# Patient Record
Sex: MALE | Race: BLACK OR AFRICAN AMERICAN | Employment: OTHER | URBAN - METROPOLITAN AREA
[De-identification: names, ages, dates, MRNs, and addresses within clinical notes are randomized per-mention and may not be internally consistent; named-entity substitution may affect disease eponyms.]

---

## 2017-01-01 ENCOUNTER — HOSPITAL ENCOUNTER (OUTPATIENT)
Dept: CT IMAGING | Age: 72
Discharge: HOME | End: 2017-11-16
Attending: INTERNAL MEDICINE
Payer: MEDICARE

## 2017-01-01 ENCOUNTER — HOSPITAL ENCOUNTER (OUTPATIENT)
Dept: INFUSION THERAPY | Age: 72
Discharge: HOME | End: 2017-11-17
Payer: MEDICARE

## 2017-01-01 ENCOUNTER — OFFICE VISIT (OUTPATIENT)
Dept: ONCOLOGY | Age: 72
End: 2017-01-01

## 2017-01-01 ENCOUNTER — HOSPITAL ENCOUNTER (OUTPATIENT)
Dept: INFUSION THERAPY | Age: 72
Discharge: HOME | End: 2017-12-08
Payer: MEDICARE

## 2017-01-01 ENCOUNTER — TELEPHONE (OUTPATIENT)
Dept: FAMILY MEDICINE CLINIC | Age: 72
End: 2017-01-01

## 2017-01-01 ENCOUNTER — HOSPITAL ENCOUNTER (OUTPATIENT)
Dept: INFUSION THERAPY | Age: 72
Discharge: HOME | End: 2017-11-27
Payer: MEDICARE

## 2017-01-01 ENCOUNTER — RESEARCH ENCOUNTER (OUTPATIENT)
Dept: ONCOLOGY | Age: 72
End: 2017-01-01

## 2017-01-01 ENCOUNTER — HOSPITAL ENCOUNTER (OUTPATIENT)
Dept: INFUSION THERAPY | Age: 72
End: 2017-01-01
Payer: MEDICARE

## 2017-01-01 ENCOUNTER — HOSPITAL ENCOUNTER (OUTPATIENT)
Dept: INFUSION THERAPY | Age: 72
Discharge: HOME | End: 2017-12-15
Payer: MEDICARE

## 2017-01-01 ENCOUNTER — HOSPITAL ENCOUNTER (OUTPATIENT)
Dept: INFUSION THERAPY | Age: 72
Discharge: HOME | End: 2017-12-29
Payer: MEDICARE

## 2017-01-01 VITALS
DIASTOLIC BLOOD PRESSURE: 62 MMHG | OXYGEN SATURATION: 98 % | HEART RATE: 69 BPM | BODY MASS INDEX: 25.48 KG/M2 | RESPIRATION RATE: 16 BRPM | TEMPERATURE: 96.6 F | SYSTOLIC BLOOD PRESSURE: 106 MMHG | HEIGHT: 69 IN | WEIGHT: 172 LBS

## 2017-01-01 VITALS
TEMPERATURE: 97.8 F | DIASTOLIC BLOOD PRESSURE: 54 MMHG | HEART RATE: 74 BPM | OXYGEN SATURATION: 99 % | RESPIRATION RATE: 20 BRPM | HEIGHT: 69 IN | BODY MASS INDEX: 26.81 KG/M2 | SYSTOLIC BLOOD PRESSURE: 131 MMHG | WEIGHT: 181 LBS

## 2017-01-01 VITALS
WEIGHT: 190 LBS | OXYGEN SATURATION: 98 % | TEMPERATURE: 97.8 F | HEART RATE: 72 BPM | DIASTOLIC BLOOD PRESSURE: 62 MMHG | BODY MASS INDEX: 28.14 KG/M2 | RESPIRATION RATE: 20 BRPM | SYSTOLIC BLOOD PRESSURE: 126 MMHG | HEIGHT: 69 IN

## 2017-01-01 VITALS
RESPIRATION RATE: 18 BRPM | HEART RATE: 79 BPM | WEIGHT: 173 LBS | SYSTOLIC BLOOD PRESSURE: 120 MMHG | DIASTOLIC BLOOD PRESSURE: 65 MMHG | OXYGEN SATURATION: 98 % | BODY MASS INDEX: 25.62 KG/M2 | TEMPERATURE: 97.8 F | HEIGHT: 69 IN

## 2017-01-01 VITALS
RESPIRATION RATE: 16 BRPM | TEMPERATURE: 96.5 F | HEIGHT: 69 IN | DIASTOLIC BLOOD PRESSURE: 62 MMHG | BODY MASS INDEX: 26.59 KG/M2 | WEIGHT: 179.5 LBS | HEART RATE: 69 BPM | SYSTOLIC BLOOD PRESSURE: 112 MMHG

## 2017-01-01 VITALS
DIASTOLIC BLOOD PRESSURE: 72 MMHG | HEIGHT: 69 IN | WEIGHT: 181.1 LBS | RESPIRATION RATE: 18 BRPM | HEART RATE: 70 BPM | BODY MASS INDEX: 26.82 KG/M2 | SYSTOLIC BLOOD PRESSURE: 119 MMHG | TEMPERATURE: 96.8 F

## 2017-01-01 VITALS
BODY MASS INDEX: 26.35 KG/M2 | RESPIRATION RATE: 17 BRPM | HEART RATE: 63 BPM | SYSTOLIC BLOOD PRESSURE: 114 MMHG | HEIGHT: 69 IN | DIASTOLIC BLOOD PRESSURE: 67 MMHG | TEMPERATURE: 97.1 F | WEIGHT: 177.9 LBS

## 2017-01-01 VITALS
TEMPERATURE: 97.1 F | BODY MASS INDEX: 28.27 KG/M2 | RESPIRATION RATE: 16 BRPM | WEIGHT: 190.9 LBS | HEART RATE: 68 BPM | DIASTOLIC BLOOD PRESSURE: 57 MMHG | SYSTOLIC BLOOD PRESSURE: 106 MMHG | HEIGHT: 69 IN

## 2017-01-01 DIAGNOSIS — C34.90 PRIMARY MALIGNANT NEOPLASM OF LUNG METASTATIC TO OTHER SITE, UNSPECIFIED LATERALITY (HCC): ICD-10-CM

## 2017-01-01 DIAGNOSIS — C77.0 METASTASIS TO SUPRACLAVICULAR LYMPH NODE (HCC): ICD-10-CM

## 2017-01-01 DIAGNOSIS — I50.30 (HFPEF) HEART FAILURE WITH PRESERVED EJECTION FRACTION (HCC): Chronic | ICD-10-CM

## 2017-01-01 DIAGNOSIS — Z87.891 HISTORY OF TOBACCO ABUSE: ICD-10-CM

## 2017-01-01 DIAGNOSIS — C77.1 METASTASIS TO MEDIASTINAL LYMPH NODE (HCC): ICD-10-CM

## 2017-01-01 DIAGNOSIS — I82.443 ACUTE DEEP VEIN THROMBOSIS (DVT) OF TIBIAL VEIN OF BOTH LOWER EXTREMITIES (HCC): ICD-10-CM

## 2017-01-01 DIAGNOSIS — C34.90 PRIMARY MALIGNANT NEOPLASM OF LUNG METASTATIC TO OTHER SITE, UNSPECIFIED LATERALITY (HCC): Primary | ICD-10-CM

## 2017-01-01 DIAGNOSIS — G62.0 CHEMOTHERAPY-INDUCED NEUROPATHY (HCC): ICD-10-CM

## 2017-01-01 DIAGNOSIS — C34.92 PRIMARY MALIGNANT NEOPLASM OF LEFT LUNG METASTATIC TO OTHER SITE (HCC): Primary | ICD-10-CM

## 2017-01-01 DIAGNOSIS — C79.31 METASTASIS TO BRAIN (HCC): ICD-10-CM

## 2017-01-01 DIAGNOSIS — I48.92 PAROXYSMAL ATRIAL FLUTTER (HCC): ICD-10-CM

## 2017-01-01 DIAGNOSIS — T45.1X5A CHEMOTHERAPY-INDUCED NEUROPATHY (HCC): ICD-10-CM

## 2017-01-01 DIAGNOSIS — C79.31 BRAIN METASTASES (HCC): ICD-10-CM

## 2017-01-01 DIAGNOSIS — I48.91 ATRIAL FIBRILLATION WITH RAPID VENTRICULAR RESPONSE (HCC): ICD-10-CM

## 2017-01-01 DIAGNOSIS — D64.9 ANEMIA, UNSPECIFIED TYPE: ICD-10-CM

## 2017-01-01 LAB
ALBUMIN SERPL-MCNC: 3.1 G/DL (ref 3.5–5)
ALBUMIN SERPL-MCNC: 3.2 G/DL (ref 3.5–5)
ALBUMIN SERPL-MCNC: 3.2 G/DL (ref 3.5–5)
ALBUMIN/GLOB SERPL: 0.8 {RATIO} (ref 1.1–2.2)
ALBUMIN/GLOB SERPL: 0.8 {RATIO} (ref 1.1–2.2)
ALBUMIN/GLOB SERPL: 0.9 {RATIO} (ref 1.1–2.2)
ALP SERPL-CCNC: 78 U/L (ref 45–117)
ALP SERPL-CCNC: 79 U/L (ref 45–117)
ALP SERPL-CCNC: 94 U/L (ref 45–117)
ALT SERPL-CCNC: 22 U/L (ref 12–78)
ALT SERPL-CCNC: 24 U/L (ref 12–78)
ALT SERPL-CCNC: 29 U/L (ref 12–78)
ANION GAP SERPL CALC-SCNC: 7 MMOL/L (ref 5–15)
ANION GAP SERPL CALC-SCNC: 7 MMOL/L (ref 5–15)
ANION GAP SERPL CALC-SCNC: 9 MMOL/L (ref 5–15)
AST SERPL-CCNC: 19 U/L (ref 15–37)
AST SERPL-CCNC: 20 U/L (ref 15–37)
AST SERPL-CCNC: 22 U/L (ref 15–37)
BASOPHILS # BLD: 0 K/UL (ref 0–0.1)
BASOPHILS # BLD: 0.1 K/UL (ref 0–0.1)
BASOPHILS NFR BLD: 0 % (ref 0–1)
BASOPHILS NFR BLD: 1 % (ref 0–1)
BASOPHILS NFR BLD: 2 % (ref 0–1)
BILIRUB SERPL-MCNC: 0.2 MG/DL (ref 0.2–1)
BUN SERPL-MCNC: 10 MG/DL (ref 6–20)
BUN SERPL-MCNC: 12 MG/DL (ref 6–20)
BUN SERPL-MCNC: 12 MG/DL (ref 6–20)
BUN/CREAT SERPL: 13 (ref 12–20)
BUN/CREAT SERPL: 16 (ref 12–20)
BUN/CREAT SERPL: 17 (ref 12–20)
CALCIUM SERPL-MCNC: 9.2 MG/DL (ref 8.5–10.1)
CHLORIDE SERPL-SCNC: 102 MMOL/L (ref 97–108)
CHLORIDE SERPL-SCNC: 104 MMOL/L (ref 97–108)
CHLORIDE SERPL-SCNC: 104 MMOL/L (ref 97–108)
CO2 SERPL-SCNC: 27 MMOL/L (ref 21–32)
CO2 SERPL-SCNC: 27 MMOL/L (ref 21–32)
CO2 SERPL-SCNC: 28 MMOL/L (ref 21–32)
CREAT SERPL-MCNC: 0.7 MG/DL (ref 0.7–1.3)
CREAT SERPL-MCNC: 0.73 MG/DL (ref 0.7–1.3)
CREAT SERPL-MCNC: 0.77 MG/DL (ref 0.7–1.3)
DIFFERENTIAL METHOD BLD: ABNORMAL
EOSINOPHIL # BLD: 0.1 K/UL (ref 0–0.4)
EOSINOPHIL # BLD: 0.1 K/UL (ref 0–0.4)
EOSINOPHIL # BLD: 0.2 K/UL (ref 0–0.4)
EOSINOPHIL # BLD: 0.3 K/UL (ref 0–0.4)
EOSINOPHIL # BLD: 0.4 K/UL (ref 0–0.4)
EOSINOPHIL NFR BLD: 1 % (ref 0–7)
EOSINOPHIL NFR BLD: 1 % (ref 0–7)
EOSINOPHIL NFR BLD: 2 % (ref 0–7)
EOSINOPHIL NFR BLD: 3 % (ref 0–7)
EOSINOPHIL NFR BLD: 4 % (ref 0–7)
ERYTHROCYTE [DISTWIDTH] IN BLOOD BY AUTOMATED COUNT: 21 % (ref 11.5–14.5)
ERYTHROCYTE [DISTWIDTH] IN BLOOD BY AUTOMATED COUNT: 21.3 % (ref 11.5–14.5)
ERYTHROCYTE [DISTWIDTH] IN BLOOD BY AUTOMATED COUNT: 21.4 % (ref 11.5–14.5)
ERYTHROCYTE [DISTWIDTH] IN BLOOD BY AUTOMATED COUNT: 22.7 % (ref 11.5–14.5)
ERYTHROCYTE [DISTWIDTH] IN BLOOD BY AUTOMATED COUNT: 23.4 % (ref 11.5–14.5)
GLOBULIN SER CALC-MCNC: 3.6 G/DL (ref 2–4)
GLOBULIN SER CALC-MCNC: 3.9 G/DL (ref 2–4)
GLOBULIN SER CALC-MCNC: 3.9 G/DL (ref 2–4)
GLUCOSE SERPL-MCNC: 93 MG/DL (ref 65–100)
GLUCOSE SERPL-MCNC: 94 MG/DL (ref 65–100)
GLUCOSE SERPL-MCNC: 99 MG/DL (ref 65–100)
HCT VFR BLD AUTO: 24.7 % (ref 36.6–50.3)
HCT VFR BLD AUTO: 25.9 % (ref 36.6–50.3)
HCT VFR BLD AUTO: 28 % (ref 36.6–50.3)
HCT VFR BLD AUTO: 28.9 % (ref 36.6–50.3)
HCT VFR BLD AUTO: 29 % (ref 36.6–50.3)
HGB BLD-MCNC: 7.8 G/DL (ref 12.1–17)
HGB BLD-MCNC: 8 G/DL (ref 12.1–17)
HGB BLD-MCNC: 8.7 G/DL (ref 12.1–17)
HGB BLD-MCNC: 8.9 G/DL (ref 12.1–17)
HGB BLD-MCNC: 9.3 G/DL (ref 12.1–17)
LYMPHOCYTES # BLD: 2 K/UL (ref 0.8–3.5)
LYMPHOCYTES # BLD: 2.2 K/UL (ref 0.8–3.5)
LYMPHOCYTES # BLD: 2.2 K/UL (ref 0.8–3.5)
LYMPHOCYTES # BLD: 2.5 K/UL (ref 0.8–3.5)
LYMPHOCYTES # BLD: 2.7 K/UL (ref 0.8–3.5)
LYMPHOCYTES NFR BLD: 21 % (ref 12–49)
LYMPHOCYTES NFR BLD: 26 % (ref 12–49)
LYMPHOCYTES NFR BLD: 26 % (ref 12–49)
LYMPHOCYTES NFR BLD: 37 % (ref 12–49)
LYMPHOCYTES NFR BLD: 38 % (ref 12–49)
MCH RBC QN AUTO: 28.3 PG (ref 26–34)
MCH RBC QN AUTO: 28.8 PG (ref 26–34)
MCH RBC QN AUTO: 30 PG (ref 26–34)
MCH RBC QN AUTO: 30.5 PG (ref 26–34)
MCH RBC QN AUTO: 30.5 PG (ref 26–34)
MCHC RBC AUTO-ENTMCNC: 30.8 G/DL (ref 30–36.5)
MCHC RBC AUTO-ENTMCNC: 30.9 G/DL (ref 30–36.5)
MCHC RBC AUTO-ENTMCNC: 31.1 G/DL (ref 30–36.5)
MCHC RBC AUTO-ENTMCNC: 31.6 G/DL (ref 30–36.5)
MCHC RBC AUTO-ENTMCNC: 32.1 G/DL (ref 30–36.5)
MCV RBC AUTO: 91.1 FL (ref 80–99)
MCV RBC AUTO: 91.5 FL (ref 80–99)
MCV RBC AUTO: 95.1 FL (ref 80–99)
MCV RBC AUTO: 96.6 FL (ref 80–99)
MCV RBC AUTO: 99 FL (ref 80–99)
METAMYELOCYTES NFR BLD MANUAL: 1 %
METAMYELOCYTES NFR BLD MANUAL: 2 %
MONOCYTES # BLD: 1.2 K/UL (ref 0–1)
MONOCYTES # BLD: 1.2 K/UL (ref 0–1)
MONOCYTES # BLD: 1.7 K/UL (ref 0–1)
MONOCYTES # BLD: 1.7 K/UL (ref 0–1)
MONOCYTES # BLD: 2.3 K/UL (ref 0–1)
MONOCYTES NFR BLD: 16 % (ref 5–13)
MONOCYTES NFR BLD: 17 % (ref 5–13)
MONOCYTES NFR BLD: 21 % (ref 5–13)
MONOCYTES NFR BLD: 23 % (ref 5–13)
MONOCYTES NFR BLD: 24 % (ref 5–13)
MYELOCYTES NFR BLD MANUAL: 2 %
NEUTS BAND NFR BLD MANUAL: 1 % (ref 0–6)
NEUTS SEG # BLD: 2 K/UL (ref 1.8–8)
NEUTS SEG # BLD: 2 K/UL (ref 1.8–8)
NEUTS SEG # BLD: 4.6 K/UL (ref 1.8–8)
NEUTS SEG # BLD: 5.3 K/UL (ref 1.8–8)
NEUTS SEG # BLD: 6 K/UL (ref 1.8–8)
NEUTS SEG NFR BLD: 35 % (ref 32–75)
NEUTS SEG NFR BLD: 39 % (ref 32–75)
NEUTS SEG NFR BLD: 47 % (ref 32–75)
NEUTS SEG NFR BLD: 52 % (ref 32–75)
NEUTS SEG NFR BLD: 56 % (ref 32–75)
NRBC # BLD: 0.06 K/UL (ref 0–0.01)
NRBC BLD-RTO: 1.1 PER 100 WBC
NRBC BLD-RTO: 2 PER 100 WBC
PATH REV BLD -IMP: ABNORMAL
PLATELET # BLD AUTO: 222 K/UL (ref 150–400)
PLATELET # BLD AUTO: 276 K/UL (ref 150–400)
PLATELET # BLD AUTO: 332 K/UL (ref 150–400)
PLATELET # BLD AUTO: 358 K/UL (ref 150–400)
PLATELET # BLD AUTO: 496 K/UL (ref 150–400)
PLATELET COMMENTS,PCOM: ABNORMAL
POTASSIUM SERPL-SCNC: 3.9 MMOL/L (ref 3.5–5.1)
POTASSIUM SERPL-SCNC: 3.9 MMOL/L (ref 3.5–5.1)
POTASSIUM SERPL-SCNC: 4.5 MMOL/L (ref 3.5–5.1)
PROT SERPL-MCNC: 6.7 G/DL (ref 6.4–8.2)
PROT SERPL-MCNC: 7.1 G/DL (ref 6.4–8.2)
PROT SERPL-MCNC: 7.1 G/DL (ref 6.4–8.2)
RBC # BLD AUTO: 2.71 M/UL (ref 4.1–5.7)
RBC # BLD AUTO: 2.83 M/UL (ref 4.1–5.7)
RBC # BLD AUTO: 2.9 M/UL (ref 4.1–5.7)
RBC # BLD AUTO: 2.92 M/UL (ref 4.1–5.7)
RBC # BLD AUTO: 3.05 M/UL (ref 4.1–5.7)
RBC MORPH BLD: ABNORMAL
SODIUM SERPL-SCNC: 137 MMOL/L (ref 136–145)
SODIUM SERPL-SCNC: 138 MMOL/L (ref 136–145)
SODIUM SERPL-SCNC: 140 MMOL/L (ref 136–145)
WBC # BLD AUTO: 10.2 K/UL (ref 4.1–11.1)
WBC # BLD AUTO: 10.6 K/UL (ref 4.1–11.1)
WBC # BLD AUTO: 5.3 K/UL (ref 4.1–11.1)
WBC # BLD AUTO: 5.7 K/UL (ref 4.1–11.1)
WBC # BLD AUTO: 9.7 K/UL (ref 4.1–11.1)
WBC NRBC COR # BLD: ABNORMAL 10*3/UL

## 2017-01-01 PROCEDURE — 74011250636 HC RX REV CODE- 250/636: Performed by: INTERNAL MEDICINE

## 2017-01-01 PROCEDURE — 36415 COLL VENOUS BLD VENIPUNCTURE: CPT | Performed by: INTERNAL MEDICINE

## 2017-01-01 PROCEDURE — 74177 CT ABD & PELVIS W/CONTRAST: CPT

## 2017-01-01 PROCEDURE — 96375 TX/PRO/DX INJ NEW DRUG ADDON: CPT

## 2017-01-01 PROCEDURE — 74011000250 HC RX REV CODE- 250: Performed by: INTERNAL MEDICINE

## 2017-01-01 PROCEDURE — 85025 COMPLETE CBC W/AUTO DIFF WBC: CPT | Performed by: INTERNAL MEDICINE

## 2017-01-01 PROCEDURE — 96413 CHEMO IV INFUSION 1 HR: CPT

## 2017-01-01 PROCEDURE — 77030012965 HC NDL HUBR BBMI -A

## 2017-01-01 PROCEDURE — 80053 COMPREHEN METABOLIC PANEL: CPT | Performed by: INTERNAL MEDICINE

## 2017-01-01 PROCEDURE — 74011636320 HC RX REV CODE- 636/320: Performed by: RADIOLOGY

## 2017-01-01 PROCEDURE — 74011250636 HC RX REV CODE- 250/636

## 2017-01-01 RX ORDER — DEXAMETHASONE SODIUM PHOSPHATE 4 MG/ML
8 INJECTION, SOLUTION INTRA-ARTICULAR; INTRALESIONAL; INTRAMUSCULAR; INTRAVENOUS; SOFT TISSUE ONCE
Status: DISCONTINUED | OUTPATIENT
Start: 2017-01-01 | End: 2017-01-01

## 2017-01-01 RX ORDER — SODIUM CHLORIDE 0.9 % (FLUSH) 0.9 %
10 SYRINGE (ML) INJECTION AS NEEDED
Status: ACTIVE | OUTPATIENT
Start: 2017-01-01 | End: 2017-01-01

## 2017-01-01 RX ORDER — SODIUM CHLORIDE 0.9 % (FLUSH) 0.9 %
10-40 SYRINGE (ML) INJECTION AS NEEDED
Status: ACTIVE | OUTPATIENT
Start: 2017-01-01 | End: 2017-01-01

## 2017-01-01 RX ORDER — SODIUM CHLORIDE 9 MG/ML
25 INJECTION, SOLUTION INTRAVENOUS CONTINUOUS
Status: DISPENSED | OUTPATIENT
Start: 2017-01-01 | End: 2017-01-01

## 2017-01-01 RX ORDER — HEPARIN 100 UNIT/ML
500 SYRINGE INTRAVENOUS AS NEEDED
Status: ACTIVE | OUTPATIENT
Start: 2017-01-01 | End: 2017-01-01

## 2017-01-01 RX ORDER — SODIUM CHLORIDE 9 MG/ML
10 INJECTION INTRAMUSCULAR; INTRAVENOUS; SUBCUTANEOUS AS NEEDED
Status: ACTIVE | OUTPATIENT
Start: 2017-01-01 | End: 2017-01-01

## 2017-01-01 RX ORDER — DEXAMETHASONE SODIUM PHOSPHATE 4 MG/ML
8 INJECTION, SOLUTION INTRA-ARTICULAR; INTRALESIONAL; INTRAMUSCULAR; INTRAVENOUS; SOFT TISSUE ONCE
Status: COMPLETED | OUTPATIENT
Start: 2017-01-01 | End: 2017-01-01

## 2017-01-01 RX ORDER — SODIUM CHLORIDE 9 MG/ML
10 INJECTION INTRAMUSCULAR; INTRAVENOUS; SUBCUTANEOUS AS NEEDED
Status: DISPENSED | OUTPATIENT
Start: 2017-01-01 | End: 2017-01-01

## 2017-01-01 RX ORDER — SODIUM CHLORIDE 9 MG/ML
25 INJECTION, SOLUTION INTRAVENOUS CONTINUOUS
Status: DISCONTINUED | OUTPATIENT
Start: 2017-01-01 | End: 2017-01-01

## 2017-01-01 RX ADMIN — SODIUM CHLORIDE 1920 MG: 900 INJECTION, SOLUTION INTRAVENOUS at 11:50

## 2017-01-01 RX ADMIN — SODIUM CHLORIDE 10 ML: 9 INJECTION, SOLUTION INTRAMUSCULAR; INTRAVENOUS; SUBCUTANEOUS at 09:32

## 2017-01-01 RX ADMIN — Medication 500 UNITS: at 15:56

## 2017-01-01 RX ADMIN — Medication 500 UNITS: at 14:10

## 2017-01-01 RX ADMIN — SODIUM CHLORIDE 10 ML: 9 INJECTION, SOLUTION INTRAMUSCULAR; INTRAVENOUS; SUBCUTANEOUS at 10:28

## 2017-01-01 RX ADMIN — DEXAMETHASONE SODIUM PHOSPHATE 8 MG: 4 INJECTION, SOLUTION INTRA-ARTICULAR; INTRALESIONAL; INTRAMUSCULAR; INTRAVENOUS; SOFT TISSUE at 10:40

## 2017-01-01 RX ADMIN — DEXAMETHASONE SODIUM PHOSPHATE 8 MG: 4 INJECTION, SOLUTION INTRA-ARTICULAR; INTRALESIONAL; INTRAMUSCULAR; INTRAVENOUS; SOFT TISSUE at 11:19

## 2017-01-01 RX ADMIN — Medication 20 ML: at 09:37

## 2017-01-01 RX ADMIN — SODIUM CHLORIDE 25 ML/HR: 900 INJECTION, SOLUTION INTRAVENOUS at 12:53

## 2017-01-01 RX ADMIN — DEXAMETHASONE SODIUM PHOSPHATE 8 MG: 4 INJECTION, SOLUTION INTRA-ARTICULAR; INTRALESIONAL; INTRAMUSCULAR; INTRAVENOUS; SOFT TISSUE at 14:48

## 2017-01-01 RX ADMIN — SODIUM CHLORIDE 10 ML: 9 INJECTION INTRAMUSCULAR; INTRAVENOUS; SUBCUTANEOUS at 11:25

## 2017-01-01 RX ADMIN — SODIUM CHLORIDE 1920 MG: 900 INJECTION, SOLUTION INTRAVENOUS at 13:35

## 2017-01-01 RX ADMIN — SODIUM CHLORIDE, PRESERVATIVE FREE 500 UNITS: 5 INJECTION INTRAVENOUS at 14:15

## 2017-01-01 RX ADMIN — Medication 10 ML: at 12:00

## 2017-01-01 RX ADMIN — Medication 10 ML: at 14:15

## 2017-01-01 RX ADMIN — SODIUM CHLORIDE 25 ML/HR: 900 INJECTION, SOLUTION INTRAVENOUS at 09:40

## 2017-01-01 RX ADMIN — SODIUM CHLORIDE 10 ML: 9 INJECTION INTRAMUSCULAR; INTRAVENOUS; SUBCUTANEOUS at 13:20

## 2017-01-01 RX ADMIN — SODIUM CHLORIDE 25 ML/HR: 900 INJECTION, SOLUTION INTRAVENOUS at 11:16

## 2017-01-01 RX ADMIN — Medication 20 ML: at 10:38

## 2017-01-01 RX ADMIN — Medication 500 UNITS: at 12:25

## 2017-01-01 RX ADMIN — Medication 10 ML: at 14:10

## 2017-01-01 RX ADMIN — DEXAMETHASONE SODIUM PHOSPHATE 8 MG: 4 INJECTION, SOLUTION INTRA-ARTICULAR; INTRALESIONAL; INTRAMUSCULAR; INTRAVENOUS; SOFT TISSUE at 12:39

## 2017-01-01 RX ADMIN — SODIUM CHLORIDE 1920 MG: 900 INJECTION, SOLUTION INTRAVENOUS at 15:21

## 2017-01-01 RX ADMIN — Medication 500 UNITS: at 12:00

## 2017-01-01 RX ADMIN — SODIUM CHLORIDE 10 ML: 9 INJECTION INTRAMUSCULAR; INTRAVENOUS; SUBCUTANEOUS at 09:28

## 2017-01-01 RX ADMIN — Medication 10 ML: at 12:25

## 2017-01-01 RX ADMIN — SODIUM CHLORIDE 1920 MG: 900 INJECTION, SOLUTION INTRAVENOUS at 13:24

## 2017-01-01 RX ADMIN — IOPAMIDOL 87 ML: 755 INJECTION, SOLUTION INTRAVENOUS at 16:00

## 2017-01-01 RX ADMIN — SODIUM CHLORIDE 25 ML/HR: 900 INJECTION, SOLUTION INTRAVENOUS at 12:36

## 2017-01-01 RX ADMIN — DEXAMETHASONE SODIUM PHOSPHATE 8 MG: 4 INJECTION, SOLUTION INTRA-ARTICULAR; INTRALESIONAL; INTRAMUSCULAR; INTRAVENOUS; SOFT TISSUE at 12:55

## 2017-01-01 RX ADMIN — SODIUM CHLORIDE 25 ML/HR: 900 INJECTION, SOLUTION INTRAVENOUS at 14:46

## 2017-01-01 RX ADMIN — SODIUM CHLORIDE 1920 MG: 900 INJECTION, SOLUTION INTRAVENOUS at 11:25

## 2017-01-01 RX ADMIN — Medication 10 ML: at 15:56

## 2017-04-20 ENCOUNTER — OFFICE VISIT (OUTPATIENT)
Dept: FAMILY MEDICINE CLINIC | Age: 72
End: 2017-04-20

## 2017-04-20 VITALS
SYSTOLIC BLOOD PRESSURE: 134 MMHG | WEIGHT: 171 LBS | RESPIRATION RATE: 18 BRPM | DIASTOLIC BLOOD PRESSURE: 72 MMHG | TEMPERATURE: 98 F | HEIGHT: 69 IN | HEART RATE: 89 BPM | OXYGEN SATURATION: 97 % | BODY MASS INDEX: 25.33 KG/M2

## 2017-04-20 DIAGNOSIS — Z72.0 TOBACCO ABUSE: ICD-10-CM

## 2017-04-20 DIAGNOSIS — E78.5 HYPERLIPIDEMIA, UNSPECIFIED HYPERLIPIDEMIA TYPE: ICD-10-CM

## 2017-04-20 DIAGNOSIS — I10 ESSENTIAL HYPERTENSION: Primary | ICD-10-CM

## 2017-04-20 NOTE — PATIENT INSTRUCTIONS
DASH Diet: Care Instructions  Your Care Instructions  The DASH diet is an eating plan that can help lower your blood pressure. DASH stands for Dietary Approaches to Stop Hypertension. Hypertension is high blood pressure. The DASH diet focuses on eating foods that are high in calcium, potassium, and magnesium. These nutrients can lower blood pressure. The foods that are highest in these nutrients are fruits, vegetables, low-fat dairy products, nuts, seeds, and legumes. But taking calcium, potassium, and magnesium supplements instead of eating foods that are high in those nutrients does not have the same effect. The DASH diet also includes whole grains, fish, and poultry. The DASH diet is one of several lifestyle changes your doctor may recommend to lower your high blood pressure. Your doctor may also want you to decrease the amount of sodium in your diet. Lowering sodium while following the DASH diet can lower blood pressure even further than just the DASH diet alone. Follow-up care is a key part of your treatment and safety. Be sure to make and go to all appointments, and call your doctor if you are having problems. It's also a good idea to know your test results and keep a list of the medicines you take. How can you care for yourself at home? Following the DASH diet  · Eat 4 to 5 servings of fruit each day. A serving is 1 medium-sized piece of fruit, ½ cup chopped or canned fruit, 1/4 cup dried fruit, or 4 ounces (½ cup) of fruit juice. Choose fruit more often than fruit juice. · Eat 4 to 5 servings of vegetables each day. A serving is 1 cup of lettuce or raw leafy vegetables, ½ cup of chopped or cooked vegetables, or 4 ounces (½ cup) of vegetable juice. Choose vegetables more often than vegetable juice. · Get 2 to 3 servings of low-fat and fat-free dairy each day. A serving is 8 ounces of milk, 1 cup of yogurt, or 1 ½ ounces of cheese. · Eat 6 to 8 servings of grains each day.  A serving is 1 slice of bread, 1 ounce of dry cereal, or ½ cup of cooked rice, pasta, or cooked cereal. Try to choose whole-grain products as much as possible. · Limit lean meat, poultry, and fish to 2 servings each day. A serving is 3 ounces, about the size of a deck of cards. · Eat 4 to 5 servings of nuts, seeds, and legumes (cooked dried beans, lentils, and split peas) each week. A serving is 1/3 cup of nuts, 2 tablespoons of seeds, or ½ cup of cooked beans or peas. · Limit fats and oils to 2 to 3 servings each day. A serving is 1 teaspoon of vegetable oil or 2 tablespoons of salad dressing. · Limit sweets and added sugars to 5 servings or less a week. A serving is 1 tablespoon jelly or jam, ½ cup sorbet, or 1 cup of lemonade. · Eat less than 2,300 milligrams (mg) of sodium a day. If you limit your sodium to 1,500 mg a day, you can lower your blood pressure even more. Tips for success  · Start small. Do not try to make dramatic changes to your diet all at once. You might feel that you are missing out on your favorite foods and then be more likely to not follow the plan. Make small changes, and stick with them. Once those changes become habit, add a few more changes. · Try some of the following:  ¨ Make it a goal to eat a fruit or vegetable at every meal and at snacks. This will make it easy to get the recommended amount of fruits and vegetables each day. ¨ Try yogurt topped with fruit and nuts for a snack or healthy dessert. ¨ Add lettuce, tomato, cucumber, and onion to sandwiches. ¨ Combine a ready-made pizza crust with low-fat mozzarella cheese and lots of vegetable toppings. Try using tomatoes, squash, spinach, broccoli, carrots, cauliflower, and onions. ¨ Have a variety of cut-up vegetables with a low-fat dip as an appetizer instead of chips and dip. ¨ Sprinkle sunflower seeds or chopped almonds over salads. Or try adding chopped walnuts or almonds to cooked vegetables. ¨ Try some vegetarian meals using beans and peas. Add garbanzo or kidney beans to salads. Make burritos and tacos with mashed cox beans or black beans. Where can you learn more? Go to http://larissa-natalie.info/. Enter S697 in the search box to learn more about \"DASH Diet: Care Instructions. \"  Current as of: March 23, 2016  Content Version: 11.2  © 7416-9644 "[employer]. Care instructions adapted under license by [employer] (which disclaims liability or warranty for this information). If you have questions about a medical condition or this instruction, always ask your healthcare professional. [place] [address] any warranty or liability for your use of this information. Stopping Smoking: Care Instructions  Your Care Instructions  Cigarette smokers crave the nicotine in cigarettes. Giving it up is much harder than simply changing a habit. Your body has to stop craving the nicotine. It is hard to quit, but you can do it. There are many tools that people use to quit smoking. You may find that combining tools works best for you. There are several steps to quitting. First you get ready to quit. Then you get support to help you. After that, you learn new skills and behaviors to become a nonsmoker. For many people, a necessary step is getting and using medicine. Your doctor will help you set up the plan that best meets your needs. You may want to attend a smoking cessation program to help you quit smoking. When you choose a program, look for one that has proven success. Ask your doctor for ideas. You will greatly increase your chances of success if you take medicine as well as get counseling or join a cessation program.  Some of the changes you feel when you first quit tobacco are uncomfortable. Your body will miss the nicotine at first, and you may feel short-tempered and grumpy. You may have trouble sleeping or concentrating. Medicine can help you deal with these symptoms.  You may struggle with changing your smoking habits and rituals. The last step is the tricky one: Be prepared for the smoking urge to continue for a time. This is a lot to deal with, but keep at it. You will feel better. Follow-up care is a key part of your treatment and safety. Be sure to make and go to all appointments, and call your doctor if you are having problems. Its also a good idea to know your test results and keep a list of the medicines you take. How can you care for yourself at home? · Ask your family, friends, and coworkers for support. You have a better chance of quitting if you have help and support. · Join a support group, such as Nicotine Anonymous, for people who are trying to quit smoking. · Consider signing up for a smoking cessation program, such as the American Lung Association's Freedom from Smoking program.  · Set a quit date. Pick your date carefully so that it is not right in the middle of a big deadline or stressful time. Once you quit, do not even take a puff. Get rid of all ashtrays and lighters after your last cigarette. Clean your house and your clothes so that they do not smell of smoke. · Learn how to be a nonsmoker. Think about ways you can avoid those things that make you reach for a cigarette. ¨ Avoid situations that put you at greatest risk for smoking. For some people, it is hard to have a drink with friends without smoking. For others, they might skip a coffee break with coworkers who smoke. ¨ Change your daily routine. Take a different route to work or eat a meal in a different place. · Cut down on stress. Calm yourself or release tension by doing an activity you enjoy, such as reading a book, taking a hot bath, or gardening. · Talk to your doctor or pharmacist about nicotine replacement therapy, which replaces the nicotine in your body. You still get nicotine but you do not use tobacco. Nicotine replacement products help you slowly reduce the amount of nicotine you need.  These products come in several forms, many of them available over-the-counter:  ¨ Nicotine patches  ¨ Nicotine gum and lozenges  ¨ Nicotine inhaler  · Ask your doctor about bupropion (Wellbutrin) or varenicline (Chantix), which are prescription medicines. They do not contain nicotine. They help you by reducing withdrawal symptoms, such as stress and anxiety. · Some people find hypnosis, acupuncture, and massage helpful for ending the smoking habit. · Eat a healthy diet and get regular exercise. Having healthy habits will help your body move past its craving for nicotine. · Be prepared to keep trying. Most people are not successful the first few times they try to quit. Do not get mad at yourself if you smoke again. Make a list of things you learned and think about when you want to try again, such as next week, next month, or next year. Where can you learn more? Go to http://larissa-natalie.info/. Enter V618 in the search box to learn more about \"Stopping Smoking: Care Instructions. \"  Current as of: May 26, 2016  Content Version: 11.2  © 9900-2348 [employer]. Care instructions adapted under license by [employer] (which disclaims liability or warranty for this information). If you have questions about a medical condition or this instruction, always ask your healthcare professional. [address] any warranty or liability for your use of this information.

## 2017-04-20 NOTE — PROGRESS NOTES
Chief Complaint   Patient presents with    Hypertension    Cholesterol Problem     he is a 66y.o. year old male who presents     Hypertension:  The patient reports:  taking medications as instructed, no medication side effects noted, no TIA's, no chest pain on exertion, no dyspnea on exertion, no swelling of ankles. Lifestyle modification/social history: sedentary     BP Readings from Last 3 Encounters:   04/20/17 134/72   10/18/16 125/71   05/27/16 112/66     Lab Results   Component Value Date/Time    TSH 2.000 04/20/2017 11:02 AM      Lab Results   Component Value Date/Time    Sodium 140 04/20/2017 11:02 AM    Potassium 4.1 04/20/2017 11:02 AM    Chloride 98 04/20/2017 11:02 AM    CO2 25 04/20/2017 11:02 AM    Anion gap 9 10/13/2010 11:04 AM    Glucose 77 04/20/2017 11:02 AM    BUN 8 04/20/2017 11:02 AM    Creatinine 0.57 04/20/2017 11:02 AM    BUN/Creatinine ratio 14 04/20/2017 11:02 AM    GFR est  04/20/2017 11:02 AM    GFR est non- 04/20/2017 11:02 AM    Calcium 9.9 04/20/2017 11:02 AM    Bilirubin, total 0.4 04/20/2017 11:02 AM    ALT (SGPT) 19 04/20/2017 11:02 AM    AST (SGOT) 20 04/20/2017 11:02 AM    Alk. phosphatase 84 04/20/2017 11:02 AM    Protein, total 7.6 04/20/2017 11:02 AM    Albumin 4.1 04/20/2017 11:02 AM    Globulin 3.2 10/13/2010 11:04 AM    A-G Ratio 1.2 04/20/2017 11:02 AM          Patient advised to log blood pressures at home 2-3 times weekly and bring to next visit. Call office as soon as possible if BP's over 140/90 on multiple occasions or with symptoms of dizziness, chest pain, shortness of breath, headache or ankle swelling. Our goal is to normalize the blood pressure to decrease the risks of strokes and heart attacks. The patient is in agreement with the plan. Hyperlipidemia    Cardiovascular risks for him are: HTN, HLD, tobacco abuse. LDL goal is under 100.      Lab Results   Component Value Date/Time    Cholesterol, total 153 04/20/2017 11:02 AM    HDL Cholesterol 45 04/20/2017 11:02 AM    LDL, calculated 77 04/20/2017 11:02 AM    Triglyceride 153 04/20/2017 11:02 AM    CHOL/HDL Ratio 3.4 10/13/2010 11:04 AM       Our goal is to lower hyperlipidemia to decrease the risks of strokes and heart attacks      Patient Active Problem List   Diagnosis Code    ED (erectile dysfunction) N52.9    Hyperlipidemia E78.5    Cough R05    HTN (hypertension) I10    Tobacco abuse Z72.0    Alcohol abuse F10.10    Groin fullness R19.00    Non-compliance Z91.19     History reviewed. No pertinent surgical history. Social History     Social History    Marital status: SINGLE     Spouse name: N/A    Number of children: N/A    Years of education: N/A     Occupational History    Not on file. Social History Main Topics    Smoking status: Current Every Day Smoker     Packs/day: 1.00     Years: 45.00     Types: Cigarettes    Smokeless tobacco: Never Used    Alcohol use 1.5 oz/week     3 Cans of beer per week    Drug use: No    Sexual activity: Not on file     Other Topics Concern    Not on file     Social History Narrative     No family history on file. Current Outpatient Prescriptions   Medication Sig    amLODIPine (NORVASC) 5 mg tablet TAKE ONE TABLET BY MOUTH EVERY DAY    levocetirizine (XYZAL) 5 mg tablet Take 1 Tab by mouth daily. No current facility-administered medications for this visit.       Allergies   Allergen Reactions    Lisinopril Angioedema       Review of Systems:  Constitutional: feeling well, denies fatigue, malaise  Skin: Negative for rash or lesion  HEENT: Negative for acute hearing or vision changes  Respiratory: Negative for cough, wheezing or SOB  Cardiovascular: Negative for chest pain, dizziness or palpitations  Gastrointestinal: Negative for nausea or abdominal pain  Genital/urinary: Negative for dysuria or voiding dysfunction  Musculoskeletal: Negative for acute myalgia or arthralgia   Neurological: Negative for HA, weakness or paresthesia  Psychlogical: Negative for depression or anxiety     Vitals:    04/20/17 0939 04/20/17 0942   BP: 143/83 134/72   Pulse: 89    Resp: 18    Temp: 98 °F (36.7 °C)    TempSrc: Oral    SpO2: 97%    Weight: 171 lb (77.6 kg)    Height: 5' 9\" (1.753 m)        Physical Examination:  General: Well developed, well nourished, in no acute distress  Skin: Warm and dry, no rash or lesion appreciated  Head: Normocephalic, atraumatic  Eyes: Sclera clear, EOMI  Neck: Normal range of motion  Respiratory: Clear to auscultation bilaterally with symmetrical effort  Cardiovascular: Normal S1, S2, Regular rate and rhythm  Abdomen: Soft, Normal BS, non-distended  Extremities: Full range of motion, Normal gait  Neurological: Normal strength and sensation. No focal deficits  Psychological: Active, alert and oriented. Affect appropriate     Health Maintenance Due   Topic Date Due    DTaP/Tdap/Td series (1 - Tdap) 07/26/1965    ZOSTER VACCINE AGE 60>  07/26/2004    GLAUCOMA SCREENING Q2Y  07/26/2009     Risk, benefits and potential costs of recommended health maintenance discussed. Patient expressed understanding and declined at this time. Jocelyn was seen today for hypertension and cholesterol problem. Diagnoses and all orders for this visit:    Essential hypertension  -     METABOLIC PANEL, COMPREHENSIVE  -     CBC W/O DIFF  -     TSH 3RD GENERATION    Hyperlipidemia, unspecified hyperlipidemia type  -     METABOLIC PANEL, COMPREHENSIVE  -     LIPID PANEL    Tobacco abuse    Strongly advised patient to stop all use of tobacco products. I have discussed the diagnosis with the patient and the intended plan as seen in the above orders. The patient expresses understanding and agreement with our plan of care. All of the patient's questions were answered to apparent satisfaction. The patient has received an after-visit summary.  The patient knows to call our office if there are any questions or concerns regarding diagnosis and treatment plans. I have discussed medication side effects and warnings with the patient as well. Follow-up Disposition:  Return in about 6 months (around 10/20/2017), or if symptoms worsen or fail to improve.

## 2017-04-20 NOTE — PROGRESS NOTES
Reviewed record in preparation for visit and have necessary documentation  Pt did not bring medication to office visit for review  Opportunity was given for questions  Goals that were addressed and/or need to be completed after this appointment include   Health Maintenance Due   Topic Date Due    DTaP/Tdap/Td series (1 - Tdap) 07/26/1965    ZOSTER VACCINE AGE 60>  07/26/2004    GLAUCOMA SCREENING Q2Y  07/26/2009

## 2017-04-21 LAB
ALBUMIN SERPL-MCNC: 4.1 G/DL (ref 3.5–4.8)
ALBUMIN/GLOB SERPL: 1.2 {RATIO} (ref 1.2–2.2)
ALP SERPL-CCNC: 84 IU/L (ref 39–117)
ALT SERPL-CCNC: 19 IU/L (ref 0–44)
AST SERPL-CCNC: 20 IU/L (ref 0–40)
BILIRUB SERPL-MCNC: 0.4 MG/DL (ref 0–1.2)
BUN SERPL-MCNC: 8 MG/DL (ref 8–27)
BUN/CREAT SERPL: 14 (ref 10–24)
CALCIUM SERPL-MCNC: 9.9 MG/DL (ref 8.6–10.2)
CHLORIDE SERPL-SCNC: 98 MMOL/L (ref 96–106)
CHOLEST SERPL-MCNC: 153 MG/DL (ref 100–199)
CO2 SERPL-SCNC: 25 MMOL/L (ref 18–29)
CREAT SERPL-MCNC: 0.57 MG/DL (ref 0.76–1.27)
ERYTHROCYTE [DISTWIDTH] IN BLOOD BY AUTOMATED COUNT: 14.8 % (ref 12.3–15.4)
GLOBULIN SER CALC-MCNC: 3.5 G/DL (ref 1.5–4.5)
GLUCOSE SERPL-MCNC: 77 MG/DL (ref 65–99)
HCT VFR BLD AUTO: 42 % (ref 37.5–51)
HDLC SERPL-MCNC: 45 MG/DL
HGB BLD-MCNC: 14 G/DL (ref 12.6–17.7)
LDLC SERPL CALC-MCNC: 77 MG/DL (ref 0–99)
MCH RBC QN AUTO: 30 PG (ref 26.6–33)
MCHC RBC AUTO-ENTMCNC: 33.3 G/DL (ref 31.5–35.7)
MCV RBC AUTO: 90 FL (ref 79–97)
PLATELET # BLD AUTO: 343 X10E3/UL (ref 150–379)
POTASSIUM SERPL-SCNC: 4.1 MMOL/L (ref 3.5–5.2)
PROT SERPL-MCNC: 7.6 G/DL (ref 6–8.5)
RBC # BLD AUTO: 4.66 X10E6/UL (ref 4.14–5.8)
SODIUM SERPL-SCNC: 140 MMOL/L (ref 134–144)
TRIGL SERPL-MCNC: 153 MG/DL (ref 0–149)
TSH SERPL DL<=0.005 MIU/L-ACNC: 2 UIU/ML (ref 0.45–4.5)
VLDLC SERPL CALC-MCNC: 31 MG/DL (ref 5–40)
WBC # BLD AUTO: 8.2 X10E3/UL (ref 3.4–10.8)

## 2017-07-11 ENCOUNTER — TELEPHONE (OUTPATIENT)
Dept: FAMILY MEDICINE CLINIC | Age: 72
End: 2017-07-11

## 2017-07-11 ENCOUNTER — OFFICE VISIT (OUTPATIENT)
Dept: FAMILY MEDICINE CLINIC | Age: 72
End: 2017-07-11

## 2017-07-11 VITALS
BODY MASS INDEX: 23.55 KG/M2 | OXYGEN SATURATION: 98 % | TEMPERATURE: 97.9 F | WEIGHT: 159 LBS | HEART RATE: 108 BPM | RESPIRATION RATE: 18 BRPM | DIASTOLIC BLOOD PRESSURE: 83 MMHG | HEIGHT: 69 IN | SYSTOLIC BLOOD PRESSURE: 138 MMHG

## 2017-07-11 DIAGNOSIS — R05.8 COUGH WITH SPUTUM: Primary | ICD-10-CM

## 2017-07-11 DIAGNOSIS — R93.89 ABNORMAL CXR: ICD-10-CM

## 2017-07-11 DIAGNOSIS — R63.4 WEIGHT LOSS, ABNORMAL: ICD-10-CM

## 2017-07-11 NOTE — TELEPHONE ENCOUNTER
----- Message from Analia Woodard sent at 7/11/2017 10:22 AM EDT -----  Regarding: Dr. Ray Souza returning a call from the doctor regarding her father.  Contact is [phone]

## 2017-07-11 NOTE — PROGRESS NOTES
[facility]    Subjective:   Luis Williamson is a 66 y.o. male with history of Tobacco Abuse, HTN  CC: Cough and weight loss  History provided by patient and Records    HPI:  Patient states he has been having one month of cough and congestion. Patient notes increased cough at night with white sputum production. Denies baseline cough. Denies fevers or night sweats. Notes reduced appetite for the past month as well. Noting weight down from 171 to 159 today. Patient smokes about 0.5-1 ppd for 50+ years. Current Outpatient Prescriptions on File Prior to Visit   Medication Sig Dispense Refill    amLODIPine (NORVASC) 5 mg tablet TAKE ONE TABLET BY MOUTH EVERY DAY 30 Tab 5    levocetirizine (XYZAL) 5 mg tablet Take 1 Tab by mouth daily. 30 Tab 3     No current facility-administered medications on file prior to visit. Patient Active Problem List   Diagnosis Code    ED (erectile dysfunction) N52.9    Hyperlipidemia E78.5    Cough R05    HTN (hypertension) I10    Tobacco abuse Z72.0    Alcohol abuse F10.10    Groin fullness R19.00    Non-compliance Z91.19       Social History     Social History    Marital status: SINGLE     Spouse name: N/A    Number of children: N/A    Years of education: N/A     Occupational History    Not on file. Social History Main Topics    Smoking status: Current Every Day Smoker     Packs/day: 1.00     Years: 45.00     Types: Cigarettes    Smokeless tobacco: Never Used    Alcohol use 1.5 oz/week     3 Cans of beer per week    Drug use: No    Sexual activity: Not on file     Other Topics Concern    Not on file     Social History Narrative       Review of Systems   Constitutional: Positive for malaise/fatigue and weight loss. Negative for chills and fever. Respiratory: Positive for cough and sputum production. Negative for hemoptysis, shortness of breath and wheezing. Cardiovascular: Negative for chest pain and palpitations. Gastrointestinal: Negative for abdominal pain, nausea and vomiting. Skin: Negative for rash. Objective:     Visit Vitals    /83 (BP 1 Location: Right arm, BP Patient Position: Sitting)    Pulse (!) 108    Temp 97.9 °F (36.6 °C)    Resp 18    Ht 5' 9\" (1.753 m)    Wt 159 lb (72.1 kg)    SpO2 98%    BMI 23.48 kg/m2        Physical Exam   Constitutional: He appears well-developed and well-nourished. No distress. HENT:   Head: Normocephalic and atraumatic. Neck: No thyromegaly present. Cardiovascular: Regular rhythm, normal heart sounds and intact distal pulses. Pulmonary/Chest: Effort normal and breath sounds normal. He has no wheezes. He has no rales. Abdominal: Soft. Bowel sounds are normal.   Lymphadenopathy:     He has no cervical adenopathy. Nursing note and vitals reviewed. Pertinent Labs/Studies:  CXR: Personally read, noting widened mediastinum and mass/opacity in the mid left chest region. Assessment and orders:       ICD-10-CM ICD-9-CM    1. Cough with sputum R05 786.2 XR CHEST PA LAT   2. Weight loss, abnormal R63.4 783.21    3. Abnormal CXR R93.8 793.2 CT CHEST W WO CONT     Diagnoses and all orders for this visit:    Cough with sputum/Weight loss, abnormal: Given history of heavy tobacco use concern for cancer. Considering possible PNA, but given lack of Fever and prolonged symptoms, less likely. -     XR CHEST PA LAT; Future    Abnormal CXR: Perihilar Mass, follow up with CT chest.  -     CT CHEST W WO CONT; Future    [phone] daughter Henok Ambriz, Patient asked us to call his daughter. Follow-up Disposition:  Return in about 2 weeks (around 7/25/2017) for Follow up mass. I have reviewed patient medical and social history and medications. I have reviewed pertinent labs results and other data. I have discussed the diagnosis with the patient and the intended plan as seen in the above orders.  The patient has received an after-visit summary and questions were answered concerning future plans.      Patrizia Arora MD  Resident [facility]  07/12/17    Patient discussed and seen with Dr. Rissa Gonzalez, Attending Physician

## 2017-07-11 NOTE — PROGRESS NOTES
I saw and evaluated the patient, performing the key elements of the service. I discussed the findings, assessment and plan with the resident and agree with the resident's findings and plan as documented in the resident's note. Left hilar mass seen on CXR, concerning given pt's history of smoking and recent unintentional weight loss. Discussed with pt, will get CT for further clarification of mass.

## 2017-07-11 NOTE — PROGRESS NOTES
Reviewed record in preparation for visit and have necessary documentation  Pt did not bring medication to office visit for review  opportunity was given for questions  Goals that were addressed and/or need to be completed during or after this appointment include     Health Maintenance Due   Topic Date Due    DTaP/Tdap/Td series (1 - Tdap) 07/26/1965    ZOSTER VACCINE AGE 60>  07/26/2004    GLAUCOMA SCREENING Q2Y  07/26/2009

## 2017-07-11 NOTE — MR AVS SNAPSHOT
Visit Information Date & Time Provider Department Dept. Phone Encounter #  
 7/11/2017  8:20 AM Daisy Gonzalez MD  Lizbeth Gunlock [id number] Your Appointments 10/20/2017 10:00 AM  
ROUTINE CARE with Geo Petty MD  
[address]) Appt Note: 6 mnth fu est pt /fasting/HTN/[facility] [address] [address]  
[place] [address] [place] [id number] Upcoming Health Maintenance Date Due DTaP/Tdap/Td series (1 - Tdap) 7/26/1965 ZOSTER VACCINE AGE 60> 7/26/2004 GLAUCOMA SCREENING Q2Y 7/26/2009 INFLUENZA AGE 9 TO ADULT 8/1/2017 MEDICARE YEARLY EXAM 10/19/2017 Allergies as of 7/11/2017  Review Complete On: 7/11/2017 By: Anna Hi Severity Noted Reaction Type Reactions Lisinopril High 10/27/2011   Systemic Angioedema Current Immunizations  Reviewed on 1/10/2013 Name Date Influenza High Dose Vaccine PF 1/10/2013 Influenza Vaccine 11/6/2015, 12/10/2014, 10/2/2013 Influenza Vaccine (Quad) PF 10/18/2016 Influenza Vaccine Split 10/12/2011, 5/10/2011, 10/13/2010 Pneumococcal Polysaccharide (PPSV-23) 2/6/2013 Not reviewed this visit You Were Diagnosed With   
  
 Codes Comments Cough with sputum    -  Primary ICD-10-CM: L57 ICD-9-CM: 786.2 Abnormal CXR     ICD-10-CM: R93.8 ICD-9-CM: 793.2 Vitals BP Pulse Temp Resp Height(growth percentile) Weight(growth percentile) 138/83 (BP 1 Location: Right arm, BP Patient Position: Sitting) (!) 108 97.9 °F (36.6 °C) 18 5' 9\" (1.753 m) 159 lb (72.1 kg) SpO2 BMI Smoking Status 98% 23.48 kg/m2 Current Every Day Smoker Vitals History BMI and BSA Data Body Mass Index Body Surface Area  
 23.48 kg/m 2 1.87 m 2 Preferred Pharmacy Pharmacy Name Phone [address] [place], [place] - [address] [phone] Your Updated Medication List  
  
   
This list is accurate as of: 7/11/17  9:24 AM.  Always use your most recent med list. amLODIPine 5 mg tablet Commonly known as:  Devorah Kayce TAKE ONE TABLET BY MOUTH EVERY DAY  
  
 levocetirizine 5 mg tablet Commonly known as:  Gerhardmatt Anjana Take 1 Tab by mouth daily. To-Do List   
 07/11/2017 Imaging:  XR CHEST PA LAT Introducing [facility] SERVICES! Ciara Trevizo introduces [employer] patient portal. Now you can access parts of your medical record, email your doctor's office, and request medication refills online. 1. In your internet browser, go to [email or web address] 2. Click on the First Time User? Click Here link in the Sign In box. You will see the New Member Sign Up page. 3. Enter your [employer] Access Code exactly as it appears below. You will not need to use this code after youve completed the sign-up process. If you do not sign up before the expiration date, you must request a new code. · [employer] Access Code: [email or web address] 
Expires: 7/19/2017  9:32 AM 
 
4. Enter the last four digits of your Social Security Number (xxxx) and Date of Birth (mm/dd/yyyy) as indicated and click Submit. You will be taken to the next sign-up page. 5. Create a [employer] ID. This will be your [employer] login ID and cannot be changed, so think of one that is secure and easy to remember. 6. Create a [employer] password. You can change your password at any time. 7. Enter your Password Reset Question and Answer. This can be used at a later time if you forget your password. 8. Enter your e-mail address. You will receive e-mail notification when new information is available in [address] 9. Click Sign Up. You can now view and download portions of your medical record.  
10. Click the Download Summary menu link to download a portable copy of your medical information. If you have questions, please visit the Frequently Asked Questions section of the Multiply website. Remember, Multiply is NOT to be used for urgent needs. For medical emergencies, dial 911. Now available from your iPhone and Android! Please provide this summary of care documentation to your next provider. Your primary care clinician is listed as Azra Alvarado. If you have any questions after today's visit, please call [phone].

## 2017-07-11 NOTE — TELEPHONE ENCOUNTER
Called Back and discussed with Daughter of mr. Luci Knight. Reviewed imaging, history, and differential diagnoses. Discussed further workup including CT of chest.  All questions were answered and knows to expect call for scheduling of study to come.     Alexsander Rae MD  Resident [facility]  07/11/17

## 2017-07-14 ENCOUNTER — TELEPHONE (OUTPATIENT)
Dept: FAMILY MEDICINE CLINIC | Age: 72
End: 2017-07-14

## 2017-07-14 ENCOUNTER — HOSPITAL ENCOUNTER (OUTPATIENT)
Dept: CT IMAGING | Age: 72
Discharge: HOME | End: 2017-07-14
Attending: FAMILY MEDICINE
Payer: MEDICARE

## 2017-07-14 DIAGNOSIS — R93.89 ABNORMAL CXR: ICD-10-CM

## 2017-07-14 DIAGNOSIS — C34.90 MALIGNANT NEOPLASM OF LUNG, UNSPECIFIED LATERALITY, UNSPECIFIED PART OF LUNG (HCC): Primary | ICD-10-CM

## 2017-07-14 PROBLEM — R91.8 PULMONARY MASS: Status: ACTIVE | Noted: 2017-07-14

## 2017-07-14 PROCEDURE — 74011636320 HC RX REV CODE- 636/320: Performed by: RADIOLOGY

## 2017-07-14 PROCEDURE — 71260 CT THORAX DX C+: CPT

## 2017-07-14 RX ADMIN — IOPAMIDOL 100 ML: 612 INJECTION, SOLUTION INTRAVENOUS at 08:00

## 2017-07-14 NOTE — TELEPHONE ENCOUNTER
Spoke with daughter of patient about recent imaging. Discussed referral to Oncology for further evaluation/managment. Daughter of of patient will discuss results with patient today and will call back if she has further questions.     Carey Moreno MD  Resident [facility]  07/14/17

## 2017-07-18 ENCOUNTER — OFFICE VISIT (OUTPATIENT)
Dept: ONCOLOGY | Age: 72
End: 2017-07-18

## 2017-07-18 VITALS
DIASTOLIC BLOOD PRESSURE: 72 MMHG | RESPIRATION RATE: 20 BRPM | WEIGHT: 159 LBS | SYSTOLIC BLOOD PRESSURE: 119 MMHG | BODY MASS INDEX: 23.55 KG/M2 | TEMPERATURE: 97.6 F | OXYGEN SATURATION: 99 % | HEART RATE: 103 BPM | HEIGHT: 69 IN

## 2017-07-18 DIAGNOSIS — C34.92 MALIGNANT NEOPLASM OF LEFT LUNG, UNSPECIFIED PART OF LUNG (HCC): Primary | ICD-10-CM

## 2017-07-18 RX ORDER — ASPIRIN 81 MG/1
81 TABLET ORAL DAILY
COMMUNITY

## 2017-07-18 NOTE — PROGRESS NOTES
[id number] [facility] Oncology at [facility]  [phone]    Hematology / Oncology Consult    Reason for Visit:   Gia Brand is a 66 y.o. male who is seen in consultation at the request of Dr. Beronica Trotter for evaluation of lung mass. Treatment History:   · CXR 7/11/2017: Mediastinal lymphadenopathy with left hilar mass. · CT Chest 7/14/2017: Bulky mediastinal and left hilar lymphadenopathy. Bilateral pulmonary masses and nodules    History of Present Illness:   Gia Brand is a pleasant 66 y.o. male who presents today for evaluation of lung mass. He reports a progressive cough over the past 2 months, productive of white sputum. No hemoptysis. No significant dyspnea. She has developed decreased appetite as well, with 15 pound weight loss in the past 2 months. Cough may actually be improving in the past few days. No pain. He had a CXR with his PCP which was abnormal, prompting a CT Chest was was worrisome for lung cancer. He is here for further evaluation. He smokes 1ppd for 50+ years. He lives alone in [address] [place] [address]. His son lives about 15 minutes from him. His daughter, who accompanies him to this appointment today, lives about 30 minutes away. Past Medical History:   Diagnosis Date    Dysfunction, erectile     Hyperlipidemia 5/18/2010    Hypertension       History reviewed. No pertinent surgical history. Social History   Substance Use Topics    Smoking status: Current Every Day Smoker     Packs/day: 1.00     Years: 45.00     Types: Cigarettes    Smokeless tobacco: Never Used    Alcohol use 1.5 oz/week     3 Cans of beer per week      Family History   Problem Relation Age of Onset    Stroke Father     Diabetes Sister     Diabetes Brother     Heart Disease Sister      Current Outpatient Prescriptions   Medication Sig    aspirin delayed-release 81 mg tablet Take  by mouth daily.     amLODIPine (NORVASC) 5 mg tablet TAKE ONE TABLET BY MOUTH EVERY DAY     No current facility-administered medications for this visit. Allergies   Allergen Reactions    Lisinopril Angioedema    Hydrochlorothiazide Hives and Swelling    Sulfa (Sulfonamide Antibiotics) Hives        Review of Systems: A complete review of systems was obtained, negative except as described above. Physical Exam:     Visit Vitals    /72 (BP 1 Location: Left arm, BP Patient Position: Sitting)    Pulse (!) 103    Temp 97.6 °F (36.4 °C) (Temporal)    Resp 20    Ht 5' 9\" (1.753 m)    Wt 159 lb (72.1 kg)    SpO2 99%    BMI 23.48 kg/m2     ECOG PS: 1  General: No distress  Eyes: PERRLA, anicteric sclerae  HENT: Atraumatic, OP clear  Neck: Supple  Lymphatic: No cervical, supraclavicular, or inguinal adenopathy  Respiratory: CTAB, normal respiratory effort  CV: Normal rate, regular rhythm, no murmurs, no peripheral edema  GI: Soft, nontender, nondistended, no masses, no hepatomegaly, no splenomegaly  MS: Normal gait and station. Digits without clubbing or cyanosis. Skin: No rashes, ecchymoses, or petechiae. Normal temperature, turgor, and texture. Psych: Alert, oriented, appropriate affect, normal judgment/insight    Results:     Lab Results   Component Value Date/Time    WBC 8.2 04/20/2017 11:02 AM    HGB 14.0 04/20/2017 11:02 AM    HCT 42.0 04/20/2017 11:02 AM    PLATELET 657 00/55/5929 11:02 AM    MCV 90 04/20/2017 11:02 AM    ABS.  NEUTROPHILS 2.8 10/18/2016 01:36 PM     Lab Results   Component Value Date/Time    Sodium 140 04/20/2017 11:02 AM    Potassium 4.1 04/20/2017 11:02 AM    Chloride 98 04/20/2017 11:02 AM    CO2 25 04/20/2017 11:02 AM    Glucose 77 04/20/2017 11:02 AM    BUN 8 04/20/2017 11:02 AM    Creatinine 0.57 04/20/2017 11:02 AM    GFR est  04/20/2017 11:02 AM    GFR est non- 04/20/2017 11:02 AM    Calcium 9.9 04/20/2017 11:02 AM     Lab Results   Component Value Date/Time    Bilirubin, total 0.4 04/20/2017 11:02 AM    ALT (SGPT) 19 04/20/2017 11:02 AM    AST (SGOT) 20 04/20/2017 11:02 AM    Alk. phosphatase 84 04/20/2017 11:02 AM    Protein, total 7.6 04/20/2017 11:02 AM    Albumin 4.1 04/20/2017 11:02 AM    Globulin 3.2 10/13/2010 11:04 AM       Records reviewed and summarized above. Radiology report(s) reviewed above. Assessment:   1) Lung mass, mediastinal adenopathy  Imaging is highly concerning for a lung cancer. It appears at least Stage IIIC based on contralateral adenopathy. However, with the multiple lung nodules we may be dealing with metastatic disease. I recommend a PET/CT to further evaluate. Based on this, he will likely need a biopsy. If no evidence of distant disease on PET, I will refer to pulmonary for EBUS. 2) Cough  Improving, monitor for now. 3) Weight loss  Likely due to cancer. Workup as above. May need dietician to intervene if this continues. Plan:     · PET/CT  · Biopsy to be ordered pending these results  · Follow up with me TBD    I appreciate the opportunity to participate in Mr. Jocelyn Bhardwaj's care.     Signed By: Marcie Gunter MD     July 18, 2017

## 2017-07-18 NOTE — MR AVS SNAPSHOT
Visit Information Date & Time Provider Department Dept. Phone Encounter #  
 7/18/2017  1:30 PM Roland Carr MD [facility] at [address] [place] [address] Rd [id number] Follow-up Instructions Return for TBD. Your Appointments 10/20/2017 10:00 AM  
ROUTINE CARE with Gerardo Briscoe MD  
[address]) Appt Note: 6 mnth fu est pt /fasting/HTN/Hyperlipidemia [address] [address]  
[place] [address] [address] Upcoming Health Maintenance Date Due DTaP/Tdap/Td series (1 - Tdap) 7/26/1965 ZOSTER VACCINE AGE 60> 7/26/2004 GLAUCOMA SCREENING Q2Y 7/26/2009 Pneumococcal 65+ High/Highest Risk (2 of 2 - PCV13) 2/6/2014 INFLUENZA AGE 9 TO ADULT 8/1/2017 MEDICARE YEARLY EXAM 10/19/2017 Allergies as of 7/18/2017  Review Complete On: 7/12/2017 By: Herson Chong MD  
  
 Severity Noted Reaction Type Reactions Lisinopril High 10/27/2011   Systemic Angioedema Hydrochlorothiazide  07/18/2017    Hives, Swelling Sulfa (Sulfonamide Antibiotics)  10/27/2011    Hives Current Immunizations  Reviewed on 1/10/2013 Name Date Influenza High Dose Vaccine PF 1/10/2013 Influenza Vaccine 11/6/2015, 12/10/2014, 10/2/2013 Influenza Vaccine (Quad) PF 10/18/2016 Influenza Vaccine Split 10/12/2011, 5/10/2011, 10/13/2010 Pneumococcal Polysaccharide (PPSV-23) 2/6/2013 Not reviewed this visit You Were Diagnosed With   
  
 Codes Comments Malignant neoplasm of left lung, unspecified part of lung ([facility] [address]    -  Primary ICD-10-CM: C34.92 
ICD-9-CM: 162.9 Vitals BP Pulse Temp Resp Height(growth percentile) 119/72 (BP 1 Location: Left arm, BP Patient Position: Sitting) (!) 103 97.6 °F (36.4 °C) (Temporal) 20 5' 9\" (1.753 m) Weight(growth percentile) SpO2 BMI Smoking Status 159 lb (72.1 kg) 99% 23.48 kg/m2 Current Every Day Smoker BMI and BSA Data Body Mass Index Body Surface Area  
 23.48 kg/m 2 1.87 m 2 Preferred Pharmacy Pharmacy Name Phone [address] [place], [place] - [address]  [phone] Your Updated Medication List  
  
   
This list is accurate as of: 7/18/17  2:28 PM.  Always use your most recent med list. amLODIPine 5 mg tablet Commonly known as:  [place] Alcantar TAKE ONE TABLET BY MOUTH EVERY DAY  
  
 aspirin delayed-release 81 mg tablet Take  by mouth daily. Follow-up Instructions Return for TBD. To-Do List   
 Around 07/24/2017 Imaging:  MRI BRAIN W WO CONT Around 07/24/2017 Imaging:  PET/CT TUMOR IMAGE SKULL THIGH W (INI) Introducing [address]! Isabela Bullock introduces [employer] patient portal. Now you can access parts of your medical record, email your doctor's office, and request medication refills online. 1. In your internet browser, go to [email or web address] 2. Click on the First Time User? Click Here link in the Sign In box. You will see the New Member Sign Up page. 3. Enter your [employer] Access Code exactly as it appears below. You will not need to use this code after youve completed the sign-up process. If you do not sign up before the expiration date, you must request a new code. · [employer] Access Code: [email or web address] 
Expires: 7/19/2017  9:32 AM 
 
4. Enter the last four digits of your Social Security Number (xxxx) and Date of Birth (mm/dd/yyyy) as indicated and click Submit. You will be taken to the next sign-up page. 5. Create a [employer] ID. This will be your [employer] login ID and cannot be changed, so think of one that is secure and easy to remember. 6. Create a [employer] password. You can change your password at any time. 7. Enter your Password Reset Question and Answer.  This can be used at a later time if you forget your password. 8. Enter your e-mail address. You will receive e-mail notification when new information is available in [address] 9. Click Sign Up. You can now view and download portions of your medical record. 10. Click the Download Summary menu link to download a portable copy of your medical information. If you have questions, please visit the Frequently Asked Questions section of the [employer] website. Remember, [employer] is NOT to be used for urgent needs. For medical emergencies, dial 911. Now available from your iPhone and Android! Please provide this summary of care documentation to your next provider. Your primary care clinician is listed as Alfredo Wyman. If you have any questions after today's visit, please call [phone].

## 2017-07-19 NOTE — PROGRESS NOTES
Oncology Navigator  Psychosocial Assessment    Reason for Assessment:    []Depression  []Anxiety  []Caregiver [place]  []Maladaptive Coping with Serious Illness   [x]Other: initial consult; dx: lung ca    Sources of Information:    [x]Patient  [x]Family  [x]Staff  [x]Medical Record    Advance Care Planning: Not AMD on file. Pt has been provided information on medical directives. No flowsheet data found.     Mental Status:    [x]Alert  []Lethargic  []Unresponsive  Oriented to:  [x]Person  [x]Place  [x]Time  [x]Situation      Barriers to Learning:    []Language  []Developmental  []Cognitive  []Altered Mental Status  []Visual/Hearing Impairment  []Unable to Read/Write  []Motivational   [x]No Barriers Identified  []Other:    Relationship Status:  [x]Single  []  []Significant Other/Life Partner  []  []  []      Living Circumstances:  [x]Lives Alone  []Family/Significant Other in Household  []Roommates  []Children in the Home  []Paid Caregivers  []Assisted Living Facility/Group Home  []Skilled [address]  []Homeless  []Incarcerated  []Environmental/Care Concerns  []Other:    Support System:    []Strong  [x]Fair  []Limited    Financial/Legal Concerns:    []Uninsured  []Limited Income/Resources  []Non-Citizen  [x]No Concerns Identified  []Financial POA:    []Other:    Holiness/Spiritual/Existential:  []Strong Sense of Spirituality  []Involved in [employer]  []Request  Visit  []Expressing Ashleigh Soliman  [x]No Concerns Identified    Coping with Illness:         Patient: Family/Caregiver:   Understanding and Acceptance of Illness/Prognosis  [x] [x]   Strong Sense of Resilience [] []   Self Reflection [] []   Engaged Support System [] []   Does not Readily Discuss Illness [] []   Denial of Terminal Status [] []   Anger [] []   Depression [] []   Anxiety/Fear [] []   Bargaining [] []   Recent Diagnosis/Prognosis [x] []   Difficulties with Body Image [] []   Loss of Identity [] []   Excessive Substance Use [] []   Mental Health History [] []   Enmeshed Relationships [] []   History of Loss [] []   Anticipatory Grief [] []   Concern for Complicated Grief [] []   Suicidal Ideation or Plan [] []   Unable to assess [] []                  Narrative: This MSW met with pt when in medical Oncology for scheduled appt with MD ( Carlene Quiñones). Initial Social Work Visit. Met with pt and pt's daughter to introduce social work role and supports available. Pt's supports include his son and daughter who live within 30 minutes of pt. Pt lives alone and is able to to meet his basic needs without concern at this time. Pt did not readily discuss thoughts or feelings about new diagnosis but shared \"I have always tried to help people when I can and be a good person\". Offered active listening and support. Reviewed barriers to care and none were identified at this time. Provided pt with information on various community resources to include supportive counseling, peer support, financial assistance, Advance Care Planning, and . Pt voiced understanding. Encouraged pt to contact MSW with questions or barriers to care and provided contact information. Referrals:     I. Transportation    Medicaid ([employer]) []   ACS Road to Recovery []                                    Regional organization  []                                      Financial Assistance/Medication Access    Patient assistance program (Care Card) []   Co-pay assistance  []                                    Leukemia & Lymphoma Society []   [address]  []   [facility] [address] []   CancerCare  []     Emotional support    Peer support group [x]   Local counseling []                                    Online support group []   Coordination of psychiatry consult []     Goals/Plan:   1. Explore ACP; assist pt in completing AD  2.  Ongoing support to address psychosocial needs as desired Anila Arrow, MSW

## 2017-07-24 ENCOUNTER — HOSPITAL ENCOUNTER (OUTPATIENT)
Dept: PET IMAGING | Age: 72
Discharge: HOME | End: 2017-07-24
Attending: INTERNAL MEDICINE
Payer: MEDICARE

## 2017-07-24 ENCOUNTER — HOSPITAL ENCOUNTER (OUTPATIENT)
Dept: MRI IMAGING | Age: 72
Discharge: HOME | End: 2017-07-24
Attending: INTERNAL MEDICINE
Payer: MEDICARE

## 2017-07-24 VITALS — WEIGHT: 152 LBS | BODY MASS INDEX: 24.43 KG/M2 | HEIGHT: 66 IN

## 2017-07-24 DIAGNOSIS — C34.92 MALIGNANT NEOPLASM OF LEFT LUNG, UNSPECIFIED PART OF LUNG (HCC): ICD-10-CM

## 2017-07-24 PROCEDURE — 70553 MRI BRAIN STEM W/O & W/DYE: CPT

## 2017-07-24 PROCEDURE — 78815 PET IMAGE W/CT SKULL-THIGH: CPT

## 2017-07-24 PROCEDURE — A9576 INJ PROHANCE MULTIPACK: HCPCS | Performed by: RADIOLOGY

## 2017-07-24 PROCEDURE — 74011250636 HC RX REV CODE- 250/636: Performed by: RADIOLOGY

## 2017-07-24 RX ORDER — SODIUM CHLORIDE 0.9 % (FLUSH) 0.9 %
10 SYRINGE (ML) INJECTION
Status: COMPLETED | OUTPATIENT
Start: 2017-07-24 | End: 2017-07-24

## 2017-07-24 RX ADMIN — Medication 10 ML: at 13:08

## 2017-07-24 RX ADMIN — GADOTERIDOL 13 ML: 279.3 INJECTION, SOLUTION INTRAVENOUS at 17:09

## 2017-07-25 ENCOUNTER — TELEPHONE (OUTPATIENT)
Dept: ONCOLOGY | Age: 72
End: 2017-07-25

## 2017-07-25 DIAGNOSIS — C34.92 MALIGNANT NEOPLASM OF LEFT LUNG, UNSPECIFIED PART OF LUNG (HCC): Primary | ICD-10-CM

## 2017-07-25 DIAGNOSIS — C79.31 BRAIN METASTASIS (HCC): ICD-10-CM

## 2017-07-25 NOTE — TELEPHONE ENCOUNTER
[address]  Medical Oncology at [facility]    PET/CT and MRI results reviewed, discussed with radiology. Right supraclav node looks amenable to US guided biopsy. I also recommend referral to radiation oncology regarding the brain metastasis, though it will be helpful to have the pathology back before he sees them, to guide treatment recommendations (ie gamma knife for NSCLC vs WBRT for SCLC). I called patient, no answer, left message for him or his daughter Keyla Ribeiro) to call me back. I will hold on placing orders until I speak with them.

## 2017-07-25 NOTE — TELEPHONE ENCOUNTER
Carmelo VILLALOBOS [facility] Oncology at [address] [place] [address]    Patient's daughter called me back and I explained the results and the plan. They are agreeable to moving forward. He is open to all treatments except surgery. I will place orders for US guided biopsy, port placement, and referral to radiation oncology. He should follow up with me once pathology is back, perhaps we can coordinate this on same day as radiation oncology, given his travel distance.

## 2017-07-25 NOTE — TELEPHONE ENCOUNTER
07/25/17- BX and port scheduled for 7/28/17. Follow up with  scheduled for 8/8/17 at 8:15am and then radiation oncology appointment on 8/8/17 at [place] [address], she will inform patient. No further questions or concerns.

## 2017-07-28 ENCOUNTER — HOSPITAL ENCOUNTER (OUTPATIENT)
Dept: INTERVENTIONAL RADIOLOGY/VASCULAR | Age: 72
Discharge: HOME | End: 2017-07-28
Attending: INTERNAL MEDICINE | Admitting: INTERNAL MEDICINE
Payer: MEDICARE

## 2017-07-28 ENCOUNTER — HOSPITAL ENCOUNTER (OUTPATIENT)
Dept: ULTRASOUND IMAGING | Age: 72
Discharge: HOME | End: 2017-07-28
Attending: INTERNAL MEDICINE
Payer: MEDICARE

## 2017-07-28 VITALS
HEART RATE: 93 BPM | WEIGHT: 159 LBS | RESPIRATION RATE: 26 BRPM | DIASTOLIC BLOOD PRESSURE: 65 MMHG | HEIGHT: 69 IN | SYSTOLIC BLOOD PRESSURE: 119 MMHG | TEMPERATURE: 98.2 F | BODY MASS INDEX: 23.55 KG/M2 | OXYGEN SATURATION: 98 %

## 2017-07-28 DIAGNOSIS — C79.31 BRAIN METASTASIS (HCC): ICD-10-CM

## 2017-07-28 DIAGNOSIS — C34.92 MALIGNANT NEOPLASM OF LEFT LUNG, UNSPECIFIED PART OF LUNG (HCC): ICD-10-CM

## 2017-07-28 DIAGNOSIS — C34.90 LUNG CANCER (HCC): ICD-10-CM

## 2017-07-28 PROBLEM — R91.8 PULMONARY MASS: Status: RESOLVED | Noted: 2017-07-14 | Resolved: 2017-07-28

## 2017-07-28 PROCEDURE — 74011000250 HC RX REV CODE- 250: Performed by: RADIOLOGY

## 2017-07-28 PROCEDURE — 77030031139 HC SUT VCRL2 J&J -A

## 2017-07-28 PROCEDURE — 77030012935 HC DRSG AQUACEL BMS -B

## 2017-07-28 PROCEDURE — C1894 INTRO/SHEATH, NON-LASER: HCPCS

## 2017-07-28 PROCEDURE — 36561 INSERT TUNNELED CV CATH: CPT

## 2017-07-28 PROCEDURE — C1788 PORT, INDWELLING, IMP: HCPCS

## 2017-07-28 PROCEDURE — 77030002996 HC SUT SLK J&J -A

## 2017-07-28 PROCEDURE — 88305 TISSUE EXAM BY PATHOLOGIST: CPT | Performed by: INTERNAL MEDICINE

## 2017-07-28 PROCEDURE — 38505 NEEDLE BIOPSY LYMPH NODES: CPT

## 2017-07-28 PROCEDURE — 77030010507 HC ADH SKN DERMBND J&J -B

## 2017-07-28 PROCEDURE — 77030003503 HC NDL BIOP TISS BD -B

## 2017-07-28 PROCEDURE — 88342 IMHCHEM/IMCYTCHM 1ST ANTB: CPT | Performed by: INTERNAL MEDICINE

## 2017-07-28 PROCEDURE — 81210 BRAF GENE: CPT | Performed by: INTERNAL MEDICINE

## 2017-07-28 PROCEDURE — 88341 IMHCHEM/IMCYTCHM EA ADD ANTB: CPT | Performed by: INTERNAL MEDICINE

## 2017-07-28 PROCEDURE — 74011250636 HC RX REV CODE- 250/636: Performed by: RADIOLOGY

## 2017-07-28 PROCEDURE — 88377 M/PHMTRC ALYS ISHQUANT/SEMIQ: CPT | Performed by: INTERNAL MEDICINE

## 2017-07-28 PROCEDURE — 81235 EGFR GENE COM VARIANTS: CPT | Performed by: INTERNAL MEDICINE

## 2017-07-28 PROCEDURE — 74011636320 HC RX REV CODE- 636/320: Performed by: RADIOLOGY

## 2017-07-28 PROCEDURE — 88360 TUMOR IMMUNOHISTOCHEM/MANUAL: CPT | Performed by: INTERNAL MEDICINE

## 2017-07-28 PROCEDURE — 88173 CYTOPATH EVAL FNA REPORT: CPT | Performed by: INTERNAL MEDICINE

## 2017-07-28 RX ORDER — MIDAZOLAM HYDROCHLORIDE 1 MG/ML
.5-1 INJECTION, SOLUTION INTRAMUSCULAR; INTRAVENOUS
Status: DISCONTINUED | OUTPATIENT
Start: 2017-07-28 | End: 2017-07-28 | Stop reason: HOSPADM

## 2017-07-28 RX ORDER — LIDOCAINE HYDROCHLORIDE AND EPINEPHRINE 10; 10 MG/ML; UG/ML
.5-4 INJECTION, SOLUTION INFILTRATION; PERINEURAL
Status: DISCONTINUED | OUTPATIENT
Start: 2017-07-28 | End: 2017-07-28 | Stop reason: HOSPADM

## 2017-07-28 RX ORDER — FENTANYL CITRATE 50 UG/ML
25-200 INJECTION, SOLUTION INTRAMUSCULAR; INTRAVENOUS
Status: DISCONTINUED | OUTPATIENT
Start: 2017-07-28 | End: 2017-07-28 | Stop reason: HOSPADM

## 2017-07-28 RX ORDER — CEFAZOLIN SODIUM IN 0.9 % NACL 2 G/50 ML
2 INTRAVENOUS SOLUTION, PIGGYBACK (ML) INTRAVENOUS ONCE
Status: COMPLETED | OUTPATIENT
Start: 2017-07-28 | End: 2017-07-28

## 2017-07-28 RX ORDER — LIDOCAINE HYDROCHLORIDE 10 MG/ML
20 INJECTION INFILTRATION; PERINEURAL
Status: DISCONTINUED | OUTPATIENT
Start: 2017-07-28 | End: 2017-07-28 | Stop reason: HOSPADM

## 2017-07-28 RX ORDER — HEPARIN SODIUM 200 [USP'U]/100ML
500 INJECTION, SOLUTION INTRAVENOUS ONCE
Status: COMPLETED | OUTPATIENT
Start: 2017-07-28 | End: 2017-07-28

## 2017-07-28 RX ADMIN — MIDAZOLAM HYDROCHLORIDE 2 MG: 1 INJECTION, SOLUTION INTRAMUSCULAR; INTRAVENOUS at 12:46

## 2017-07-28 RX ADMIN — FENTANYL CITRATE 50 MCG: 50 INJECTION, SOLUTION INTRAMUSCULAR; INTRAVENOUS at 12:36

## 2017-07-28 RX ADMIN — MIDAZOLAM HYDROCHLORIDE 2 MG: 1 INJECTION, SOLUTION INTRAMUSCULAR; INTRAVENOUS at 12:53

## 2017-07-28 RX ADMIN — LIDOCAINE HYDROCHLORIDE,EPINEPHRINE BITARTRATE 40 MG: 10; .01 INJECTION, SOLUTION INFILTRATION; PERINEURAL at 12:53

## 2017-07-28 RX ADMIN — IOPAMIDOL 10 ML: 755 INJECTION, SOLUTION INTRAVENOUS at 13:17

## 2017-07-28 RX ADMIN — LIDOCAINE HYDROCHLORIDE 20 ML: 10 INJECTION, SOLUTION INFILTRATION; PERINEURAL at 12:49

## 2017-07-28 RX ADMIN — MIDAZOLAM HYDROCHLORIDE 1 MG: 1 INJECTION, SOLUTION INTRAMUSCULAR; INTRAVENOUS at 12:58

## 2017-07-28 RX ADMIN — HEPARIN SODIUM 1000 UNITS: 200 INJECTION, SOLUTION INTRAVENOUS at 12:31

## 2017-07-28 RX ADMIN — SODIUM BICARBONATE 5 ML: 0.2 INJECTION, SOLUTION INTRAVENOUS at 09:30

## 2017-07-28 RX ADMIN — CEFAZOLIN 2 G: 1 INJECTION, POWDER, FOR SOLUTION INTRAMUSCULAR; INTRAVENOUS; PARENTERAL at 12:37

## 2017-07-28 RX ADMIN — SODIUM BICARBONATE 2 ML: 0.2 INJECTION, SOLUTION INTRAVENOUS at 12:49

## 2017-07-28 NOTE — PROGRESS NOTES
TRANSFER - IN REPORT:    Verbal report received from Zuly(name) on [facility]  being received from angio(unit) for routine progression of care      Report consisted of patients Situation, Background, Assessment and   Recommendations(SBAR). Information from the following report(s) Procedure Summary was reviewed with the receiving nurse. Opportunity for questions and clarification was provided. Assessment completed upon patients arrival to unit and care assumed.

## 2017-07-28 NOTE — H&P
Radiology History and Physical    Patient: Juan Enciso 67 y.o. male     Chief Complaint: No chief complaint on file. History of Present Illness: Lung cancer. History:    Past Medical History:   Diagnosis Date    Dysfunction, erectile     Hyperlipidemia 5/18/2010    Hypertension      Family History   Problem Relation Age of Onset    Stroke Father     Diabetes Sister     Diabetes Brother     Heart Disease Sister      Social History     Social History    Marital status: SINGLE     Spouse name: N/A    Number of children: N/A    Years of education: N/A     Occupational History    Not on file. Social History Main Topics    Smoking status: Current Every Day Smoker     Packs/day: 1.00     Years: 45.00     Types: Cigarettes    Smokeless tobacco: Never Used    Alcohol use 1.5 oz/week     3 Cans of beer per week    Drug use: No    Sexual activity: Not on file     Other Topics Concern    Not on file     Social History Narrative       Allergies: Allergies   Allergen Reactions    Lisinopril Angioedema    Hydrochlorothiazide Hives and Swelling    Sulfa (Sulfonamide Antibiotics) Hives       Current Medications:  No current facility-administered medications for this encounter. Physical Exam:  Blood pressure 124/71, pulse 90, temperature 98.2 °F (36.8 °C), resp. rate 20, height 5' 9\" (1.753 m), weight 72.1 kg (159 lb), SpO2 95 %. GENERAL: alert, cooperative, no distress, appears stated age, LUNG: clear to auscultation bilaterally, HEART: regular rate and rhythm      Alerts:    Hospital Problems  Date Reviewed: 7/18/2017    None          Laboratory:    No results for input(s): HGB, HCT, WBC, PLT, INR, BUN, CREA, K, CRCLT, HGBEXT, HCTEXT, PLTEXT in the last 72 hours. No lab exists for component: PTT, PT, INREXT      Plan of Care/Planned Procedure:  Risks, benefits, and alternatives reviewed with patient and he agrees to proceed with the procedure.

## 2017-07-28 NOTE — IP AVS SNAPSHOT
[address] 
[phone] Patient: Bakari Dunn MRN: [id number] :1945 Current Discharge Medication List  
  
CONTINUE these medications which have NOT CHANGED Dose & Instructions Dispensing Information Comments Morning Noon Evening Bedtime  
 amLODIPine 5 mg tablet Commonly known as:  Farhana Lacey Your last dose was: Your next dose is: TAKE ONE TABLET BY MOUTH EVERY DAY Quantity:  30 Tab Refills:  5  
     
   
   
   
  
 aspirin delayed-release 81 mg tablet Your last dose was: Your next dose is: Take  by mouth daily. Refills:  0

## 2017-07-28 NOTE — DISCHARGE INSTRUCTIONS
Implanted Port: What to Expect at [facility] have had a procedure to implant a port. The port looks like a small bump under your skin. A thin, flexible tube called a catheter runs under the skin from the port into a large vein. You may have the port for weeks, months, or longer. You will be able to get medicine, blood, nutrients, or other fluids with more comfort. The port can be used right away. You will probably have some discomfort and bruising at the port site. This will go away in a few days. You may have strips of tape on the cut (incision) the doctor made, or the cut may have been closed with glue. It may be covered with a small bandage. This care sheet gives you a general idea about how long it will take for you to recover. But each person recovers at a different pace. Follow the steps below to feel better as quickly as possible. How can you care for yourself at home? Activity  · Avoid arm and upper body movements that may pull on the catheter. These movements include heavy weight lifting and vigorous use of your arms. · You will probably need to take 1 day off from work and will be able to return to normal activities shortly after. This depends on the type of work you do, why you have the catheter, and how you feel. · Ask your doctor when you can drive again. Pay special attention when pulling your seat belt across your chest so it doesn't pull out the catheter. It's okay if the seat belt lays over the catheter. Medicines  · Take pain medicines exactly as directed. ¨ If the doctor gave you a prescription medicine for pain, take it as prescribed. ¨ If you are not taking a prescription pain medicine, ask your doctor if you can take an over-the-counter medicine. ¨ Do not take two or more pain medicines at the same time unless the doctor told you to. Many pain medicines have acetaminophen, which is Tylenol. Too much acetaminophen (Tylenol) can be harmful.   · If you think your pain medicine is making you sick to your stomach:  ¨ Take your medicine after meals (unless your doctor has told you not to). ¨ Ask your doctor for a different pain medicine. Incision care  · If you have a bandage, your doctor will tell you when you can remove it. After you remove the bandage, you may shower. Wash the area with soap and water and pat it dry. Don't use hydrogen peroxide or alcohol, which can slow healing. You may cover the area with a gauze bandage if it weeps or rubs against clothing. Change the bandage every day. · If you have strips of tape on the cut (incision) the doctor made, leave the tape on for a week or until it falls off. Other instructions  · Always carry the medical alert card that your doctor gives you. It contains information about your port. It will tell health care workers you have a port in case you need emergency care. · Wear loose clothing over the port for the first 10 to 14 days. When getting dressed, be careful not to rub the port. Follow-up care is a key part of your treatment and safety. Be sure to make and go to all appointments, and call your doctor if you are having problems. It's also a good idea to know your test results and keep a list of the medicines you take. When should you call for help? Call 911 anytime you think you may need emergency care. For example, call if:  · You passed out (lost consciousness). · You have severe trouble breathing. · You have sudden chest pain and shortness of breath, or you cough up blood. Call your doctor now or seek immediate medical care if:  · You have signs of infection, such as:  ¨ Increased pain, swelling, warmth, or redness near the port. ¨ Red streaks leading from the port. ¨ Pus draining from the port. ¨ A fever. · You have pain or swelling in your neck or arm. · You have trouble breathing or chest pain.   Watch closely for changes in your health, and be sure to contact your doctor if:  · You have any problems with your port.    Where can you learn more? Go to [employer] Scientificer.be  Enter M256 in the search box to learn more about \"Implanted Port: What to Expect at Home. \"   © 9404-2740 Healthwise, Incorporated. Care instructions adapted under license by [employer] (which disclaims liability or warranty for this information). This care instruction is for use with your licensed healthcare professional. If you have questions about a medical condition or this instruction, always ask your healthcare professional. Tracy Showman any warranty or liability for your use of this information. Content Version: 8.8.60100;  Last Revised: April 29, 2010

## 2017-07-28 NOTE — IP AVS SNAPSHOT
[address] [place] [address] 
[phone] Patient: Jillian Alegria MRN: [id number] :1945 You are allergic to the following Allergen Reactions Lisinopril Angioedema Hydrochlorothiazide Hives Swelling Sulfa (Sulfonamide Antibiotics) Hives Recent Documentation Height Weight BMI Smoking Status 1.753 m 72.1 kg 23.48 kg/m2 Current Every Day Smoker Emergency Contacts Name Discharge Info Relation Home Work Mobile [facility] DISCHARGE CAREGIVER [3] Child [2] [phone] About your hospitalization You were admitted on:  2017 You last received care in the:  [facility] PACU You were discharged on:  2017 Unit phone number:  [phone] Why you were hospitalized Your primary diagnosis was:  Not on File Providers Seen During Your Hospitalizations Provider Role Specialty Primary office phone Rolf Brown MD Attending Provider Hematology and Oncology [phone] Your Primary Care Physician (PCP) Primary Care Physician Office Phone Office Fax Maria Esther Reyez [phone] Follow-up Information Follow up With Details Comments Contact Info Michelle Thomas MD   [address] [place] [address] 
[phone] Your Appointments 2017  8:15 AM EDT  
ESTABLISHED PATIENT with Wanda Castellanos NP  
[facility] at [address]  
[phone] 2017  9:00 AM EDT  
RADIATION ONC TREATMENT with RAD ONC THERAPY SFM  
[facility] [place] RAD THERAPY [facility] ([address]) [address] [place] [address] [place] [address]  
[phone] Current Discharge Medication List  
  
 CONTINUE these medications which have NOT CHANGED Dose & Instructions Dispensing Information Comments Morning Noon Evening Bedtime  
 amLODIPine 5 mg tablet Commonly known as:  Benjy Lute Your last dose was: Your next dose is: TAKE ONE TABLET BY MOUTH EVERY DAY Quantity:  30 Tab Refills:  5  
     
   
   
   
  
 aspirin delayed-release 81 mg tablet Your last dose was: Your next dose is: Take  by mouth daily. Refills:  0 Discharge Instructions Implanted Port: What to Expect at [facility] Your Recovery You have had a procedure to implant a port. The port looks like a small bump under your skin. A thin, flexible tube called a catheter runs under the skin from the port into a large vein. You may have the port for weeks, months, or longer. You will be able to get medicine, blood, nutrients, or other fluids with more comfort. The port can be used right away. You will probably have some discomfort and bruising at the port site. This will go away in a few days. You may have strips of tape on the cut (incision) the doctor made, or the cut may have been closed with glue. It may be covered with a small bandage. This care sheet gives you a general idea about how long it will take for you to recover. But each person recovers at a different pace. Follow the steps below to feel better as quickly as possible. How can you care for yourself at home? Activity · Avoid arm and upper body movements that may pull on the catheter. These movements include heavy weight lifting and vigorous use of your arms. · You will probably need to take 1 day off from work and will be able to return to normal activities shortly after. This depends on the type of work you do, why you have the catheter, and how you feel. · Ask your doctor when you can drive again.  Pay special attention when pulling your seat belt across your chest so it doesn't pull out the catheter. It's okay if the seat belt lays over the catheter. Medicines · Take pain medicines exactly as directed. ¨ If the doctor gave you a prescription medicine for pain, take it as prescribed. ¨ If you are not taking a prescription pain medicine, ask your doctor if you can take an over-the-counter medicine. ¨ Do not take two or more pain medicines at the same time unless the doctor told you to. Many pain medicines have acetaminophen, which is Tylenol. Too much acetaminophen (Tylenol) can be harmful. · If you think your pain medicine is making you sick to your stomach: 
¨ Take your medicine after meals (unless your doctor has told you not to). ¨ Ask your doctor for a different pain medicine. Incision care · If you have a bandage, your doctor will tell you when you can remove it. After you remove the bandage, you may shower. Wash the area with soap and water and pat it dry. Don't use hydrogen peroxide or alcohol, which can slow healing. You may cover the area with a gauze bandage if it weeps or rubs against clothing. Change the bandage every day. · If you have strips of tape on the cut (incision) the doctor made, leave the tape on for a week or until it falls off. Other instructions · Always carry the medical alert card that your doctor gives you. It contains information about your port. It will tell health care workers you have a port in case you need emergency care. · Wear loose clothing over the port for the first 10 to 14 days. When getting dressed, be careful not to rub the port. Follow-up care is a key part of your treatment and safety. Be sure to make and go to all appointments, and call your doctor if you are having problems. It's also a good idea to know your test results and keep a list of the medicines you take. When should you call for help? Call 911 anytime you think you may need emergency care. For example, call if: · You passed out (lost consciousness). · You have severe trouble breathing. · You have sudden chest pain and shortness of breath, or you cough up blood. Call your doctor now or seek immediate medical care if: 
· You have signs of infection, such as: 
¨ Increased pain, swelling, warmth, or redness near the port. ¨ Red streaks leading from the port. ¨ Pus draining from the port. ¨ A fever. · You have pain or swelling in your neck or arm. · You have trouble breathing or chest pain. Watch closely for changes in your health, and be sure to contact your doctor if: 
· You have any problems with your port. Where can you learn more? Go to Pel[employer] Interactive.be Enter M256 in the search box to learn more about \"Implanted Port: What to Expect at Home. \"  
© 4107-4658 Healthwise, Incorporated. Care instructions adapted under license by Chidi Vallejo (which disclaims liability or warranty for this information). This care instruction is for use with your licensed healthcare professional. If you have questions about a medical condition or this instruction, always ask your healthcare professional. Lady Gordon any warranty or liability for your use of this information. Content Version: 8.8.25071; Last Revised: April 29, 2010 Discharge Orders None Introducing [facility] & HEALTH SERVICES! Dear Karina Solano: Thank you for requesting a [employer] account. Our records indicate that you already have an active [employer] account. You can access your account anytime at [email or web address]. [email or web address] Did you know that you can access your hospital and ER discharge instructions at any time in [employer]? You can also review all of your test results from your hospital stay or ER visit. Additional Information If you have questions, please visit the Frequently Asked Questions section of the [employer] website at [email or web address] Remember, MyChart is NOT to be used for urgent needs. For medical emergencies, dial 911. Now available from your iPhone and Android! General Information Please provide this summary of care documentation to your next provider. Patient Signature:  ____________________________________________________________ Date:  ____________________________________________________________  
  
Larri Hazard Provider Signature:  ____________________________________________________________ Date:  ____________________________________________________________

## 2017-07-28 NOTE — PROCEDURES
PROCEDURE:Port placement. INDICATION:chemotherapy. ANESTHESIA:sedation and local.  COMPLICATION:NONE. SPECIMENS REMOVED:none. BLOOD LOSS:NONE. /ASSISTANT:GABRIEL Cherry RECOMMENDATIONS:may use. CONSENT OBTAINED:YES.  NOTES:none.

## 2017-07-28 NOTE — PROGRESS NOTES
TRANSFER - OUT REPORT:    Verbal report given to Zuly(name) on Jocelyn Strong  being transferred to angio(unit) for routine progression of care       Report consisted of patients Situation, Background, Assessment and   Recommendations(SBAR). Information from the following report(s) SBAR was reviewed with the receiving nurse. Lines:       Opportunity for questions and clarification was provided.       Patient transported with:   Registered Nurse

## 2017-08-08 ENCOUNTER — HOSPITAL ENCOUNTER (OUTPATIENT)
Dept: RADIATION THERAPY | Age: 72
Discharge: HOME | End: 2017-08-08

## 2017-08-08 ENCOUNTER — OFFICE VISIT (OUTPATIENT)
Dept: ONCOLOGY | Age: 72
End: 2017-08-08

## 2017-08-08 VITALS
RESPIRATION RATE: 16 BRPM | DIASTOLIC BLOOD PRESSURE: 68 MMHG | BODY MASS INDEX: 22.66 KG/M2 | HEART RATE: 86 BPM | HEIGHT: 69 IN | WEIGHT: 153 LBS | OXYGEN SATURATION: 98 % | SYSTOLIC BLOOD PRESSURE: 115 MMHG | TEMPERATURE: 96.6 F

## 2017-08-08 DIAGNOSIS — C34.92 PRIMARY MALIGNANT NEOPLASM OF LEFT LUNG METASTATIC TO OTHER SITE (HCC): Primary | ICD-10-CM

## 2017-08-08 DIAGNOSIS — C79.31 BRAIN METASTASES (HCC): ICD-10-CM

## 2017-08-08 DIAGNOSIS — F10.10 ALCOHOL ABUSE: ICD-10-CM

## 2017-08-08 DIAGNOSIS — Z72.0 TOBACCO ABUSE: ICD-10-CM

## 2017-08-08 RX ORDER — ONDANSETRON HYDROCHLORIDE 8 MG/1
8 TABLET, FILM COATED ORAL
Qty: 45 TAB | Refills: 5 | Status: SHIPPED | OUTPATIENT
Start: 2017-08-08

## 2017-08-08 RX ORDER — LIDOCAINE AND PRILOCAINE 25; 25 MG/G; MG/G
CREAM TOPICAL AS NEEDED
Qty: 30 G | Refills: 0 | Status: SHIPPED | OUTPATIENT
Start: 2017-08-08 | End: 2018-01-01 | Stop reason: SDUPTHER

## 2017-08-08 RX ORDER — PROCHLORPERAZINE MALEATE 10 MG
10 TABLET ORAL
Qty: 50 TAB | Refills: 5 | Status: SHIPPED | OUTPATIENT
Start: 2017-08-08

## 2017-08-08 NOTE — MR AVS SNAPSHOT
Visit Information Date & Time Provider Department Dept. Phone Encounter #  
 8/8/2017  8:15 AM Sammy Boyd NP [address] at [address] [id number] Your Appointments 8/15/2017  8:00 AM  
ESTABLISHED PATIENT with Sammy Boyd NP  
[address] at [facility]) Appt Note: Carbogem. Manjeet Perish [address] [facility] [address]  
[phone]  
  
   
 [address] [facility]  
  
    
 10/20/2017 10:00 AM  
ROUTINE CARE with Donny Desai MD  
[address] [facility]) Appt Note: 6 mnth fu est pt /fasting/HTN/Hyperlipidemia [address] [address]  
[place] [address] [address] Upcoming Health Maintenance Date Due DTaP/Tdap/Td series (1 - Tdap) 7/26/1965 ZOSTER VACCINE AGE 60> 5/26/2004 GLAUCOMA SCREENING Q2Y 7/26/2009 Pneumococcal 65+ High/Highest Risk (2 of 2 - PCV13) 2/6/2014 INFLUENZA AGE 9 TO ADULT 8/1/2017 MEDICARE YEARLY EXAM 10/19/2017 Allergies as of 8/8/2017  Review Complete On: 8/8/2017 By: Essie Alberto LPN Severity Noted Reaction Type Reactions Lisinopril High 10/27/2011   Systemic Angioedema Hydrochlorothiazide  07/18/2017    Hives, Swelling Sulfa (Sulfonamide Antibiotics)  10/27/2011    Hives Current Immunizations  Reviewed on 1/10/2013 Name Date Influenza High Dose Vaccine PF 1/10/2013 Influenza Vaccine 11/6/2015, 12/10/2014, 10/2/2013 Influenza Vaccine (Quad) PF 10/18/2016 Influenza Vaccine Split 10/12/2011, 5/10/2011, 10/13/2010 Pneumococcal Polysaccharide (PPSV-23) 2/6/2013 Not reviewed this visit You Were Diagnosed With   
  
 Codes Comments  Primary malignant neoplasm of left lung metastatic to other site [facility])    -  Primary ICD-10-CM: C34.92 
 ICD-9-CM: 162.9 Vitals BP Pulse Temp Resp Height(growth percentile)  
 115/68 (BP 1 Location: Left arm, BP Patient Position: Sitting) 86 96.6 °F (35.9 °C) (Temporal) 16 5' 9\" (1.753 m) Weight(growth percentile) SpO2 BMI Smoking Status 153 lb (69.4 kg) 98% 22.59 kg/m2 Current Every Day Smoker Vitals History BMI and BSA Data Body Mass Index Body Surface Area  
 22.59 kg/m 2 1.84 m 2 Preferred Pharmacy Pharmacy Name Phone [address] [address] [phone] Your Updated Medication List  
  
   
This list is accurate as of: 8/8/17 10:29 AM.  Always use your most recent med list. amLODIPine 5 mg tablet Commonly known as:  Yissel Alcantar TAKE ONE TABLET BY MOUTH EVERY DAY  
  
 aspirin delayed-release 81 mg tablet Take  by mouth daily. To-Do List   
 08/15/2017 7:30 AM  
  Appointment with Fabio Anaya 1 at [place] [address] ([phone]) Patient Instructions Common Side Effects of Chemotherapy Decreased Blood Counts Your blood counts can decrease temporarily due to chemotherapy, they will recover over time. This is an expected side effect that your Doctor will be monitoring. 
- If you experience fevers (temperature >100.4°F), bleeding or unexplained bruising, please call the office right away Risk of Infection Your white blood cells can decrease temporarily due to chemotherapy and can put you at higher risk of infection. Washing hands frequently with soap and avoiding sick contacts can reduce your risk of infection. 
- If you experience fevers (temperature >100.4°F), shaking chills, or any signs of infection, please call the office immediately Anemia Chemotherapy can cause your red blood cells to temporarily decrease; this is an expected side effect that your Doctor will be monitoring. 
- You may experience fatigue if this occurs, please notify the office if you experience bleeding, shortness of breath with minimal exertion or at rest, rapid heartbeat, or feeling as though you may lose consciousness. Hair Loss Chemotherapy can affect your hair follicles and cause you to lose hair. This can occur on your scalp hair but also all over your body including eyebrows and eye lashes Nausea  You have been prescribed nausea medication to take if needed. Please follow the directions given to you by your Doctor. - Please call the office if the medications you have been given are not relieving nausea. Vomiting Make sure you are taking anti-nausea medication as prescribed. Eating small amounts of bland foods frequently can help. - Please call the office right away if you are vomiting more than 4 times per day or are unable to keep down food or fluids Diarrhea Eating small amounts of bland foods frequently can help, increase your fluid intake. It is usually ok to take Imodium for diarrhea. - Please call the office right away if you experience more than 4 episodes of watery diarrhea or if you are feeling dehydrated. Female patients of childbearing age need to avoid pregnancy during chemotherapy. You can reach Medical Oncology at [address] [place] [address] with further questions or concerns at: [phone]. 
- Calls during normal business hours will reach our office. 
- Calls after hours or on the weekend will reach an answering service and the on-call Oncologist will return your call. Introducing [facility] & HE[facility] SERVICES! Dear Casi Moran: Thank you for requesting a [employer] account. Our records indicate that you already have an active [employer] account. You can access your account anytime at [email or web address] Did you know that you can access your hospital and ER discharge instructions at any time in IForem? You can also review all of your test results from your hospital stay or ER visit. Additional Information If you have questions, please visit the Frequently Asked Questions section of the [employer] website at [email or web address] Remember, NaHere is NOT to be used for urgent needs. For medical emergencies, dial 911. Now available from your iPhone and Android! Please provide this summary of care documentation to your next provider. Your primary care clinician is listed as Alfredo Wyman. If you have any questions after today's visit, please call [phone].

## 2017-08-08 NOTE — PROGRESS NOTES
[address] [facility] Oncology at [facility]  [phone]    Hematology / Oncology Consult    Reason for Visit:   Jad Frausto is a 67 y.o. male who is seen in consultation at the request of Dr. Merlyn Valentine for evaluation of lung mass. Treatment History:   · CXR 7/11/2017: Mediastinal lymphadenopathy with left hilar mass. · CT Chest 7/14/2017: Bulky mediastinal and left hilar lymphadenopathy. Bilateral pulmonary masses and nodules  · PET-CT 7/24/2017: Right parietal mass. Hypermetabolic right supraclavicular, right paratracheal, AP window, prevascular, precarinal, subcarinal and left hilar lymphadenopathy. Hypermetabolic pulmonary nodules bilaterally. · MRI Brain 7/24/2017: Junction right posterior temporal lobe and occipital lobe 2.7 cm mass likely metastasis from lung cancer. No additional acute abnormalities  · FNA supraclavicular node 7/28/2017: Metastatic squamous cell carcinoma, PD-L1 5%, BRAF negative, EGFR negative, ALK & ROS-1 pending   · Stage IV Non-small cell lung cancer (Squamous Cell)    History of Present Illness:   Here today for follow up and biopsy results. He reports feeling okay today. Has appt with radiation oncology later today. He states he is feeling okay. Denies dizziness, blurred vision, N/V or balance difficulty. Denies pain. He states he has stopped smoking and drinking since last month. Cough stable. No new complaints. He is accompanied by his daughter today. He smoked 1ppd for 50+ years but quit a few weeks ago. He lives alone in [address] [place] [address]. His son lives about 15 minutes from him. His daughter lives about 30 minutes away. PAST HISTORY: The following sections were reviewed and updated in the EMR as appropriate: PMH, SH, FH, Medications, Allergies.       Allergies   Allergen Reactions    Lisinopril Angioedema    Hydrochlorothiazide Hives and Swelling    Sulfa (Sulfonamide Antibiotics) Hives      Review of Systems: A complete review of systems was obtained, reviewed, and scanned into the EMR. Pertinent findings reviewed above. Physical Exam:     Visit Vitals    /68 (BP 1 Location: Left arm, BP Patient Position: Sitting)  Comment: .  Pulse 86    Temp 96.6 °F (35.9 °C) (Temporal)    Resp 16    Ht 5' 9\" (1.753 m)    Wt 153 lb (69.4 kg)    SpO2 98%    BMI 22.59 kg/m2     ECOG PS: 1  General: No distress  Eyes: PERRLA, anicteric sclerae  HENT: Atraumatic, OP clear  Neck: Supple  Lymphatic: No cervical, supraclavicular, or inguinal adenopathy  Respiratory: CTAB, normal respiratory effort  CV: Normal rate, regular rhythm, no murmurs, no peripheral edema  GI: Soft, nontender, nondistended, no masses, no hepatomegaly, no splenomegaly  MS: Normal gait and station. Digits without clubbing or cyanosis. Skin: No rashes, ecchymoses, or petechiae. Normal temperature, turgor, and texture. Psych: Alert, oriented, appropriate affect, normal judgment/insight    Results:     Lab Results   Component Value Date/Time    WBC 8.2 04/20/2017 11:02 AM    HGB 14.0 04/20/2017 11:02 AM    HCT 42.0 04/20/2017 11:02 AM    PLATELET 477 51/02/9700 11:02 AM    MCV 90 04/20/2017 11:02 AM    ABS. NEUTROPHILS 2.8 10/18/2016 01:36 PM     Lab Results   Component Value Date/Time    Sodium 140 04/20/2017 11:02 AM    Potassium 4.1 04/20/2017 11:02 AM    Chloride 98 04/20/2017 11:02 AM    CO2 25 04/20/2017 11:02 AM    Glucose 77 04/20/2017 11:02 AM    BUN 8 04/20/2017 11:02 AM    Creatinine 0.57 04/20/2017 11:02 AM    GFR est  04/20/2017 11:02 AM    GFR est non- 04/20/2017 11:02 AM    Calcium 9.9 04/20/2017 11:02 AM     Lab Results   Component Value Date/Time    Bilirubin, total 0.4 04/20/2017 11:02 AM    ALT (SGPT) 19 04/20/2017 11:02 AM    AST (SGOT) 20 04/20/2017 11:02 AM    Alk.  phosphatase 84 04/20/2017 11:02 AM    Protein, total 7.6 04/20/2017 11:02 AM    Albumin 4.1 04/20/2017 11:02 AM    Globulin 3.2 10/13/2010 11:04 AM       Records reviewed and summarized above. Radiology report(s) reviewed above. Pathology results reviewed as above. Assessment:   1) Metastatic non-small cell lung cancer (squamous cell)  He has disease within his chest and brain. We reviewed the diagnosis, prognosis, and management options at length today. Metastatic non-small cell lung cancer unfortunately is not curable. Without therapy, survival is generally measured in months. With palliative systemic chemotherapy, we can potentially improve symptoms and prolong the patient's life, with a median survival of just under a year. My recommendation is for systemic therapy with carboplatin and gemcitabine given every 3 weeks for 4 cycles, potentially followed by maintenance chemotherapy. We discussed the risks and benefits of Gemcitabine and Carboplatin chemotherapy. Potential side effects include, but are not limited to, nausea, vomiting, diarrhea, constipation, mucositis, taste changes, myelosuppression, infection, fatigue, alopecia, pulmonary toxicity, neuropathy, allergic reactions, infertility, and rarely, death. The patient has consented to beginning chemotherapy. Port has been placed. We will plan to start in the next week or two depending on the timing of radiation to the brain. Port has been placed. 2) Brain metastasis  Referred to radiation oncology. Discussed with Dr. Ponce Jeffers today, she feels the lesion is likely amenable to gamma knife and will reach out to neurosurgery to coordinate. 3) Cough  Improving. Monitor. 4) Weight loss  Likely due to cancer. Treatment as above. May need dietician to intervene if this continues.     Plan:     · Proceed with radiation oncology appt today  Plan to proceed in 1-2 weeks with C#1 Gemcitabine (1000 mg/m2 on days 1 and 8) and Carboplatin (AUC 5 on day 1) given every 3 weeks x 4 cycles  Labs: CBC on days 1 and 8, BMP, Magnesium, Phosphorus on day 1  Antiemetic Prophylaxis: Palonosetron on day 1, Dexamethasone on day 1 and day 8, Ondansetron prior to day 8  PRN Antiemetics: Ondansetron, Compazine  EMLA cream for port  Return to clinic with start of therapy    >50% of this 40 minute visit was spent on counseling/coordination of care regarding the above diagnoses.       Signed By: Mir Lee MD     August 8, 2017

## 2017-08-08 NOTE — PATIENT INSTRUCTIONS
Common Side Effects of Chemotherapy  Decreased Blood Counts Your blood counts can decrease temporarily due to chemotherapy, they will recover over time. This is an expected side effect that your Doctor will be monitoring.  - If you experience fevers (temperature >100.4°F), bleeding or unexplained bruising, please call the office right away   Risk of Infection Your white blood cells can decrease temporarily due to chemotherapy and can put you at higher risk of infection. Washing hands frequently with soap and avoiding sick contacts can reduce your risk of infection.  - If you experience fevers (temperature >100.4°F), shaking chills, or any signs of infection, please call the office immediately   Anemia Chemotherapy can cause your red blood cells to temporarily decrease; this is an expected side effect that your Doctor will be monitoring.  - You may experience fatigue if this occurs, please notify the office if you experience bleeding, shortness of breath with minimal exertion or at rest, rapid heartbeat, or feeling as though you may lose consciousness. Hair Loss Chemotherapy can affect your hair follicles and cause you to lose hair. This can occur on your scalp hair but also all over your body including eyebrows and eye lashes   Nausea  You have been prescribed nausea medication to take if needed. Please follow the directions given to you by your Doctor. - Please call the office if the medications you have been given are not relieving nausea. Vomiting Make sure you are taking anti-nausea medication as prescribed. Eating small amounts of bland foods frequently can help. - Please call the office right away if you are vomiting more than 4 times per day or are unable to keep down food or fluids   Diarrhea Eating small amounts of bland foods frequently can help, increase your fluid intake. It is usually ok to take Imodium for diarrhea.   - Please call the office right away if you experience more than 4 episodes of watery diarrhea or if you are feeling dehydrated. Female patients of childbearing age need to avoid pregnancy during chemotherapy. You can reach Medical Oncology at [facility] with further questions or concerns at: [phone].  - Calls during normal business hours will reach our office.  - Calls after hours or on the weekend will reach an answering service and the on-call Oncologist will return your call.

## 2017-08-15 ENCOUNTER — TELEPHONE (OUTPATIENT)
Dept: ONCOLOGY | Age: 72
End: 2017-08-15

## 2017-08-15 ENCOUNTER — HOSPITAL ENCOUNTER (OUTPATIENT)
Dept: INFUSION THERAPY | Age: 72
End: 2017-08-15

## 2017-08-15 NOTE — TELEPHONE ENCOUNTER
[facility]  Medical Oncology at [address] [facility]    Call received by Dr. Kathleen Palacios from Dr. Terrell Pride. Brain lesion has been treated with gamma knife 8/15/2017. Dr. Terrell Pride recommends we wait until next week to start chemotherapy. We will reschedule and advise patient.

## 2017-08-15 NOTE — TELEPHONE ENCOUNTER
08/15/17- Spoke to Jessica, patients daughter-informed her per Abner Lennox, NP and Roselia Hansen they want to postpone starting treatment. New appointments scheduled for 8/25/17 7:30am and 8:15am. She questioned if there was something he could take to help stimulate his appetite. Per Jhoan Bucio. NP advised her the steroids form gamma knife will help. She verbalized understanding and denied any further questions or concerns.

## 2017-08-18 ENCOUNTER — HOSPITAL ENCOUNTER (OUTPATIENT)
Dept: INFUSION THERAPY | Age: 72
End: 2017-08-18

## 2017-08-25 ENCOUNTER — OFFICE VISIT (OUTPATIENT)
Dept: ONCOLOGY | Age: 72
End: 2017-08-25

## 2017-08-25 ENCOUNTER — HOSPITAL ENCOUNTER (OUTPATIENT)
Dept: INFUSION THERAPY | Age: 72
Discharge: HOME | End: 2017-08-25
Payer: MEDICARE

## 2017-08-25 VITALS
DIASTOLIC BLOOD PRESSURE: 66 MMHG | BODY MASS INDEX: 23.99 KG/M2 | WEIGHT: 162 LBS | SYSTOLIC BLOOD PRESSURE: 110 MMHG | RESPIRATION RATE: 16 BRPM | HEIGHT: 69 IN | TEMPERATURE: 97.1 F | HEART RATE: 77 BPM | OXYGEN SATURATION: 98 %

## 2017-08-25 VITALS
RESPIRATION RATE: 18 BRPM | HEIGHT: 69 IN | DIASTOLIC BLOOD PRESSURE: 67 MMHG | BODY MASS INDEX: 23.99 KG/M2 | WEIGHT: 162 LBS | HEART RATE: 62 BPM | SYSTOLIC BLOOD PRESSURE: 128 MMHG | TEMPERATURE: 96.7 F

## 2017-08-25 DIAGNOSIS — K59.00 CONSTIPATION, UNSPECIFIED CONSTIPATION TYPE: ICD-10-CM

## 2017-08-25 DIAGNOSIS — C79.31 BRAIN METASTASES (HCC): ICD-10-CM

## 2017-08-25 DIAGNOSIS — R63.0 DECREASED APPETITE: ICD-10-CM

## 2017-08-25 DIAGNOSIS — C34.92 PRIMARY MALIGNANT NEOPLASM OF LEFT LUNG METASTATIC TO OTHER SITE (HCC): Primary | ICD-10-CM

## 2017-08-25 LAB
ALBUMIN SERPL-MCNC: 2.4 G/DL (ref 3.5–5)
ALBUMIN/GLOB SERPL: 0.6 {RATIO} (ref 1.1–2.2)
ALP SERPL-CCNC: 67 U/L (ref 45–117)
ALT SERPL-CCNC: 51 U/L (ref 12–78)
ANION GAP SERPL CALC-SCNC: 5 MMOL/L (ref 5–15)
AST SERPL-CCNC: 18 U/L (ref 15–37)
BASOPHILS # BLD: 0 K/UL (ref 0–0.1)
BASOPHILS NFR BLD: 0 % (ref 0–1)
BILIRUB DIRECT SERPL-MCNC: 0.1 MG/DL (ref 0–0.2)
BILIRUB SERPL-MCNC: 0.2 MG/DL (ref 0.2–1)
BUN SERPL-MCNC: 21 MG/DL (ref 6–20)
BUN/CREAT SERPL: 33 (ref 12–20)
CALCIUM SERPL-MCNC: 9.2 MG/DL (ref 8.5–10.1)
CHLORIDE SERPL-SCNC: 96 MMOL/L (ref 97–108)
CO2 SERPL-SCNC: 31 MMOL/L (ref 21–32)
CREAT SERPL-MCNC: 0.63 MG/DL (ref 0.7–1.3)
EOSINOPHIL # BLD: 0 K/UL (ref 0–0.4)
EOSINOPHIL NFR BLD: 0 % (ref 0–7)
ERYTHROCYTE [DISTWIDTH] IN BLOOD BY AUTOMATED COUNT: 16 % (ref 11.5–14.5)
GLOBULIN SER CALC-MCNC: 4.3 G/DL (ref 2–4)
GLUCOSE SERPL-MCNC: 132 MG/DL (ref 65–100)
HCT VFR BLD AUTO: 34.3 % (ref 36.6–50.3)
HGB BLD-MCNC: 10.9 G/DL (ref 12.1–17)
LYMPHOCYTES # BLD: 1.2 K/UL (ref 0.8–3.5)
LYMPHOCYTES NFR BLD: 6 % (ref 12–49)
MCH RBC QN AUTO: 27.8 PG (ref 26–34)
MCHC RBC AUTO-ENTMCNC: 31.8 G/DL (ref 30–36.5)
MCV RBC AUTO: 87.5 FL (ref 80–99)
MONOCYTES # BLD: 2 K/UL (ref 0–1)
MONOCYTES NFR BLD: 9 % (ref 5–13)
NEUTS SEG # BLD: 17.7 K/UL (ref 1.8–8)
NEUTS SEG NFR BLD: 85 % (ref 32–75)
PLATELET # BLD AUTO: 396 K/UL (ref 150–400)
POTASSIUM SERPL-SCNC: 4.8 MMOL/L (ref 3.5–5.1)
PROT SERPL-MCNC: 6.7 G/DL (ref 6.4–8.2)
RBC # BLD AUTO: 3.92 M/UL (ref 4.1–5.7)
SODIUM SERPL-SCNC: 132 MMOL/L (ref 136–145)
WBC # BLD AUTO: 20.9 K/UL (ref 4.1–11.1)

## 2017-08-25 PROCEDURE — 80076 HEPATIC FUNCTION PANEL: CPT | Performed by: INTERNAL MEDICINE

## 2017-08-25 PROCEDURE — 96375 TX/PRO/DX INJ NEW DRUG ADDON: CPT

## 2017-08-25 PROCEDURE — 74011250636 HC RX REV CODE- 250/636: Performed by: INTERNAL MEDICINE

## 2017-08-25 PROCEDURE — 36415 COLL VENOUS BLD VENIPUNCTURE: CPT | Performed by: INTERNAL MEDICINE

## 2017-08-25 PROCEDURE — 80048 BASIC METABOLIC PNL TOTAL CA: CPT | Performed by: INTERNAL MEDICINE

## 2017-08-25 PROCEDURE — 96417 CHEMO IV INFUS EACH ADDL SEQ: CPT

## 2017-08-25 PROCEDURE — 96413 CHEMO IV INFUSION 1 HR: CPT

## 2017-08-25 PROCEDURE — 77030012965 HC NDL HUBR BBMI -A

## 2017-08-25 PROCEDURE — 85025 COMPLETE CBC W/AUTO DIFF WBC: CPT | Performed by: INTERNAL MEDICINE

## 2017-08-25 PROCEDURE — 74011250636 HC RX REV CODE- 250/636

## 2017-08-25 RX ORDER — SODIUM CHLORIDE 9 MG/ML
25 INJECTION, SOLUTION INTRAVENOUS CONTINUOUS
Status: DISPENSED | OUTPATIENT
Start: 2017-08-25 | End: 2017-08-25

## 2017-08-25 RX ORDER — PALONOSETRON 0.05 MG/ML
0.25 INJECTION, SOLUTION INTRAVENOUS ONCE
Status: COMPLETED | OUTPATIENT
Start: 2017-08-25 | End: 2017-08-25

## 2017-08-25 RX ORDER — DEXAMETHASONE SODIUM PHOSPHATE 4 MG/ML
12 INJECTION, SOLUTION INTRA-ARTICULAR; INTRALESIONAL; INTRAMUSCULAR; INTRAVENOUS; SOFT TISSUE ONCE
Status: COMPLETED | OUTPATIENT
Start: 2017-08-25 | End: 2017-08-25

## 2017-08-25 RX ORDER — POLYETHYLENE GLYCOL 3350 17 G/17G
17 POWDER, FOR SOLUTION ORAL 2 TIMES DAILY
Qty: 1530 G | Refills: 11 | Status: SHIPPED | OUTPATIENT
Start: 2017-08-25 | End: 2017-09-15

## 2017-08-25 RX ADMIN — CARBOPLATIN 670 MG: 10 INJECTION, SOLUTION INTRAVENOUS at 11:40

## 2017-08-25 RX ADMIN — SODIUM CHLORIDE 1890 MG: 900 INJECTION, SOLUTION INTRAVENOUS at 12:24

## 2017-08-25 RX ADMIN — DEXAMETHASONE SODIUM PHOSPHATE 12 MG: 4 INJECTION, SOLUTION INTRA-ARTICULAR; INTRALESIONAL; INTRAMUSCULAR; INTRAVENOUS; SOFT TISSUE at 09:36

## 2017-08-25 RX ADMIN — SODIUM CHLORIDE 25 ML/HR: 900 INJECTION, SOLUTION INTRAVENOUS at 09:28

## 2017-08-25 RX ADMIN — PALONOSETRON HYDROCHLORIDE 0.25 MG: 0.25 INJECTION INTRAVENOUS at 09:29

## 2017-08-25 NOTE — PROGRESS NOTES
[address] [facility] Oncology at [facility]  [phone]    Hematology / Oncology Consult    Reason for Visit:   Zechariah Lang is a 67 y.o. male who is seen in consultation at the request of Dr. Elmer Corbin for evaluation of lung mass. Treatment History:   · CXR 7/11/2017: Mediastinal lymphadenopathy with left hilar mass. · CT Chest 7/14/2017: Bulky mediastinal and left hilar lymphadenopathy. Bilateral pulmonary masses and nodules  · PET-CT 7/24/2017: Right parietal mass. Hypermetabolic right supraclavicular, right paratracheal, AP window, prevascular, precarinal, subcarinal and left hilar lymphadenopathy. Hypermetabolic pulmonary nodules bilaterally. · MRI Brain 7/24/2017: Junction right posterior temporal lobe and occipital lobe 2.7 cm mass likely metastasis from lung cancer. No additional acute abnormalities  · FNA supraclavicular node 7/28/2017: Metastatic squamous cell carcinoma, PD-L1 5%, BRAF negative, EGFR negative, ALK & ROS-1 pending   · Stage IV Non-small cell lung cancer (Squamous Cell)  · Gamma knife by Dr. Kyler Velasquez 8/15/2017   · Palliative chemotherapy with carboplatin and gemcitabine beginning 8/25/2017    History of Present Illness:   Here today for follow up and start of therapy. Had gamma knife last week, taking dexamethasone per Dr. Kyler Velasquez. He states he is feeling okay. Appetite not great. Denies pain. Breathing well. No new complaints. He is accompanied by his daughter today. He smoked 1ppd for 50+ years but quit at diagnosis. He lives alone in [address] [place] [address]. His son lives about 15 minutes from him. His daughter lives about 30 minutes away. PAST HISTORY: The following sections were reviewed and updated in the EMR as appropriate: PMH, SH, FH, Medications, Allergies.       Allergies   Allergen Reactions    Lisinopril Angioedema    Hydrochlorothiazide Hives and Swelling    Sulfa (Sulfonamide Antibiotics) Hives      Review of Systems: A complete review of systems was obtained, reviewed, and scanned into the EMR. Pertinent findings reviewed above. Physical Exam:     Visit Vitals    /67 (BP 1 Location: Left arm, BP Patient Position: Sitting)    Pulse 62    Temp 96.7 °F (35.9 °C) (Oral)    Resp 18    Ht 5' 8.9\" (1.75 m)    Wt 162 lb (73.5 kg)    BMI 23.99 kg/m2     ECOG PS: 1  General: No distress  Eyes: PERRLA, anicteric sclerae  HENT: Atraumatic, OP clear  Neck: Supple  Lymphatic: No cervical, supraclavicular, or inguinal adenopathy  Respiratory: CTAB, normal respiratory effort  CV: Normal rate, regular rhythm, no murmurs, no peripheral edema  GI: Soft, nontender, nondistended, no masses, no hepatomegaly, no splenomegaly  MS: Normal gait and station. Digits without clubbing or cyanosis. Skin: No rashes, ecchymoses, or petechiae. Normal temperature, turgor, and texture. Psych: Alert, oriented, appropriate affect, normal judgment/insight    Results:     Lab Results   Component Value Date/Time    WBC 20.9 08/25/2017 08:03 AM    HGB 10.9 08/25/2017 08:03 AM    HCT 34.3 08/25/2017 08:03 AM    PLATELET 274 90/41/0971 08:03 AM    MCV 87.5 08/25/2017 08:03 AM    ABS. NEUTROPHILS 17.7 08/25/2017 08:03 AM     Lab Results   Component Value Date/Time    Sodium 132 08/25/2017 08:03 AM    Potassium 4.8 08/25/2017 08:03 AM    Chloride 96 08/25/2017 08:03 AM    CO2 31 08/25/2017 08:03 AM    Glucose 132 08/25/2017 08:03 AM    BUN 21 08/25/2017 08:03 AM    Creatinine 0.63 08/25/2017 08:03 AM    GFR est AA >60 08/25/2017 08:03 AM    GFR est non-AA >60 08/25/2017 08:03 AM    Calcium 9.2 08/25/2017 08:03 AM     Lab Results   Component Value Date/Time    Bilirubin, total 0.2 08/25/2017 08:03 AM    ALT (SGPT) 51 08/25/2017 08:03 AM    AST (SGOT) 18 08/25/2017 08:03 AM    Alk.  phosphatase 67 08/25/2017 08:03 AM    Protein, total 6.7 08/25/2017 08:03 AM    Albumin 2.4 08/25/2017 08:03 AM    Globulin 4.3 08/25/2017 08:03 AM         Assessment:   1) Metastatic non-small cell lung cancer (squamous cell)  EGFR, ALK, ROS1, BRAF negative  He has disease within his chest and brain. His cancer is not curable and management is with palliative intent. He is s/p gamma knife to CNS disease. My recommendation is for systemic therapy with carboplatin and gemcitabine given every 3 weeks for 4 cycles, potentially followed by maintenance chemotherapy. He is feeling pretty good and we will start today. We will plan to see him back in 3 weeks with next cycle of therapy. At his next visit we can discuss possible prescreening for the Lung-MAP trial.    2) Brain metastasis  S/P gamma knife. On dexamethasone per Dr. Alonso Hinson. 3) Cough  Improving. Monitor. 4) Weight loss  Stable today. Likely due to cancer. Treatment as above. May need dietician to intervene if this continues.      Plan:     Proceed today with C#1 Gemcitabine (1000 mg/m2 on days 1 and 8) and Carboplatin (AUC 5 on day 1) given every 3 weeks x 4 cycles  Labs: CBC on days 1 and 8, BMP, Magnesium, Phosphorus on day 1  Antiemetic Prophylaxis: Palonosetron on day 1, Dexamethasone on day 1 and day 8, Ondansetron prior to day 8  PRN Antiemetics: Ondansetron, Compazine  EMLA cream for port   following and met with patient today  Continue dexamethasone per Dr. Alonso Hinson  Return to clinic in 3 weeks with next cycle or sooner if needed        Signed By: Antonieta Scherer MD     August 25, 2017

## 2017-08-25 NOTE — PROGRESS NOTES
Problem: Chemotherapy Treatment  Goal: *Chemotherapy regimen followed  Outcome: Progressing Towards Goal  C1 Carbo/Gemzar today.

## 2017-08-25 NOTE — PROGRESS NOTES
HPI: Bob Magallanes is a 67 y.o. male diagnosed with Stage IV NSCLC with brain metastasises here today for Cycle 1, Day 1 chemotherapy counseling. Mr. Zane Lundberg is being treated with carboplatin AUC 5 on day 1 and gemcitabine 1000 mg/m2 on days 1 and 8 of each 21 day cycle per Jose Manuel CAREY et al J Clin Oncol 2002;55:0159. Education on medications included in the regimen was provided to the patient. Topics covered included, but were not limited to: myelosuppression, nausea and vomiting, alopecia, and hydration. Mr. Zane Lundberg verbalized understanding of the information provided and denied any additional questions or concerns. Thank you for the opportunity to work with Mr. Zane Lundberg. Franchesca NeriD, BCOP    Time spent with the patient:  30 minutes  Time spent documenting: 10 minutes        Current Outpatient Prescriptions   Medication Sig    DEXAMETHASONE (DECADRON PO) Take 4 mg by mouth.  polyethylene glycol (MIRALAX) 17 gram/dose powder Take 17 g by mouth two (2) times a day.  prochlorperazine (COMPAZINE) 10 mg tablet Take 1 Tab by mouth every six (6) hours as needed for Nausea.  lidocaine-prilocaine (EMLA) topical cream Apply  to affected area as needed for Pain (Apply 30-60 min. prior to having your port accessed).  ondansetron hcl (ZOFRAN) 8 mg tablet Take 1 Tab by mouth every eight (8) hours as needed for Nausea.  aspirin delayed-release 81 mg tablet Take  by mouth daily.     amLODIPine (NORVASC) 5 mg tablet TAKE ONE TABLET BY MOUTH EVERY DAY     Current Facility-Administered Medications   Medication Dose Route Frequency    0.9% sodium chloride infusion  25 mL/hr IntraVENous CONTINUOUS    CARBOplatin (PARAPLATIN) 670 mg in 0.9% sodium chloride 250 mL, overfill volume 25 mL chemo infusion  670 mg IntraVENous ONCE    gemcitabine (GEMZAR) 1,890 mg in 0.9% sodium chloride 250 mL, overfill volume 25 mL chemo infusion  1,890 mg IntraVENous ONCE

## 2017-08-25 NOTE — PROGRESS NOTES
Outpatient Infusion Center - Chemotherapy Progress Note    2066 Pt admit to [facility] for C1 Carboplatin/Gemzar ambulatory in stable condition. Assessment completed. No new concerns voiced. Port accessed with 20 G 0.75 inch kellogg needle, with positive blood return. Labs drawn per order and sent for results prior to treatment today. Recent Results (from the past 12 hour(s))   CBC WITH AUTOMATED DIFF    Collection Time: 08/25/17  8:03 AM   Result Value Ref Range    WBC 20.9 (H) 4.1 - 11.1 K/uL    RBC 3.92 (L) 4.10 - 5.70 M/uL    HGB 10.9 (L) 12.1 - 17.0 g/dL    HCT 34.3 (L) 36.6 - 50.3 %    MCV 87.5 80.0 - 99.0 FL    MCH 27.8 26.0 - 34.0 PG    MCHC 31.8 30.0 - 36.5 g/dL    RDW 16.0 (H) 11.5 - 14.5 %    PLATELET 468 205 - 649 K/uL    NEUTROPHILS 85 (H) 32 - 75 %    LYMPHOCYTES 6 (L) 12 - 49 %    MONOCYTES 9 5 - 13 %    EOSINOPHILS 0 0 - 7 %    BASOPHILS 0 0 - 1 %    ABS. NEUTROPHILS 17.7 (H) 1.8 - 8.0 K/UL    ABS. LYMPHOCYTES 1.2 0.8 - 3.5 K/UL    ABS. MONOCYTES 2.0 (H) 0.0 - 1.0 K/UL    ABS. EOSINOPHILS 0.0 0.0 - 0.4 K/UL    ABS. BASOPHILS 0.0 0.0 - 0.1 K/UL   METABOLIC PANEL, BASIC    Collection Time: 08/25/17  8:03 AM   Result Value Ref Range    Sodium 132 (L) 136 - 145 mmol/L    Potassium 4.8 3.5 - 5.1 mmol/L    Chloride 96 (L) 97 - 108 mmol/L    CO2 31 21 - 32 mmol/L    Anion gap 5 5 - 15 mmol/L    Glucose 132 (H) 65 - 100 mg/dL    BUN 21 (H) 6 - 20 MG/DL    Creatinine 0.63 (L) 0.70 - 1.30 MG/DL    BUN/Creatinine ratio 33 (H) 12 - 20      GFR est AA >60 >60 ml/min/1.73m2    GFR est non-AA >60 >60 ml/min/1.73m2    Calcium 9.2 8.5 - 10.1 MG/DL   HEPATIC FUNCTION PANEL    Collection Time: 08/25/17  8:03 AM   Result Value Ref Range    Protein, total 6.7 6.4 - 8.2 g/dL    Albumin 2.4 (L) 3.5 - 5.0 g/dL    Globulin 4.3 (H) 2.0 - 4.0 g/dL    A-G Ratio 0.6 (L) 1.1 - 2.2      Bilirubin, total 0.2 0.2 - 1.0 MG/DL    Bilirubin, direct 0.1 0.0 - 0.2 MG/DL    Alk.  phosphatase 67 45 - 117 U/L    AST (SGOT) 18 15 - 37 U/L    ALT (SGPT) 51 12 - 78 U/L     0805- patient left [facility] for MD office appointment. 0900- patient returned to [facility] for treatment. New chemo order for Carboplain sent and scanned into chart. Medications:  Normal Saline KVO  Aloxi 0.25 MG IVP  Decadron 12 MG IVP  Carboplatin 670 MG -infused over 30 minutes  Gemzar 1890 MG - infused over 30 minutes    Patient was observed for 30 minutes post infusion with no reactions noted. Patient was educated on medications given today and all possible side effects. Patient was also given printed copies of education to take home. 1340 Pt tolerated treatment well. Port maintained positive blood return throughout treatment, flushed with positive blood return at conclusion and kellogg needle was removed,site covered with 2x2 guaze and band aid. D/c home ambulatory in no distress. Pt aware of next [facility] appointment scheduled for 9/01/17.     Patient Vitals for the past 12 hrs:   Temp Pulse Resp BP SpO2   08/25/17 1331 - 77 16 110/66 -   08/25/17 0753 97.1 °F (36.2 °C) 66 18 109/69 98 %

## 2017-08-25 NOTE — DISCHARGE INSTRUCTIONS

## 2017-08-28 RX ORDER — DEXAMETHASONE SODIUM PHOSPHATE 4 MG/ML
8 INJECTION, SOLUTION INTRA-ARTICULAR; INTRALESIONAL; INTRAMUSCULAR; INTRAVENOUS; SOFT TISSUE ONCE
Status: COMPLETED | OUTPATIENT
Start: 2017-09-01 | End: 2017-09-01

## 2017-08-28 RX ORDER — SODIUM CHLORIDE 9 MG/ML
25 INJECTION, SOLUTION INTRAVENOUS CONTINUOUS
Status: DISCONTINUED | OUTPATIENT
Start: 2017-08-31 | End: 2017-08-28

## 2017-08-28 RX ORDER — DEXAMETHASONE SODIUM PHOSPHATE 4 MG/ML
8 INJECTION, SOLUTION INTRA-ARTICULAR; INTRALESIONAL; INTRAMUSCULAR; INTRAVENOUS; SOFT TISSUE ONCE
Status: DISCONTINUED | OUTPATIENT
Start: 2017-08-31 | End: 2017-08-28

## 2017-08-28 RX ORDER — SODIUM CHLORIDE 9 MG/ML
25 INJECTION, SOLUTION INTRAVENOUS CONTINUOUS
Status: DISPENSED | OUTPATIENT
Start: 2017-09-01 | End: 2017-09-01

## 2017-09-01 ENCOUNTER — HOSPITAL ENCOUNTER (OUTPATIENT)
Dept: INFUSION THERAPY | Age: 72
Discharge: HOME | End: 2017-09-01
Payer: MEDICARE

## 2017-09-01 VITALS
SYSTOLIC BLOOD PRESSURE: 115 MMHG | BODY MASS INDEX: 23.4 KG/M2 | TEMPERATURE: 97.8 F | WEIGHT: 158 LBS | HEART RATE: 69 BPM | HEIGHT: 69 IN | DIASTOLIC BLOOD PRESSURE: 63 MMHG | RESPIRATION RATE: 18 BRPM

## 2017-09-01 LAB
BASO+EOS+MONOS # BLD AUTO: 0.4 K/UL (ref 0.2–1.2)
BASO+EOS+MONOS # BLD AUTO: 7 % (ref 3.2–16.9)
DIFFERENTIAL METHOD BLD: ABNORMAL
ERYTHROCYTE [DISTWIDTH] IN BLOOD BY AUTOMATED COUNT: 15.8 % (ref 11.8–15.8)
HCT VFR BLD AUTO: 31.5 % (ref 36.6–50.3)
HGB BLD-MCNC: 10.3 G/DL (ref 12.1–17)
LYMPHOCYTES # BLD: 1.7 K/UL (ref 0.8–3.5)
LYMPHOCYTES NFR BLD: 32 % (ref 12–49)
MCH RBC QN AUTO: 28.9 PG (ref 26–34)
MCHC RBC AUTO-ENTMCNC: 32.7 G/DL (ref 30–36.5)
MCV RBC AUTO: 88.5 FL (ref 80–99)
NEUTS SEG # BLD: 3.2 K/UL (ref 1.8–8)
NEUTS SEG NFR BLD: 61 % (ref 32–75)
PLATELET # BLD AUTO: 189 K/UL (ref 150–400)
RBC # BLD AUTO: 3.56 M/UL (ref 4.1–5.7)
WBC # BLD AUTO: 5.3 K/UL (ref 4.1–11.1)

## 2017-09-01 PROCEDURE — 74011000250 HC RX REV CODE- 250: Performed by: INTERNAL MEDICINE

## 2017-09-01 PROCEDURE — 74011250636 HC RX REV CODE- 250/636: Performed by: INTERNAL MEDICINE

## 2017-09-01 PROCEDURE — 77030012965 HC NDL HUBR BBMI -A

## 2017-09-01 PROCEDURE — 96413 CHEMO IV INFUSION 1 HR: CPT

## 2017-09-01 PROCEDURE — 96375 TX/PRO/DX INJ NEW DRUG ADDON: CPT

## 2017-09-01 PROCEDURE — 85025 COMPLETE CBC W/AUTO DIFF WBC: CPT | Performed by: INTERNAL MEDICINE

## 2017-09-01 PROCEDURE — 36415 COLL VENOUS BLD VENIPUNCTURE: CPT | Performed by: INTERNAL MEDICINE

## 2017-09-01 RX ORDER — SODIUM CHLORIDE 9 MG/ML
10 INJECTION INTRAMUSCULAR; INTRAVENOUS; SUBCUTANEOUS AS NEEDED
Status: ACTIVE | OUTPATIENT
Start: 2017-09-01 | End: 2017-09-02

## 2017-09-01 RX ORDER — HEPARIN 100 UNIT/ML
500 SYRINGE INTRAVENOUS AS NEEDED
Status: ACTIVE | OUTPATIENT
Start: 2017-09-01 | End: 2017-09-02

## 2017-09-01 RX ORDER — SODIUM CHLORIDE 9 MG/ML
INJECTION INTRAMUSCULAR; INTRAVENOUS; SUBCUTANEOUS
Status: DISPENSED
Start: 2017-09-01 | End: 2017-09-01

## 2017-09-01 RX ORDER — SODIUM CHLORIDE 0.9 % (FLUSH) 0.9 %
10 SYRINGE (ML) INJECTION AS NEEDED
Status: ACTIVE | OUTPATIENT
Start: 2017-09-01 | End: 2017-09-02

## 2017-09-01 RX ADMIN — Medication 10 ML: at 10:35

## 2017-09-01 RX ADMIN — Medication 10 ML: at 12:26

## 2017-09-01 RX ADMIN — HEPARIN 500 UNITS: 100 SYRINGE at 12:26

## 2017-09-01 RX ADMIN — SODIUM CHLORIDE 1890 MG: 900 INJECTION, SOLUTION INTRAVENOUS at 11:50

## 2017-09-01 RX ADMIN — SODIUM CHLORIDE 10 ML: 9 INJECTION, SOLUTION INTRAMUSCULAR; INTRAVENOUS; SUBCUTANEOUS at 10:35

## 2017-09-01 RX ADMIN — DEXAMETHASONE SODIUM PHOSPHATE 8 MG: 4 INJECTION, SOLUTION INTRAMUSCULAR; INTRAVENOUS at 10:58

## 2017-09-01 RX ADMIN — Medication 10 ML: at 10:36

## 2017-09-01 RX ADMIN — SODIUM CHLORIDE 25 ML/HR: 900 INJECTION, SOLUTION INTRAVENOUS at 10:56

## 2017-09-01 NOTE — PROGRESS NOTES
[facility] VISIT NOTE    3986  Pt arrived at [facility] and in no distress for C1D8 of Carbo/Gemzar. Assessment completed, pt c/o swelling legs, constipation with relief from medication, and difficulty sleeping. Notified TYLER Cuellar and states per Dorian Sanchez NP to encourage pt to elevate legs, use compression stockings, and try an over the counter sleep aid. Pt notified and he verbalized understanding. Port accessed with 0.75 in kellogg with no difficulty. Positive blood return noted and labs drawn. Recent Results (from the past 12 hour(s))   CBC WITH 3 PART DIFF    Collection Time: 09/01/17 10:27 AM   Result Value Ref Range    WBC 5.3 4.1 - 11.1 K/uL    RBC 3.56 (L) 4.10 - 5.70 M/uL    HGB 10.3 (L) 12.1 - 17.0 g/dL    HCT 31.5 (L) 36.6 - 50.3 %    MCV 88.5 80.0 - 99.0 FL    MCH 28.9 26.0 - 34.0 PG    MCHC 32.7 30.0 - 36.5 g/dL    RDW 15.8 11.8 - 15.8 %    PLATELET 674 727 - 126 K/uL    NEUTROPHILS 61 32 - 75 %    MIXED CELLS 7 3.2 - 16.9 %    LYMPHOCYTES 32 12 - 49 %    ABS. NEUTROPHILS 3.2 1.8 - 8.0 K/UL    ABS. MIXED CELLS 0.4 0.2 - 1.2 K/uL    ABS. LYMPHOCYTES 1.7 0.8 - 3.5 K/UL    DF AUTOMATED       Medications received:  NS at [address] IVP  Decadron IVP  Gemzar IV    Patient Vitals for the past 12 hrs:   Temp Pulse Resp BP   09/01/17 1228 97.8 °F (36.6 °C) 69 18 115/63   09/01/17 1020 97.8 °F (36.6 °C) 67 18 110/68     Tolerated treatment well, no adverse reaction noted. Port de-accessed and flushed per protocol. Positive blood return noted. 1230  D/C'd from [facility] and in no distress accompanied by his daughter. Next appointment is 9/15/17 at 0900.

## 2017-09-07 RX ORDER — PALONOSETRON 0.05 MG/ML
0.25 INJECTION, SOLUTION INTRAVENOUS ONCE
Status: COMPLETED | OUTPATIENT
Start: 2017-09-15 | End: 2017-09-15

## 2017-09-07 RX ORDER — DEXAMETHASONE SODIUM PHOSPHATE 4 MG/ML
12 INJECTION, SOLUTION INTRA-ARTICULAR; INTRALESIONAL; INTRAMUSCULAR; INTRAVENOUS; SOFT TISSUE ONCE
Status: COMPLETED | OUTPATIENT
Start: 2017-09-15 | End: 2017-09-15

## 2017-09-07 RX ORDER — SODIUM CHLORIDE 9 MG/ML
25 INJECTION, SOLUTION INTRAVENOUS CONTINUOUS
Status: DISPENSED | OUTPATIENT
Start: 2017-09-15 | End: 2017-09-15

## 2017-09-12 ENCOUNTER — TELEPHONE (OUTPATIENT)
Dept: ONCOLOGY | Age: 72
End: 2017-09-12

## 2017-09-12 DIAGNOSIS — K13.79 MOUTH SORES: ICD-10-CM

## 2017-09-12 DIAGNOSIS — C34.92 PRIMARY MALIGNANT NEOPLASM OF LEFT LUNG METASTATIC TO OTHER SITE (HCC): Primary | ICD-10-CM

## 2017-09-12 NOTE — TELEPHONE ENCOUNTER
[facility]  Medical Oncology at [address] [facility]    09/12/17- Patient's daughter reports intermittent edema in ankles and legs, right side slightly larger than left. She denies redness or pain. Patient wears compression stockings that help, and edema decreases at night when laying down. Encouraged him to continue elevating legs and wearing compression stockings. Reviewed signs/symptoms of blood clot and when to call back. Patient's daughter also stated he has some sores in his cheek and gums are tender. Will call in a prescription for magic mouthwash. She verbalized understanding, and denies further questions or concerns at this time. Requested Prescriptions     Signed Prescriptions Disp Refills    magic mouthwash solution 480 mL 2     Sig: Swish and spit 15-30 mL every 2-4 hours as needed for mouth pain. May swallow for throat pain. Magic mouth wash   Maalox  Lidocaine 2% viscous   Diphenhydramine oral solution     Pharmacy to mix equal portions of ingredients to a total volume as indicated in the dispense amount.      Authorizing Provider: Juan Tabares     Ordering User: Dalton CUMMINS

## 2017-09-12 NOTE — TELEPHONE ENCOUNTER
Pts daughter called and left a voicemail stating her father is experiencing swelling in his legs that comes and goes and they would like to know what to do for it.  Call back number [phone]

## 2017-09-15 ENCOUNTER — HOSPITAL ENCOUNTER (OUTPATIENT)
Dept: INFUSION THERAPY | Age: 72
Discharge: HOME | End: 2017-09-15
Payer: MEDICARE

## 2017-09-15 ENCOUNTER — HOSPITAL ENCOUNTER (OUTPATIENT)
Dept: VASCULAR SURGERY | Age: 72
Discharge: HOME | DRG: 300 | End: 2017-09-15
Attending: NURSE PRACTITIONER
Payer: MEDICARE

## 2017-09-15 ENCOUNTER — RESEARCH ENCOUNTER (OUTPATIENT)
Dept: ONCOLOGY | Age: 72
End: 2017-09-15

## 2017-09-15 ENCOUNTER — OFFICE VISIT (OUTPATIENT)
Dept: ONCOLOGY | Age: 72
End: 2017-09-15

## 2017-09-15 ENCOUNTER — APPOINTMENT (OUTPATIENT)
Dept: CT IMAGING | Age: 72
DRG: 300 | End: 2017-09-15
Attending: EMERGENCY MEDICINE
Payer: MEDICARE

## 2017-09-15 ENCOUNTER — APPOINTMENT (OUTPATIENT)
Dept: GENERAL RADIOLOGY | Age: 72
DRG: 300 | End: 2017-09-15
Attending: EMERGENCY MEDICINE
Payer: MEDICARE

## 2017-09-15 ENCOUNTER — HOSPITAL ENCOUNTER (INPATIENT)
Age: 72
LOS: 2 days | Discharge: HOME HEALTH | DRG: 300 | End: 2017-09-17
Attending: EMERGENCY MEDICINE | Admitting: FAMILY MEDICINE
Payer: MEDICARE

## 2017-09-15 VITALS
HEIGHT: 69 IN | RESPIRATION RATE: 18 BRPM | WEIGHT: 168.8 LBS | BODY MASS INDEX: 25 KG/M2 | OXYGEN SATURATION: 94 % | HEART RATE: 153 BPM | TEMPERATURE: 97.9 F | SYSTOLIC BLOOD PRESSURE: 119 MMHG | DIASTOLIC BLOOD PRESSURE: 75 MMHG

## 2017-09-15 VITALS
HEIGHT: 69 IN | WEIGHT: 168.6 LBS | SYSTOLIC BLOOD PRESSURE: 119 MMHG | TEMPERATURE: 97 F | DIASTOLIC BLOOD PRESSURE: 79 MMHG | HEART RATE: 151 BPM | BODY MASS INDEX: 24.97 KG/M2 | OXYGEN SATURATION: 97 %

## 2017-09-15 DIAGNOSIS — I95.9 HYPOTENSION, UNSPECIFIED HYPOTENSION TYPE: ICD-10-CM

## 2017-09-15 DIAGNOSIS — Z72.0 TOBACCO ABUSE: ICD-10-CM

## 2017-09-15 DIAGNOSIS — M79.89 LEG SWELLING: ICD-10-CM

## 2017-09-15 DIAGNOSIS — R00.0 TACHYCARDIA: ICD-10-CM

## 2017-09-15 DIAGNOSIS — I48.92 ATRIAL FLUTTER, UNSPECIFIED TYPE (HCC): Primary | ICD-10-CM

## 2017-09-15 DIAGNOSIS — C79.31 BRAIN METASTASES (HCC): ICD-10-CM

## 2017-09-15 DIAGNOSIS — R41.0 CONFUSION: ICD-10-CM

## 2017-09-15 DIAGNOSIS — I82.4Y3 ACUTE DEEP VEIN THROMBOSIS (DVT) OF PROXIMAL VEIN OF BOTH LOWER EXTREMITIES (HCC): ICD-10-CM

## 2017-09-15 DIAGNOSIS — C34.92 PRIMARY MALIGNANT NEOPLASM OF LEFT LUNG METASTATIC TO OTHER SITE (HCC): Primary | ICD-10-CM

## 2017-09-15 DIAGNOSIS — C34.92 PRIMARY MALIGNANT NEOPLASM OF LEFT LUNG METASTATIC TO OTHER SITE (HCC): ICD-10-CM

## 2017-09-15 DIAGNOSIS — F10.10 ALCOHOL ABUSE: ICD-10-CM

## 2017-09-15 PROBLEM — I82.443 DEEP VEIN THROMBOSIS (DVT) OF TIBIAL VEIN OF BOTH LOWER EXTREMITIES (HCC): Status: ACTIVE | Noted: 2017-09-15

## 2017-09-15 LAB
ALBUMIN SERPL-MCNC: 2.6 G/DL (ref 3.5–5)
ALBUMIN SERPL-MCNC: 2.7 G/DL (ref 3.5–5)
ALBUMIN/GLOB SERPL: 0.7 {RATIO} (ref 1.1–2.2)
ALBUMIN/GLOB SERPL: 0.7 {RATIO} (ref 1.1–2.2)
ALP SERPL-CCNC: 70 U/L (ref 45–117)
ALP SERPL-CCNC: 74 U/L (ref 45–117)
ALT SERPL-CCNC: 59 U/L (ref 12–78)
ALT SERPL-CCNC: 99 U/L (ref 12–78)
ANION GAP SERPL CALC-SCNC: 6 MMOL/L (ref 5–15)
ANION GAP SERPL CALC-SCNC: 7 MMOL/L (ref 5–15)
AST SERPL-CCNC: 44 U/L (ref 15–37)
AST SERPL-CCNC: 65 U/L (ref 15–37)
BASO+EOS+MONOS # BLD AUTO: 1.5 K/UL (ref 0.2–1.2)
BASO+EOS+MONOS # BLD AUTO: 18 % (ref 3.2–16.9)
BASOPHILS # BLD: 0 K/UL (ref 0–0.1)
BASOPHILS NFR BLD: 0 % (ref 0–1)
BILIRUB DIRECT SERPL-MCNC: 0.1 MG/DL (ref 0–0.2)
BILIRUB SERPL-MCNC: 0.2 MG/DL (ref 0.2–1)
BILIRUB SERPL-MCNC: 0.2 MG/DL (ref 0.2–1)
BUN SERPL-MCNC: 15 MG/DL (ref 6–20)
BUN SERPL-MCNC: 17 MG/DL (ref 6–20)
BUN/CREAT SERPL: 19 (ref 12–20)
BUN/CREAT SERPL: 20 (ref 12–20)
CALCIUM SERPL-MCNC: 8.5 MG/DL (ref 8.5–10.1)
CALCIUM SERPL-MCNC: 8.6 MG/DL (ref 8.5–10.1)
CHLORIDE SERPL-SCNC: 96 MMOL/L (ref 97–108)
CHLORIDE SERPL-SCNC: 99 MMOL/L (ref 97–108)
CO2 SERPL-SCNC: 27 MMOL/L (ref 21–32)
CO2 SERPL-SCNC: 31 MMOL/L (ref 21–32)
CREAT SERPL-MCNC: 0.78 MG/DL (ref 0.7–1.3)
CREAT SERPL-MCNC: 0.83 MG/DL (ref 0.7–1.3)
DIFFERENTIAL METHOD BLD: ABNORMAL
DIFFERENTIAL METHOD BLD: ABNORMAL
EOSINOPHIL # BLD: 0 K/UL (ref 0–0.4)
EOSINOPHIL NFR BLD: 0 % (ref 0–7)
ERYTHROCYTE [DISTWIDTH] IN BLOOD BY AUTOMATED COUNT: 17.6 % (ref 11.5–14.5)
ERYTHROCYTE [DISTWIDTH] IN BLOOD BY AUTOMATED COUNT: 17.7 % (ref 11.8–15.8)
GLOBULIN SER CALC-MCNC: 3.7 G/DL (ref 2–4)
GLOBULIN SER CALC-MCNC: 3.9 G/DL (ref 2–4)
GLUCOSE SERPL-MCNC: 104 MG/DL (ref 65–100)
GLUCOSE SERPL-MCNC: 149 MG/DL (ref 65–100)
HCT VFR BLD AUTO: 27.1 % (ref 36.6–50.3)
HCT VFR BLD AUTO: 29 % (ref 36.6–50.3)
HEMOCCULT STL QL: NEGATIVE
HGB BLD-MCNC: 9 G/DL (ref 12.1–17)
HGB BLD-MCNC: 9.3 G/DL (ref 12.1–17)
LYMPHOCYTES # BLD: 0.7 K/UL (ref 0.8–3.5)
LYMPHOCYTES # BLD: 2 K/UL (ref 0.8–3.5)
LYMPHOCYTES NFR BLD: 10 % (ref 12–49)
LYMPHOCYTES NFR BLD: 23 % (ref 12–49)
MAGNESIUM SERPL-MCNC: 1.9 MG/DL (ref 1.6–2.4)
MCH RBC QN AUTO: 28 PG (ref 26–34)
MCH RBC QN AUTO: 29.3 PG (ref 26–34)
MCHC RBC AUTO-ENTMCNC: 32.1 G/DL (ref 30–36.5)
MCHC RBC AUTO-ENTMCNC: 33.2 G/DL (ref 30–36.5)
MCV RBC AUTO: 87.3 FL (ref 80–99)
MCV RBC AUTO: 88.3 FL (ref 80–99)
METAMYELOCYTES NFR BLD MANUAL: 2 %
MONOCYTES # BLD: 0.3 K/UL (ref 0–1)
MONOCYTES NFR BLD: 4 % (ref 5–13)
MYELOCYTES NFR BLD MANUAL: 1 %
NEUTS SEG # BLD: 5.1 K/UL (ref 1.8–8)
NEUTS SEG # BLD: 5.8 K/UL (ref 1.8–8)
NEUTS SEG NFR BLD: 59 % (ref 32–75)
NEUTS SEG NFR BLD: 83 % (ref 32–75)
NRBC # BLD: 0.1 K/UL (ref 0–0.01)
NRBC BLD-RTO: 1 PER 100 WBC
NRBC BLD-RTO: 1.5 PER 100 WBC
PHOSPHATE SERPL-MCNC: 4.1 MG/DL (ref 2.6–4.7)
PLATELET # BLD AUTO: 377 K/UL (ref 150–400)
PLATELET # BLD AUTO: 402 K/UL (ref 150–400)
POTASSIUM SERPL-SCNC: 3.5 MMOL/L (ref 3.5–5.1)
POTASSIUM SERPL-SCNC: 4.1 MMOL/L (ref 3.5–5.1)
PROT SERPL-MCNC: 6.4 G/DL (ref 6.4–8.2)
PROT SERPL-MCNC: 6.5 G/DL (ref 6.4–8.2)
RBC # BLD AUTO: 3.07 M/UL (ref 4.1–5.7)
RBC # BLD AUTO: 3.32 M/UL (ref 4.1–5.7)
RBC MORPH BLD: ABNORMAL
SODIUM SERPL-SCNC: 133 MMOL/L (ref 136–145)
SODIUM SERPL-SCNC: 133 MMOL/L (ref 136–145)
TROPONIN I SERPL-MCNC: 0.08 NG/ML
TSH SERPL DL<=0.05 MIU/L-ACNC: 0.61 UIU/ML (ref 0.36–3.74)
WBC # BLD AUTO: 7 K/UL (ref 4.1–11.1)
WBC # BLD AUTO: 8.6 K/UL (ref 4.1–11.1)
WBC NRBC COR # BLD: ABNORMAL 10*3/UL

## 2017-09-15 PROCEDURE — 36415 COLL VENOUS BLD VENIPUNCTURE: CPT | Performed by: EMERGENCY MEDICINE

## 2017-09-15 PROCEDURE — 82272 OCCULT BLD FECES 1-3 TESTS: CPT

## 2017-09-15 PROCEDURE — 77030012965 HC NDL HUBR BBMI -A

## 2017-09-15 PROCEDURE — 71010 XR CHEST PORT: CPT

## 2017-09-15 PROCEDURE — 65660000000 HC RM CCU STEPDOWN

## 2017-09-15 PROCEDURE — 96417 CHEMO IV INFUS EACH ADDL SEQ: CPT

## 2017-09-15 PROCEDURE — 83735 ASSAY OF MAGNESIUM: CPT | Performed by: FAMILY MEDICINE

## 2017-09-15 PROCEDURE — 96376 TX/PRO/DX INJ SAME DRUG ADON: CPT

## 2017-09-15 PROCEDURE — 96413 CHEMO IV INFUSION 1 HR: CPT

## 2017-09-15 PROCEDURE — 36415 COLL VENOUS BLD VENIPUNCTURE: CPT | Performed by: INTERNAL MEDICINE

## 2017-09-15 PROCEDURE — 85025 COMPLETE CBC W/AUTO DIFF WBC: CPT | Performed by: INTERNAL MEDICINE

## 2017-09-15 PROCEDURE — 96372 THER/PROPH/DIAG INJ SC/IM: CPT

## 2017-09-15 PROCEDURE — 74011000258 HC RX REV CODE- 258: Performed by: EMERGENCY MEDICINE

## 2017-09-15 PROCEDURE — 74011000250 HC RX REV CODE- 250

## 2017-09-15 PROCEDURE — 84443 ASSAY THYROID STIM HORMONE: CPT | Performed by: FAMILY MEDICINE

## 2017-09-15 PROCEDURE — 80048 BASIC METABOLIC PNL TOTAL CA: CPT | Performed by: INTERNAL MEDICINE

## 2017-09-15 PROCEDURE — 74011250636 HC RX REV CODE- 250/636: Performed by: INTERNAL MEDICINE

## 2017-09-15 PROCEDURE — 93005 ELECTROCARDIOGRAM TRACING: CPT

## 2017-09-15 PROCEDURE — 96365 THER/PROPH/DIAG IV INF INIT: CPT

## 2017-09-15 PROCEDURE — 96360 HYDRATION IV INFUSION INIT: CPT

## 2017-09-15 PROCEDURE — 96375 TX/PRO/DX INJ NEW DRUG ADDON: CPT

## 2017-09-15 PROCEDURE — 74011250637 HC RX REV CODE- 250/637: Performed by: FAMILY MEDICINE

## 2017-09-15 PROCEDURE — 74011636320 HC RX REV CODE- 636/320: Performed by: RADIOLOGY

## 2017-09-15 PROCEDURE — 84100 ASSAY OF PHOSPHORUS: CPT | Performed by: FAMILY MEDICINE

## 2017-09-15 PROCEDURE — 74011250636 HC RX REV CODE- 250/636: Performed by: NURSE PRACTITIONER

## 2017-09-15 PROCEDURE — 80076 HEPATIC FUNCTION PANEL: CPT | Performed by: INTERNAL MEDICINE

## 2017-09-15 PROCEDURE — 74011250636 HC RX REV CODE- 250/636: Performed by: EMERGENCY MEDICINE

## 2017-09-15 PROCEDURE — 80053 COMPREHEN METABOLIC PANEL: CPT | Performed by: EMERGENCY MEDICINE

## 2017-09-15 PROCEDURE — 84484 ASSAY OF TROPONIN QUANT: CPT | Performed by: EMERGENCY MEDICINE

## 2017-09-15 PROCEDURE — 74011000250 HC RX REV CODE- 250: Performed by: INTERNAL MEDICINE

## 2017-09-15 PROCEDURE — 99285 EMERGENCY DEPT VISIT HI MDM: CPT

## 2017-09-15 PROCEDURE — 85025 COMPLETE CBC W/AUTO DIFF WBC: CPT | Performed by: EMERGENCY MEDICINE

## 2017-09-15 PROCEDURE — 74011000250 HC RX REV CODE- 250: Performed by: EMERGENCY MEDICINE

## 2017-09-15 PROCEDURE — 71275 CT ANGIOGRAPHY CHEST: CPT

## 2017-09-15 RX ORDER — DILTIAZEM HYDROCHLORIDE 5 MG/ML
10 INJECTION INTRAVENOUS
Status: COMPLETED | OUTPATIENT
Start: 2017-09-15 | End: 2017-09-15

## 2017-09-15 RX ORDER — HEPARIN 100 UNIT/ML
500 SYRINGE INTRAVENOUS AS NEEDED
Status: ACTIVE | OUTPATIENT
Start: 2017-09-15 | End: 2017-09-16

## 2017-09-15 RX ORDER — ENOXAPARIN SODIUM 100 MG/ML
1 INJECTION SUBCUTANEOUS
Status: COMPLETED | OUTPATIENT
Start: 2017-09-15 | End: 2017-09-15

## 2017-09-15 RX ORDER — SODIUM CHLORIDE 9 MG/ML
10 INJECTION INTRAMUSCULAR; INTRAVENOUS; SUBCUTANEOUS AS NEEDED
Status: ACTIVE | OUTPATIENT
Start: 2017-09-15 | End: 2017-09-16

## 2017-09-15 RX ORDER — ENOXAPARIN SODIUM 100 MG/ML
80 INJECTION SUBCUTANEOUS EVERY 12 HOURS
Status: DISCONTINUED | OUTPATIENT
Start: 2017-09-16 | End: 2017-09-17

## 2017-09-15 RX ORDER — NICOTINE 7MG/24HR
1 PATCH, TRANSDERMAL 24 HOURS TRANSDERMAL DAILY PRN
Status: DISCONTINUED | OUTPATIENT
Start: 2017-09-15 | End: 2017-09-17 | Stop reason: HOSPADM

## 2017-09-15 RX ORDER — LORAZEPAM 2 MG/ML
0.5 INJECTION INTRAMUSCULAR
Status: DISCONTINUED | OUTPATIENT
Start: 2017-09-15 | End: 2017-09-17 | Stop reason: HOSPADM

## 2017-09-15 RX ORDER — DILTIAZEM HYDROCHLORIDE 5 MG/ML
INJECTION INTRAVENOUS
Status: COMPLETED
Start: 2017-09-15 | End: 2017-09-15

## 2017-09-15 RX ORDER — SODIUM CHLORIDE 0.9 % (FLUSH) 0.9 %
5-10 SYRINGE (ML) INJECTION EVERY 8 HOURS
Status: DISCONTINUED | OUTPATIENT
Start: 2017-09-15 | End: 2017-09-17 | Stop reason: HOSPADM

## 2017-09-15 RX ORDER — IBUPROFEN 200 MG
1 TABLET ORAL EVERY 24 HOURS
Qty: 30 PATCH | Refills: 1 | Status: SHIPPED | OUTPATIENT
Start: 2017-09-15 | End: 2018-01-01 | Stop reason: SDUPTHER

## 2017-09-15 RX ORDER — POLYETHYLENE GLYCOL 3350 17 G/17G
17 POWDER, FOR SOLUTION ORAL DAILY
COMMUNITY
End: 2018-01-01

## 2017-09-15 RX ORDER — SODIUM CHLORIDE 0.9 % (FLUSH) 0.9 %
5-10 SYRINGE (ML) INJECTION AS NEEDED
Status: ACTIVE | OUTPATIENT
Start: 2017-09-15 | End: 2017-09-16

## 2017-09-15 RX ORDER — SODIUM CHLORIDE 0.9 % (FLUSH) 0.9 %
5-10 SYRINGE (ML) INJECTION AS NEEDED
Status: DISCONTINUED | OUTPATIENT
Start: 2017-09-15 | End: 2017-09-17 | Stop reason: HOSPADM

## 2017-09-15 RX ADMIN — CARBOPLATIN 554 MG: 10 INJECTION, SOLUTION INTRAVENOUS at 12:50

## 2017-09-15 RX ADMIN — DILTIAZEM HYDROCHLORIDE 10 MG/HR: 5 INJECTION INTRAVENOUS at 20:07

## 2017-09-15 RX ADMIN — IOPAMIDOL 80 ML: 755 INJECTION, SOLUTION INTRAVENOUS at 19:50

## 2017-09-15 RX ADMIN — SODIUM CHLORIDE 500 ML: 900 INJECTION, SOLUTION INTRAVENOUS at 10:46

## 2017-09-15 RX ADMIN — DILTIAZEM HYDROCHLORIDE 10 MG: 5 INJECTION INTRAVENOUS at 19:45

## 2017-09-15 RX ADMIN — DEXAMETHASONE SODIUM PHOSPHATE 12 MG: 4 INJECTION, SOLUTION INTRA-ARTICULAR; INTRALESIONAL; INTRAMUSCULAR; INTRAVENOUS; SOFT TISSUE at 12:04

## 2017-09-15 RX ADMIN — SODIUM CHLORIDE 10 ML: 9 INJECTION INTRAMUSCULAR; INTRAVENOUS; SUBCUTANEOUS at 14:26

## 2017-09-15 RX ADMIN — PALONOSETRON HYDROCHLORIDE 0.25 MG: 0.25 INJECTION INTRAVENOUS at 12:04

## 2017-09-15 RX ADMIN — ENOXAPARIN SODIUM 80 MG: 80 INJECTION SUBCUTANEOUS at 19:48

## 2017-09-15 RX ADMIN — DILTIAZEM HYDROCHLORIDE 10 MG: 5 INJECTION INTRAVENOUS at 20:34

## 2017-09-15 RX ADMIN — SODIUM CHLORIDE 1000 ML: 900 INJECTION, SOLUTION INTRAVENOUS at 20:42

## 2017-09-15 RX ADMIN — SODIUM CHLORIDE 25 ML/HR: 900 INJECTION, SOLUTION INTRAVENOUS at 12:10

## 2017-09-15 RX ADMIN — Medication 500 UNITS: at 14:26

## 2017-09-15 RX ADMIN — SODIUM CHLORIDE 1890 MG: 900 INJECTION, SOLUTION INTRAVENOUS at 13:40

## 2017-09-15 RX ADMIN — Medication 10 ML: at 14:30

## 2017-09-15 RX ADMIN — Medication 10 ML: at 23:06

## 2017-09-15 NOTE — Clinical Note
Status[de-identified] Inpatient [101] Type of Bed: Stepdown [17] Inpatient Hospitalization Certified Necessary for the Following Reasons: 3. Patient receiving treatment that can only be provided in an inpatient setting (further clarification in H&P documentation) Admitting Diagnosis: Acute bilateral deep vein thrombosis (DVT) of popliteal veins ([facility] [[id number]] Admitting Physician: Kitty Henson [[id number]] Attending Physician: Kitty Henson [[id number]] Estimated Length of Stay: 2 Midnights Discharge Plan[de-identified] Home with Office Follow-up

## 2017-09-15 NOTE — PROCEDURES
[facility]  *** FINAL REPORT ***    Name: Vignesh Castellano  MRN: [id number]    Outpatient  : 1945  HIS Order #: [id number]  [address] [facility] Visit #: [id number]  Date: 15 Sep 2017    TYPE OF TEST: Peripheral Venous Testing    REASON FOR TEST  Limb swelling    Right Leg:-  Deep venous thrombosis:           Yes  Proximal extent of thrombus:      Posterior Tibial  Superficial venous thrombosis:    Yes  Deep venous insufficiency:        No  Superficial venous insufficiency: No    Left Leg:-  Deep venous thrombosis:           Yes  Proximal extent of thrombus:      Posterior Tibial  Superficial venous thrombosis:    Yes  Deep venous insufficiency:        No  Superficial venous insufficiency: No      INTERPRETATION/FINDINGS  PROCEDURE:  BILATERAL LE VENOUS DUPLEX. Evaluation of lower extremity veins with ultrasound (B-mode imaging,  pulsed Doppler, color Doppler). Includes the common femoral, deep  femoral, femoral, popliteal, posterior tibial, peroneal, and great  saphenous veins. FINDINGS:  Chronic deep vein thrombosis was detected in the right  posterior tibial vein, the right peroneal vein, and the left posterior   tibial vein with the partial compression and filling defects in color   folw. Superficial vein thrombosis was detected in bilateral greater  saphenous veins. Gray scale and color flow duplex images of the  remaining veins in both lower extremities demonstrate normal  compressibility, spontaneous and augmented flow profiles, and absence  of filling defects throughout the deep and superficial veins in both  lower extremities. CONCLUSION:  Bilateral lower extremity venous duplex positive for deep   venous thrombosis in right posterior tibial vein, right peroneal  vein, and the left posterior tibial vein. There are superficial venous   thrombosis in bilateral greater saphenous veins.     ADDITIONAL COMMENTS    I have personally reviewed the data relevant to the interpretation of  this  study. TECHNOLOGIST: Bola Restrepo  Signed: 09/15/2017 07:03 PM    PHYSICIAN: Queen Sudhir.  Keny Calderon MD  Signed: 09/16/2017 01:24 PM

## 2017-09-15 NOTE — ED PROVIDER NOTES
HPI Comments: 67 y.o. male with past medical history significant for HTN, erectile dysfunction and hyperlipidemia who presents from home with chief complaint of LE swelling. Per relative, the pt has been experiencing moderate bilateral LE swelling over the past 1x week. She reports that the pt has had little improvement of his swelling with use of compression stockings. The relative notes that the pt was seen by his oncologist today (9/15/2017) to received Chemotherapy treatment (hx of lung CA) and while there received bilateral venous doppler study which showed bilateral DVT. Additionally, the pt's HR was noted to be elevated in the 150's at that time. The pt was referred to the ED to receive treatment and for further evaluation. Per relative, the pt was noted to have an elevated HR yesterday (9/14/2017) as well which appeared to be 154 bpm. While in the ED, the pt reports that he has not experienced any palpitation or CP along with his elevated HR. The pt further denies fever, chills, N/V/D, SOB, abd pain, dizziness, headache and urinary symptoms. There are no other acute medical concerns at this time. Social hx: Current daily smoker, Current ETOH use   PCP: Tico Wing MD    Note written by Telly Ji, as dictated by Lila Carey MD 7:27 PM          The history is provided by the patient and a relative. Past Medical History:   Diagnosis Date    Dysfunction, erectile     Hyperlipidemia 5/18/2010    Hypertension        No past surgical history on file. Family History:   Problem Relation Age of Onset    Stroke Father     Diabetes Sister     Diabetes Brother     Heart Disease Sister        Social History     Social History    Marital status: SINGLE     Spouse name: N/A    Number of children: N/A    Years of education: N/A     Occupational History    Not on file.      Social History Main Topics    Smoking status: Current Every Day Smoker     Packs/day: 1.00     Years: 45.00     Types: Cigarettes    Smokeless tobacco: Never Used    Alcohol use 1.5 oz/week     3 Cans of beer per week    Drug use: No    Sexual activity: Not on file     Other Topics Concern    Not on file     Social History Narrative         ALLERGIES: Lisinopril; Hydrochlorothiazide; and Sulfa (sulfonamide antibiotics)    Review of Systems   Constitutional: Negative. Negative for chills and fever. HENT: Negative. Eyes: Negative. Negative for visual disturbance. Respiratory: Negative. Negative for cough, shortness of breath and wheezing. Cardiovascular: Positive for leg swelling (increased to bilateral LE over the past 1x week). Negative for chest pain. Increased HR noticed since yesterday (9/14/2017) with reading of 154 bpm. No sensation of palpitations associated. Gastrointestinal: Negative. Negative for abdominal pain, diarrhea, nausea and vomiting. Endocrine: Negative. Genitourinary: Negative. Negative for dysuria. Musculoskeletal: Negative. Negative for back pain and neck stiffness. Skin: Negative. Negative for rash. Allergic/Immunologic: Negative. Neurological: Negative. Negative for dizziness, syncope and headaches. Hematological: Negative. Psychiatric/Behavioral: Negative. Negative for confusion. All other systems reviewed and are negative. Vitals:    09/15/17 1907   BP: 111/77   Pulse: (!) 154   Resp: 18   SpO2: 98%            Physical Exam   Constitutional: He appears well-developed and well-nourished. No distress. HENT:   Head: Normocephalic. Eyes: Pupils are equal, round, and reactive to light. Neck: Normal range of motion. Cardiovascular: Regular rhythm. No murmur heard. Tachycardic    Pulmonary/Chest: Effort normal. No respiratory distress. Decreased bilateral breath sounds   Abdominal: Soft. There is no tenderness. Musculoskeletal: Normal range of motion. He exhibits edema (3+ bilateral LE). Neurological: He is alert.  He has normal strength. No cranial nerve deficit. Skin: Skin is warm and dry. Psychiatric: He has a normal mood and affect. His behavior is normal.   Nursing note and vitals reviewed. Note written by Telly Blevins, as dictated by Torito De Souza MD 7:27 PM    MDM  Number of Diagnoses or Management Options  Acute deep vein thrombosis (DVT) of proximal vein of both lower extremities [facility]):   Atrial flutter, unspecified type [facility]):      Amount and/or Complexity of Data Reviewed  Clinical lab tests: ordered and reviewed  Obtain history from someone other than the patient: yes  Discuss the patient with other providers: yes  Independent visualization of images, tracings, or specimens: yes    Risk of Complications, Morbidity, and/or Mortality  Presenting problems: high  Diagnostic procedures: high  Management options: high    Critical Care  Total time providing critical care: 30-74 minutes    ED Course       Procedures    ED EKG interpretation:  Rhythm: atrial flutter 2:1;  Rate (approx.): 153 bpm; Axis: normal; ST/T wave: non-specific changes; focused criteria for LVH. Note written by Telly Blevins, as dictated by Torito De Souza MD 7:23 pm      CONSULT NOTE:  8:52 PM Torito De Souza MD spoke with [facility] Medicine resident. Discussed available diagnostic tests and clinical findings. They are in agreement with care plans as outlined. The family medicine resident will see and admit the pt.

## 2017-09-15 NOTE — PROGRESS NOTES
Alma Gunn is a 67 y.o. male follow up for lung cancer. 12:30pm- Informed patients opic nurse that doppler scheduled today at 4:30pm at [facility]- he should have EKG completed at the same time ( no appointment needed).

## 2017-09-15 NOTE — PROGRESS NOTES
[facility] DISTRICT  Medical Oncology at [facility]  [phone]    Hematology / Oncology Followup    Reason for Visit:   Osei Kumar is a 67 y.o. male who is seen for follow up of lung cancer. Treatment History:   · CXR 7/11/2017: Mediastinal lymphadenopathy with left hilar mass. · CT Chest 7/14/2017: Bulky mediastinal and left hilar lymphadenopathy. Bilateral pulmonary masses and nodules  · PET-CT 7/24/2017: Right parietal mass. Hypermetabolic right supraclavicular, right paratracheal, AP window, prevascular, precarinal, subcarinal and left hilar lymphadenopathy. Hypermetabolic pulmonary nodules bilaterally. · MRI Brain 7/24/2017: Junction right posterior temporal lobe and occipital lobe 2.7 cm mass likely metastasis from lung cancer. No additional acute abnormalities  · FNA supraclavicular node 7/28/2017: Metastatic squamous cell carcinoma, PD-L1 5%, BRAF negative, EGFR negative, ALK & ROS-1 pending   · Stage IV Non-small cell lung cancer (Squamous Cell)  · Gamma knife by Dr. Alejandro Guerrero 8/15/2017   · Palliative chemotherapy with carboplatin and gemcitabine beginning 8/25/2017    History of Present Illness:   Here today for follow up. He states he is feeling okay today. He reports tachycardia yesterday and today and states his BP was low 80s/50s yesterday. He denies chest pain, dizziness, shortness of breath. He states he had some confusion and hallucinations yesterday - thought he was in someone else's house and called his daughter and told him that people were there with him and they weren't. He states he saw Dr. Alejandro Guerrero yesterday afternoon and Dr. Alejandro Guerrero feels this is due to nicotine withdrawal and that his brain \"is okay\". He has a few more days of steroids before stopping. Bilateral leg swelling which is somewhat bothersome. Appetite stable. He requests nicotine patches. He states he has not had any alcoholic drinks since last week, has been drinking non-alcoholic beer.      He is accompanied by his daughter today. He smoked 1ppd for 50+ years but quit at diagnosis. He lives alone in [address] [place] [address]. His son lives about 15 minutes from him. His daughter lives about 30 minutes away. PAST HISTORY: The following sections were reviewed and updated in the EMR as appropriate: PMH, SH, FH, Medications, Allergies. Allergies   Allergen Reactions    Lisinopril Angioedema    Hydrochlorothiazide Hives and Swelling    Sulfa (Sulfonamide Antibiotics) Hives      Review of Systems: A complete review of systems was obtained, reviewed, and scanned into the EMR. Pertinent findings reviewed above. Physical Exam:     Visit Vitals    /75 (BP 1 Location: Right arm, BP Patient Position: Sitting)    Pulse (!) 153    Temp 97.9 °F (36.6 °C) (Temporal)    Resp 18    Ht 5' 8.9\" (1.75 m)    Wt 168 lb 12.8 oz (76.6 kg)    SpO2 94%    BMI 25 kg/m2     ECOG PS: 1  General: No distress  Eyes: PERRLA, anicteric sclerae  HENT: Atraumatic, OP clear  Neck: Supple  Lymphatic: No cervical, supraclavicular, or inguinal adenopathy  Respiratory: CTAB, normal respiratory effort  CV: Normal rate, regular rhythm, no murmurs, no peripheral edema  GI: Soft, nontender, nondistended, no masses, no hepatomegaly, no splenomegaly  MS: Normal gait and station. Digits without clubbing or cyanosis. Skin: No rashes, ecchymoses, or petechiae. Normal temperature, turgor, and texture. Psych: Alert, oriented, appropriate affect, normal judgment/insight    Results:     Lab Results   Component Value Date/Time    WBC 8.6 09/15/2017 09:29 AM    HGB 9.0 09/15/2017 09:29 AM    HCT 27.1 09/15/2017 09:29 AM    PLATELET 779 66/98/9239 09:29 AM    MCV 88.3 09/15/2017 09:29 AM    ABS.  NEUTROPHILS 5.1 09/15/2017 09:29 AM     Lab Results   Component Value Date/Time    Sodium 133 09/15/2017 09:29 AM    Potassium 3.5 09/15/2017 09:29 AM    Chloride 96 09/15/2017 09:29 AM    CO2 31 09/15/2017 09:29 AM    Glucose 104 09/15/2017 09:29 AM    BUN 17 09/15/2017 09:29 AM    Creatinine 0.83 09/15/2017 09:29 AM    GFR est AA >60 09/15/2017 09:29 AM    GFR est non-AA >60 09/15/2017 09:29 AM    Calcium 8.6 09/15/2017 09:29 AM     Lab Results   Component Value Date/Time    Bilirubin, total 0.2 09/15/2017 09:29 AM    ALT (SGPT) 59 09/15/2017 09:29 AM    AST (SGOT) 44 09/15/2017 09:29 AM    Alk. phosphatase 70 09/15/2017 09:29 AM    Protein, total 6.4 09/15/2017 09:29 AM    Albumin 2.7 09/15/2017 09:29 AM    Globulin 3.7 09/15/2017 09:29 AM         Assessment:   1) Metastatic non-small cell lung cancer (squamous cell)  EGFR, ALK, ROS1, BRAF negative  He has disease within his chest and brain. His cancer is not curable and management is with palliative intent. He is s/p gamma knife to CNS disease. He is currently receiving palliative chemotherapy with carboplatin and gemcitabine given every 3 weeks with plans for 4 cycles, potentially followed by maintenance chemotherapy. He is tolerating therapy okay with complaints as below. We will proceed with therapy today and reimage prior to next cycle. I discussed Lung-MAP trial with patient and his daughter today and provided handouts to patient regarding this. They will give some thought to pre-screening. 2) Brain metastasis  S/P gamma knife. On dexamethasone per Dr. Verona Adrian. Will defer reimaging to Dr. Verona Adrian    3) Tachycardia  New. Unclear etiology. Thought to be due to dehydration but no improvement with fluid bolus. EKG to further evaluate. If this is normal, we can consider CTA Chest but he is not hypoxic and has no chest pain or respiratory complaints so my suspicion for PE is low. 4) Lower extremity edema  Unclear etiology. Dopplers to assess for clot. Monitor. 5) Hypotension  At home yesterday. Improved today. I discussed with patient to get home BP monitor to check at home, especially if he is feeling poorly. May need to hold Norvasc if this recurs. 6) Altered mental status  Resolved at present. Unclear etiology. Seen by neurosurgery yesterday. ? Nicotine withdrawal per neurosurgery. I will send Rx for nicotine patches at patient request but I am unsure if insurance will cover the cost (which I discussed with the patient today). Patient to notify us if this recurs. 7) Cough  Improving. Secondary to disease. Monitor. 8) Weight loss  Improved today though now having some swelling. Likely due to cancer. Treatment as above. May need dietician to intervene if this continues.      Plan:     EKG  Bilateral low extremity dopplers  Proceed today with C#2 Gemcitabine (1000 mg/m2 on days 1 and 8) and Carboplatin (AUC 5 on day 1) given every 3 weeks x 4 cycles  Labs: CBC on days 1 and 8, BMP, Magnesium, Phosphorus on day 1  Antiemetic Prophylaxis: Palonosetron on day 1, Dexamethasone on day 1 and day 8, Ondansetron prior to day 8  PRN Antiemetics: Ondansetron, Compazine   following   Continue dexamethasone per Dr. Mariela Randolph  Follow up with Dr. Mariela Randolph as scheduled  Try Nicotine patches  CT C/A/P prior to next cycle  Return to clinic in 3 weeks with next cycle or sooner if needed        Signed By: Елена Kunz MD     September 15, 2017

## 2017-09-15 NOTE — PROGRESS NOTES
Bilateral lower extremity venous duplex completed at 6:20pm. Tried to contact ERLIN Mendez and page on-call doctor but no answer. Took the patient to ER at 6:45.

## 2017-09-15 NOTE — PROGRESS NOTES
\[facility]\"" Progress Note    Date: September 15, 2017    Name: Pb Tapia    MRN: [id number]         : 1945    Mr. Daiana Jurado Arrived ambulatory and in no distress for cycle 2 day 1 of Carbo/Gemzar regimen. Assessment was completed, no acute issues at this time, no new complaints voiced. Right chest port accessed without difficulty, labs drawn and in process. HR elevated, per MD give 500 bolus, HR still elevated after, per MD give chemo and patient to go to Doppler/ekg after chemo, apt made for 1630 at [address] [place] [address]. Patient understood. Chemotherapy Flowsheet 9/15/2017   Cycle C2D1   Date 9/15/2017   Drug / Regimen Carbo/Gemzar         0935  Proceeded to appt with Dr. Jd Wilhelm. Mr. Kenny Stone vitals were reviewed. Patient Vitals for the past 12 hrs:   Temp Pulse BP SpO2   09/15/17 1423 97 °F (36.1 °C) (!) 151 119/79 97 %   09/15/17 1151 - (!) 143 - -   09/15/17 0926 97.1 °F (36.2 °C) (!) 118 130/79 98 %     Lab results were obtained and reviewed. Recent Results (from the past 12 hour(s))   METABOLIC PANEL, BASIC    Collection Time: 09/15/17  9:29 AM   Result Value Ref Range    Sodium 133 (L) 136 - 145 mmol/L    Potassium 3.5 3.5 - 5.1 mmol/L    Chloride 96 (L) 97 - 108 mmol/L    CO2 31 21 - 32 mmol/L    Anion gap 6 5 - 15 mmol/L    Glucose 104 (H) 65 - 100 mg/dL    BUN 17 6 - 20 MG/DL    Creatinine 0.83 0.70 - 1.30 MG/DL    BUN/Creatinine ratio 20 12 - 20      GFR est AA >60 >60 ml/min/1.73m2    GFR est non-AA >60 >60 ml/min/1.73m2    Calcium 8.6 8.5 - 10.1 MG/DL   HEPATIC FUNCTION PANEL    Collection Time: 09/15/17  9:29 AM   Result Value Ref Range    Protein, total 6.4 6.4 - 8.2 g/dL    Albumin 2.7 (L) 3.5 - 5.0 g/dL    Globulin 3.7 2.0 - 4.0 g/dL    A-G Ratio 0.7 (L) 1.1 - 2.2      Bilirubin, total 0.2 0.2 - 1.0 MG/DL    Bilirubin, direct 0.1 0.0 - 0.2 MG/DL    Alk.  phosphatase 70 45 - 117 U/L    AST (SGOT) 44 (H) 15 - 37 U/L    ALT (SGPT) 59 12 - 78 U/L   CBC WITH 3 PART DIFF    Collection Time: 09/15/17  9:29 AM   Result Value Ref Range    WBC 8.6 4.1 - 11.1 K/uL    RBC 3.07 (L) 4.10 - 5.70 M/uL    HGB 9.0 (L) 12.1 - 17.0 g/dL    HCT 27.1 (L) 36.6 - 50.3 %    MCV 88.3 80.0 - 99.0 FL    MCH 29.3 26.0 - 34.0 PG    MCHC 33.2 30.0 - 36.5 g/dL    RDW 17.7 (H) 11.8 - 15.8 %    PLATELET 928 615 - 948 K/uL    NEUTROPHILS 59 32 - 75 %    MIXED CELLS 18 (H) 3.2 - 16.9 %    LYMPHOCYTES 23 12 - 49 %    ABS. NEUTROPHILS 5.1 1.8 - 8.0 K/UL    ABS. MIXED CELLS 1.5 (H) 0.2 - 1.2 K/uL    ABS. LYMPHOCYTES 2.0 0.8 - 3.5 K/UL    DF AUTOMATED       Pre-medications  were administered as ordered and chemotherapy was initiated.    Aloxi IVP  Decadron IVP  Gemzar IV  Carbo IV        Mr. Vera Alcantar tolerated treatment well and was discharged from [place] [address] in stable condition at . He is to return on 9/22/17 at 0730 for his next appointment.     Francisco Javier Cruz  September 15, 2017

## 2017-09-15 NOTE — PROGRESS NOTES
Dr. Lorenza Covarrubias referred pt to assess for eligibility in  study. Pt meets eligibility pending evaluation of adequate archived biopsy tissue. Pt in clinic today and given a read only copy of consent and patient information as well as my contact info. I was unable to meet with the pt due to a conflict. Dr. Lorenza Covarrubias discussed the study with the patient and family. Daughter may call me.

## 2017-09-15 NOTE — MR AVS SNAPSHOT
Visit Information Date & Time Provider Department Dept. Phone Encounter #  
 9/15/2017  9:15 AM Papito Hoskins NP [facility] at [address] [place] [address] Rd [id number] Follow-up Instructions Return in 3 weeks (on 10/6/2017) for labs, Luis Schmid #[id number]. Your Appointments 10/20/2017 10:00 AM  
ROUTINE CARE with Bee Mathew MD  
[facility] Appt Note: 6 mnth fu est pt /fasting/HTN/Hyperlipidemia [address] [address]  
[place] [address] [address] Upcoming Health Maintenance Date Due DTaP/Tdap/Td series (1 - Tdap) 7/26/1965 ZOSTER VACCINE AGE 60> 5/26/2004 GLAUCOMA SCREENING Q2Y 7/26/2009 Pneumococcal 65+ High/Highest Risk (2 of 2 - PCV13) 2/6/2014 INFLUENZA AGE 9 TO ADULT 8/1/2017 MEDICARE YEARLY EXAM 10/19/2017 Allergies as of 9/15/2017  Review Complete On: 9/15/2017 By: Jagruti Abdul LPN Severity Noted Reaction Type Reactions Lisinopril High 10/27/2011   Systemic Angioedema Hydrochlorothiazide  07/18/2017    Hives, Swelling Sulfa (Sulfonamide Antibiotics)  10/27/2011    Hives Current Immunizations  Reviewed on 9/15/2017 Name Date Influenza High Dose Vaccine PF 1/10/2013 Influenza Vaccine 11/6/2015, 12/10/2014, 10/2/2013 Influenza Vaccine (Quad) PF 10/18/2016 Influenza Vaccine Split 10/12/2011, 5/10/2011, 10/13/2010 Pneumococcal Polysaccharide (PPSV-23) 2/6/2013 Reviewed by Estrella Bernardo on 9/15/2017 at  9:22 AM  
You Were Diagnosed With   
  
 Codes Comments Primary malignant neoplasm of left lung metastatic to other site [facility])    -  Primary ICD-10-CM: C34.92 
ICD-9-CM: 162.9 Brain metastases ([facility] [address]     ICD-10-CM: C79.31 
ICD-9-CM: 198.3 Tobacco abuse     ICD-10-CM: Z72.0 ICD-9-CM: 305.1 Alcohol abuse     ICD-10-CM: F10.10 ICD-9-CM: 305.00   
  
 Vitals BP Pulse Temp Resp Height(growth percentile) 119/75 (BP 1 Location: Right arm, BP Patient Position: Sitting) (!) 153 97.9 °F (36.6 °C) (Temporal) 18 5' 8.9\" (1.75 m) Weight(growth percentile) SpO2 BMI Smoking Status 168 lb 12.8 oz (76.6 kg) 94% 25 kg/m2 Current Every Day Smoker BMI and BSA Data Body Mass Index Body Surface Area  
 25 kg/m 2 1.93 m 2 Preferred Pharmacy Pharmacy Name Phone [facility] [place] [address] [facility] [phone] Your Updated Medication List  
  
   
This list is accurate as of: 9/15/17 10:39 AM.  Always use your most recent med list. amLODIPine 5 mg tablet Commonly known as:  Caralee Dupes TAKE ONE TABLET BY MOUTH EVERY DAY  
  
 aspirin delayed-release 81 mg tablet Take  by mouth daily. DECADRON PO Take 4 mg by mouth.  
  
 lidocaine-prilocaine topical cream  
Commonly known as:  EMLA Apply  to affected area as needed for Pain (Apply 30-60 min. prior to having your port accessed). magic mouthwash solution Swish and spit 15-30 mL every 2-4 hours as needed for mouth pain. May swallow for throat pain. Magic mouth wash  Maalox Lidocaine 2% viscous  Diphenhydramine oral solution   Pharmacy to mix equal portions of ingredients to a total volume as indicated in the dispense amount. ondansetron hcl 8 mg tablet Commonly known as:  Romi Kurk Take 1 Tab by mouth every eight (8) hours as needed for Nausea. polyethylene glycol 17 gram/dose powder Commonly known as:  Adorno Lies Take 17 g by mouth two (2) times a day. prochlorperazine 10 mg tablet Commonly known as:  COMPAZINE Take 1 Tab by mouth every six (6) hours as needed for Nausea. Follow-up Instructions Return in 3 weeks (on 10/6/2017) for labs, Gene Flores #3D1. To-Do List   
 09/22/2017 7:30 AM  
  Appointment with Fabio Anaya 1 at [place] [address] ([phone]) 10/04/2017 Imaging:  CT CHEST ABD PELV W CONT   
  
 10/06/2017 9:00 AM  
  Appointment with [address] at [place] [address] ([phone])  
  
 10/13/2017 9:00 AM  
  Appointment with [address] at [place] [address] ([phone]) [facility] SERVICES! Dear Karina Solano: Thank you for requesting a [employer] account. Our records indicate that you already have an active [employer] account. You can access your account anytime at [email or web address] Did you know that you can access your hospital and ER discharge instructions at any time in [employer]? You can also review all of your test results from your hospital stay or ER visit. Additional Information If you have questions, please visit the Frequently Asked Questions section of the [employer] website at [email or web address] Remember, [employer] is NOT to be used for urgent needs. For medical emergencies, dial 911. Now available from your iPhone and Android! Please provide this summary of care documentation to your next provider. Your primary care clinician is listed as Deepthi Coffey. If you have any questions after today's visit, please call [phone].

## 2017-09-15 NOTE — PROGRESS NOTES
[address] [facility] Oncology at [address] [place] [address]  [phone]    Hematology / Oncology Consult    Reason for Visit:   Theodore Del Rosario is a 67 y.o. male who is seen in consultation at the request of Dr. Kathleen Crawford for evaluation of lung mass. Treatment History:   · CXR 7/11/2017: Mediastinal lymphadenopathy with left hilar mass. · CT Chest 7/14/2017: Bulky mediastinal and left hilar lymphadenopathy. Bilateral pulmonary masses and nodules  · PET-CT 7/24/2017: Right parietal mass. Hypermetabolic right supraclavicular, right paratracheal, AP window, prevascular, precarinal, subcarinal and left hilar lymphadenopathy. Hypermetabolic pulmonary nodules bilaterally. · MRI Brain 7/24/2017: Junction right posterior temporal lobe and occipital lobe 2.7 cm mass likely metastasis from lung cancer. No additional acute abnormalities  · FNA supraclavicular node 7/28/2017: Metastatic squamous cell carcinoma, PD-L1 5%, BRAF negative, EGFR negative, ALK & ROS-1 pending   · Stage IV Non-small cell lung cancer (Squamous Cell)  · Gamma knife by Dr. Cleo Archuleta 8/15/2017   · Palliative chemotherapy with carboplatin and gemcitabine beginning 8/25/2017    History of Present Illness:   Here today for follow up and start of therapy. Had gamma knife last week, taking dexamethasone per Dr. Cleo Archuleta. He states he is feeling okay. Appetite not great. Denies pain. Breathing well. No new complaints. He is accompanied by his daughter today. He smoked 1ppd for 50+ years but quit at diagnosis. He lives alone in [address] [place] [address]. His son lives about 15 minutes from him. His daughter lives about 30 minutes away. PAST HISTORY: The following sections were reviewed and updated in the EMR as appropriate: PMH, SH, FH, Medications, Allergies.       Allergies   Allergen Reactions    Lisinopril Angioedema    Hydrochlorothiazide Hives and Swelling    Sulfa (Sulfonamide Antibiotics) Hives      Review of Systems: A complete review of systems was obtained, reviewed, and scanned into the EMR. Pertinent findings reviewed above. Physical Exam:     Visit Vitals    /75 (BP 1 Location: Right arm, BP Patient Position: Sitting)    Pulse (!) 153    Temp 97.9 °F (36.6 °C) (Temporal)    Resp 18    Ht 5' 8.9\" (1.75 m)    Wt 168 lb 12.8 oz (76.6 kg)    SpO2 94%    BMI 25 kg/m2     ECOG PS: 1  General: No distress  Eyes: PERRLA, anicteric sclerae  HENT: Atraumatic, OP clear  Neck: Supple  Lymphatic: No cervical, supraclavicular, or inguinal adenopathy  Respiratory: CTAB, normal respiratory effort  CV: Normal rate, regular rhythm, no murmurs, no peripheral edema  GI: Soft, nontender, nondistended, no masses, no hepatomegaly, no splenomegaly  MS: Normal gait and station. Digits without clubbing or cyanosis. Skin: No rashes, ecchymoses, or petechiae. Normal temperature, turgor, and texture. Psych: Alert, oriented, appropriate affect, normal judgment/insight    Results:     Lab Results   Component Value Date/Time    WBC 8.6 09/15/2017 09:29 AM    HGB 9.0 09/15/2017 09:29 AM    HCT 27.1 09/15/2017 09:29 AM    PLATELET 882 81/38/7144 09:29 AM    MCV 88.3 09/15/2017 09:29 AM    ABS. NEUTROPHILS 5.1 09/15/2017 09:29 AM     Lab Results   Component Value Date/Time    Sodium 132 08/25/2017 08:03 AM    Potassium 4.8 08/25/2017 08:03 AM    Chloride 96 08/25/2017 08:03 AM    CO2 31 08/25/2017 08:03 AM    Glucose 132 08/25/2017 08:03 AM    BUN 21 08/25/2017 08:03 AM    Creatinine 0.63 08/25/2017 08:03 AM    GFR est AA >60 08/25/2017 08:03 AM    GFR est non-AA >60 08/25/2017 08:03 AM    Calcium 9.2 08/25/2017 08:03 AM     Lab Results   Component Value Date/Time    Bilirubin, total 0.2 08/25/2017 08:03 AM    ALT (SGPT) 51 08/25/2017 08:03 AM    AST (SGOT) 18 08/25/2017 08:03 AM    Alk.  phosphatase 67 08/25/2017 08:03 AM    Protein, total 6.7 08/25/2017 08:03 AM    Albumin 2.4 08/25/2017 08:03 AM    Globulin 4.3 08/25/2017 08:03 AM         Assessment:   1) Metastatic non-small cell lung cancer (squamous cell)  EGFR, ALK, ROS1, BRAF negative  He has disease within his chest and brain. His cancer is not curable and management is with palliative intent. He is s/p gamma knife to CNS disease. My recommendation is for systemic therapy with carboplatin and gemcitabine given every 3 weeks for 4 cycles, potentially followed by maintenance chemotherapy. He is feeling pretty good and we will start today. We will plan to see him back in 3 weeks with next cycle of therapy. At his next visit we can discuss possible prescreening for the Lung-MAP trial.    2) Brain metastasis  S/P gamma knife. On dexamethasone per Dr. Clifford Vazquez. 3) Cough  Improving. Monitor. 4) Weight loss  Stable today. Likely due to cancer. Treatment as above. May need dietician to intervene if this continues.      5) ***       Plan:     Proceed today with C#1 Gemcitabine (1000 mg/m2 on days 1 and 8) and Carboplatin (AUC 5 on day 1) given every 3 weeks x 4 cycles  Labs: CBC on days 1 and 8, BMP, Magnesium, Phosphorus on day 1  Antiemetic Prophylaxis: Palonosetron on day 1, Dexamethasone on day 1 and day 8, Ondansetron prior to day 8  PRN Antiemetics: Ondansetron, Compazine  EMLA cream for port   following and met with patient today  Continue dexamethasone per Dr. Clifford Vazquez  Return to clinic in 3 weeks with next cycle or sooner if needed        Signed By: Oneil Boas, NP     September 15, 2017

## 2017-09-15 NOTE — IP AVS SNAPSHOT
[address] [place] [address] 
[phone] Patient: Leonard Griffiths MRN: [id number] :1945 You are allergic to the following Allergen Reactions Lisinopril Angioedema Hydrochlorothiazide Hives Swelling Sulfa (Sulfonamide Antibiotics) Hives Recent Documentation Weight BMI Smoking Status 76.2 kg 24.88 kg/m2 Current Every Day Smoker Emergency Contacts Name Discharge Info Relation Home Work Mobile [facility] DISCHARGE CAREGIVER [3] Child [2] [phone] About your hospitalization You were admitted on:  September 15, 2017 You last received care in the:  [facility]  MED SURG 2 You were discharged on:  2017 Unit phone number:  [phone] Why you were hospitalized Your primary diagnosis was:  Deep Vein Thrombosis (Dvt) Of Tibial Vein Of Both Lower Extremities (Hcc) Your diagnoses also included:  Squamous Cell Carcinoma Of Lung, Stage Iv (Hcc), Atrial Fibrillation With Rapid Ventricular Response (Hcc), Benign Hypertensive Heart Disease With Hf (Heart Failure) (Hcc), Tobacco Abuse, Primary Malignant Neoplasm Of Left Lung Metastatic To Other Site (Hcc), Brain Metastases (Hcc), (Hfpef) Heart Failure With Preserved Ejection Fraction (Hcc) Providers Seen During Your Hospitalizations Provider Role Specialty Primary office phone Karen Dueñas MD Attending Provider Emergency Medicine [phone] Juan Manuel Monae MD Attending Provider Family Practice [phone] Your Primary Care Physician (PCP) Primary Care Physician Office Phone Office Fax Keya Jain [phone] Follow-up Information Follow up With Details Comments Contact Info  Piper Salinas MD Schedule an appointment as soon as possible for a visit in 2 days For followup. Because it was Sunday, we were unable to make the follow up appointment for you. [address] [facility] 
[phone] 
  
   [facility] [phone] Your Appointments Friday September 22, 2017  7:30 AM EDT INFUSION 120 [place] with [place] INFUSION NURSE 1  
[address] ([address]) [facility] [address] [phone]  
[phone] Friday October 06, 2017  9:00 AM EDT INFUSION 120 RI with [place] INFUSION NURSE 1  
[address] ([address]) 301 [facility] [address] [phone]  
[phone] Friday October 06, 2017  9:45 AM EDT  
ESTABLISHED PATIENT with ELRIN [facility]og [facility] at [facility] 301 [facility], [facility] [facility]  
[phone] Friday October 13, 2017  9:00 AM EDT INFUSION 120 RI with [place] INFUSION NURSE 1  
[address] ([address]) [address] [place], [place] [address]a [address] [phone]  
[phone] Friday October 20, 2017 10:00 AM EDT ROUTINE CARE with Unique Orosco MD  
[address] [facility] [address] [place] [address]  
[phone] Current Discharge Medication List  
  
START taking these medications Dose & Instructions Dispensing Information Comments Morning Noon Evening Bedtime  
 dilTIAZem  mg ER capsule Commonly known as:  CARDIZEM CD Your last dose was: Your next dose is:    
   
   
 Dose:  180 mg Take 1 Cap by mouth daily. Indications: VENTRICULAR RATE CONTROL IN ATRIAL FIBRILLATION Quantity:  30 Cap Refills:  0  
     
   
   
   
  
 enoxaparin 120 mg/0.8 mL injection Commonly known as:  LOVENOX Your last dose was: Your next dose is:    
   
   
 Dose:  120 mg  
120 mg by SubCUTAneous route daily. Quantity:  24 mL Refills:  5 CONTINUE these medications which have NOT CHANGED Dose & Instructions Dispensing Information Comments Morning Noon Evening Bedtime  
 aspirin delayed-release 81 mg tablet Your last dose was: Your next dose is:    
   
   
 Dose:  81 mg Take 81 mg by mouth daily. Refills:  0 DECADRON PO Your last dose was: Your next dose is: Take  by mouth. Tapering Refills:  0  
     
   
   
   
  
 lidocaine-prilocaine topical cream  
Commonly known as:  EMLA Your last dose was: Your next dose is:    
   
   
 Apply  to affected area as needed for Pain (Apply 30-60 min. prior to having your port accessed). Quantity:  30 g Refills:  0  
     
   
   
   
  
 magic mouthwash solution Your last dose was: Your next dose is:    
   
   
 Swish and spit 15-30 mL every 2-4 hours as needed for mouth pain. May swallow for throat pain. Magic mouth wash  Maalox Lidocaine 2% viscous  Diphenhydramine oral solution   Pharmacy to mix equal portions of ingredients to a total volume as indicated in the dispense amount. Quantity:  480 mL Refills:  2 MIRALAX 17 gram packet Generic drug:  polyethylene glycol Your last dose was: Your next dose is:    
   
   
 Dose:  17 g Take 17 g by mouth daily as needed (constipation). Refills:  0  
     
   
   
   
  
 nicotine 14 mg/24 hr patch Commonly known as:  Dominic Villalta Your last dose was: Your next dose is:    
   
   
 Dose:  1 Patch 1 Patch by TransDERmal route every twenty-four (24) hours. Quantity:  30 Patch Refills:  1  
     
   
   
   
  
 ondansetron hcl 8 mg tablet Commonly known as:  Sandra Walter Your last dose was: Your next dose is: Dose:  8 mg Take 1 Tab by mouth every eight (8) hours as needed for Nausea. Quantity:  45 Tab Refills:  5 OTHER Your last dose was: Your next dose is:    
   
   
 Chemotherapy: carboplatin + gemcitabine Refills:  0  
     
   
   
   
  
 prochlorperazine 10 mg tablet Commonly known as:  COMPAZINE Your last dose was: Your next dose is:    
   
   
 Dose:  10 mg Take 1 Tab by mouth every six (6) hours as needed for Nausea. Quantity:  50 Tab Refills:  5 STOP taking these medications   
 amLODIPine 5 mg tablet Commonly known as:  Jose Anthony Where to Get Your Medications Information on where to get these meds will be given to you by the nurse or doctor. ! Ask your nurse or doctor about these medications  
  dilTIAZem  mg ER capsule  
 enoxaparin 120 mg/0.8 mL injection Discharge Instructions HOME DISCHARGE INSTRUCTIONS Julpatrizia Ailyn / [id number] : 1945 Admission date: 9/15/2017 Discharge date: 2017 Please bring this form with you to show your care provider at your follow-up appointment. Primary care provider:  Vicente Serrano MD 
 
Discharging provider:  Basil Jack MD  - Family Medicine Resident Brett Sanchez MD - Attending, Family Medicine You have been admitted to the hospital with the following diagnoses: 
 
ACUTE DIAGNOSES: 
· Acute bilateral deep vein thrombosis (DVT) of popliteal veins (HCC) · Deep vein thrombosis (DVT) of tibial vein of both lower extremities ([facility] Frutoso Golder . . . . . . . . . . . . . . . . . . . . . . . . . . . . . . . . . . . . . . . . . . . . . . . . . . . . . . . . . . . . . . . . . . . . . . Frutoso Golder FOLLOW-UP CARE RECOMMENDATIONS: 
 
Medication changes: Take the lovenox daily, home nursing or the pharmacy can show you or your daughter how to administer it.  It is ready to be picked up at the [facility] located at [address] [place] [address], [address]. Also, don't take your Amlodipine anymore, now you'll be taking Diltiazem instead. Appointments: Listed on page 2 of these documents. Follow-up tests needed: none at this time, do follow up with your PCP for further lovenox prescriptions (they have to work on a prior [facility]) and Dr. Mushtaq Pa. Pending test results: At the time of your discharge the following test results are still pending: none. Please make sure you review these results with your outpatient follow-up provider(s). Specific symptoms to watch for: chest pain, shortness of breath, fever, chills, nausea, vomiting, diarrhea, change in mentation, falling, weakness, bleeding. DIET/what to eat: Regular Diet ACTIVITY:  Activity as tolerated What to do if new or unexpected symptoms occur? If you experience any of the above symptoms (or should other concerns or questions arise after discharge) please call your primary care physician. Return to the emergency room if you cannot get hold of your doctor. · It is very important that you keep your follow-up appointment(s). · Please bring discharge papers, medication list (and/or medication bottles) to your follow-up appointments for review by your outpatient provider(s). · Please check the list of medications and be sure it includes every medication (even non-prescription medications) that your provider wants you to take. · It is important that you take the medication exactly as they are prescribed. · Keep your medication in the bottles provided by the pharmacist and keep a list of the medication names, dosages, and times to be taken in your wallet. · Do not take other medications without consulting your doctor. · If you have any questions about your medications or other instructions, please talk to your nurse or care provider before you leave the hospital.  
 
Information obtained by: I understand that if any problems occur once I am at home I am to contact my physician. These instructions were explained to me and I had the opportunity to ask questions. I understand and acknowledge receipt of the instructions indicated above. Physician's or R.N.'s Signature                                                                  Date/Time Patient or Representative Signature                                                          Date/Time Discharge Orders None [employer] Announcement We are excited to announce that we are making your provider's discharge notes available to you in [employer]. You will see these notes when they are completed and signed by the physician that discharged you from your recent hospital stay. If you have any questions or concerns about any information you see in [employer], please call the Health Information Department where you were seen or reach out to your Primary Care Provider for more information about your plan of care. Introducing hospitals & [facility] SERVICES! Dear Camron Woodall: Thank you for requesting a [employer] account. Our records indicate that you already have an active [employer] account. You can access your account anytime at [email or web address]/[address] Did you know that you can access your hospital and ER discharge instructions at any time in [employer]? You can also review all of your test results from your hospital stay or ER visit. Additional Information If you have questions, please visit the Frequently Asked Questions section of the [employer] website at [email or web address]/. Remember, MyChart is NOT to be used for urgent needs. For medical emergencies, dial 911. Now available from your iPhone and Android! General Information Please provide this summary of care documentation to your next provider. Patient Signature:  ____________________________________________________________ Date:  ____________________________________________________________  
  
Edrie Gunnels Provider Signature:  ____________________________________________________________ Date:  ____________________________________________________________

## 2017-09-15 NOTE — IP AVS SNAPSHOT
[address] 
[phone] Patient: Arthur England MRN: [id number] :1945 Current Discharge Medication List  
  
START taking these medications Dose & Instructions Dispensing Information Comments Morning Noon Evening Bedtime  
 dilTIAZem  mg ER capsule Commonly known as:  CARDIZEM CD Your last dose was: Your next dose is:    
   
   
 Dose:  180 mg Take 1 Cap by mouth daily. Indications: VENTRICULAR RATE CONTROL IN ATRIAL FIBRILLATION Quantity:  30 Cap Refills:  0  
     
   
   
   
  
 enoxaparin 120 mg/0.8 mL injection Commonly known as:  LOVENOX Your last dose was: Your next dose is:    
   
   
 Dose:  120 mg  
120 mg by SubCUTAneous route daily. Quantity:  24 mL Refills:  5 CONTINUE these medications which have NOT CHANGED Dose & Instructions Dispensing Information Comments Morning Noon Evening Bedtime  
 aspirin delayed-release 81 mg tablet Your last dose was: Your next dose is:    
   
   
 Dose:  81 mg Take 81 mg by mouth daily. Refills:  0 DECADRON PO Your last dose was: Your next dose is: Take  by mouth. Tapering Refills:  0  
     
   
   
   
  
 lidocaine-prilocaine topical cream  
Commonly known as:  EMLA Your last dose was: Your next dose is:    
   
   
 Apply  to affected area as needed for Pain (Apply 30-60 min. prior to having your port accessed). Quantity:  30 g Refills:  0  
     
   
   
   
  
 magic mouthwash solution Your last dose was: Your next dose is:    
   
   
 Swish and spit 15-30 mL every 2-4 hours as needed for mouth pain. May swallow for throat pain.    Magic mouth wash  Maalox Lidocaine 2% viscous  Diphenhydramine oral solution   Pharmacy to mix equal portions of ingredients to a total volume as indicated in the dispense amount. Quantity:  480 mL Refills:  2 MIRALAX 17 gram packet Generic drug:  polyethylene glycol Your last dose was: Your next dose is:    
   
   
 Dose:  17 g Take 17 g by mouth daily as needed (constipation). Refills:  0  
     
   
   
   
  
 nicotine 14 mg/24 hr patch Commonly known as:  Earle Slade Your last dose was: Your next dose is:    
   
   
 Dose:  1 Patch 1 Patch by TransDERmal route every twenty-four (24) hours. Quantity:  30 Patch Refills:  1  
     
   
   
   
  
 ondansetron hcl 8 mg tablet Commonly known as:  Kristen Zee Your last dose was: Your next dose is:    
   
   
 Dose:  8 mg Take 1 Tab by mouth every eight (8) hours as needed for Nausea. Quantity:  45 Tab Refills:  5 OTHER Your last dose was: Your next dose is:    
   
   
 Chemotherapy: carboplatin + gemcitabine Refills:  0  
     
   
   
   
  
 prochlorperazine 10 mg tablet Commonly known as:  COMPAZINE Your last dose was: Your next dose is:    
   
   
 Dose:  10 mg Take 1 Tab by mouth every six (6) hours as needed for Nausea. Quantity:  50 Tab Refills:  5 STOP taking these medications   
 amLODIPine 5 mg tablet Commonly known as:  Cranks Bars Where to Get Your Medications Information on where to get these meds will be given to you by the nurse or doctor. ! Ask your nurse or doctor about these medications  
  dilTIAZem  mg ER capsule  
 enoxaparin 120 mg/0.8 mL injection

## 2017-09-16 PROBLEM — I48.91 ATRIAL FIBRILLATION WITH RAPID VENTRICULAR RESPONSE (HCC): Status: ACTIVE | Noted: 2017-09-16

## 2017-09-16 PROBLEM — C34.90 SQUAMOUS CELL CARCINOMA OF LUNG, STAGE IV (HCC): Status: ACTIVE | Noted: 2017-09-16

## 2017-09-16 LAB
ALBUMIN SERPL-MCNC: 2.3 G/DL (ref 3.5–5)
ALBUMIN/GLOB SERPL: 0.7 {RATIO} (ref 1.1–2.2)
ALP SERPL-CCNC: 66 U/L (ref 45–117)
ALT SERPL-CCNC: 90 U/L (ref 12–78)
ANION GAP SERPL CALC-SCNC: 5 MMOL/L (ref 5–15)
AST SERPL-CCNC: 54 U/L (ref 15–37)
BILIRUB SERPL-MCNC: 0.2 MG/DL (ref 0.2–1)
BUN SERPL-MCNC: 13 MG/DL (ref 6–20)
BUN/CREAT SERPL: 20 (ref 12–20)
CALCIUM SERPL-MCNC: 7.8 MG/DL (ref 8.5–10.1)
CHLORIDE SERPL-SCNC: 102 MMOL/L (ref 97–108)
CO2 SERPL-SCNC: 27 MMOL/L (ref 21–32)
CREAT SERPL-MCNC: 0.66 MG/DL (ref 0.7–1.3)
ERYTHROCYTE [DISTWIDTH] IN BLOOD BY AUTOMATED COUNT: 17.8 % (ref 11.5–14.5)
GLOBULIN SER CALC-MCNC: 3.5 G/DL (ref 2–4)
GLUCOSE SERPL-MCNC: 160 MG/DL (ref 65–100)
HCT VFR BLD AUTO: 26.3 % (ref 36.6–50.3)
HGB BLD-MCNC: 8.5 G/DL (ref 12.1–17)
MCH RBC QN AUTO: 28.1 PG (ref 26–34)
MCHC RBC AUTO-ENTMCNC: 32.3 G/DL (ref 30–36.5)
MCV RBC AUTO: 87.1 FL (ref 80–99)
PLATELET # BLD AUTO: 357 K/UL (ref 150–400)
POTASSIUM SERPL-SCNC: 3.8 MMOL/L (ref 3.5–5.1)
PROT SERPL-MCNC: 5.8 G/DL (ref 6.4–8.2)
RBC # BLD AUTO: 3.02 M/UL (ref 4.1–5.7)
SODIUM SERPL-SCNC: 134 MMOL/L (ref 136–145)
TROPONIN I SERPL-MCNC: 0.09 NG/ML
TROPONIN I SERPL-MCNC: 0.09 NG/ML
WBC # BLD AUTO: 7.2 K/UL (ref 4.1–11.1)

## 2017-09-16 PROCEDURE — 74011250636 HC RX REV CODE- 250/636: Performed by: INTERNAL MEDICINE

## 2017-09-16 PROCEDURE — 74011250637 HC RX REV CODE- 250/637: Performed by: FAMILY MEDICINE

## 2017-09-16 PROCEDURE — 36415 COLL VENOUS BLD VENIPUNCTURE: CPT | Performed by: FAMILY MEDICINE

## 2017-09-16 PROCEDURE — 93306 TTE W/DOPPLER COMPLETE: CPT

## 2017-09-16 PROCEDURE — 77010033678 HC OXYGEN DAILY

## 2017-09-16 PROCEDURE — 65660000000 HC RM CCU STEPDOWN

## 2017-09-16 PROCEDURE — 74011000250 HC RX REV CODE- 250: Performed by: INTERNAL MEDICINE

## 2017-09-16 PROCEDURE — 85027 COMPLETE CBC AUTOMATED: CPT | Performed by: FAMILY MEDICINE

## 2017-09-16 PROCEDURE — 74011250637 HC RX REV CODE- 250/637: Performed by: HOSPITALIST

## 2017-09-16 PROCEDURE — 80053 COMPREHEN METABOLIC PANEL: CPT | Performed by: FAMILY MEDICINE

## 2017-09-16 PROCEDURE — 74011250636 HC RX REV CODE- 250/636: Performed by: FAMILY MEDICINE

## 2017-09-16 PROCEDURE — 84484 ASSAY OF TROPONIN QUANT: CPT | Performed by: FAMILY MEDICINE

## 2017-09-16 RX ORDER — DILTIAZEM HYDROCHLORIDE 180 MG/1
180 CAPSULE, COATED, EXTENDED RELEASE ORAL DAILY
Status: DISCONTINUED | OUTPATIENT
Start: 2017-09-16 | End: 2017-09-17 | Stop reason: HOSPADM

## 2017-09-16 RX ORDER — CALCIUM CARBONATE 200(500)MG
200 TABLET,CHEWABLE ORAL AS NEEDED
Status: DISCONTINUED | OUTPATIENT
Start: 2017-09-16 | End: 2017-09-16

## 2017-09-16 RX ORDER — CALCIUM CARBONATE 200(500)MG
400 TABLET,CHEWABLE ORAL
Status: DISCONTINUED | OUTPATIENT
Start: 2017-09-16 | End: 2017-09-17 | Stop reason: HOSPADM

## 2017-09-16 RX ORDER — SODIUM CHLORIDE 9 MG/ML
500 INJECTION, SOLUTION INTRAVENOUS ONCE
Status: COMPLETED | OUTPATIENT
Start: 2017-09-16 | End: 2017-09-16

## 2017-09-16 RX ORDER — ENOXAPARIN SODIUM 100 MG/ML
80 INJECTION SUBCUTANEOUS EVERY 12 HOURS
Qty: 48 ML | Refills: 0 | Status: SHIPPED | OUTPATIENT
Start: 2017-09-16 | End: 2017-09-17 | Stop reason: ALTCHOICE

## 2017-09-16 RX ORDER — METOPROLOL TARTRATE 5 MG/5ML
5 INJECTION INTRAVENOUS ONCE
Status: COMPLETED | OUTPATIENT
Start: 2017-09-16 | End: 2017-09-16

## 2017-09-16 RX ORDER — POTASSIUM CHLORIDE 750 MG/1
40 TABLET, FILM COATED, EXTENDED RELEASE ORAL EVERY 4 HOURS
Status: COMPLETED | OUTPATIENT
Start: 2017-09-16 | End: 2017-09-16

## 2017-09-16 RX ORDER — METOPROLOL TARTRATE 25 MG/1
12.5 TABLET, FILM COATED ORAL 2 TIMES DAILY
Status: DISCONTINUED | OUTPATIENT
Start: 2017-09-16 | End: 2017-09-16

## 2017-09-16 RX ORDER — SODIUM CHLORIDE 9 MG/ML
125 INJECTION, SOLUTION INTRAVENOUS CONTINUOUS
Status: DISCONTINUED | OUTPATIENT
Start: 2017-09-16 | End: 2017-09-17

## 2017-09-16 RX ORDER — DEXAMETHASONE 4 MG/1
2 TABLET ORAL 3 TIMES DAILY
Status: DISCONTINUED | OUTPATIENT
Start: 2017-09-16 | End: 2017-09-17 | Stop reason: HOSPADM

## 2017-09-16 RX ADMIN — POTASSIUM CHLORIDE 40 MEQ: 750 TABLET, FILM COATED, EXTENDED RELEASE ORAL at 13:00

## 2017-09-16 RX ADMIN — ENOXAPARIN SODIUM 80 MG: 80 INJECTION SUBCUTANEOUS at 20:32

## 2017-09-16 RX ADMIN — SODIUM CHLORIDE 500 ML: 900 INJECTION, SOLUTION INTRAVENOUS at 00:23

## 2017-09-16 RX ADMIN — DEXAMETHASONE 2 MG: 4 TABLET ORAL at 17:11

## 2017-09-16 RX ADMIN — METOPROLOL TARTRATE 12.5 MG: 25 TABLET ORAL at 08:32

## 2017-09-16 RX ADMIN — METOPROLOL TARTRATE 5 MG: 5 INJECTION INTRAVENOUS at 00:24

## 2017-09-16 RX ADMIN — DEXAMETHASONE 2 MG: 4 TABLET ORAL at 14:00

## 2017-09-16 RX ADMIN — SODIUM CHLORIDE 125 ML/HR: 900 INJECTION, SOLUTION INTRAVENOUS at 05:27

## 2017-09-16 RX ADMIN — ENOXAPARIN SODIUM 80 MG: 80 INJECTION SUBCUTANEOUS at 08:44

## 2017-09-16 RX ADMIN — ANTACID TABLETS 400 MG: 500 TABLET, CHEWABLE ORAL at 17:50

## 2017-09-16 RX ADMIN — DILTIAZEM HYDROCHLORIDE 180 MG: 180 CAPSULE, COATED, EXTENDED RELEASE ORAL at 13:10

## 2017-09-16 RX ADMIN — SODIUM CHLORIDE 125 ML/HR: 900 INJECTION, SOLUTION INTRAVENOUS at 01:33

## 2017-09-16 RX ADMIN — Medication 10 ML: at 09:37

## 2017-09-16 RX ADMIN — DEXAMETHASONE 2 MG: 4 TABLET ORAL at 22:35

## 2017-09-16 RX ADMIN — Medication 10 ML: at 22:36

## 2017-09-16 NOTE — PROGRESS NOTES
Occupational Therapy  Order received and chart reviewed, noted bilateral LE DVT's and RN recommend to hold this date. Will follow up on Monday.    Concetta Landon OTR/L

## 2017-09-16 NOTE — PROGRESS NOTES
[facility]   Senior Resident Admission Note    CC: \"LE Swelling\"    HPI:  José Ivory is a 67 y.o. male who presents to the ER after urging from heme/Onc doctor. He is accompanied by his daughter. Patient has known history of stage 4 lung cancer with mets to brain and bones (multiple sites), for which he is followed by Dr. Jing Palacios and is on Palliative chemotherapy. He was seen by his oncologist today to receive chemotherapy and while there mentioned bilaterally LE swelling for the past week. He says the swelling is not that painful, about a 10/10 initially but now no pain (he has not received any pain medication). A duplex was done which showed bilateral proximal tibial DVTs. HR in the office was elevated to 150s, and in ER elevated to 150s as well. Patient completely asymptomatic. Denies chest pain, palpitations, shortness of breath. No hx of arrythmias. Last cigarette end of July. Last alcohol drink end of July. CTA negative for PE. Visit Vitals    /88    Pulse (!) 123    Resp 25    SpO2 97%     Gen: Alert, NAD  Heart: elevated rate, irregular rhythm. Lungs: CTAB no wheeze  Ext: 2 + edema bilaterally, No palpable cords but endorsed tenderness to palpation of calves. And left lower leg >>> right. Some excoriations/breaks in skin, without oozing, weeping, or erythema. A/P: 67year old gentleman with stage 4 lung cancer with metastases, on palliative chemo, and a history of tobacco abuse, HLD, HTN,     1. Acute Bilateral Proximal Tibial DVT: given hx of cancer, recommended guidelines for treatment of acute and chronic DVT is lovenox.  Patient already received 1mg/kg dosing in ED.  - FOBT negative  -  Will continue 1mg/kg (80mg) BID dosing.   - Consult with case management to help with teaching and arranging outpatient lovenox dosing.  - Also consult heme/onc given hx of cancer, patient hypercoaguable, will likely need anticoagulation for remainder of life (prognosis is median survival <1 year per H/O note on Aug 8th) But will defer to H/O.  2. New Onset A flutter/A fib: Initial EKG read A flutter in the ER, s/p cardizem 10mg x2 and then started on cardizem drip 10ml/hr with HR now in the 110s-120s while here in the ER and BPs are now normotensive. Troponin elevated at 0.08, likely due to heart strain, as patient denies chest pain or palpitations. - Given new onset will obtain ECHO, TSH, Mag, Phos and Cards consult. Will hold home antihypertensive Norvasc 5mg) while on cardizem drip. 3. Alcohol abuse: denies currently. Will place on CIWA to be safe w/ very low dose benzo to start  4. Hx of tobacco abuse: denies currently using but per chart review spoke with Dr. Carlos Saavedra about wanting a nicotine patch. Will give 7mcg prn.      Please see full H&P as documented by Dr. Tiana Fajardo, PGY-1.       Pt discussed with Luisa Russell MD (on-call attending physician)    Bernice Rincon MD  Family Medicine Resident  PGY 3

## 2017-09-16 NOTE — PROGRESS NOTES
ADMISSION REPORT:  2215-Admission report given to Rhesa Gaucher, RN [facility] nurse) by Gerald Simpson ( nurse). Report included the following information SBAR, Kardex, Procedure Summary, Intake/Output, Recent Results and Cardiac Rhythm. SHIFT SUMMARY:  2151-arrived in room 336 via stretcher;  2330-FP MD in for rectal exam; spec sent for OB  2330-Dr. Yi called in/ Aware of rapid heart rate, will titrate cardizem to 20 mg as B/P allows. RN to consult cardiology as necessary after notifying FP.  0001-FP notified of cont Heart Rate on 20mg Cardizem, will consult Dr. Milli Rutherford  0010-Dr. Milli Rutherford returned call orders received  0025-NS started for 500 ml bolus; POLLY CHRISTENSEN called to check in;  0035- Lopressor given IVP  0045-Heart rate down to 84; Cardizem rate lowered to 10mg/hr. 0110-cardizem IV off, venous blood drawn for Troponin, and AM lab. 0215-Monitor shows mostly Sinus R.  0600-POLLY CHRISTENSEN in to see pt, no new orders at this time. 0640-venous blood drawn for stat Troponin  END OF SHIFT REPORT:  0700-Bedside shift change report given to Rinda Koyanagi., RN (oncoming nurse) by Rhesa Gaucher, RN (offgoing nurse). Report included the following information SBAR, Kardex, Procedure Summary, Intake/Output, Recent Results and Cardiac Rhythm .

## 2017-09-16 NOTE — PROGRESS NOTES
Pt has [employer] medicare dual product which includes medicaid and medicare. For pricing of lovenox (out-of pocket  co-pays),I would need a script to fax to his pharmacy with dosages ,once a day or twice a day dosing and duration of therapy. If you think it may be for one month or greater,please write for one month with refills and I will check for pricing,etc.    Either pt or a family or friend would need to give pt his lovenox @ home  Teaching on giving lovenox injections will need to commence here in the hospital to insure pt or individual giving home lovenox is competent . Home Health can be established for pt to reinforce lovenox teaching. Thank you.   Dakotah Santiago [phone]

## 2017-09-16 NOTE — PROGRESS NOTES
0700- Bedside and Verbal shift change report given to Richelle Alberto RN (oncoming nurse) by CITLALI RN  (offgoing nurse). Report included the following information SBAR, Kardex and Recent Results. ... 1514- TRANSFER - OUT REPORT:    Verbal report given to Bethany CORRIGAN RN(name) on Mirta Quintero  being transferred to 5th Floor(unit) for routine progression of care       Report consisted of patients Situation, Background, Assessment and   Recommendations(SBAR). Information from the following report(s) SBAR, Kardex and Recent Results was reviewed with the receiving nurse. Lines:   Venous Access Device [employer] 07/28/17 Upper chest (subclavicular area, right (Active)   Central Line Being Utilized No 9/15/2017 11:00 PM   Criteria for Appropriate Use Irritant/vesicant medication 9/15/2017  9:26 AM   Site Assessment Clean, dry, & intact 9/15/2017  9:26 AM   Date of Last Dressing Change 09/15/17 9/15/2017  9:26 AM   Dressing Status Clean, dry, & intact 9/15/2017  9:26 AM   Dressing Type Bandaid 9/15/2017  2:23 PM   Date Accessed (Medial Site) 09/15/17 9/15/2017  9:26 AM   Access Time (Medial Site) 0930 9/15/2017  9:26 AM   Access Needle Size (Site #1) 20 G 9/15/2017  9:26 AM   Access Needle Length (Medial Site) 0.75 inches 9/15/2017  9:26 AM   Positive Blood Return (Medial Site) Yes 9/15/2017  2:23 PM   Action Taken (Medial Site) Flushed; De-accessed 9/15/2017  2:23 PM       Peripheral IV 09/15/17 Left Antecubital (Active)   Site Assessment Clean, dry, & intact 9/16/2017  8:00 AM   Phlebitis Assessment 0 9/16/2017  8:00 AM   Infiltration Assessment 0 9/16/2017  8:00 AM   Dressing Status Clean, dry, & intact 9/16/2017  8:00 AM   Dressing Type Transparent 9/16/2017  8:00 AM   Hub Color/Line Status Pink; Infusing;Flushed 9/16/2017  8:00 AM   Action Taken Open ports on tubing capped 9/16/2017  8:00 AM   Alcohol Cap Used Yes 9/16/2017  8:00 AM        Opportunity for questions and clarification was provided.       Patient transported with:  YVES Cruz

## 2017-09-16 NOTE — PROGRESS NOTES
Primary Nurse Mary Orozco and Thomas Hsu., RN performed a dual skin assessment on this patient Excoriation to BLE no other impairment noted  Sonu score is 22

## 2017-09-16 NOTE — PROGRESS NOTES
[address] [facility] [address] [facility] [place] [address]   Office [phone]  Fax [phone]          Assessment and Plan     Alma Gunn is a 67 y.o. 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 He has spent 1 night(s) in the hospital.    24 Hour Events:   - Early in the night, pt tachycardic despite maxing out on cardizem drip. Cardiology was consulted, who gave IVF bolus, and started lopressor 5mg IV x 1. Discontinued cardizem drip. Pt intermittently between Aflutter and Afib throughout the night  - Converted to NSR around 0300 and has been in sinus ever since, with much improved HR in 80s. Overnight blood pressures also stable 110s-120s/60s-80s.        Bilateral DVTs - no prior hx of VTE, likely 2/2 hypercoagulable state of malignancy. DVTs seen on LE duplex 9/15 (bilateral posterior tibial DVTs). CTA neg for PE. No CP, SOB, or hypoxia. Tachycardia now resolved. No palpable cords on LE.   - lovenox 80mg BID   - consulted heme/onc for further management decisions given patient's comorbidities  - wound care consulted     Stage IV squamous cell lung cancer - Dx'd 7/2017, noncurative, on palliative chemo, s/p gamma knife for brain mets. Being followed by Dr. Nicole Mcmanus, last seen 9/15. Diagnosis made 7/2017. Palliative chemotherapy w carboplatin and gemcitabine q3 weeks starting 8/25/17. Following Dr. Ethan Cheek (neurosurg) for brain mets  - bilateral wheezing on PE likely 2/2 lung cancer, will continue to monitor. Pt asymptomatic.   - continued home dexamethasone PO for brain mets (pharmacy to substitute)  - consulted heme/onc      New onset Aflutter/Afib with RVR - POA HR 150s, pt asymptomatic. Was on cardizem drip, discontinued in early morning per cardiology, s/p IV lopressor 5mg (per cardiology). Pt has been NSR with HR in 80s since 0300.    - Cardiology following  - POA EKG Aflutter  - now resolved, currently NSR  - ECHO ordered  - TSH 0.61 (wnl)  - Trop 0.08 POA --> 0.09. One more trending (q6h). Likely due to strain from new onset aflutter/afib but will continue to monitor for signs of ischemia     Hyponatremia  - POA Na 133, likely related to metastatic lung cancer  - Will continue to monitor.  - This AM, NA is 134. Continue IV NS @ 125cc/hr     Anemia - POA Hgb 9.3. Iron studies 2016: Iron 55, TIBC 255, % sat 22, ferritin 780. Baseline Hgb was 14 prior to diagnosis but recently has been around 10. Likely anemia of chronic disease  - FOBT Negative  - Continue to monitor     Hx of tobacco abuse - Pt smoked 1ppd for 45 years but quit at cancer diagosis at the end of July. Pt stated he had confusion 2 days ago, saw Neurosurgery, who stated it may be due to nicotine withdrawal. Saw heme/onc today and Dr. Tylor Thomas was going to send Rx for nicotine patches at patient request  - Ordered nicotine patch 7mcg PRN per patient request     Hx of alcohol abuse - last drink end of July, reports drinking a fifth of liquor over a week or so. Denies current abuse.   - CIWA w low dose ativan  - Overnight CIWA has been 0 (x 3)     Hypertension - patient reportedly hypotensive yesterday, 82/50s. Asymptomatic.   - BP on admission 111/77. Did decrease to 89/52 @ 1am but most recent 125/63.   - Continue holding home amlodipine  - Will continue to monitor at this time and readjust as BP's trend. FEN/GI - Regular diet. NS at 125 mL/hr. Activity - Ambulate with assistance  DVT prophylaxis - Lovenox  GI prophylaxis - Not indicated at this time  Fall prophylaxis - Not indicated at this time. Unit - Stepdown  Admission Status - Admitted  IV Access - Peripheral IV, port R chest  Disposition - Plan to d/c to Home.   Code Status - Full    I appreciate the opportunity to participate in the care of this patient,  Jennifer Olson MD  Family Medicine Resident         Subjective / Objective     Subjective  Temp (24hrs), Av.5 °F (36.4 °C), Min:97 °F (36.1 °C), Max:97.9 °F (36.6 °C)  Pt reports that he is \"feeling good\", Denies dizziness, HA, SOB, CP, palpitations, fever/chills, N/V. Has not had BM since arrival, last BM yesterday morning. States his LE do not hurt, has not had any more of the burning sensation in his legs that he initially had earlier this week. Is asking for coffee/juice/breakfast. No other complaints at this time. Objective  Respiratory: O2 Flow Rate (L/min): 1 l/min O2 Device: Room air     Visit Vitals    /72 (BP 1 Location: Right arm, BP Patient Position: At rest)    Pulse 87    Temp 97.8 °F (36.6 °C)    Resp 25    Wt 172 lb (78 kg)    SpO2 98%    BMI 25.47 kg/m2     General Appearance:  Comfortable, well-appearing and in no acute distress. HEENT: Normal HEENT exam.    Lungs:  Normal respiratory rate and normal effort. There are expiratory wheezes throughout. Heart: Regular rate, normal rhythm. No murmur. Chest: Symmetric chest wall expansion. Extremities: 3+ pitting edema in b/l LE to knees, worse around ankles. Abdomen: Abdomen is soft. Protuberant. Bowel sounds are normal.   There is no abdominal tenderness. Pulses: Distal pulses are intact. I/O:    Date 09/15/17 0700 - 09/16/17 0659 09/16/17 0700 - 09/17/17 0659   Shift 2455-3391 5802-8625 24 Hour Total 7017-2153 4577-2558 24 Hour Total   I  N  T  A  K  E   P.O.  250 250         P. O.  250 250       I.V.  1140.8 1140.8         Cardizem Volume  82.4 82.4         Volume (0.9% sodium chloride infusion)  1058. 3 1058. 3       Shift Total  (mL/kg)  1390.8  (17.8) 1390.8  (17.8)      O  U  T  P  U  T   Urine  950 950         Urine Voided  950 950       Blood  0 0         Blood  0 0       Shift Total  (mL/kg)  950  (12.2) 950  (12.2)      NET  440.8 440.8      Weight (kg)  78 78 78 78 78       CBC:  Recent Labs      09/16/17   0101  09/15/17   1939  09/15/17   0929   WBC  7.2  7.0  8.6   HGB  8.5*  9.3*  9.0*   HCT  26.3*  29.0*  27.1*   PLT  357  402* 337       Metabolic Panel:  Recent Labs      09/16/17   0101  09/15/17   1939  09/15/17   0929   NA  134*  133*  133*   K  3.8  4.1  3.5   CL  102  99  96*   CO2  27  27  31   BUN  13  15  17   CREA  0.66*  0.78  0.83   GLU  160*  149*  104*   CA  7.8*  8.5  8.6   MG   --   1.9   --    PHOS   --   4.1   --    ALB  2.3*  2.6*  2.7*   SGOT  54*  65*  44*   ALT  90*  99*  59          For Billing    Chief Complaint   Patient presents with    Rapid Heart Rate    Leg Pain       Hospital Problems  Date Reviewed: 9/15/2017          Codes Class Noted POA    Deep vein thrombosis (DVT) of tibial vein of both lower extremities ([facility] ICD-10-CM: B07.100  ICD-9-CM: 453.42  9/15/2017 Unknown

## 2017-09-16 NOTE — CONSULTS
Armando Durán MD [facility] [address]  Office [phone]  Mobile [phone]    Date of  Admission: 9/15/2017  7:10 PM  PCP- Alfredo Wyman MD    Chuckie Ford is a 67 y.o. male admitted for Acute bilateral deep vein thrombosis (DVT) of popliteal vei*. Consult requested by Christy Richter MD    Assessment  · New onset atrial flutter/fib, now in sinus  · Calf DVT  · Metastatic squamous cancer lung with brain met s/p gamma knife  · Anemia        Discussion/Recommendations:  Complex case. Trigger for AFlutter could still have been small PE despite neg chest CTA. No evidence of MI. He has diffuse bronchospasm by exam but is minimally symptomatic. Continue lovenox for VTE and atrial flutter/fib. Monitor for ICH given brain met. Would consider diltiazem rather than metoprolol given COPD    I will be happy to see again as needed    Subjective:  Met lung cancer to brain s/p gamma knife, ongoing systemic chemo, chronic HTN now admitted with progressive bilateral LE edema, new AFlutter with RVR. Patient denies palps/worsening dyspnea or chest pain. Chest CTA without PE, but evidence of coronary Ca2+. Stable/decreased hilar masses. Venous duplex with calf DVT. Converted to sinus rhythm after iv dilt, now on toprol. Seen by heme/onc, started on full dose lovenox. Troponin negative. He is moderately active at home. Denies any significant MCLEAN. No chest pain. Notes progressive bilateral LE edema. Remote smoker      Past Medical History:   Diagnosis Date    Dysfunction, erectile     Hyperlipidemia 2010    Hypertension       No past surgical history on file.   Current Facility-Administered Medications on File Prior to Encounter   Medication Dose Route Frequency Provider Last Rate Last Dose    [] sodium chloride 0.9 % injection 10 mL  10 mL IntraVENous PRN Bon Ng MD   10 mL at 09/15/17 1426    [] heparin (porcine) pf 500 Units  500 Units IntraVENous PRN Celso Hinojosa MD   500 Units at 09/15/17 1426    [] sodium chloride (NS) flush 5-10 mL  5-10 mL IntraVENous PRN Dakota Ng MD   10 mL at 09/15/17 1430    [COMPLETED] CARBOplatin (PARAPLATIN) 554 mg in 0.9% sodium chloride 250 mL, overfill volume 25 mL chemo infusion  554 mg IntraVENous ONCE Dakota Ng MD   554 mg at 09/15/17 1250    [] 0.9% sodium chloride infusion  25 mL/hr IntraVENous CONTINUOUS Dakota Ng MD   Stopped at 09/15/17 1430    [COMPLETED] gemcitabine (GEMZAR) 1,890 mg in 0.9% sodium chloride 250 mL, overfill volume 25 mL chemo infusion  1,890 mg IntraVENous ONCE Dakota Ng MD   1,890 mg at 09/15/17 1340     Current Outpatient Prescriptions on File Prior to Encounter   Medication Sig Dispense Refill    nicotine (NICODERM CQ) 14 mg/24 hr patch 1 Patch by TransDERmal route every twenty-four (24) hours. 30 Patch 1    magic mouthwash solution Swish and spit 15-30 mL every 2-4 hours as needed for mouth pain. May swallow for throat pain. Magic mouth wash   Maalox  Lidocaine 2% viscous   Diphenhydramine oral solution     Pharmacy to mix equal portions of ingredients to a total volume as indicated in the dispense amount. 480 mL 2    DEXAMETHASONE (DECADRON PO) Take  by mouth. Tapering      prochlorperazine (COMPAZINE) 10 mg tablet Take 1 Tab by mouth every six (6) hours as needed for Nausea. 50 Tab 5    lidocaine-prilocaine (EMLA) topical cream Apply  to affected area as needed for Pain (Apply 30-60 min. prior to having your port accessed). 30 g 0    ondansetron hcl (ZOFRAN) 8 mg tablet Take 1 Tab by mouth every eight (8) hours as needed for Nausea. 45 Tab 5    aspirin delayed-release 81 mg tablet Take 81 mg by mouth daily.       amLODIPine (NORVASC) 5 mg tablet TAKE ONE TABLET BY MOUTH EVERY DAY 30 Tab 5     Allergies   Allergen Reactions    Lisinopril Angioedema    Hydrochlorothiazide Hives and Swelling    Sulfa (Sulfonamide Antibiotics) Hives             Review of Symptoms:  Constitutional: negative for fevers and chills  Respiratory: negative for sputum, hemoptysis or pleurisy/chest pain  Gastrointestinal: negative for dysphagia, odynophagia, dyspepsia and abdominal pain  Musculoskeletal:positive for arthralgias  Neurological: negative for dizziness, vertigo and seizures  Other systems reviewed and negative except as above. Physical Exam    Visit Vitals    /86    Pulse (!) 102    Temp 97.7 °F (36.5 °C)    Resp 19    Wt 172 lb (78 kg)    SpO2 99%    BMI 25.47 kg/m2     Visit Vitals    /86    Pulse (!) 102    Temp 97.7 °F (36.5 °C)    Resp 19    Wt 172 lb (78 kg)    SpO2 99%    BMI 25.47 kg/m2     General Appearance:  Well developed, well nourished,alert and oriented x 3, and individual in no acute distress. Ears/Nose/Mouth/Throat:   Hearing grossly normal.         Neck: Supple. Chest:   Lungs diffuse wheezing   Cardiovascular:  Regular rate and rhythm, S1, S2 normal, no murmur. Abdomen:   Soft, non-tender, bowel sounds are active. Extremities: 3+ LE edema bilaterally. Skin: Warm and dry.                  Cardiographics    Telemetry: normal sinus rhythm  ECG: narrow tachycardia consistent with atrial flutter 2:1    Echocardiogram: Not done  Recent Results (from the past 12 hour(s))   TROPONIN I    Collection Time: 09/16/17  1:01 AM   Result Value Ref Range    Troponin-I, Qt. 0.09 (H) <7.64 ng/mL   METABOLIC PANEL, COMPREHENSIVE    Collection Time: 09/16/17  1:01 AM   Result Value Ref Range    Sodium 134 (L) 136 - 145 mmol/L    Potassium 3.8 3.5 - 5.1 mmol/L    Chloride 102 97 - 108 mmol/L    CO2 27 21 - 32 mmol/L    Anion gap 5 5 - 15 mmol/L    Glucose 160 (H) 65 - 100 mg/dL    BUN 13 6 - 20 MG/DL    Creatinine 0.66 (L) 0.70 - 1.30 MG/DL    BUN/Creatinine ratio 20 12 - 20      GFR est AA >60 >60 ml/min/1.73m2    GFR est non-AA >60 >60 ml/min/1.73m2    Calcium 7.8 (L) 8.5 - 10.1 MG/DL    Bilirubin, total 0.2 0.2 - 1.0 MG/DL    ALT (SGPT) 90 (H) 12 - 78 U/L    AST (SGOT) 54 (H) 15 - 37 U/L    Alk.  phosphatase 66 45 - 117 U/L    Protein, total 5.8 (L) 6.4 - 8.2 g/dL    Albumin 2.3 (L) 3.5 - 5.0 g/dL    Globulin 3.5 2.0 - 4.0 g/dL    A-G Ratio 0.7 (L) 1.1 - 2.2     CBC W/O DIFF    Collection Time: 09/16/17  1:01 AM   Result Value Ref Range    WBC 7.2 4.1 - 11.1 K/uL    RBC 3.02 (L) 4.10 - 5.70 M/uL    HGB 8.5 (L) 12.1 - 17.0 g/dL    HCT 26.3 (L) 36.6 - 50.3 %    MCV 87.1 80.0 - 99.0 FL    MCH 28.1 26.0 - 34.0 PG    MCHC 32.3 30.0 - 36.5 g/dL    RDW 17.8 (H) 11.5 - 14.5 %    PLATELET 774 287 - 299 K/uL   TROPONIN I    Collection Time: 09/16/17  6:34 AM   Result Value Ref Range    Troponin-I, Qt. 0.09 (H) <0.05 ng/mL

## 2017-09-16 NOTE — ROUTINE PROCESS
Primary Nurse Asif Geronimo RN and Brianna Grant, RN performed a dual skin assessment on this patient No impairment noted  Sonu score is 20

## 2017-09-16 NOTE — PROGRESS NOTES
Problem: Falls - Risk of  Goal: *Absence of Falls  Document Alfonso Fall Risk and appropriate interventions in the flowsheet.    Outcome: Progressing Towards Goal  Fall Risk Interventions:  Mobility Interventions: Patient to call before getting OOB, OT consult for ADLs, PT Consult for mobility concerns           Medication Interventions: Evaluate medications/consider consulting pharmacy

## 2017-09-16 NOTE — H&P
[address] [place] [address]   Office [phone]  Fax [phone]       Admission H&P     Name: Miley Garibay MRN: [id number]  Sex: Male   YOB: 1945  Age: 67 y.o. PCP: Daisy Gonzalez MD     Source of Information: patient, medical records    Chief complaint: Tachycardia, bilateral DVT, Aflutter/Afib    History of Present Illness  Jocelyn Koroma is a 67 y.o. 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 Patient went to heme/onc today for chemo, mentioned that he has had bilateral leg swelling.  ordered a LE duplex and pt was found to have bilateral DVTs. Pt reports pain to be bilateral, worse around his ankles, 10/10 at its worst, burning sensation, but currently 0/10. Tried compression stockings but there was no change. Pt also reports being hypotensive yesterday with BP to 80s/50s and \"feeling drunk. \" Daughter also states he had some minor confusion at that time, which has now resolved. Pt denies any CP, diarrhea, SOB, GERD, abdominal pain, N/V, HA, dizziness, vision changes, heart palpitations. Last BM was this morning, was normal. Denies diarrhea. Denies blood in stool, dark tarry stools. In the ED, pt's HR was 154 and EKG showed Aflutter. /77, RR 18, 98% on RA. Not hypoxic. CTA was negative for PE, CXR showed left hilar mass (patient's cancer). WBC count 7.0, Hgb 9.3, platelets 778. Hyponatremic at 133, Cr 0.78. Troponin was 0.08. Pt was given 20mg cardizem IV and placed on cardizem drip, BPs continue to remain in 150s. He was given NS 1L bolus and 80mg lovenix for the DVTs.     Past Medical History:   Diagnosis Date    Dysfunction, erectile     Hyperlipidemia 5/18/2010    Hypertension       Patient Vitals for the past 12 hrs:   Pulse Resp BP SpO2   09/15/17 2130 (!) 123 25 122/88 97 %   09/15/17 2100 (!) 128 14 116/73 98 % 09/15/17 2030 (!) 153 23 112/83 98 %   09/15/17 2005 (!) 150 20 - 96 %   09/15/17 2004 - - 112/76 -   09/15/17 1930 (!) 155 17 107/83 99 %   09/15/17 1922 (!) 154 21 115/83 98 %   09/15/17 1907 (!) 154 18 111/77 98 %     Home Medications   Prior to Admission medications    Medication Sig Start Date End Date Taking? Authorizing Provider   nicotine (NICODERM CQ) 14 mg/24 hr patch 1 Patch by TransDERmal route every twenty-four (24) hours. 9/15/17  Yes Arelis Dacosta NP   polyethylene glycol [facility]) 17 gram packet Take 17 g by mouth daily as needed (constipation). Yes Historical Provider   OTHER Chemotherapy: carboplatin + gemcitabine   Yes Historical Provider   magic mouthwash solution Swish and spit 15-30 mL every 2-4 hours as needed for mouth pain. May swallow for throat pain. Magic mouth wash   Maalox  Lidocaine 2% viscous   Diphenhydramine oral solution     Pharmacy to mix equal portions of ingredients to a total volume as indicated in the dispense amount. 9/12/17  Yes Paxton Cash MD   DEXAMETHASONE (DECADRON PO) Take  by mouth. Tapering   Yes Historical Provider   prochlorperazine (COMPAZINE) 10 mg tablet Take 1 Tab by mouth every six (6) hours as needed for Nausea. 8/8/17  Yes Arelis Dacosta NP   lidocaine-prilocaine (EMLA) topical cream Apply  to affected area as needed for Pain (Apply 30-60 min. prior to having your port accessed). 8/8/17  Yes Arelis Dacosta NP   ondansetron hcl (ZOFRAN) 8 mg tablet Take 1 Tab by mouth every eight (8) hours as needed for Nausea. 8/8/17  Yes Arelis Dacosta NP   aspirin delayed-release 81 mg tablet Take 81 mg by mouth daily.    Yes Historical Provider   amLODIPine (NORVASC) 5 mg tablet TAKE ONE TABLET BY MOUTH EVERY DAY 5/11/17  Yes Jo Qiu MD       Allergies  Allergies   Allergen Reactions    Lisinopril Angioedema    Hydrochlorothiazide Hives and Swelling    Sulfa (Sulfonamide Antibiotics) Hives       Past Medical History:   Diagnosis Date    Dysfunction, erectile     Hyperlipidemia 5/18/2010    Hypertension        No past surgical history on file. Family History   Problem Relation Age of Onset    Stroke Father     Diabetes Sister     Diabetes Brother     Heart Disease Sister        Social History  Social History     Social History    Marital status: SINGLE     Spouse name: N/A    Number of children: N/A    Years of education: N/A     Occupational History    Not on file. Social History Main Topics    Smoking status: Current Every Day Smoker     Packs/day: 1.00     Years: 45.00     Types: Cigarettes    Smokeless tobacco: Never Used    Alcohol use 1.5 oz/week     3 Cans of beer per week    Drug use: No    Sexual activity: Not on file     Other Topics Concern    Not on file     Social History Narrative       Alcohol history: Problem list includes prior hx of alcohol abuse. Patient states he only drinks a fifth of liquor a week, states he quit when he received cancer diagnosis. No drink since the end of July  Smoking history: Former smoker, smoked 1ppd x 45 years, quit when he received cancer diagnosis at the end of July 7536  Illicit drug history: Not at all    Living arrangement: patient lives alone. Ambulates: With cane    Review of Systems  Review of Systems   Constitutional: Positive for unexpected weight change. Negative for chills and fever. Respiratory: Negative for cough, chest tightness, shortness of breath and wheezing. Cardiovascular: Positive for leg swelling. Negative for chest pain and palpitations. Gastrointestinal: Negative for abdominal distention, abdominal pain, blood in stool, constipation, diarrhea, nausea and vomiting. Neurological: Negative for dizziness, weakness, light-headedness and headaches. Physical Exam  Objective:  General Appearance:  Comfortable, well-appearing and in no acute distress. Vital signs: (most recent): Blood pressure (P) 109/77, pulse (!) (P) 153, resp. rate (P) 18, SpO2 96 %.   No fever.    HEENT: Normal HEENT exam.    Lungs:  Normal respiratory rate and normal effort. There are wheezes. Heart: Tachycardia. Irregular rhythm. No murmur. Chest: Symmetric chest wall expansion. Extremities: (3+ pitting edema in b/l LE to knees, worse around ankles. )  Abdomen: Abdomen is soft. (Protuberant)Bowel sounds are normal.   There is no abdominal tenderness. Pulses: Distal pulses are intact. O2 Device: Room air     Laboratory Data  Recent Results (from the past 8 hour(s))   CBC WITH AUTOMATED DIFF    Collection Time: 09/15/17  7:39 PM   Result Value Ref Range    WBC 7.0 4.1 - 11.1 K/uL    RBC 3.32 (L) 4.10 - 5.70 M/uL    HGB 9.3 (L) 12.1 - 17.0 g/dL    HCT 29.0 (L) 36.6 - 50.3 %    MCV 87.3 80.0 - 99.0 FL    MCH 28.0 26.0 - 34.0 PG    MCHC 32.1 30.0 - 36.5 g/dL    RDW 17.6 (H) 11.5 - 14.5 %    PLATELET 568 (H) 751 - 400 K/uL    NEUTROPHILS 83 (H) 32 - 75 %    LYMPHOCYTES 10 (L) 12 - 49 %    MONOCYTES 4 (L) 5 - 13 %    EOSINOPHILS 0 0 - 7 %    BASOPHILS 0 0 - 1 %    METAMYELOCYTES 2 (H) 0 %    MYELOCYTES 1 (H) 0 %    NRBC 1.0 (H) 0  WBC    ABS. NEUTROPHILS 5.8 1.8 - 8.0 K/UL    ABS. LYMPHOCYTES 0.7 (L) 0.8 - 3.5 K/UL    ABS. MONOCYTES 0.3 0.0 - 1.0 K/UL    ABS. EOSINOPHILS 0.0 0.0 - 0.4 K/UL    ABS.  BASOPHILS 0.0 0.0 - 0.1 K/UL    DF MANUAL      RBC COMMENTS ANISOCYTOSIS  1+        RBC COMMENTS MACROCYTOSIS  1+        RBC COMMENTS POIKILOCYTOSIS  1+        RBC COMMENTS OVALOCYTES  1+        RBC COMMENTS CINDY CELLS  1+        RBC COMMENTS SCHISTOCYTES  1+       METABOLIC PANEL, COMPREHENSIVE    Collection Time: 09/15/17  7:39 PM   Result Value Ref Range    Sodium 133 (L) 136 - 145 mmol/L    Potassium 4.1 3.5 - 5.1 mmol/L    Chloride 99 97 - 108 mmol/L    CO2 27 21 - 32 mmol/L    Anion gap 7 5 - 15 mmol/L    Glucose 149 (H) 65 - 100 mg/dL    BUN 15 6 - 20 MG/DL    Creatinine 0.78 0.70 - 1.30 MG/DL    BUN/Creatinine ratio 19 12 - 20      GFR est AA >60 >60 ml/min/1.73m2    GFR est non-AA >60 >60 ml/min/1.73m2    Calcium 8.5 8.5 - 10.1 MG/DL    Bilirubin, total 0.2 0.2 - 1.0 MG/DL    ALT (SGPT) 99 (H) 12 - 78 U/L    AST (SGOT) 65 (H) 15 - 37 U/L    Alk. phosphatase 74 45 - 117 U/L    Protein, total 6.5 6.4 - 8.2 g/dL    Albumin 2.6 (L) 3.5 - 5.0 g/dL    Globulin 3.9 2.0 - 4.0 g/dL    A-G Ratio 0.7 (L) 1.1 - 2.2     TROPONIN I    Collection Time: 09/15/17  7:39 PM   Result Value Ref Range    Troponin-I, Qt. 0.08 (H) <0.05 ng/mL   NUCLEATED RBC    Collection Time: 09/15/17  7:39 PM   Result Value Ref Range    NRBC 1.5 (H) 0  WBC    ABSOLUTE NRBC 0.10 (H) 0.00 - 0.01 K/uL    WBC CORRECTED FOR NR ADJUSTED FOR NUCLEATED RBC'S         Imaging  CXR Results  (Last 48 hours)               09/15/17 1942  XR CHEST PORT Final result    Impression:  IMPRESSION: Decreased left hilar or perihilar mass. No other acute abnormality. Narrative:  EXAM:  XR CHEST PORT. INDICATION: Chest pain. COMPARISON: 7/11/2017. FINDINGS:    A portable AP radiograph of the chest was obtained at 1931 hours. There is a   right jugular Port-A-Cath with tip over the superior vena cava. The patient is   rotated to the right. Lines and tubes: The patient is on a cardiac monitor. Lungs: The left perihilar mass has decreased in size. Pleura: There is no pneumothorax or pleural effusion. Mediastinum: The cardiac silhouette is borderline enlarged. The aorta is   atherosclerotic. Bones and soft tissues: The bones and soft tissues are grossly within normal   limits. CT Results  (Last 48 hours)               09/15/17 2004  CTA CHEST W OR W WO CONT Final result    Impression:  IMPRESSION:    1. No pulmonary embolus. 2. Bilateral lung masses and mediastinal nodes decreased in size. 3. Atherosclerosis with coronary artery calcifications. Narrative:  EXAM: CT ANGIOGRAPHY CHEST    INDICATION: Leg pain and tachycardia, now chest pain or shortness of breath. COMPARISON: 7/14/2017. CONTRAST: 80 mL of Isovue-370. TECHNIQUE:    Precontrast  images were obtained to localize the volume for acquisition. Multislice helical CT arteriography was performed from the diaphragm to the   thoracic inlet during uneventful rapid bolus intravenous contrast   administration. Lung and soft tissue windows were generated. Coronal and   sagittal  reformatted images were also generated and 3D post processing   consisting of coronal maximum intensity projection images was performed. CT dose   reduction was achieved through use of a standardized protocol tailored for this   examination and automatic exposure control for dose modulation. FINDINGS:   LOWER NECK: The visualized portions of the thyroid and structures of the lower   neck are within normal limits. LUNGS: There is a lobulated and spiculated lingular lung mass which measures 4.3   x 4 x 3.8 cm and contains central areas of cavitation which has decreased in   size since the prior examination at which time it measured 4.9 x 4.3 x 5.3 cm. There is a 1.9 x 1.9 cm right middle lobe nodule (series 3 image 39) which has   decreased in size and is less solid than on the prior examination when it   measured 2.7 x 2.5 cm. PLEURA: PULMONARY ARTERIES: The pulmonary arteries are well enhanced and no   pulmonary emboli are identified. AORTA: The aorta enhances normally without evidence of aneurysm or dissection. There is coronary artery calcification. MEDIASTINUM: There is a large 5 x 4.2 x 5.3 cm left hilar mass which previously   measured 6.1 x 5.3 x 5.7 cm and encases the left main pulmonary artery. The   artery measures 4 mm in diameter and previously measured 2 mm. There is a 3.7 x   2.5 cm right paratracheal lymph node which previously measured 3.7 x 2.3 cm. BONES AND SOFT TISSUES: The bones and soft tissues of the chest wall are within   normal limits.    UPPER ABDOMEN: The visualized portions of the upper abdominal organs are normal.               EKG: Aflutter with , then repeat EKG showed Afib      Assessment and Plan     Camron Dalton is a 67 y.o. male with hx of HLD, HTN, Stave IV squamous cell lung cancer with brain mets, admitted for bilateral DVTs and new onset Aflutter/Afib with RVR    Bilateral DVTs - see on LE duplex 9/15 (bilateral posterior tibial DVTs) ordered by heme/onc after seeing patient outpatient. - lovenox 80mg BID given hx of metastatic lung cancer, first dose given in ED  - consulted heme/onc for further management decisions given patient's comorbidities    Stage IV squamous cell lung cancer, on palliative chemo, s/p gamma knife for brain mets. Being followed by Dr. Carline Bush, last seen 9/15. Diagnosis made 7/2017. Patient's cancer is non-curative. On palliative chemotherapy w carboplatin and gemcitabine q3 weeks starting 8/25/17, last infusion today. Following Dr. Britta Palomino (neurosurg) for brain mets  - bilateral wheezing on PE likely 2/2 lung cancer, will continue to monitor. Pt asymptomatic.   - continued home dexamethasone PO for brain mets  - consulted heme/onc     New onset Aflutter/Afib with RVR - pt with POA HR up to 150s, pt asymptomatic. Pt denies cardiac history. S/p 20mg cardizem IV before being placed on cardizem drip in ED. POA EKG showed aflutter with , but patient later converted to Afib.   - Continue to titrate cardizem drip up as BP allows for goal HR <120  - Cardiology consulted order placed  - POA EKG Aflutter  - repeat EKG ordered  - ECHO ordered  - TSH ordered to r/o hyperthyroidism as poss cause of new Afib  - continue cardizem drip - patient normotensive, HR now ranging 120s-150s  - Trop 0.08 POA, will trend q6h x 2 more. May be due to strain from new onset aflutter/afib but will continue to monitor for signs of ischemia  - Etiology for tachycardia unknown, pt with significant tumor burden on CT, maybe compression of vessels?     Hyponatremia  - POA Na 133, likely related to metastatic lung cancer  - Will continue to monitor    Anemia - POA Hgb 9.3. Iron studies 5/2016: Iron 55, TIBC 255, % sat 22, ferritin 780. Baseline Hgb was 14 prior to diagnosis but recently has been around 10. Likely anemia of chronic disease  - FOBT ordered, will collect  - Continue to monitor    Hx of tobacco abuse - Pt smoked 1ppd for 45 years but quit at cancer diagosis at the end of July. Pt stated he had confusion 2 days ago, saw Neurosurgery, who stated it may be due to nicotine withdrawal. Saw heme/onc today and Dr. Tylor Thomas was going to send Rx for nicotine patches at patient request  - Ordered nicotine patch 7mcg PRN per patient request    Hx of alcohol abuse - last drink end of July, reports drinking a fifth of liquor over a week or so. Denies current abuse.   - CIWA w low dose ativan    Hypertension - patient reportedly hypotensive yesterday, 82/50s. Asymptomatic.   - BP on admission 111/77. At this time, 122/88.   -  Holding home amlodipine 5mg as patient has been hypotensive and is on cardizem drip for tachycardia  - Will continue to monitor at this time and readjust as BP's trend. FEN/GI - Regular diet. Activity - Ambulate with assistance  DVT prophylaxis - Lovenox 80mg for bilateral DVT  GI prophylaxis - Not indicated at this time  Fall prophylaxis - Not indicated at this time. Disposition - Admit to Stepdown. Plan to d/c to Home. Pending PT/OT  Code Status - Full, discussed with patient / caregivers. Next of Shabbir Brooks Name and Manpreet Tomlin (daughter): [phone] or Myrna Nash (son): [phone]    Patient Amanda Hood will be discussed Dr. Gema Pulido.     10:07 PM, 09/15/17  Jennifer Olson MD  Family Medicine Resident       For Billing    Chief Complaint   Patient presents with    Rapid Heart Rate    Leg Pain       Hospital Problems  Date Reviewed: 9/15/2017          Codes Class Noted POA    Deep vein thrombosis (DVT) of tibial vein of both lower extremities ([address] ICD-10-CM: B40.985  ICD-9-CM: 453.42  9/15/2017 Unknown

## 2017-09-16 NOTE — ED NOTES
TRANSFER - OUT REPORT:    Verbal report given to Pru, RN(name) on Catia Lima  being transferred to [facility](unit) for routine progression of care       Report consisted of patients Situation, Background, Assessment and   Recommendations(SBAR). Information from the following report(s) SBAR, Kardex, ED Summary, [facility] and Recent Results was reviewed with the receiving nurse. Lines:   Venous Access Device [employer] 07/28/17 Upper chest (subclavicular area, right (Active)   Central Line Being Utilized No 9/15/2017  2:23 PM   Criteria for Appropriate Use Irritant/vesicant medication 9/15/2017  9:26 AM   Site Assessment Clean, dry, & intact 9/15/2017  9:26 AM   Date of Last Dressing Change 09/15/17 9/15/2017  9:26 AM   Dressing Status Clean, dry, & intact 9/15/2017  9:26 AM   Dressing Type Bandaid 9/15/2017  2:23 PM   Date Accessed (Medial Site) 09/15/17 9/15/2017  9:26 AM   Access Time (Medial Site) 0930 9/15/2017  9:26 AM   Access Needle Size (Site #1) 20 G 9/15/2017  9:26 AM   Access Needle Length (Medial Site) 0.75 inches 9/15/2017  9:26 AM   Positive Blood Return (Medial Site) Yes 9/15/2017  2:23 PM   Action Taken (Medial Site) Flushed; De-accessed 9/15/2017  2:23 PM       Peripheral IV 09/15/17 Left Antecubital (Active)   Site Assessment Clean, dry, & intact 9/15/2017  7:59 PM   Phlebitis Assessment 0 9/15/2017  7:59 PM   Infiltration Assessment 0 9/15/2017  7:59 PM   Dressing Status New 9/15/2017  7:59 PM   Hub Color/Line Status Pink 9/15/2017  7:59 PM        Opportunity for questions and clarification was provided. All of pt's belongings, valuables, and medications were sent with patient.     Patient transported with:   Monitor  Tech

## 2017-09-16 NOTE — CONSULTS
[id number] [facility] Oncology at [facility]  [phone]    Hematology / Oncology Consult    Reason for Consult:   Luis Williamson is a 67 y.o. male who is seen in consultation for lung cancer and acute DVT      History of Present Illness:   67 y.o. established patient of Dr. Petrona Adorno who is on palliative chemotherapy with Carboplatin and gemcitabine was admitted to [address] [place] [address] from the office on 9/15/17 with complains of B/L LE edema x 1 week, tachycardia. He had tried compression stockings without relief, noted a severe burning sensation in both legs with decreased clarity of thoughts, no change in baseline SOB and no fevers. He ws noted to be hypotensive with SBP in the 80s in the office and sent to the ER. Was noted to have tachycardia in the 150s, CTA without PE and decrease in the size of bilateral masses as well as adenopathy,  was started on Cardizem after EKG revealed Aflutter. LE dopplers revealed Chronic deep vein thrombosis in the right posterior tibial vein, the right peroneal vein, and the left posterior tibial vein. Superficial vein thrombosis detected in bilateral greater  saphenous veins. He was started on Lovenox 80 mg BID and hematology consulted for further management    He smoked 1ppd for 50+ years but quit at diagnosis. He lives alone in [address] [place] [address]. His son lives about 15 minutes from him. His daughter lives about 30 minutes away. Today he reports no SOB, no fevers, LE swelling persists but does not burn as much. He has had clarity in thoughts and does not feel confused. No bleeding, no diarrhea, dysuria, falls, HA. No falls and has an appetite. BP stable   Treatment History:   · CXR 7/11/2017: Mediastinal lymphadenopathy with left hilar mass. · CT Chest 7/14/2017: Bulky mediastinal and left hilar lymphadenopathy. Bilateral pulmonary masses and nodules  · PET-CT 7/24/2017: Right parietal mass.  Hypermetabolic right supraclavicular, right paratracheal, AP window, prevascular, precarinal, subcarinal and left hilar lymphadenopathy. Hypermetabolic pulmonary nodules bilaterally. · MRI Brain 7/24/2017: Junction right posterior temporal lobe and occipital lobe 2.7 cm mass likely metastasis from lung cancer. No additional acute abnormalities  · FNA supraclavicular node 7/28/2017: Metastatic squamous cell carcinoma, PD-L1 5%, BRAF negative, EGFR negative, ALK & ROS-1 pending   · Stage IV Non-small cell lung cancer (Squamous Cell)  · Gamma knife by Dr. Alejandro Guerrero 8/15/2017   · Palliative chemotherapy with carboplatin and gemcitabine beginning 8/25/2017, cycle 2 day 1 - 9/15/17      PAST HISTORY: The following sections were reviewed and updated in the EMR as appropriate: PMH, SH, FH, Medications, Allergies. Allergies   Allergen Reactions    Lisinopril Angioedema    Hydrochlorothiazide Hives and Swelling    Sulfa (Sulfonamide Antibiotics) Hives      Review of Systems: A complete review of systems was obtained, reviewed, and scanned into the EMR. Pertinent findings reviewed above. Physical Exam:     Visit Vitals    /86    Pulse (!) 102    Temp 97.7 °F (36.5 °C)    Resp 19    Wt 172 lb (78 kg)    SpO2 99%    BMI 25.47 kg/m2     ECOG PS: 1  General: No distress  Eyes: PERRLA, anicteric sclerae  HENT: Atraumatic, OP clear  Neck: Supple  Lymphatic: No cervical, supraclavicular, or inguinal adenopathy  Respiratory: CTAB, normal respiratory effort  CV: Normal rate, regular rhythm, no murmurs, bilateral 1+ LE edema  GI: Soft, nontender, nondistended, no masses, no hepatomegaly, no splenomegaly  MS: Normal gait and station. Digits without clubbing or cyanosis. Skin: No rashes, ecchymoses, or petechiae. Normal temperature, turgor, and texture.   Psych: Alert, oriented, appropriate affect, normal judgment/insight    Results:     Lab Results   Component Value Date/Time    WBC 7.2 09/16/2017 01:01 AM    HGB 8.5 09/16/2017 01:01 AM    HCT 26.3 09/16/2017 01:01 AM    PLATELET 118 09/16/2017 01:01 AM    MCV 87.1 09/16/2017 01:01 AM    ABS. NEUTROPHILS 5.8 09/15/2017 07:39 PM     Lab Results   Component Value Date/Time    Sodium 134 09/16/2017 01:01 AM    Potassium 3.8 09/16/2017 01:01 AM    Chloride 102 09/16/2017 01:01 AM    CO2 27 09/16/2017 01:01 AM    Glucose 160 09/16/2017 01:01 AM    BUN 13 09/16/2017 01:01 AM    Creatinine 0.66 09/16/2017 01:01 AM    GFR est AA >60 09/16/2017 01:01 AM    GFR est non-AA >60 09/16/2017 01:01 AM    Calcium 7.8 09/16/2017 01:01 AM     Lab Results   Component Value Date/Time    Bilirubin, total 0.2 09/16/2017 01:01 AM    ALT (SGPT) 90 09/16/2017 01:01 AM    AST (SGOT) 54 09/16/2017 01:01 AM    Alk. phosphatase 66 09/16/2017 01:01 AM    Protein, total 5.8 09/16/2017 01:01 AM    Albumin 2.3 09/16/2017 01:01 AM    Globulin 3.5 09/16/2017 01:01 AM     CTA 9/14/17  IMPRESSION  IMPRESSION:   1. No pulmonary embolus. 2. Bilateral lung masses and mediastinal nodes decreased in size. 3. Atherosclerosis with coronary artery calcifications. Dopplers     Right Leg:-  Deep venous thrombosis:           Yes  Proximal extent of thrombus:      Posterior Tibial  Superficial venous thrombosis:    Yes  Deep venous insufficiency:        No  Superficial venous insufficiency: No     Left Leg:-  Deep venous thrombosis:           Yes  Proximal extent of thrombus:      Posterior Tibial  Superficial venous thrombosis:    Yes  Deep venous insufficiency:        No  Superficial venous insufficiency: No    Assessment and plan   1) Metastatic non-small cell lung cancer (squamous cell)  EGFR, ALK, ROS1, BRAF negative  He has disease within his chest and brain. His cancer is not curable and management is with palliative intent. He is s/p gamma knife to CNS disease. He is currently receiving palliative chemotherapy with carboplatin and gemcitabine given every 3 weeks with plans for 4 cycles, potentially followed by maintenance chemotherapy.       Received C2D1 on 9/15/17, day 8 planned 9/22/17    2) Brain metastasis  S/P gamma knife. On dexamethasone per Dr. Britta Palomino. Will defer reimaging to Dr. Ramon Aguirre to daughter and she confirmed that currently is on 2 mg TID of decadron. Order added    3) Symptomatic R chronic LE DVT and acute b/l superficial vein thrombosis  Reviewed dopplers with the patient  Given underlying incurable malignancy- Would discharge when stable on Lovenox 80 mg BID or 120 mg daily. Once a day dosing may be convenient if daughter needs to administer the shots. Will continue indefinitely  [facility], his daughter and discussed plan    4) Lower extremity edema  Secondary to # 3, Improving    5) Hypotension  Improved after IVF    6) Altered mental status  Resolved at present. Unclear etiology. Seen by neurosurgery?  Nicotine withdrawal per neurosurgery          Signed By: Sheldon Dan MD     September 16, 2017

## 2017-09-16 NOTE — PROGRESS NOTES
[facility]: MED RECONCILIATION    Comments/Recommendations:    Per daughter, dexamethasone started 8/15/17 and is now being tapered off. She does not recall the exact taper instructions or dose. She will check paperwork at home. Heme/onc office note from today states \"he has a few days left\"   Per patient and daughter, patient was previously taking aspirin 325 mg, but was told to decrease to aspirin 81 mg. He stopped the 325 mg a few days ago but has not started the 81 mg yet    Medications added:   · chemotherapy    Medications adjusted:  · Miralax to daily as needed for constipation    Information obtained from: Patient, daughter, and current electronic medical records    Chief Complaint for this Admission:   Chief Complaint   Patient presents with    Rapid Heart Rate    Leg Pain     Allergies: Lisinopril; Hydrochlorothiazide; and Sulfa (sulfonamide antibiotics)    Prior to Admission Medications:   Prior to Admission Medications   Prescriptions Last Dose Informant Patient Reported? Taking? DEXAMETHASONE (DECADRON PO) 9/15/2017 at AM Self Yes Yes   Sig: Take  by mouth. Tapering   OTHER 9/15/2017 at Unknown time Self Yes Yes   Sig: Chemotherapy: carboplatin + gemcitabine   amLODIPine (NORVASC) 5 mg tablet 9/15/2017 at AM Self No Yes   Sig: TAKE ONE TABLET BY MOUTH EVERY DAY   aspirin delayed-release 81 mg tablet Not started yet Self Yes Yes   Sig: Take 81 mg by mouth daily. lidocaine-prilocaine (EMLA) topical cream  Self No Yes   Sig: Apply  to affected area as needed for Pain (Apply 30-60 min. prior to having your port accessed). magic mouthwash solution 9/15/2017 at Unknown time Self No Yes   Sig: Swish and spit 15-30 mL every 2-4 hours as needed for mouth pain. May swallow for throat pain. Magic mouth wash   Maalox  Lidocaine 2% viscous   Diphenhydramine oral solution     Pharmacy to mix equal portions of ingredients to a total volume as indicated in the dispense amount.    nicotine (NICODERM CQ) 14 mg/24 hr patch Not started yet Self No Yes   Si Patch by TransDERmal route every twenty-four (24) hours. ondansetron hcl (ZOFRAN) 8 mg tablet Has not needed Self No Yes   Sig: Take 1 Tab by mouth every eight (8) hours as needed for Nausea. polyethylene glycol (MIRALAX) 17 gram packet  Self Yes Yes   Sig: Take 17 g by mouth daily as needed (constipation). prochlorperazine (COMPAZINE) 10 mg tablet Has not needed Self No Yes   Sig: Take 1 Tab by mouth every six (6) hours as needed for Nausea.         Thank you,  Kaylynn Thompson, PharmD, [facility]

## 2017-09-16 NOTE — PROGRESS NOTES
Physical Therapy:    Consult received and chart reviewed. Spoke to Nursing who reported that pt has bilateral DVTs and recommend that pt not be seen or moved today. Will continue to follow pt and check for new orders to mobilize pt before therapy can complete functional mobility evaluation.   Thank you for this request.

## 2017-09-17 VITALS
SYSTOLIC BLOOD PRESSURE: 147 MMHG | WEIGHT: 167.99 LBS | HEART RATE: 83 BPM | TEMPERATURE: 98.7 F | OXYGEN SATURATION: 93 % | BODY MASS INDEX: 24.88 KG/M2 | DIASTOLIC BLOOD PRESSURE: 81 MMHG | RESPIRATION RATE: 20 BRPM

## 2017-09-17 PROBLEM — I50.30 (HFPEF) HEART FAILURE WITH PRESERVED EJECTION FRACTION (HCC): Chronic | Status: ACTIVE | Noted: 2017-09-17

## 2017-09-17 LAB
ALBUMIN SERPL-MCNC: 2.3 G/DL (ref 3.5–5)
ALBUMIN/GLOB SERPL: 0.7 {RATIO} (ref 1.1–2.2)
ALP SERPL-CCNC: 62 U/L (ref 45–117)
ALT SERPL-CCNC: 88 U/L (ref 12–78)
ANION GAP SERPL CALC-SCNC: 8 MMOL/L (ref 5–15)
AST SERPL-CCNC: 36 U/L (ref 15–37)
BILIRUB SERPL-MCNC: 0.2 MG/DL (ref 0.2–1)
BUN SERPL-MCNC: 11 MG/DL (ref 6–20)
BUN/CREAT SERPL: 17 (ref 12–20)
CALCIUM SERPL-MCNC: 8.3 MG/DL (ref 8.5–10.1)
CHLORIDE SERPL-SCNC: 103 MMOL/L (ref 97–108)
CO2 SERPL-SCNC: 24 MMOL/L (ref 21–32)
CREAT SERPL-MCNC: 0.65 MG/DL (ref 0.7–1.3)
ERYTHROCYTE [DISTWIDTH] IN BLOOD BY AUTOMATED COUNT: 18.5 % (ref 11.5–14.5)
GLOBULIN SER CALC-MCNC: 3.5 G/DL (ref 2–4)
GLUCOSE SERPL-MCNC: 168 MG/DL (ref 65–100)
HCT VFR BLD AUTO: 26.6 % (ref 36.6–50.3)
HGB BLD-MCNC: 8.5 G/DL (ref 12.1–17)
MCH RBC QN AUTO: 28.1 PG (ref 26–34)
MCHC RBC AUTO-ENTMCNC: 32 G/DL (ref 30–36.5)
MCV RBC AUTO: 87.8 FL (ref 80–99)
PLATELET # BLD AUTO: 418 K/UL (ref 150–400)
POTASSIUM SERPL-SCNC: 4.5 MMOL/L (ref 3.5–5.1)
PROT SERPL-MCNC: 5.8 G/DL (ref 6.4–8.2)
RBC # BLD AUTO: 3.03 M/UL (ref 4.1–5.7)
SODIUM SERPL-SCNC: 135 MMOL/L (ref 136–145)
WBC # BLD AUTO: 6.2 K/UL (ref 4.1–11.1)

## 2017-09-17 PROCEDURE — 74011250637 HC RX REV CODE- 250/637: Performed by: FAMILY MEDICINE

## 2017-09-17 PROCEDURE — 36415 COLL VENOUS BLD VENIPUNCTURE: CPT | Performed by: FAMILY MEDICINE

## 2017-09-17 PROCEDURE — 74011250636 HC RX REV CODE- 250/636: Performed by: INTERNAL MEDICINE

## 2017-09-17 PROCEDURE — 85027 COMPLETE CBC AUTOMATED: CPT | Performed by: FAMILY MEDICINE

## 2017-09-17 PROCEDURE — 80053 COMPREHEN METABOLIC PANEL: CPT | Performed by: FAMILY MEDICINE

## 2017-09-17 PROCEDURE — 74011250636 HC RX REV CODE- 250/636: Performed by: FAMILY MEDICINE

## 2017-09-17 PROCEDURE — 74011250636 HC RX REV CODE- 250/636: Performed by: STUDENT IN AN ORGANIZED HEALTH CARE EDUCATION/TRAINING PROGRAM

## 2017-09-17 PROCEDURE — 74011250637 HC RX REV CODE- 250/637: Performed by: HOSPITALIST

## 2017-09-17 RX ORDER — ENOXAPARIN SODIUM 100 MG/ML
80 INJECTION SUBCUTANEOUS ONCE
Status: DISCONTINUED | OUTPATIENT
Start: 2017-09-17 | End: 2017-09-17

## 2017-09-17 RX ORDER — DILTIAZEM HYDROCHLORIDE 180 MG/1
180 CAPSULE, COATED, EXTENDED RELEASE ORAL DAILY
Qty: 30 CAP | Refills: 0 | Status: SHIPPED | OUTPATIENT
Start: 2017-09-17 | End: 2017-09-27

## 2017-09-17 RX ORDER — ENOXAPARIN SODIUM 150 MG/ML
120 INJECTION SUBCUTANEOUS DAILY
Qty: 24 ML | Refills: 5 | Status: SHIPPED | OUTPATIENT
Start: 2017-09-17 | End: 2017-10-27

## 2017-09-17 RX ORDER — ENOXAPARIN SODIUM 100 MG/ML
40 INJECTION SUBCUTANEOUS ONCE
Status: COMPLETED | OUTPATIENT
Start: 2017-09-17 | End: 2017-09-17

## 2017-09-17 RX ORDER — ENOXAPARIN SODIUM 100 MG/ML
120 INJECTION SUBCUTANEOUS ONCE
Status: DISCONTINUED | OUTPATIENT
Start: 2017-09-17 | End: 2017-09-17

## 2017-09-17 RX ADMIN — Medication 10 ML: at 06:00

## 2017-09-17 RX ADMIN — ENOXAPARIN SODIUM 40 MG: 80 INJECTION SUBCUTANEOUS at 16:24

## 2017-09-17 RX ADMIN — ENOXAPARIN SODIUM 80 MG: 80 INJECTION SUBCUTANEOUS at 10:27

## 2017-09-17 RX ADMIN — DILTIAZEM HYDROCHLORIDE 180 MG: 180 CAPSULE, COATED, EXTENDED RELEASE ORAL at 10:26

## 2017-09-17 RX ADMIN — ANTACID TABLETS 400 MG: 500 TABLET, CHEWABLE ORAL at 14:48

## 2017-09-17 RX ADMIN — DEXAMETHASONE 2 MG: 4 TABLET ORAL at 10:26

## 2017-09-17 NOTE — PROGRESS NOTES
I discussed pt with [facility] who is checking regarding pt.'s insurance and will get back with me . [facility] is not able to accept pt. as they do not accept so I will try other home health agencies.   Nikita Solomon

## 2017-09-17 NOTE — DISCHARGE INSTRUCTIONS
HOME DISCHARGE INSTRUCTIONS    Chuckie Ford / [id number] : 1945    Admission date: 9/15/2017 Discharge date: 2017     Please bring this form with you to show your care provider at your follow-up appointment. Primary care provider:  Alfredo Wyman MD    Discharging provider:  Margareth Weir MD  - Family Medicine Resident  mAari Conde MD - Attending, Family Medicine     You have been admitted to the hospital with the following diagnoses:    ACUTE DIAGNOSES:  · Acute bilateral deep vein thrombosis (DVT) of popliteal veins (HCC)  · Deep vein thrombosis (DVT) of tibial vein of both lower extremities ([facility] [address]  . . . . . . . . . . . . . . . . . . . . . . . . . . . . . . . . . . . . . . . . . . . . . . . . . . . . . . . . . . . . . . . . . . . . . . . Lan Mcmanus FOLLOW-UP CARE RECOMMENDATIONS:    Medication changes: Take the lovenox daily, home nursing or the pharmacy can show you or your daughter how to administer it. It is ready to be picked up at the [facility] located at [address] [place] [address]. Also, don't take your Amlodipine anymore, now you'll be taking Diltiazem instead. Appointments: Listed on page 2 of these documents. Follow-up tests needed: none at this time, do follow up with your PCP for further lovenox prescriptions (they have to work on a prior [facility]) and Dr. Geraldo Riley. Pending test results: At the time of your discharge the following test results are still pending: none. Please make sure you review these results with your outpatient follow-up provider(s). Specific symptoms to watch for: chest pain, shortness of breath, fever, chills, nausea, vomiting, diarrhea, change in mentation, falling, weakness, bleeding. DIET/what to eat: Regular Diet    ACTIVITY:  Activity as tolerated    What to do if new or unexpected symptoms occur?     If you experience any of the above symptoms (or should other concerns or questions arise after discharge) please call your primary care physician. Return to the emergency room if you cannot get hold of your doctor. · It is very important that you keep your follow-up appointment(s). · Please bring discharge papers, medication list (and/or medication bottles) to your follow-up appointments for review by your outpatient provider(s). · Please check the list of medications and be sure it includes every medication (even non-prescription medications) that your provider wants you to take. · It is important that you take the medication exactly as they are prescribed. · Keep your medication in the bottles provided by the pharmacist and keep a list of the medication names, dosages, and times to be taken in your wallet. · Do not take other medications without consulting your doctor. · If you have any questions about your medications or other instructions, please talk to your nurse or care provider before you leave the hospital.     Information obtained by:     I understand that if any problems occur once I am at home I am to contact my physician. These instructions were explained to me and I had the opportunity to ask questions. I understand and acknowledge receipt of the instructions indicated above.                                                                                                                                                Physician's or R.N.'s Signature                                                                  Date/Time                                                                                                                                              Patient or Representative Signature                                                          Date/Time

## 2017-09-17 NOTE — PROGRESS NOTES
[address] [address]   Office [phone]  Fax [phone]          Assessment and Plan     Alma Gunn is a 67 y.o. male admitted for B/L LE DVT. He has spent 2 night(s) in the hospital.    24 Hour Events: No acute events. Bilateral DVTs - no prior hx of VTE, likely 2/2 hypercoagulable state of malignancy. DVTs seen on LE duplex 9/15 (bilateral posterior tibial DVTs). CTA neg for PE. No CP, SOB, or hypoxia. Tachycardia resolved. No palpable cords on LE.   - Lovenox 80mg BID, confirmed by Heme/Onc.  - Will call pharmacy today to see if they can get it in.  - Pt requesting [place] RN for administration. Stage IV squamous cell lung cancer - Dx'd 7/2017, noncurative, on palliative chemo, s/p gamma knife for brain mets. Being followed by Dr. Nicole Mcmanus, last seen 9/15. Diagnosis made 7/2017. Palliative chemotherapy w carboplatin and gemcitabine q3 weeks starting 8/25/17. Following Dr. Ethan Cheek (neurosurg) for brain mets  - bilateral wheezing continues on PE likely 2/2 lung cancer, will continue to monitor. Pt asymptomatic.   - continued home dexamethasone PO for brain mets (pharmacy to substitute)  - heme/onc following.      New onset Afib with RVR - POA HR 150s, pt asymptomatic. Pt got cardizem, converted back to sinus with 1L NS and 5 mg lopressor.   - Seen by cardiology, pt now on Cardizem  mg / day. - POA EKG Aflutter  - resolved, back to NSR since yesterday. - ECHO showed EF 45-50%, hypokinesis of basal-mid inferior walls, grade 3 diastolic dysfunction.   - TSH 0.61 (wnl)  - Troponins stable on the 0.08-0.09 range.       Hyponatremia  - POA Na 133, likely related to metastatic lung cancer.  - This AM, NA is 135, will monitor while pt admitted.      Anemia - POA Hgb 9.3. Iron studies 5/2016: Iron 55, TIBC 255, % sat 22, ferritin 780. Baseline Hgb was 14 prior to diagnosis but recently has been around 10.  Likely anemia of chronic disease.  - FOBT Negative  - Will continue to monitor      Hx of tobacco abuse - Pt smoked 1ppd for 45 years but quit at cancer diagosis at the end of July. Pt stated he had confusion 2 days ago, saw Neurosurgery, who stated it may be due to nicotine withdrawal. Saw heme/onc today and Dr. Genna Dickens was going to send Rx for nicotine patches at patient request  - Continue nicotine patch 7mcg PRN per patient request      Hx of alcohol abuse - last drink end of July, reports drinking a fifth of liquor over a week or so. Denies current abuse.   - CIWAs remain at 0.      Hypertension   - BP on admission 111/77. Had brief BP of 89/52 shortly after admitted but has been normotensive since. - Discontinued home amlodipine.  - On discharge, pt will continued be controlled with Cardizem IR 2/2 the afib with RVR. (primarily for rate, but secondarily will keep BP down)    HFpEF: New diagnosis seen with grade 3 diastolic disfunction on echo. Pt can f/u with cardiology / PCP outpatient. FEN/GI - Regular diet for patient comfort (pt has terminal Ca). Activity - Ambulate as tolerated  DVT prophylaxis - Lovenox  GI prophylaxis - Not indicated at this time  Fall prophylaxis - Not indicated at this time. Unit - Medical  Admission Status - Admitted  Disposition - Plan to d/c to Home. Code Status - Full    I appreciate the opportunity to participate in the care of this patient,  Lauren Godwin MD  [facility] Medicine Resident         Subjective / Objective     Subjective:  Symptoms:  Improved. No shortness of breath or cough. Diet:  Adequate intake. No nausea or vomiting. Activity level: Normal.    Pain:  He reports no pain. Temp (24hrs), Av.1 °F (36.7 °C), Min:97.6 °F (36.4 °C), Max:98.6 °F (37 °C)     Objective:  General Appearance:  Comfortable, ill-appearing, in no acute distress and not in pain. Vital signs: (most recent): Blood pressure 147/81, pulse 83, temperature 98.7 °F (37.1 °C), resp. rate 20, weight 172 lb (78 kg), SpO2 93 %.   Vital signs are normal.    Lungs: There are wheezes. (Chronic wheezes)  Heart: Normal rate. Regular rhythm. S1 normal and S2 normal.  Positive for murmur (mild 2/6 systolic flow murmur). Extremities: There is dependent edema (1+ pitting edema at ankles b/l). Neurological: Patient is alert. Skin:  Warm and dry. No ulceration. Abdomen: Abdomen is soft. Bowel sounds are normal.   There is no abdominal tenderness. Respiratory: O2 Device: Room air     I/O:    Date 09/16/17 0700 - 09/17/17 0659 09/17/17 0700 - 09/18/17 0659   Shift 4418-6753 0376-6225 24 Hour Total 0362-3711 0770-9398 24 Hour Total   I  N  T  A  K  E   P.O. 1050  1050         P. O. 1050  1050       Shift Total  (mL/kg) 1050  (13.5)  1050  (13.5)      O  U  T  P  U  T   Urine  (mL/kg/hr) 150  (0.2) 1220 1370         Urine Voided 150 1220 1370       Shift Total  (mL/kg) 150  (1.9) 1220  (15.6) 1370  (17.6)       -1220 -320      Weight (kg) 78 78 78 78 78 78       CBC:  Recent Labs      09/17/17   0502  09/16/17   0101  09/15/17   1939   WBC  6.2  7.2  7.0   HGB  8.5*  8.5*  9.3*   HCT  26.6*  26.3*  29.0*   PLT  418*  357  929*       Metabolic Panel:  Recent Labs      09/17/17   0502  09/16/17   0101  09/15/17   1939   NA  135*  134*  133*   K  4.5  3.8  4.1   CL  103  102  99   CO2  24  27  27   BUN  11  13  15   CREA  0.65*  0.66*  0.78   GLU  168*  160*  149*   CA  8.3*  7.8*  8.5   MG   --    --   1.9   PHOS   --    --   4.1   ALB  2.3*  2.3*  2.6*   SGOT  36  54*  65*   ALT  88*  90*  99*          For Billing    Chief Complaint   Patient presents with    Rapid Heart Rate    Leg Pain       Hospital Problems  Date Reviewed: 9/16/2017          Codes Class Noted POA    Squamous cell carcinoma of lung, stage IV (HCC) ICD-10-CM: C34.90  ICD-9-CM: 162.9  9/16/2017 Yes        Atrial fibrillation with rapid ventricular response (HCC) ICD-10-CM: I48.91  ICD-9-CM: 427.31  9/16/2017 Yes        * (Principal)Deep vein thrombosis (DVT) of tibial vein of both lower extremities ([facility] ICD-10-CM: O92.925  ICD-9-CM: 453.42  9/15/2017 Yes

## 2017-09-17 NOTE — PROGRESS NOTES
Home Care Face to Face Encounter    Patients Name: Nenita Monreal    YOB: 1945    Primary Diagnosis: Bilateral DVTs    Date of Face to Face:   9/17/2017                                  Face to Face Encounter findings are related to primary reason for home care:   yes. 1. I certify that the patient needs intermittent care as follows: skilled nursing care:  teaching/training of lovenox injections, skilled observation/assessment, patient education and administration of medications    2. I certify that this patient is homebound, that is: 1) patient requires the use of a cane device, special transportation, or assistance of another to leave the home; or 2) patient's condition makes leaving the home medically contraindicated; and 3) patient has a normal inability to leave the home and leaving the home requires considerable and taxing effort. Patient may leave the home for infrequent and short duration for medical reasons, and occasional absences for non-medical reasons. Homebound status is due to the following functional limitations: Patient with strength deficits limiting the performance of all ADL's without caregiver assistance or the use of an assistive device. Patient with poor safety awareness and is at risk for falls without assistance of another person and the use of an assistive device. Patient with poor ambulation endurance limiting their safe ability to ascend/descend the required number of steps to leave the home. Patient with increased shortness of breath with all exertional activity limiting ambulation outside the home. Patient with strength deficits limiting the ability to carry portable O2 for distances outside the home without the assistance of a caregiver.     3. I certify that this patient is under my care and that I, or a nurse practitioner or [address] [place] [address], or clinical nurse specialist, or certified nurse midwife, working with me, had a Face-to-Face Encounter that meets the physician Face-to-Face Encounter requirements. The following are the clinical findings from the [address] [place] [address] encounter that support the need for skilled services and is a summary of the encounter:   Dyspneic with exertion. Bilateral DVT with new onset Atrial Fibrillation and needs to start life-long lovenox therapy. Has Stage IV Sqamous Cell Lung Cancer. Will needs assistance from family to give injections.     See discharge summary      Sherrill Panchal MD  9/17/2017

## 2017-09-17 NOTE — PROGRESS NOTES
Physical Therapy:  Chart reviewed. New orders to mobilize patient secondary to b/l DVTs is required.  Will mobilize for PT evaluation upon medical stabilization with new PT consult orders.      Thank you,  Shirley Davenport , PT [place]

## 2017-09-17 NOTE — PROGRESS NOTES
I tried to contact Felecia @ [facility] as she has not called me back regarding pt . Hoping she will call back soon.   Jarred Rosen

## 2017-09-17 NOTE — PROGRESS NOTES
[facility] in [address] is very interested in pt and will confirm with me if they accept pt.'s insurance soon. I faxed clinicals with home health prder to [phone].   [facility]

## 2017-09-17 NOTE — PROGRESS NOTES
Bedside and Verbal shift change report given to Titus Rudd RN (oncoming nurse) by Tono Gates (offgoing nurse). Report included the following information SBAR, Kardex, Procedure Summary, Intake/Output, MAR, Recent Results and Med Rec Status.

## 2017-09-17 NOTE — PROGRESS NOTES
[facility] does not service the [facility]. I have also sent clinicals to [facility]. [facility] [address] in [place]. The problem is there are not many home health agencies who accept pt.'s insurance. I am contacting pt.'s nurse to see how pt and his family are progressing with lovenox teaching before I notify Dr Lynette Thomas.   Vimal Guerra

## 2017-09-17 NOTE — PROGRESS NOTES
Bedside shift change report given to [address] [place] [address] Drive (oncoming nurse) by Keshav Infante RN (offgoing nurse). Report included the following information SBAR, Kardex, Intake/Output, MAR, Recent Results and Cardiac Rhythm NSR.

## 2017-09-17 NOTE — PROGRESS NOTES
Occupational Therapy      Chart reviewed. Do not see new orders to mobilize patient secondary to b/l DVTs. Will continue to follow and mobilize for OT evaluation after new orders are written.     Thank you,    Espir[employer]ion Mail, MOTR/L

## 2017-09-17 NOTE — PROGRESS NOTES
[facility] relooked @ pt.'s insurance and will accept pt for home health for SN for lovenox reinforcement/education. [facility] placed on AVS.I notified Celestine Samaniego ,pt.'s nurse. Luis Carrera    Addendum-I notified Dr Jonathan Villalta with [facility] and Sandra Neff nurse navigator regarding pt.'s discharge plan.   Luis Carrera

## 2017-09-17 NOTE — PROGRESS NOTES
Physician Face to Face     Patient requires home Lovenox teaching as he has bilateral lower extremity DVT's in setting of malignant squamous cell carcinoma with metastasis to the brain. Discussed the risks and benefits for the Lovenox with the patient and patient agrees.   [address]  Diagnosis: B/L LE DVTs.     Patria La MD  10:47 AM

## 2017-09-17 NOTE — PROGRESS NOTES
I contacted  [address] to see if they can accept pt for home skilled nursing for home lovenox instruction. [phone]). Waiting to hear back. Pt has a medicare dual anthem product that is difficult to find someone who accepts his insurance in the outlying areas. Sun Acharya,RN    I am also trying [facility]. Maria C Rothman

## 2017-09-17 NOTE — DISCHARGE SUMMARY
[address] [facility] [address] [place] [address]   Office [phone]  Fax [phone]       Discharge / Transfer / Off-Service Note     Name: Catia Lima MRN: [id number]  Sex: Male   YOB: 1945  Age: 67 y.o. PCP: Xiomy Carrera MD     Date of admission: 9/15/2017  Date of discharge/transfer: 9/17/2017    Attending physician at admission: Dr. Shagufta Muñoz  Attending physician at discharge/transfer: Dr. Shagufta Muñoz  Resident physician at discharge/transfer: Lauren Godwin MD     Consultants during hospitalization  Dr. Denise Almonte (Cardiology)  Dr. Bentley Holloway (Heme/Onc)     Admission diagnoses   Acute bilateral deep vein thrombosis (DVT) of popliteal veins ([address]  Deep vein thrombosis (DVT) of tibial vein of both lower extremities ([address]    Recommended follow-up after discharge  · Heme onc for continued Ca treatments. · PCP to follow DVD resolution and prior auth for continued Lovenox. History of Present Illness    Jocelyn Miles is a 67 y.o. 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       Patient went to heme/onc today for chemo, mentioned that he has had bilateral leg swelling.  ordered a LE duplex and pt was found to have bilateral DVTs. Pt reports pain to be bilateral, worse around his ankles, 10/10 at its worst, burning sensation, but currently 0/10. Tried compression stockings but there was no change. Pt also reports being hypotensive yesterday with BP to 80s/50s and \"feeling drunk. \" Daughter also states he had some minor confusion at that time, which has now resolved. Pt denies any CP, diarrhea, SOB, GERD, abdominal pain, N/V, HA, dizziness, vision changes, heart palpitations. Last BM was this morning, was normal. Denies diarrhea. Denies blood in stool, dark tarry stools.      In the ED, pt's HR was 154 and EKG showed Aflutter. /77, RR 18, 98% on RA.  Not hypoxic. CTA was negative for PE, CXR showed left hilar mass (patient's cancer). WBC count 7.0, Hgb 9.3, platelets 780. Hyponatremic at 133, Cr 0.78. Troponin was 0.08. Pt was given 20mg cardizem IV and placed on cardizem drip, BPs continue to remain in 150s. He was given NS 1L bolus and 80mg lovenix for the DVTs. Hospital course    Bilateral DVTs - no prior hx of VTE, likely 2/2 hypercoagulable state of malignancy. DVTs seen on LE duplex 9/15 (bilateral posterior tibial DVTs). CTA neg for PE. No CP, SOB, or hypoxia. Tachycardia resolved. No palpable cords on LE.   - Lovenox 80mg BID, confirmed by Heme/Onc.  -  RN for training on administration as well as supervision - also had nurses in hospital show him.      Stage IV squamous cell lung cancer - Dx'd 7/2017, noncurative, on palliative chemo, s/p gamma knife for brain mets. Being followed by Dr. Marquis Ann, last seen 9/15. Diagnosis made 7/2017. Palliative chemotherapy w carboplatin and gemcitabine q3 weeks starting 8/25/17. Following Dr. Justin Riley (neurosurg) for brain mets  - Pt to continue following up with heme/onc.      New onset Afib with RVR - POA HR 150s, pt asymptomatic. Pt got cardizem, converted back to sinus with 1L NS and 5 mg lopressor.   - Seen by cardiology, pt now on Cardizem  mg / day. - Pt anticoagulated with Lovenox. - Pt remained in sinus through discharge. - POA EKG Aflutter  - resolved, back to NSR since yesterday. - ECHO showed EF 45-50%, hypokinesis of basal-mid inferior walls, grade 3 diastolic dysfunction.   - TSH 0.61 (wnl)  - Troponins stable on the 0.08-0.09 range.       Hyponatremia  - POA Na 133, likely related to metastatic lung cancer.  - Was stable during admission, discharged with Na at 135. Anemia - POA Hgb 9.3. Iron studies 5/2016: Iron 55, TIBC 255, % sat 22, ferritin 780.  Baseline Hgb was 14 prior to diagnosis but recently has been around 10.   - FOBT Negative  - Likely anemia of chronic disease.      Hx of tobacco abuse - Pt smoked 1ppd for 45 years but [facility]. Pt stated he had confusion 2 days ago, saw Neurosurgery, who stated it may be due to nicotine withdrawal. Dr. Tasha Neri was going to send Rx for nicotine patches at patient request  - Pt to continue nicotine patches, OTC. Hx of alcohol abuse - last drink end of July, reports drinking a fifth of liquor over a week or so. Denies current abuse.   - CIWAs were 0 for duration of admission.      Hypertension   - Pt fairly normotensive during admission, had brief hypotension during initial stay that quickly resolved. - Discontinued home amlodipine as pt will be now on Cardizem IR 2/2 the afib with RVR. (primarily for rate, but secondarily will keep BP down)     HFpEF: New diagnosis seen with grade 3 diastolic disfunction on echo. Pt can f/u with cardiology / PCP outpatient. Physical exam at discharge:    General Appearance:  Comfortable, ill-appearing, in no acute distress and not in pain. Vital signs: (most recent): Blood pressure 147/81, pulse 83, temperature 98.7 °F (37.1 °C), resp. rate 20, weight 172 lb (78 kg), SpO2 93 %. Vital signs are normal.  Lungs: There are wheezes. (Chronic wheezes)  Heart: Normal rate. Regular rhythm. S1 normal and S2 normal.  Positive for murmur (mild 2/6 systolic flow murmur). Extremities: There is dependent edema (1+ pitting edema at ankles b/l). Neurological: Patient is alert. Skin:  Warm and dry. No ulceration. Abdomen: Abdomen is soft. Bowel sounds are normal.   There is no abdominal tenderness. Condition at discharge: Stable.     Labs  Recent Labs      09/17/17   0502  09/16/17   0101  09/15/17   1939   WBC  6.2  7.2  7.0   HGB  8.5*  8.5*  9.3*   HCT  26.6*  26.3*  29.0*   PLT  418*  357  402*     Recent Labs      09/17/17   0502  09/16/17   0101  09/15/17   1939   NA  135*  134*  133*   K  4.5  3.8  4.1   CL  103  102  99   CO2  24  27  27   BUN  11  13  15 CREA  0.65*  0.66*  0.78   GLU  168*  160*  149*   CA  8.3*  7.8*  8.5   MG   --    --   1.9   PHOS   --    --   4.1     Recent Labs      09/17/17   0502  09/16/17   0101  09/15/17   1939   SGOT  36  54*  65*   ALT  88*  90*  99*   AP  62  66  74   TBILI  0.2  0.2  0.2   TP  5.8*  5.8*  6.5   ALB  2.3*  2.3*  2.6*   GLOB  3.5  3.5  3.9     Recent Labs      09/16/17   0634  09/16/17   0101  09/15/17   1939   TROIQ  0.09*  0.09*  0.08*     Cultures  · None. Procedures / Diagnostic Studies  · None. Imaging    Results from [place] encounter on 09/15/17   XR CHEST PORT   Narrative EXAM:  XR CHEST PORT. INDICATION: Chest pain. COMPARISON: 7/11/2017. FINDINGS:   A portable AP radiograph of the chest was obtained at 1931 hours. There is a  right jugular Port-A-Cath with tip over the superior vena cava. The patient is  rotated to the right. Lines and tubes: The patient is on a cardiac monitor. Lungs: The left perihilar mass has decreased in size. Pleura: There is no pneumothorax or pleural effusion. Mediastinum: The cardiac silhouette is borderline enlarged. The aorta is  atherosclerotic. Bones and soft tissues: The bones and soft tissues are grossly within normal  limits. Impression IMPRESSION: Decreased left hilar or perihilar mass. No other acute abnormality. Results from Hospital Encounter encounter on 09/15/17   CTA CHEST W OR W WO CONT   Narrative EXAM: CT ANGIOGRAPHY CHEST   INDICATION: Leg pain and tachycardia, now chest pain or shortness of breath. COMPARISON: 7/14/2017. CONTRAST: 80 mL of Isovue-370. TECHNIQUE:   Precontrast  images were obtained to localize the volume for acquisition. Multislice helical CT arteriography was performed from the diaphragm to the  thoracic inlet during uneventful rapid bolus intravenous contrast  administration. Lung and soft tissue windows were generated.  Coronal and  sagittal  reformatted images were also generated and 3D post processing  consisting of coronal maximum intensity projection images was performed. CT dose  reduction was achieved through use of a standardized protocol tailored for this  examination and automatic exposure control for dose modulation. FINDINGS:  LOWER NECK: The visualized portions of the thyroid and structures of the lower  neck are within normal limits. LUNGS: There is a lobulated and spiculated lingular lung mass which measures 4.3  x 4 x 3.8 cm and contains central areas of cavitation which has decreased in  size since the prior examination at which time it measured 4.9 x 4.3 x 5.3 cm. There is a 1.9 x 1.9 cm right middle lobe nodule (series 3 image 39) which has  decreased in size and is less solid than on the prior examination when it  measured 2.7 x 2.5 cm. PLEURA: PULMONARY ARTERIES: The pulmonary arteries are well enhanced and no  pulmonary emboli are identified. AORTA: The aorta enhances normally without evidence of aneurysm or dissection. There is coronary artery calcification. MEDIASTINUM: There is a large 5 x 4.2 x 5.3 cm left hilar mass which previously  measured 6.1 x 5.3 x 5.7 cm and encases the left main pulmonary artery. The  artery measures 4 mm in diameter and previously measured 2 mm. There is a 3.7 x  2.5 cm right paratracheal lymph node which previously measured 3.7 x 2.3 cm. BONES AND SOFT TISSUES: The bones and soft tissues of the chest wall are within  normal limits. UPPER ABDOMEN: The visualized portions of the upper abdominal organs are normal.         Impression IMPRESSION:   1. No pulmonary embolus. 2. Bilateral lung masses and mediastinal nodes decreased in size. 3. Atherosclerosis with coronary artery calcifications.                  Chronic diagnoses   Problem List as of 9/17/2017  Date Reviewed: 9/16/2017          Codes Class Noted - Resolved    (HFpEF) heart failure with preserved ejection fraction (HCC) (Chronic) ICD-10-CM: I50.30  ICD-9-CM: 428.9  9/17/2017 - Present        Squamous cell carcinoma of lung, stage IV (HCC) ICD-10-CM: C34.90  ICD-9-CM: 162.9  9/16/2017 - Present        Atrial fibrillation with rapid ventricular response (HCC) ICD-10-CM: I48.91  ICD-9-CM: 427.31  9/16/2017 - Present        * (Principal)Deep vein thrombosis (DVT) of tibial vein of both lower extremities (HCC) ICD-10-CM: F57.370  ICD-9-CM: 453.42  9/15/2017 - Present        Primary malignant neoplasm of left lung metastatic to other site [facility]) ICD-10-CM: C34.92  ICD-9-CM: 162.9  7/28/2017 - Present        Brain metastases ([facility] ICD-10-CM: C79.31  ICD-9-CM: 198.3  7/28/2017 - Present        Non-compliance ICD-10-CM: Z91.19  ICD-9-CM: V15.81  5/27/2016 - Present        Groin fullness ICD-10-CM: R19.00  ICD-9-CM: 789.30  3/5/2014 - Present        Benign hypertensive heart disease with HF (heart failure) (HCC) ICD-10-CM: I11.0, I50.9  ICD-9-CM: 402.11, 428.9  10/11/2011 - Present        Tobacco abuse ICD-10-CM: Z72.0  ICD-9-CM: 305.1  10/11/2011 - Present        Alcohol abuse ICD-10-CM: F10.10  ICD-9-CM: 305.00  10/11/2011 - Present        Cough ICD-10-CM: R05  ICD-9-CM: 786.2  4/4/2011 - Present        ED (erectile dysfunction) ICD-10-CM: N52.9  ICD-9-CM: 607.84  5/18/2010 - Present        Hyperlipidemia ICD-10-CM: E78.5  ICD-9-CM: 272.4  5/18/2010 - Present        RESOLVED: Pulmonary mass ICD-10-CM: R91.8  ICD-9-CM: 786.6  7/14/2017 - 7/28/2017        RESOLVED: Hypertension ICD-10-CM: I10  ICD-9-CM: 401.9  Unknown - 10/11/2011              Discharge/Transfer Medications  Current Discharge Medication List      START taking these medications    Details   enoxaparin (LOVENOX) 120 mg/0.8 mL injection 120 mg by SubCUTAneous route daily. Qty: 24 mL, Refills: 5      dilTIAZem CD (CARDIZEM CD) 180 mg ER capsule Take 1 Cap by mouth daily.  Indications: VENTRICULAR RATE CONTROL IN ATRIAL FIBRILLATION  Qty: 30 Cap, Refills: 0         CONTINUE these medications which have NOT CHANGED Details   nicotine (NICODERM CQ) 14 mg/24 hr patch 1 Patch by TransDERmal route every twenty-four (24) hours. Qty: 30 Patch, Refills: 1    Associated Diagnoses: Tobacco abuse      polyethylene glycol (MIRALAX) 17 gram packet Take 17 g by mouth daily as needed (constipation). OTHER Chemotherapy: carboplatin + gemcitabine      magic mouthwash solution Swish and spit 15-30 mL every 2-4 hours as needed for mouth pain. May swallow for throat pain. Magic mouth wash   Maalox  Lidocaine 2% viscous   Diphenhydramine oral solution     Pharmacy to mix equal portions of ingredients to a total volume as indicated in the dispense amount. Qty: 480 mL, Refills: 2    Associated Diagnoses: Primary malignant neoplasm of left lung metastatic to other site [facility]); Mouth sores      DEXAMETHASONE (DECADRON PO) Take  by mouth. Tapering      prochlorperazine (COMPAZINE) 10 mg tablet Take 1 Tab by mouth every six (6) hours as needed for Nausea. Qty: 50 Tab, Refills: 5    Associated Diagnoses: Primary malignant neoplasm of left lung metastatic to other site (HCC)      lidocaine-prilocaine (EMLA) topical cream Apply  to affected area as needed for Pain (Apply 30-60 min. prior to having your port accessed). Qty: 30 g, Refills: 0    Associated Diagnoses: Primary malignant neoplasm of left lung metastatic to other site (HCC)      ondansetron hcl (ZOFRAN) 8 mg tablet Take 1 Tab by mouth every eight (8) hours as needed for Nausea. Qty: 45 Tab, Refills: 5    Associated Diagnoses: Primary malignant neoplasm of left lung metastatic to other site [facility])      aspirin delayed-release 81 mg tablet Take 81 mg by mouth daily. STOP taking these medications       amLODIPine (NORVASC) 5 mg tablet Comments:   Reason for Stopping:                Diet:  Regular diet for comfort, given his cancer diagnosis.      Activity:  As tolerated    Disposition: Home or Self Care    Discharge instructions to patient/family  Please seek medical attention for any new or worsening symptoms particularly fever, chest pain, shortness of breath, abdominal pain, nausea, vomiting    Follow up plans/appointments  Follow-up Information     Follow up With Details Comments Derik Arnett MD Schedule an appointment as soon as possible for a visit in 2 days For followup.  Because it was Sunday, we were unable to make the follow up appointment for you. Fernandez Prince MD  Family Medicine Resident       For Billing    Chief Complaint   Patient presents with    Rapid Heart Rate    Leg Pain       Hospital Problems  Date Reviewed: 9/16/2017          Codes Class Noted POA    (HFpEF) heart failure with preserved ejection fraction (HCC) (Chronic) ICD-10-CM: I50.30  ICD-9-CM: 428.9  9/17/2017 Unknown        Squamous cell carcinoma of lung, stage IV ([facility] ICD-10-CM: C34.90  ICD-9-CM: 162.9  9/16/2017 Yes        Atrial fibrillation with rapid ventricular response ([facility] ICD-10-CM: I48.91  ICD-9-CM: 427.31  9/16/2017 Yes        * (Principal)Deep vein thrombosis (DVT) of tibial vein of both lower extremities ([facility] ICD-10-CM: P94.455  ICD-9-CM: 453.42  9/15/2017 Yes        Primary malignant neoplasm of left lung metastatic to other site [facility]) ICD-10-CM: C34.92  ICD-9-CM: 162.9  7/28/2017 Yes        Brain metastases ([facility] ICD-10-CM: C79.31  ICD-9-CM: 198.3  7/28/2017 Yes        Benign hypertensive heart disease with HF (heart failure) ([facility] ICD-10-CM: I11.0, I50.9  ICD-9-CM: 402.11, 428.9  10/11/2011 Yes        Tobacco abuse ICD-10-CM: Z72.0  ICD-9-CM: 305.1  10/11/2011 Yes

## 2017-09-17 NOTE — PROGRESS NOTES
I received notification that pt is discharging home today and will need home health RN for lovenox teaching/education. Order for home health with clinicals faxed to [facility] office in hopes of their agency accepti g pt for services. Once home health has been confirmed,I will write an addendum to this note and place the home health agency that confirms acceptance on pt.'s AVS.  Nursing,please provide instructions/education with pt and his family regarding lovenox prior to discharge. Thank you.   Clark Shaffer  [phone]

## 2017-09-17 NOTE — PROGRESS NOTES
I discussed pt with Jens Cates who will perform lovenox teaching with family when they arrive and will call me back @ [phone] to let me know if family is competent in giving home injections. In the meantime,I will continue to try to set up home health . Please see my previous note for details.   Abida Lockwood

## 2017-09-18 ENCOUNTER — TELEPHONE (OUTPATIENT)
Dept: ONCOLOGY | Age: 72
End: 2017-09-18

## 2017-09-18 ENCOUNTER — PATIENT OUTREACH (OUTPATIENT)
Dept: FAMILY MEDICINE CLINIC | Age: 72
End: 2017-09-18

## 2017-09-18 LAB
ATRIAL RATE: 267 BPM
ATRIAL RATE: 306 BPM
CALCULATED R AXIS, ECG10: -46 DEGREES
CALCULATED R AXIS, ECG10: -50 DEGREES
CALCULATED T AXIS, ECG11: 104 DEGREES
CALCULATED T AXIS, ECG11: 110 DEGREES
DIAGNOSIS, 93000: NORMAL
DIAGNOSIS, 93000: NORMAL
Q-T INTERVAL, ECG07: 308 MS
Q-T INTERVAL, ECG07: 312 MS
QRS DURATION, ECG06: 112 MS
QRS DURATION, ECG06: 116 MS
QTC CALCULATION (BEZET), ECG08: 465 MS
QTC CALCULATION (BEZET), ECG08: 491 MS
VENTRICULAR RATE, ECG03: 134 BPM
VENTRICULAR RATE, ECG03: 153 BPM

## 2017-09-18 RX ORDER — SODIUM CHLORIDE 9 MG/ML
25 INJECTION, SOLUTION INTRAVENOUS CONTINUOUS
Status: DISPENSED | OUTPATIENT
Start: 2017-09-22 | End: 2017-09-22

## 2017-09-18 RX ORDER — DEXAMETHASONE SODIUM PHOSPHATE 4 MG/ML
8 INJECTION, SOLUTION INTRA-ARTICULAR; INTRALESIONAL; INTRAMUSCULAR; INTRAVENOUS; SOFT TISSUE ONCE
Status: COMPLETED | OUTPATIENT
Start: 2017-09-22 | End: 2017-09-22

## 2017-09-18 NOTE — PROGRESS NOTES
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 NN spoke with patient who stated, \" I feel fine so far. \"   Patient denied any pain today. He is taking food and fluids without difficulty. Patient stated he is not performing daily weights but also stated , my weight never changes but to go down. Confirmed with patient's daughter that patient is consistent  with low sodium diet. Patient denied fever,chills, N/V/D or constipation. Please note functional assessment. NN unable to reconcile with patient however his daughter manages patient's medications and sets a pill box. Patient agrees to bring all medications to PCP appointment for reconciliation. Follow-up after discharge  · Heme onc for continued Ca treatments (9/22/2017). · PCP to follow DVD resolution and prior auth for continued Lovenox 9/19/2017. · [facility] in place (confirmed)   · Patient and his daughter will set appointment with NN for assistance with Advance Directive. · Discussed with daughter future need for Personal Care Aid. Patient denied recently for services. · Daughter called [facility] for services.  [facility] reviewed RED FLAGS with patient and he verbalized understanding when to seek immediate medical attention. Red Flags/SEPSIS: Fever,or below normal temperature, 97F,chills,Nausea,Vomiting,Diarrhea, unable to take medications,pain,SOB,chest pain or discomfort,confusion,unusual sensations,BFAST, feeling poorly after a period of improvement. Advance Directive discussed with patient and his daughter briefly. Patient stated he will complete during PCP visit. NN remains available to patient and family.     _____________________________________________  [facility] course: Dr. Luisa Russell     Bilateral DVTs - no prior hx of VTE, likely 2/2 hypercoagulable state of malignancy. DVTs seen on LE duplex 9/15 (bilateral posterior tibial DVTs). CTA neg for PE. No CP, SOB, or hypoxia. Tachycardia resolved. No palpable cords on LE.   - Lovenox 80mg BID, confirmed by Heme/Onc.  -  RN for training on administration as well as supervision - also had nurses in hospital show him.       Stage IV squamous cell lung cancer - Dx'd 7/2017, noncurative, on palliative chemo, s/p gamma knife for brain mets. Being followed by Dr. Leanna Benavidez, last seen 9/15. Diagnosis made 7/2017. Palliative chemotherapy w carboplatin and gemcitabine q3 weeks starting 8/25/17. Following Dr. Iwona Ballard (neurosurg) for brain mets  - Pt to continue following up with heme/onc.      New onset Afib with RVR - POA HR 150s, pt asymptomatic. Pt got cardizem, converted back to sinus with 1L NS and 5 mg lopressor.   - Seen by cardiology, pt now on Cardizem  mg / day. - Pt anticoagulated with Lovenox. - Pt remained in sinus through discharge. - POA EKG Aflutter  - resolved, back to NSR since yesterday. - ECHO showed EF 45-50%, hypokinesis of basal-mid inferior walls, grade 3 diastolic dysfunction.   - TSH 0.61 (wnl)  - Troponins stable on the 0.08-0.09 range.       Hyponatremia  - POA Na 133, likely related to metastatic lung cancer.  - Was stable during admission, discharged with Na at 135.     Anemia - POA Hgb 9.3. Iron studies 5/2016: Iron 55, TIBC 255, % sat 22, ferritin 780. Baseline Hgb was 14 prior to diagnosis but recently has been around 10.   - FOBT Negative  - Likely anemia of chronic disease.      Hx of tobacco abuse - Pt smoked 1ppd for 45 years but quit at cancer diagosis at the end of July.  Pt stated he had confusion 2 days ago, saw Neurosurgery, who stated it may be due to nicotine withdrawal. Dr. Leanna Benavidez was going to send Rx for nicotine patches at patient request  - Pt to continue nicotine patches, OTC.     Hx of alcohol abuse - last drink end of July, reports drinking a fifth of liquor over a week or so. Denies current abuse.   - CIWAs were 0 for duration of admission.      Hypertension   - Pt fairly normotensive during admission, had brief hypotension during initial stay that quickly resolved. - Discontinued home amlodipine as pt will be now on Cardizem IR 2/2 the afib with RVR. (primarily for rate, but secondarily will keep BP down)      HFpEF: New diagnosis seen with grade 3 diastolic disfunction on echo. Pt can f/u with cardiology / PCP outpatient.         Physical exam at discharge:     General Appearance:  Comfortable, ill-appearing, in no acute distress and not in pain.    Vital signs: (most recent): Blood pressure 147/81, pulse 83, temperature 98.7 °F (37.1 °C), resp. rate 20, weight 172 lb (78 kg), SpO2 93 %.  Vital signs are normal.  Lungs:  There are wheezes.  (Chronic wheezes)  Heart: Normal rate.  Regular rhythm.  S1 normal and S2 normal.  Positive for murmur (mild 2/6 systolic flow murmur).    Extremities: There is dependent edema (1+ pitting edema at ankles b/l).    Neurological: Patient is alert.    Skin:  Warm and dry.  No ulceration.    Abdomen: Abdomen is soft.  Bowel sounds are normal.   There is no abdominal tenderness.     Labs        Recent Labs       09/17/17   0502  09/16/17   0101  09/15/17   1939   WBC  6.2  7.2  7.0   HGB  8.5*  8.5*  9.3*   HCT  26.6*  26.3*  29.0*   PLT  418*  357  402*            Recent Labs       09/17/17   0502  09/16/17   0101  09/15/17   1939   NA  135*  134*  133*   K  4.5  3.8  4.1   CL  103  102  99   CO2  24  27  27   BUN  11  13  15   CREA  0.65*  0.66*  0.78   GLU  168*  160*  149*   CA  8.3*  7.8*  8.5   MG   --    --   1.9   PHOS   --    --   4.1            Recent Labs       09/17/17   0502  09/16/17   0101  09/15/17   1939   SGOT  36  54*  65*   ALT  88*  90*  99*   AP  62  66  74   TBILI  0.2  0.2  0.2   TP  5.8*  5.8*  6.5   ALB  2.3*  2.3*  2.6*   GLOB  3.5  3.5  3.9            Recent Labs     09/16/17   0634  09/16/17   0101  09/15/17   1939   TROIQ  0.09*  0.09*  0.08*   XR CHEST PORT    Narrative EXAM:  XR CHEST PORT. INDICATION: Chest pain. COMPARISON: 7/11/2017. FINDINGS:   A portable AP radiograph of the chest was obtained at 1931 hours. There is a  right jugular Port-A-Cath with tip over the superior vena cava. The patient is  rotated to the right. Lines and tubes: The patient is on a cardiac monitor. Lungs: The left perihilar mass has decreased in size. Pleura: There is no pneumothorax or pleural effusion. Mediastinum: The cardiac silhouette is borderline enlarged. The aorta is  atherosclerotic. Bones and soft tissues: The bones and soft tissues are grossly within normal  limits.           Impression IMPRESSION: Decreased left hilar or perihilar mass. No other acute abnormality.                    Results from Hospital Encounter encounter on 09/15/17   CTA CHEST W OR W WO CONT    Narrative EXAM: CT ANGIOGRAPHY CHEST   INDICATION: Leg pain and tachycardia, now chest pain or shortness of breath. COMPARISON: 7/14/2017. CONTRAST: 80 mL of Isovue-370. TECHNIQUE:   Precontrast  images were obtained to localize the volume for acquisition. Multislice helical CT arteriography was performed from the diaphragm to the  thoracic inlet during uneventful rapid bolus intravenous contrast  administration. Lung and soft tissue windows were generated. Coronal and  sagittal  reformatted images were also generated and 3D post processing  consisting of coronal maximum intensity projection images was performed. CT dose  reduction was achieved through use of a standardized protocol tailored for this  examination and automatic exposure control for dose modulation. FINDINGS:  LOWER NECK: The visualized portions of the thyroid and structures of the lower  neck are within normal limits.   LUNGS: There is a lobulated and spiculated lingular lung mass which measures 4.3  x 4 x 3.8 cm and contains central areas of cavitation which has decreased in  size since the prior examination at which time it measured 4.9 x 4.3 x 5.3 cm. There is a 1.9 x 1.9 cm right middle lobe nodule (series 3 image 39) which has  decreased in size and is less solid than on the prior examination when it  measured 2.7 x 2.5 cm. PLEURA: PULMONARY ARTERIES: The pulmonary arteries are well enhanced and no  pulmonary emboli are identified. AORTA: The aorta enhances normally without evidence of aneurysm or dissection. There is coronary artery calcification. MEDIASTINUM: There is a large 5 x 4.2 x 5.3 cm left hilar mass which previously  measured 6.1 x 5.3 x 5.7 cm and encases the left main pulmonary artery. The  artery measures 4 mm in diameter and previously measured 2 mm. There is a 3.7 x  2.5 cm right paratracheal lymph node which previously measured 3.7 x 2.3 cm. BONES AND SOFT TISSUES: The bones and soft tissues of the chest wall are within  normal limits. UPPER ABDOMEN: The visualized portions of the upper abdominal organs are normal.           Impression IMPRESSION:   1. No pulmonary embolus. 2. Bilateral lung masses and mediastinal nodes decreased in size. 3. Atherosclerosis with coronary artery calcifications. START taking these medications     Details   enoxaparin (LOVENOX) 120 mg/0.8 mL injection 120 mg by SubCUTAneous route daily. Qty: 24 mL, Refills: 5       dilTIAZem CD (CARDIZEM CD) 180 mg ER capsule Take 1 Cap by mouth daily. Indications: VENTRICULAR RATE CONTROL IN ATRIAL FIBRILLATION  Qty: 30 Cap, Refills: 0                 CONTINUE these medications which have NOT CHANGED     Details   nicotine (NICODERM CQ) 14 mg/24 hr patch 1 Patch by TransDERmal route every twenty-four (24) hours.   Qty: 30 Patch, Refills: 1     Associated Diagnoses: Tobacco abuse       polyethylene glycol (MIRALAX) 17 gram packet Take 17 g by mouth daily as needed (constipation).       OTHER Chemotherapy: carboplatin + gemcitabine     magic mouthwash solution Swish and spit 15-30 mL every 2-4 hours as needed for mouth pain. May swallow for throat pain.      Magic mouth wash   Maalox  Lidocaine 2% viscous   Diphenhydramine oral solution      Pharmacy to mix equal portions of ingredients to a total volume as indicated in the dispense amount. Qty: 480 mL, Refills: 2     Associated Diagnoses: Primary malignant neoplasm of left lung metastatic to other site [facility]); Mouth sores       DEXAMETHASONE (DECADRON PO) Take  by mouth. Tapering       prochlorperazine (COMPAZINE) 10 mg tablet Take 1 Tab by mouth every six (6) hours as needed for Nausea. Qty: 50 Tab, Refills: 5     Associated Diagnoses: Primary malignant neoplasm of left lung metastatic to other site (HCC)       lidocaine-prilocaine (EMLA) topical cream Apply  to affected area as needed for Pain (Apply 30-60 min. prior to having your port accessed). Qty: 30 g, Refills: 0     Associated Diagnoses: Primary malignant neoplasm of left lung metastatic to other site (HCC)       ondansetron hcl (ZOFRAN) 8 mg tablet Take 1 Tab by mouth every eight (8) hours as needed for Nausea.   Qty: 45 Tab, Refills: 5     Associated Diagnoses: Primary malignant neoplasm of left lung metastatic to other site [facility])       aspirin delayed-release 81 mg tablet Take 81 mg by mouth daily.                STOP taking these medications         amLODIPine (NORVASC) 5 mg tablet Comments:   Reason for Stopping:

## 2017-09-18 NOTE — TELEPHONE ENCOUNTER
[employer]  Medical Oncology at [address] [place] [address]    09/18/17- TCM call, patient's daughter stated patient is doing well since discharge from the hospital on 9/17. Stated his legs are still swollen, but heart rate has lowered. She confirmed that both lovenox and nicotine prescriptions were covered by insurance, but lovenox will need a PA done for a refill. She also confirmed giving patient his injection without any issues. Patient denies pain. Reviewed follow up appointment on 10/6, but offered to see patient sooner if needed. She verbalized understanding, no further questions or concerns at this time.

## 2017-09-19 ENCOUNTER — OFFICE VISIT (OUTPATIENT)
Dept: FAMILY MEDICINE CLINIC | Age: 72
End: 2017-09-19

## 2017-09-19 VITALS
HEART RATE: 78 BPM | RESPIRATION RATE: 20 BRPM | HEIGHT: 69 IN | TEMPERATURE: 98.5 F | BODY MASS INDEX: 25.77 KG/M2 | SYSTOLIC BLOOD PRESSURE: 125 MMHG | WEIGHT: 174 LBS | DIASTOLIC BLOOD PRESSURE: 66 MMHG

## 2017-09-19 DIAGNOSIS — Z00.00 HEALTH CARE MAINTENANCE: ICD-10-CM

## 2017-09-19 DIAGNOSIS — Z09 HOSPITAL DISCHARGE FOLLOW-UP: ICD-10-CM

## 2017-09-19 DIAGNOSIS — I82.4Y3 ACUTE DEEP VEIN THROMBOSIS (DVT) OF PROXIMAL VEIN OF BOTH LOWER EXTREMITIES (HCC): Primary | ICD-10-CM

## 2017-09-19 DIAGNOSIS — C34.90 SQUAMOUS CELL CARCINOMA OF LUNG, STAGE IV, UNSPECIFIED LATERALITY (HCC): ICD-10-CM

## 2017-09-19 DIAGNOSIS — C79.31 BRAIN METASTASES (HCC): ICD-10-CM

## 2017-09-19 NOTE — MR AVS SNAPSHOT
Visit Information Date & Time Provider Department Dept. Phone Encounter #  
 9/19/2017  9:10 AM Alexsander Rae MD [address] [place] [id number] Follow-up Instructions Return in about 1 month (around 10/19/2017). Your Appointments 10/6/2017  9:45 AM  
ESTABLISHED PATIENT with [facility] at [facility]) Appt Note: labs, Virgilio Hollis #3D1 [address] [facility], [address] [facility] [address]  
[phone]  
  
   
 [address] [facility], [address] [place] [address]  
  
    
 10/20/2017 10:00 AM  
ROUTINE CARE with Marck Cox MD  
[address] [facility]) Appt Note: 6 mnth fu est pt /fasting/HTN/[facility] [address] [facility] [address] [address]  
[place] [address] [address] Upcoming Health Maintenance Date Due DTaP/Tdap/Td series (1 - Tdap) 7/26/1965 ZOSTER VACCINE AGE 60> 5/26/2004 GLAUCOMA SCREENING Q2Y 7/26/2009 Pneumococcal 65+ High/Highest Risk (2 of 2 - PCV13) 2/6/2014 INFLUENZA AGE 9 TO ADULT 8/1/2017 MEDICARE YEARLY EXAM 10/19/2017 Allergies as of 9/19/2017  Review Complete On: 9/16/2017 By: Shar Coleman, RT Severity Noted Reaction Type Reactions Lisinopril High 10/27/2011   Systemic Angioedema Hydrochlorothiazide  07/18/2017    Hives, Swelling Sulfa (Sulfonamide Antibiotics)  10/27/2011    Hives Current Immunizations  Reviewed on 9/15/2017 Name Date Influenza High Dose Vaccine PF 1/10/2013 Influenza Vaccine 11/6/2015, 12/10/2014, 10/2/2013 Influenza Vaccine (Quad) PF 10/18/2016 Influenza Vaccine Split 10/12/2011, 5/10/2011, 10/13/2010 Pneumococcal Polysaccharide (PPSV-23) 2/6/2013 Not reviewed this visit You Were Diagnosed With   
  
 Codes Comments Acute deep vein thrombosis (DVT) of proximal vein of both lower extremities (HCC)    -  Primary ICD-10-CM: I82.4Y3 ICD-9-CM: 453.41 Squamous cell carcinoma of lung, stage IV, unspecified laterality (HCC)     ICD-10-CM: C34.90 ICD-9-CM: 162.9 Brain metastases ([address]     ICD-10-CM: C79.31 
ICD-9-CM: 198.3 Hospital discharge follow-up     ICD-10-CM: [facility] ICD-9-CM: V67.59 Health care maintenance     ICD-10-CM: Z00.00 ICD-9-CM: V70.0 Vitals BP Pulse Temp Resp Height(growth percentile) Weight(growth percentile) 125/66 (BP 1 Location: Left arm, BP Patient Position: Sitting) 78 98.5 °F (36.9 °C) (Oral) 20 5' 8.9\" (1.75 m) 174 lb (78.9 kg) BMI Smoking Status 25.77 kg/m2 Current Every Day Smoker Vitals History BMI and BSA Data Body Mass Index Body Surface Area 25.77 kg/m 2 1.96 m 2 Preferred Pharmacy Pharmacy Name Phone [address] [place], [place] - [address] [phone] Your Updated Medication List  
  
   
This list is accurate as of: 9/19/17  9:41 AM.  Always use your most recent med list.  
  
  
  
  
 aspirin delayed-release 81 mg tablet Take 81 mg by mouth daily. DECADRON PO Take  by mouth. Tapering  
  
 dilTIAZem  mg ER capsule Commonly known as:  CARDIZEM CD Take 1 Cap by mouth daily. Indications: VENTRICULAR RATE CONTROL IN ATRIAL FIBRILLATION  
  
 enoxaparin 120 mg/0.8 mL injection Commonly known as:  LOVENOX  
120 mg by SubCUTAneous route daily. lidocaine-prilocaine topical cream  
Commonly known as:  EMLA Apply  to affected area as needed for Pain (Apply 30-60 min. prior to having your port accessed). magic mouthwash solution Swish and spit 15-30 mL every 2-4 hours as needed for mouth pain. May swallow for throat pain.    Magic mouth wash  Maalox Lidocaine 2% viscous  Diphenhydramine oral solution   Pharmacy to mix equal portions of ingredients to a total volume as indicated in the dispense amount. MIRALAX 17 gram packet Generic drug:  polyethylene glycol Take 17 g by mouth daily as needed (constipation). nicotine 14 mg/24 hr patch Commonly known as:  NICODERM CQ  
1 Patch by TransDERmal route every twenty-four (24) hours. ondansetron hcl 8 mg tablet Commonly known as:  Oh Agustín Take 1 Tab by mouth every eight (8) hours as needed for Nausea. OTHER Chemotherapy: carboplatin + gemcitabine  
  
 pneumococcal 13 yudelka conj dip 0.5 mL Syrg injection Commonly known as:  PREVNAR 13 (PF)  
0.5 mL by IntraMUSCular route once for 1 dose. prochlorperazine 10 mg tablet Commonly known as:  COMPAZINE Take 1 Tab by mouth every six (6) hours as needed for Nausea. Prescriptions Printed Refills  
 pneumococcal 13 yudelka conj dip (PREVNAR 13, PF,) 0.5 mL syrg injection 0 Si.5 mL by IntraMUSCular route once for 1 dose. Class: Print Route: IntraMUSCular Follow-up Instructions Return in about 1 month (around 10/19/2017). To-Do List   
 2017  7:30 AM  
  Appointment with Fabio Cruz [facility] at [place] [address] ([phone]) 10/06/2017 9:00 AM  
  Appointment with [address] [facility] [address] at [place] [address] ([phone]) 10/06/2017 1:00 PM  
  Appointment with [facility] at [facility] ([phone]) CONTRAST STUDY: 1. The patient should not eat solid food four hours before the appointment but should be encouraged to drink clear liquids. 2.  If you have to drink oral contrast, please pick it up any weekday prior to your appointment, if you cannot please check in 2 hrs before appt time. 3.  The patient will require IV access for contrast administration.  4.  The patient should not take Ibuprofen (Advil, Motrin, etc.) and Naproxen Sodium (Aleve, etc.)  on the day of the exam. Stopping non-steroidal anti-inflammatory agents (NSAIDs) like Ibuprofen decreases the risk of kidney damage from the x-ray contrast (dye). 5.  Bring any non [facility] facility films/images pertaining to the area of interest with you on the day of appointment. 6.  Bring current lab work if available (within last 90 days CMP) ***If scheduled at [facility], iSTAT is not available, labs will need to be done before appointment*** 7. Check in at registration at least 30 minutes before appt time unless you were instructed to do otherwise. 10/13/2017 9:00 AM  
  Appointment with Fabio Anaya 1 at [place] [address] ([phone]) Patient Instructions Stopping Smoking: Care Instructions Your Care Instructions Cigarette smokers crave the nicotine in cigarettes. Giving it up is much harder than simply changing a habit. Your body has to stop craving the nicotine. It is hard to quit, but you can do it. There are many tools that people use to quit smoking. You may find that combining tools works best for you. There are several steps to quitting. First you get ready to quit. Then you get support to help you. After that, you learn new skills and behaviors to become a nonsmoker. For many people, a necessary step is getting and using medicine. Your doctor will help you set up the plan that best meets your needs. You may want to attend a smoking cessation program to help you quit smoking. When you choose a program, look for one that has proven success. Ask your doctor for ideas. You will greatly increase your chances of success if you take medicine as well as get counseling or join a cessation program. 
Some of the changes you feel when you first quit tobacco are uncomfortable. Your body will miss the nicotine at first, and you may feel short-tempered and grumpy. You may have trouble sleeping or concentrating. Medicine can help you deal with these symptoms.  You may struggle with changing your smoking habits and rituals. The last step is the tricky one: Be prepared for the smoking urge to continue for a time. This is a lot to deal with, but keep at it. You will feel better. Follow-up care is a key part of your treatment and safety. Be sure to make and go to all appointments, and call your doctor if you are having problems. Its also a good idea to know your test results and keep a list of the medicines you take. How can you care for yourself at home? · Ask your family, friends, and coworkers for support. You have a better chance of quitting if you have help and support. · Join a support group, such as Nicotine Anonymous, for people who are trying to quit smoking. · Consider signing up for a smoking cessation program, such as the American Lung Association's Freedom from Smoking program. 
· Set a quit date. Pick your date carefully so that it is not right in the middle of a big deadline or stressful time. Once you quit, do not even take a puff. Get rid of all ashtrays and lighters after your last cigarette. Clean your house and your clothes so that they do not smell of smoke. · Learn how to be a nonsmoker. Think about ways you can avoid those things that make you reach for a cigarette. ¨ Avoid situations that put you at greatest risk for smoking. For some people, it is hard to have a drink with friends without smoking. For others, they might skip a coffee break with coworkers who smoke. ¨ Change your daily routine. Take a different route to work or eat a meal in a different place. · Cut down on stress. Calm yourself or release tension by doing an activity you enjoy, such as reading a book, taking a hot bath, or gardening. · Talk to your doctor or pharmacist about nicotine replacement therapy, which replaces the nicotine in your body.  You still get nicotine but you do not use tobacco. Nicotine replacement products help you slowly reduce the amount of nicotine you need. These products come in several forms, many of them available over-the-counter: ¨ Nicotine patches ¨ Nicotine gum and lozenges ¨ Nicotine inhaler · Ask your doctor about bupropion (Wellbutrin) or varenicline (Chantix), which are prescription medicines. They do not contain nicotine. They help you by reducing withdrawal symptoms, such as stress and anxiety. · Some people find hypnosis, acupuncture, and massage helpful for ending the smoking habit. · Eat a healthy diet and get regular exercise. Having healthy habits will help your body move past its craving for nicotine. · Be prepared to keep trying. Most people are not successful the first few times they try to quit. Do not get mad at yourself if you smoke again. Make a list of things you learned and think about when you want to try again, such as next week, next month, or next year. Where can you learn more? Go to http://larissaOptimus3natalie.info/. Enter H745 in the search box to learn more about \"Stopping Smoking: Care Instructions. \" Current as of: March 20, 2017 Content Version: 11.3 © 3663-8952 [employer]. Care instructions adapted under license by [employer] (which disclaims liability or warranty for this information). If you have questions about a medical condition or this instruction, always ask your healthcare professional. [address] any warranty or liability for your use of this information. Lymphedema: Care Instructions Your Care Instructions Lymphedema is fluid that builds up in the arms or legs. It is often caused by surgery to remove lymph nodes during cancer treatment, especially breast cancer surgery, which can cause fluid to build up in the arm. It can happen after radiation treatment to an area that involves lymph nodes. It also can be caused by a fractured bone or surgery to fix a fracture. And some medicines also can cause lymphedema. Some people get it for unknown reasons. Normally, lymph nodes trap bacteria and other substances as fluid flows through them. Then, the white cells in the body's defense, or immune, system can destroy the substances. But if there are few or no lymph nodesor if the lymph system in an arm or leg has been damagedfluid can build up in the affected arm or leg. You can take simple steps at home to help treat or prevent fluid buildup. Treatment may include raising the arm or leg to let gravity drain the fluid. You also can wear compression stockings or sleeves. Follow-up care is a key part of your treatment and safety. Be sure to make and go to all appointments, and call your doctor if you are having problems. Its also a good idea to know your test results and keep a list of the medicines you take. How can you care for yourself at home? · Wear a compression stocking or sleeve as your doctor suggests. It can help keep fluid from pooling in an arm or leg. Wear it during air travel. · Prop up the swollen arm or leg on a pillow anytime you sit or lie down. Try to keep it above the level of your heart. This will help reduce swelling. · Avoid crossing your legs if your legs are swollen. · Get some exercise on most days of the week. Increase the intensity of exercise slowly. Water aerobics can help reduce swelling by helping fluid move around. Wear your compression stocking or sleeve during exercise, but not during water exercise. · See a physical therapist. He or she can teach you how to do self-massage to help fluid move around. You also can learn what activities would be best for you. · Keep your feet clean and wear clean socks or stockings every day. Check your feet often for signs of infection, such as redness or heat. Do not walk barefoot. · If you have had lymph nodes removed from under your arm: ¨ Do not have blood drawn from the arm on the side of the lymph node surgery. ¨ Do not allow a blood pressure cuff to be placed on that arm. If you are in the hospital, make sure your nurse and other hospital staff know of your condition. ¨ Wear gloves when gardening or doing other activities that may lead to cuts on your fingers or hands. · If you have had lymph nodes removed from your groin: ¨ Bathe your feet daily in lukewarm, not hot, water. Use a mild soap that has a moisturizer, or use a moisturizer separately. ¨ Check your feet for blisters or cuts. ¨ Wear comfortable and supportive shoes that fit properly. ¨ Wear the correct size of panty hose and stockings. Avoid garters or knee-high or thigh-high stockings. · Ask your doctor how to treat any cuts, scratches, insect bites, or other injuries that may occur. · Use sunscreen and insect repellent when outdoors to protect your skin from sunburn and insect bites. · Wear medical alert jewelry that says you have lymphedema. You can buy these at most [employer] and on the Internet. When should you call for help? Call your doctor now or seek immediate medical care if: 
· You have signs of infection, such as: 
¨ Increased pain, swelling, warmth, or redness. ¨ Red streaks leading from the area of lymph node surgery or radiation. ¨ Pus draining from the area of surgery or radiation. ¨ A fever. · You have a feeling of tightness or swelling in or around your arm, hand, leg, or foot. · You have pain, weakness that keeps getting worse, or a \"pins-and-needles\" feeling. Watch closely for changes in your health, and be sure to contact your doctor if: 
· You continue to have fluid buildup even with home treatment. Where can you learn more? Go to http://larissa-natalie.info/. Enter V398 in the search box to learn more about \"Lymphedema: Care Instructions. \" Current as of: July 26, 2016 Content Version: 11.3 © 3923-9129 [employer]. Care instructions adapted under license by [employer] (which disclaims liability or warranty for this information). If you have questions about a medical condition or this instruction, always ask your healthcare professional. [address] any warranty or liability for your use of this information. Introducing [facility]! Dear Karina Solano: Thank you for requesting a Ide[employer] account. Our records indicate that you already have an active Ideatory account. You can access your account anytime at [email or web address]. [email or web address] Did you know that you can access your hospital and ER discharge instructions at any time in Ideatory? You can also review all of your test results from your hospital stay or ER visit. Additional Information If you have questions, please visit the Frequently Asked Questions section of the Ideatory website at [email or web address]/. Remember, Ideatory is NOT to be used for urgent needs. For medical emergencies, dial 911. Now available from your iPhone and Android! Please provide this summary of care documentation to your next provider. Your primary care clinician is listed as Deepthi Coffey. If you have any questions after today's visit, please call [phone].

## 2017-09-19 NOTE — PROGRESS NOTES
Transition of Care Visit    Patient: Lluvia Villar MRN: [id number]  SSN: [id number]    YOB: 1945  Age: 67 y.o. Sex: male      Hospital: [facility]  Dates of admission: 9/15/17-9/17/17  Discharge diagnoses: Acute Bilateral DVTs, Stage 4 squamous cell lung cancer  State of health at discharge: Stable  Surgical or invasive procedures done: None    Amount and/or Complexity of Data Reviewed:   Clinical lab tests: Reviewed or ordered   Tests in the radiology section: reviewed or ordered  Discussion of test results with the patient-yes  Obtain previous medical records or obtain history from someone other than the patient: Yes (Daughter)  Obtain history from someone other than the patient: no  Review and address past medical records: yes  Discuss the patient with another provider: no  Independant visualization of image, tracing, or specimen: yes     Risk of Significant Complications, Morbidity, and/or Mortality:   Presenting problems: High   Diagnostic procedures: Moderate   Management options: Moderate     Transition of Care time spent:   Total time providing care and documentation: 40-60 minutes   Progress at discharge:   Stable on Discharge      Progress Note    Patient: Lluvia Villar MRN: [id number]  SSN: [id number]    YOB: 1945  Age: 67 y.o. Sex: male        Chief Complaint   Patient presents with   [facility] Follow Up         Subjective:     Encounter Diagnoses   Name Primary?  Acute deep vein thrombosis (DVT) of proximal vein of both lower extremities (HCC) Yes    Squamous cell carcinoma of lung, stage IV, unspecified laterality [facility]     Brain metastases ([facility]    [facility] discharge follow-up    [address] [facility]      Patient was diagnosed with Stage 4 squamous cell lung cancer in 7/2017. Followed by Dr. Cynthia Goodpasture for palliative chemo with carboplatin and gemcitabine starting 8/25/17. Patient is s/p gamma knife procedure for brain metastases.    Endorsed developing Bilateral leg swelling around 9/6/17 with gradual worsening, though denies pain. Seen by Dr Eric Rodriguez on 9/15 and sent to ED where found to have bilateral DVT. No PE was present. Patient discharged with Lovenox 120 mg daily until further notice according to Oncology. During admission newly discovered Afib with RVR controlled on diltiazem. Patient also stopped smoking during this admission. Per daughter and patient, appetite has significantly improved, improved energy levels/activity overall, and improved cough. Current and past medical information:    Current Medications after this visit[de-identified]     Current Outpatient Prescriptions   Medication Sig    enoxaparin (LOVENOX) 120 mg/0.8 mL injection 120 mg by SubCUTAneous route daily.  dilTIAZem CD (CARDIZEM CD) 180 mg ER capsule Take 1 Cap by mouth daily. Indications: VENTRICULAR RATE CONTROL IN ATRIAL FIBRILLATION    nicotine (NICODERM CQ) 14 mg/24 hr patch 1 Patch by TransDERmal route every twenty-four (24) hours.  polyethylene glycol (MIRALAX) 17 gram packet Take 17 g by mouth daily as needed (constipation).  OTHER Chemotherapy: carboplatin + gemcitabine    magic mouthwash solution Swish and spit 15-30 mL every 2-4 hours as needed for mouth pain. May swallow for throat pain. Magic mouth wash   Maalox  Lidocaine 2% viscous   Diphenhydramine oral solution     Pharmacy to mix equal portions of ingredients to a total volume as indicated in the dispense amount.  DEXAMETHASONE (DECADRON PO) Take  by mouth. Tapering    lidocaine-prilocaine (EMLA) topical cream Apply  to affected area as needed for Pain (Apply 30-60 min. prior to having your port accessed).  aspirin delayed-release 81 mg tablet Take 81 mg by mouth daily.  prochlorperazine (COMPAZINE) 10 mg tablet Take 1 Tab by mouth every six (6) hours as needed for Nausea.  ondansetron hcl (ZOFRAN) 8 mg tablet Take 1 Tab by mouth every eight (8) hours as needed for Nausea.      No current facility-administered medications for this visit. Facility-Administered Medications Ordered in Other Visits   Medication Dose Route Frequency    [START ON 9/22/2017] 0.9% sodium chloride infusion  25 mL/hr IntraVENous CONTINUOUS    [START ON 9/22/2017] dexamethasone (DECADRON) 4 mg/mL injection 8 mg  8 mg IntraVENous ONCE    [START ON 9/22/2017] gemcitabine (GEMZAR) 1,890 mg in 0.9% sodium chloride 250 mL, overfill volume 25 mL chemo infusion  1,890 mg IntraVENous ONCE       Patient Active Problem List    Diagnosis Date Noted    (HFpEF) heart failure with preserved ejection fraction [facility] 09/17/2017    Squamous cell carcinoma of lung, stage IV ([facility] 09/16/2017    Atrial fibrillation with rapid ventricular response ([facility] 09/16/2017    Deep vein thrombosis (DVT) of tibial vein of both lower extremities ([facility] 09/15/2017    Primary malignant neoplasm of left lung metastatic to other site [facility]) 07/28/2017    Brain metastases ([facility] 07/28/2017    Non-compliance 05/27/2016    Groin fullness 03/05/2014    Benign hypertensive heart disease with HF (heart failure) ([facility] 10/11/2011    Tobacco abuse 10/11/2011    Alcohol abuse 10/11/2011    Cough 04/04/2011    ED (erectile dysfunction) 05/18/2010    Hyperlipidemia 05/18/2010       Past Medical History:   Diagnosis Date    Dysfunction, erectile     Hyperlipidemia 5/18/2010    Hypertension        Allergies   Allergen Reactions    Lisinopril Angioedema    Hydrochlorothiazide Hives and Swelling    Sulfa (Sulfonamide Antibiotics) Hives       History reviewed. No pertinent surgical history.     Social History     Social History    Marital status: SINGLE     Spouse name: N/A    Number of children: N/A    Years of education: N/A     Social History Main Topics    Smoking status: Current Every Day Smoker     Packs/day: 1.00     Years: 45.00     Types: Cigarettes    Smokeless tobacco: Never Used    Alcohol use 1.5 oz/week     3 Cans of beer per week    Drug use: No    Sexual activity: Not Asked     Other Topics Concern    None     Social History Narrative       Review of Systems   Constitutional: Negative. Negative for fever, chills, weight loss, malaise/fatigue and diaphoresis. HENT: Negative. Negative for hearing loss, ear pain, nosebleeds, congestion, sore throat, neck pain, tinnitus and ear discharge. Eyes: Negative. Negative for blurred vision, double vision, photophobia, pain, discharge and redness. Respiratory: Negative. Negative for cough, hemoptysis, sputum production, shortness of breath, or wheezing. Cardiovascular: Negative. Negative for chest pain, palpitations. Gastrointestinal: Negative. Negative for heartburn, nausea, vomiting, abdominal pain, diarrhea, constipation, blood in stool and melena. Genitourinary: Negative. Negative for dysuria, urgency, frequency, hematuria. Musculoskeletal: Negative. Negative for myalgias, back pain, joint pain and falls. Positive for leg swelling bilaterally  Skin: Negative. Negative for rash. Lymphedema present  Neurological: Negative. Negative for dizziness, tingling, tremors, sensory change, speech change, focal weakness, seizures weakness and headaches. Endo/Heme/Allergies: Negative. Negative for environmental allergies and polydipsia. Does not bruise/bleed easily. Psychiatric/Behavioral: Negative. Negative for depression, suicidal ideas, hallucinations, memory loss. Objective:     Vitals:    09/19/17 0910   BP: 125/66   Pulse: 78   Resp: 20   Temp: 98.5 °F (36.9 °C)   TempSrc: Oral   Weight: 174 lb (78.9 kg)   Height: 5' 8.9\" (1.75 m)      Body mass index is 25.77 kg/(m^2). Physical Exam   Nursing note reviewed. Constitutional: Oriented to person, place, and time. Appears well-developed and well-nourished. No distress. HENT: Oropharynx normal, no adenopathy. Head: Normocephalic and atraumatic. Eyes: Conjunctivae are normal. Pupils are equal, round.  No scleral icterus. Neck: Normal range of motion. Neck supple. No JVD present. No tracheal deviation present. No thyromegaly present. No carotid bruit. Cardiovascular: Normal rate, regular rhythm and normal heart sounds. Exam reveals no gallop and no friction rub. No murmur heard. Pulmonary/Chest: Effort normal and breath sounds normal. Has no wheezes. Has no rales. Abdominal: Soft. Bowel sounds are normal. No distension. There is no tenderness. There is no rebound and no guarding. Musculoskeletal: Bilateral pitting edema in lower extremities  Neurological: The patient is alert and oriented to person, place, and time. No tremor. Skin: Skin is warm and dry. No rash noted. Not diaphoretic. Psychiatric:  Has a normal mood and affect. Behavior is normal. Judgment and thought content normal      Health Maintenance Due   Topic Date Due    DTaP/Tdap/Td series (1 - Tdap) 07/26/1965    ZOSTER VACCINE AGE 60>  05/26/2004    GLAUCOMA SCREENING Q2Y  07/26/2009    Pneumococcal 65+ High/Highest Risk (2 of 2 - PCV13) 02/06/2014    INFLUENZA AGE 9 TO ADULT  08/01/2017       Assessment and orders:       ICD-10-CM ICD-9-CM    1. Acute deep vein thrombosis (DVT) of proximal vein of both lower extremities (HCC) I82.4Y3 453.41    2. Squamous cell carcinoma of lung, stage IV, unspecified laterality (HCC) C34.90 162.9    3. Brain metastases (HCC) C79.31 198.3    4. Hospital discharge follow-up Z09 V67.59    5. Health care maintenance Z00.00 V70.0      Patient to continue Lovenox daily for treatment of DVTs. Keep follow up with oncology. Discussed wound care for lymphedema of legs secondary to DVT. HH to follow up and assist patient, good family support availabe. Plan of care:  Discussed diagnoses in detail with patient. Medication risks/benefits/side effects discussed with patient. All of the patient's questions were addressed. The patient understands and agrees with our plan of care.     The patient knows to call back if they are unsure of or forget any changes we discussed today or if the symptoms change. The patient received an After-Visit Summary which contains VS, orders, medication list and allergy list. This can be used as a \"mini-medical record\" should they have to seek medical care while out of town.     Follow-up Disposition: Not on File    Future Appointments  Date Time Provider Suzy Estefani   9/22/2017 7:30 AM [place] INFUSION NURSE 1 [facility]   10/6/2017 9:00 AM [place] INFUSION NURSE 1 [facility]   10/6/2017 9:45 AM Sun Argueta NP [facility] SCHED   10/6/2017 1:00 PM [facility] 1 [facility] Nasim Neil   10/13/2017 9:00 AM [place] INFUSION NURSE 1 [facility]   10/20/2017 10:00 AM Mirna Donohue MD [facility] SCHED       Signed By: Maggie Alejandro MD     September 19, 2017      Patient discussed and seen with Dr. Calvin Rhodes, Attending Physician

## 2017-09-19 NOTE — PATIENT INSTRUCTIONS
Stopping Smoking: Care Instructions  Your Care Instructions  Cigarette smokers crave the nicotine in cigarettes. Giving it up is much harder than simply changing a habit. Your body has to stop craving the nicotine. It is hard to quit, but you can do it. There are many tools that people use to quit smoking. You may find that combining tools works best for you. There are several steps to quitting. First you get ready to quit. Then you get support to help you. After that, you learn new skills and behaviors to become a nonsmoker. For many people, a necessary step is getting and using medicine. Your doctor will help you set up the plan that best meets your needs. You may want to attend a smoking cessation program to help you quit smoking. When you choose a program, look for one that has proven success. Ask your doctor for ideas. You will greatly increase your chances of success if you take medicine as well as get counseling or join a cessation program.  Some of the changes you feel when you first quit tobacco are uncomfortable. Your body will miss the nicotine at first, and you may feel short-tempered and grumpy. You may have trouble sleeping or concentrating. Medicine can help you deal with these symptoms. You may struggle with changing your smoking habits and rituals. The last step is the tricky one: Be prepared for the smoking urge to continue for a time. This is a lot to deal with, but keep at it. You will feel better. Follow-up care is a key part of your treatment and safety. Be sure to make and go to all appointments, and call your doctor if you are having problems. Its also a good idea to know your test results and keep a list of the medicines you take. How can you care for yourself at home? · Ask your family, friends, and coworkers for support. You have a better chance of quitting if you have help and support.   · Join a support group, such as Nicotine Anonymous, for people who are trying to quit smoking. · Consider signing up for a smoking cessation program, such as the American Lung Association's Freedom from Smoking program.  · Set a quit date. Pick your date carefully so that it is not right in the middle of a big deadline or stressful time. Once you quit, do not even take a puff. Get rid of all ashtrays and lighters after your last cigarette. Clean your house and your clothes so that they do not smell of smoke. · Learn how to be a nonsmoker. Think about ways you can avoid those things that make you reach for a cigarette. ¨ Avoid situations that put you at greatest risk for smoking. For some people, it is hard to have a drink with friends without smoking. For others, they might skip a coffee break with coworkers who smoke. ¨ Change your daily routine. Take a different route to work or eat a meal in a different place. · Cut down on stress. Calm yourself or release tension by doing an activity you enjoy, such as reading a book, taking a hot bath, or gardening. · Talk to your doctor or pharmacist about nicotine replacement therapy, which replaces the nicotine in your body. You still get nicotine but you do not use tobacco. Nicotine replacement products help you slowly reduce the amount of nicotine you need. These products come in several forms, many of them available over-the-counter:  ¨ Nicotine patches  ¨ Nicotine gum and lozenges  ¨ Nicotine inhaler  · Ask your doctor about bupropion (Wellbutrin) or varenicline (Chantix), which are prescription medicines. They do not contain nicotine. They help you by reducing withdrawal symptoms, such as stress and anxiety. · Some people find hypnosis, acupuncture, and massage helpful for ending the smoking habit. · Eat a healthy diet and get regular exercise. Having healthy habits will help your body move past its craving for nicotine. · Be prepared to keep trying. Most people are not successful the first few times they try to quit.  Do not get mad at yourself if you smoke again. Make a list of things you learned and think about when you want to try again, such as next week, next month, or next year. Where can you learn more? Go to http://larissa-natalie.info/. Enter D845 in the search box to learn more about \"Stopping Smoking: Care Instructions. \"  Current as of: March 20, 2017  Content Version: 11.3  © 4162-0082 [employer]. Care instructions adapted under license by [employer] (which disclaims liability or warranty for this information). If you have questions about a medical condition or this instruction, always ask your healthcare professional. [place] [address] any warranty or liability for your use of this information. Lymphedema: Care Instructions  Your Care Instructions  Lymphedema is fluid that builds up in the arms or legs. It is often caused by surgery to remove lymph nodes during cancer treatment, especially breast cancer surgery, which can cause fluid to build up in the arm. It can happen after radiation treatment to an area that involves lymph nodes. It also can be caused by a fractured bone or surgery to fix a fracture. And some medicines also can cause lymphedema. Some people get it for unknown reasons. Normally, lymph nodes trap bacteria and other substances as fluid flows through them. Then, the white cells in the body's defense, or immune, system can destroy the substances. But if there are few or no lymph nodes--or if the lymph system in an arm or leg has been damaged--fluid can build up in the affected arm or leg. You can take simple steps at home to help treat or prevent fluid buildup. Treatment may include raising the arm or leg to let gravity drain the fluid. You also can wear compression stockings or sleeves. Follow-up care is a key part of your treatment and safety. Be sure to make and go to all appointments, and call your doctor if you are having problems.  Its also a good idea to know your test results and keep a list of the medicines you take. How can you care for yourself at home? · Wear a compression stocking or sleeve as your doctor suggests. It can help keep fluid from pooling in an arm or leg. Wear it during air travel. · Prop up the swollen arm or leg on a pillow anytime you sit or lie down. Try to keep it above the level of your heart. This will help reduce swelling. · Avoid crossing your legs if your legs are swollen. · Get some exercise on most days of the week. Increase the intensity of exercise slowly. Water aerobics can help reduce swelling by helping fluid move around. Wear your compression stocking or sleeve during exercise, but not during water exercise. · See a physical therapist. He or she can teach you how to do self-massage to help fluid move around. You also can learn what activities would be best for you. · Keep your feet clean and wear clean socks or stockings every day. Check your feet often for signs of infection, such as redness or heat. Do not walk barefoot. · If you have had lymph nodes removed from under your arm:  ¨ Do not have blood drawn from the arm on the side of the lymph node surgery. ¨ Do not allow a blood pressure cuff to be placed on that arm. If you are in the hospital, make sure your nurse and other hospital staff know of your condition. ¨ Wear gloves when gardening or doing other activities that may lead to cuts on your fingers or hands. · If you have had lymph nodes removed from your groin:  ¨ Bathe your feet daily in lukewarm, not hot, water. Use a mild soap that has a moisturizer, or use a moisturizer separately. ¨ Check your feet for blisters or cuts. ¨ Wear comfortable and supportive shoes that fit properly. ¨ Wear the correct size of panty hose and stockings. Avoid garters or knee-high or thigh-high stockings. · Ask your doctor how to treat any cuts, scratches, insect bites, or other injuries that may occur.   · Use sunscreen and insect repellent when outdoors to protect your skin from sunburn and insect bites. · Wear medical alert jewelry that says you have lymphedema. You can buy these at most drugstores and on the Internet. When should you call for help? Call your doctor now or seek immediate medical care if:  · You have signs of infection, such as:  ¨ Increased pain, swelling, warmth, or redness. ¨ Red streaks leading from the area of lymph node surgery or radiation. ¨ Pus draining from the area of surgery or radiation. ¨ A fever. · You have a feeling of tightness or swelling in or around your arm, hand, leg, or foot. · You have pain, weakness that keeps getting worse, or a \"pins-and-needles\" feeling. Watch closely for changes in your health, and be sure to contact your doctor if:  · You continue to have fluid buildup even with home treatment. Where can you learn more? Go to http://larissa-natalie.info/. Enter V398 in the search box to learn more about \"Lymphedema: Care Instructions. \"  Current as of: July 26, 2016  Content Version: 11.3  © 7038-5821 [employer] Care instructions adapted under license by [employer] (which disclaims liability or warranty for this information). If you have questions about a medical condition or this instruction, always ask your healthcare professional. [address] any warranty or liability for your use of this information.

## 2017-09-19 NOTE — PROGRESS NOTES
I saw and evaluated the patient, performing the key elements of the service. I discussed the findings, assessment and plan with the resident and agree with the resident's findings and plan as documented in the resident's note. They were able to  lovenox from the pharmacy without requiring a prior auth.

## 2017-09-19 NOTE — PROGRESS NOTES
Reviewed record in preparation for visit and have necessary documentation  Pt did not bring medication to office visit for review  Information was given to pt on Advanced Directives, Living Will  Information was given on Shingles Vaccine  opportunity was given for questions  Goals that were addressed and/or need to be completed during or after this appointment include   Health Maintenance Due   Topic Date Due    DTaP/Tdap/Td series (1 - Tdap) 07/26/1965    ZOSTER VACCINE AGE 60>  05/26/2004    GLAUCOMA SCREENING Q2Y  07/26/2009    Pneumococcal 65+ High/Highest Risk (2 of 2 - PCV13) 02/06/2014    INFLUENZA AGE 9 TO ADULT  08/01/2017     Pt reports having an eye exam- will request records

## 2017-09-22 ENCOUNTER — HOSPITAL ENCOUNTER (OUTPATIENT)
Dept: INFUSION THERAPY | Age: 72
Discharge: HOME | End: 2017-09-22
Payer: MEDICARE

## 2017-09-22 VITALS
BODY MASS INDEX: 27.48 KG/M2 | HEART RATE: 78 BPM | RESPIRATION RATE: 18 BRPM | SYSTOLIC BLOOD PRESSURE: 153 MMHG | TEMPERATURE: 97.6 F | DIASTOLIC BLOOD PRESSURE: 79 MMHG | WEIGHT: 171 LBS | HEIGHT: 66 IN | OXYGEN SATURATION: 98 %

## 2017-09-22 LAB
APPEARANCE UR: CLEAR
BACTERIA URNS QL MICRO: NEGATIVE /HPF
BASO+EOS+MONOS # BLD AUTO: 0.9 K/UL (ref 0.2–1.2)
BASO+EOS+MONOS # BLD AUTO: 9 % (ref 3.2–16.9)
BILIRUB UR QL: NEGATIVE
COLOR UR: NORMAL
DIFFERENTIAL METHOD BLD: ABNORMAL
EPITH CASTS URNS QL MICRO: NORMAL /LPF
ERYTHROCYTE [DISTWIDTH] IN BLOOD BY AUTOMATED COUNT: 18.3 % (ref 11.8–15.8)
GLUCOSE UR STRIP.AUTO-MCNC: NEGATIVE MG/DL
HCT VFR BLD AUTO: 26.2 % (ref 36.6–50.3)
HGB BLD-MCNC: 8.6 G/DL (ref 12.1–17)
HGB UR QL STRIP: NEGATIVE
HYALINE CASTS URNS QL MICRO: NORMAL /LPF (ref 0–5)
KETONES UR QL STRIP.AUTO: NEGATIVE MG/DL
LEUKOCYTE ESTERASE UR QL STRIP.AUTO: NEGATIVE
LYMPHOCYTES # BLD: 2.1 K/UL (ref 0.8–3.5)
LYMPHOCYTES NFR BLD: 20 % (ref 12–49)
MCH RBC QN AUTO: 29.5 PG (ref 26–34)
MCHC RBC AUTO-ENTMCNC: 32.8 G/DL (ref 30–36.5)
MCV RBC AUTO: 89.7 FL (ref 80–99)
NEUTS SEG # BLD: 7.1 K/UL (ref 1.8–8)
NEUTS SEG NFR BLD: 71 % (ref 32–75)
NITRITE UR QL STRIP.AUTO: NEGATIVE
PH UR STRIP: 7.5 [PH] (ref 5–8)
PLATELET # BLD AUTO: 358 K/UL (ref 150–400)
PROT UR STRIP-MCNC: NEGATIVE MG/DL
RBC # BLD AUTO: 2.92 M/UL (ref 4.1–5.7)
RBC #/AREA URNS HPF: NORMAL /HPF (ref 0–5)
SP GR UR REFRACTOMETRY: 1.01 (ref 1–1.03)
UA: UC IF INDICATED,UAUC: NORMAL
UROBILINOGEN UR QL STRIP.AUTO: 0.2 EU/DL (ref 0.2–1)
WBC # BLD AUTO: 10.1 K/UL (ref 4.1–11.1)
WBC URNS QL MICRO: NORMAL /HPF (ref 0–4)

## 2017-09-22 PROCEDURE — 81001 URINALYSIS AUTO W/SCOPE: CPT | Performed by: INTERNAL MEDICINE

## 2017-09-22 PROCEDURE — 36415 COLL VENOUS BLD VENIPUNCTURE: CPT | Performed by: INTERNAL MEDICINE

## 2017-09-22 PROCEDURE — 85025 COMPLETE CBC W/AUTO DIFF WBC: CPT | Performed by: INTERNAL MEDICINE

## 2017-09-22 PROCEDURE — 96375 TX/PRO/DX INJ NEW DRUG ADDON: CPT

## 2017-09-22 PROCEDURE — 74011250636 HC RX REV CODE- 250/636: Performed by: INTERNAL MEDICINE

## 2017-09-22 PROCEDURE — 74011000250 HC RX REV CODE- 250: Performed by: INTERNAL MEDICINE

## 2017-09-22 PROCEDURE — 96413 CHEMO IV INFUSION 1 HR: CPT

## 2017-09-22 PROCEDURE — 77030012965 HC NDL HUBR BBMI -A

## 2017-09-22 RX ORDER — SODIUM CHLORIDE 0.9 % (FLUSH) 0.9 %
10-40 SYRINGE (ML) INJECTION AS NEEDED
Status: ACTIVE | OUTPATIENT
Start: 2017-09-22 | End: 2017-09-23

## 2017-09-22 RX ORDER — SODIUM CHLORIDE 9 MG/ML
10 INJECTION INTRAMUSCULAR; INTRAVENOUS; SUBCUTANEOUS AS NEEDED
Status: ACTIVE | OUTPATIENT
Start: 2017-09-22 | End: 2017-09-23

## 2017-09-22 RX ORDER — HEPARIN 100 UNIT/ML
500 SYRINGE INTRAVENOUS AS NEEDED
Status: ACTIVE | OUTPATIENT
Start: 2017-09-22 | End: 2017-09-23

## 2017-09-22 RX ADMIN — SODIUM CHLORIDE 10 ML: 9 INJECTION INTRAMUSCULAR; INTRAVENOUS; SUBCUTANEOUS at 08:12

## 2017-09-22 RX ADMIN — Medication 10 ML: at 09:58

## 2017-09-22 RX ADMIN — SODIUM CHLORIDE 25 ML/HR: 900 INJECTION, SOLUTION INTRAVENOUS at 09:00

## 2017-09-22 RX ADMIN — DEXAMETHASONE SODIUM PHOSPHATE 8 MG: 4 INJECTION, SOLUTION INTRA-ARTICULAR; INTRALESIONAL; INTRAMUSCULAR; INTRAVENOUS; SOFT TISSUE at 09:00

## 2017-09-22 RX ADMIN — HEPARIN SODIUM (PORCINE) LOCK FLUSH IV SOLN 100 UNIT/ML 500 UNITS: 100 SOLUTION at 09:59

## 2017-09-22 RX ADMIN — SODIUM CHLORIDE 1890 MG: 900 INJECTION, SOLUTION INTRAVENOUS at 09:22

## 2017-09-22 NOTE — PROGRESS NOTES
[facility] VISIT NOTE    Pt arrived at [facility] ambulatory and in no distress for C2D8 Carbo/Gemzar. Assessment completed, pt c/o . Patient Vitals for the past 12 hrs:   Temp Pulse Resp BP SpO2   09/22/17 0955 97.6 °F (36.4 °C) 78 18 153/79 -   09/22/17 0748 97.8 °F (36.6 °C) 92 18 154/75 98 %      Right chest port accessed with . 75   in [place] no difficulty. Positive blood return noted and labs drawn. Recent Results (from the past 12 hour(s))   CBC WITH 3 PART DIFF    Collection Time: 09/22/17  8:08 AM   Result Value Ref Range    WBC 10.1 4.1 - 11.1 K/uL    RBC 2.92 (L) 4.10 - 5.70 M/uL    HGB 8.6 (L) 12.1 - 17.0 g/dL    HCT 26.2 (L) 36.6 - 50.3 %    MCV 89.7 80.0 - 99.0 FL    MCH 29.5 26.0 - 34.0 PG    MCHC 32.8 30.0 - 36.5 g/dL    RDW 18.3 (H) 11.8 - 15.8 %    PLATELET 514 300 - 170 K/uL    NEUTROPHILS 71 32 - 75 %    MIXED CELLS 9 3.2 - 16.9 %    LYMPHOCYTES 20 12 - 49 %    ABS. NEUTROPHILS 7.1 1.8 - 8.0 K/UL    ABS. MIXED CELLS 0.9 0.2 - 1.2 K/uL    ABS. LYMPHOCYTES 2.1 0.8 - 3.5 K/UL    DF AUTOMATED     URINALYSIS W/ REFLEX CULTURE    Collection Time: 09/22/17  9:03 AM   Result Value Ref Range    Color YELLOW/STRAW      Appearance CLEAR CLEAR      Specific gravity 1.006 1.003 - 1.030      pH (UA) 7.5 5.0 - 8.0      Protein NEGATIVE  NEG mg/dL    Glucose NEGATIVE  NEG mg/dL    Ketone NEGATIVE  NEG mg/dL    Bilirubin NEGATIVE  NEG      Blood NEGATIVE  NEG      Urobilinogen 0.2 0.2 - 1.0 EU/dL    Nitrites NEGATIVE  NEG      Leukocyte Esterase NEGATIVE  NEG      WBC 0-4 0 - 4 /hpf    RBC 0-5 0 - 5 /hpf    Epithelial cells FEW FEW /lpf    Bacteria NEGATIVE  NEG /hpf    UA:UC IF INDICATED CULTURE NOT INDICATED BY UA RESULT CNI      Hyaline cast 0-2 0 - 5 /lpf     Medications received:  Dexamethasone IV  Gemzar IV    Tolerated treatment well, no adverse reaction noted. Port de-accessed and flushed per protocol. Positive blood return noted. D/C'd from [facility] ambulatory and in no distress accompanied by daughter . Next appointment is 10/6/17 at 9:00.

## 2017-09-22 NOTE — PROGRESS NOTES
Problem: Chemotherapy Treatment  Goal: *Hemodynamically stable  Outcome: Progressing Towards Goal  Pt here for C2D8 Carbo/Gemzar

## 2017-09-26 ENCOUNTER — HOSPITAL ENCOUNTER (OUTPATIENT)
Age: 72
Setting detail: OBSERVATION
Discharge: HOME HEALTH | End: 2017-09-27
Attending: EMERGENCY MEDICINE | Admitting: FAMILY MEDICINE
Payer: MEDICARE

## 2017-09-26 ENCOUNTER — TELEPHONE (OUTPATIENT)
Dept: FAMILY MEDICINE CLINIC | Age: 72
End: 2017-09-26

## 2017-09-26 ENCOUNTER — APPOINTMENT (OUTPATIENT)
Dept: GENERAL RADIOLOGY | Age: 72
End: 2017-09-26
Attending: EMERGENCY MEDICINE
Payer: MEDICARE

## 2017-09-26 DIAGNOSIS — C34.90 METASTATIC LUNG CANCER (METASTASIS FROM LUNG TO OTHER SITE), UNSPECIFIED LATERALITY (HCC): ICD-10-CM

## 2017-09-26 DIAGNOSIS — I48.92 ATRIAL FLUTTER, UNSPECIFIED TYPE (HCC): Primary | ICD-10-CM

## 2017-09-26 DIAGNOSIS — I82.443 ACUTE DEEP VEIN THROMBOSIS (DVT) OF TIBIAL VEIN OF BOTH LOWER EXTREMITIES (HCC): ICD-10-CM

## 2017-09-26 PROBLEM — I48.91 A-FIB (HCC): Status: ACTIVE | Noted: 2017-09-26

## 2017-09-26 LAB
ANION GAP SERPL CALC-SCNC: 8 MMOL/L (ref 5–15)
BNP SERPL-MCNC: 3201 PG/ML (ref 0–125)
BUN SERPL-MCNC: 23 MG/DL (ref 6–20)
BUN/CREAT SERPL: 29 (ref 12–20)
CALCIUM SERPL-MCNC: 9.3 MG/DL (ref 8.5–10.1)
CHLORIDE SERPL-SCNC: 103 MMOL/L (ref 97–108)
CK SERPL-CCNC: 72 U/L (ref 39–308)
CO2 SERPL-SCNC: 28 MMOL/L (ref 21–32)
CREAT SERPL-MCNC: 0.8 MG/DL (ref 0.7–1.3)
ERYTHROCYTE [DISTWIDTH] IN BLOOD BY AUTOMATED COUNT: 18.8 % (ref 11.5–14.5)
GLUCOSE SERPL-MCNC: 122 MG/DL (ref 65–100)
HCT VFR BLD AUTO: 25.6 % (ref 36.6–50.3)
HGB BLD-MCNC: 8.3 G/DL (ref 12.1–17)
MAGNESIUM SERPL-MCNC: 1.6 MG/DL (ref 1.6–2.4)
MCH RBC QN AUTO: 28.6 PG (ref 26–34)
MCHC RBC AUTO-ENTMCNC: 32.4 G/DL (ref 30–36.5)
MCV RBC AUTO: 88.3 FL (ref 80–99)
PHOSPHATE SERPL-MCNC: 3.7 MG/DL (ref 2.6–4.7)
PLATELET # BLD AUTO: 175 K/UL (ref 150–400)
POTASSIUM SERPL-SCNC: 4.2 MMOL/L (ref 3.5–5.1)
RBC # BLD AUTO: 2.9 M/UL (ref 4.1–5.7)
SODIUM SERPL-SCNC: 139 MMOL/L (ref 136–145)
TROPONIN I SERPL-MCNC: 0.12 NG/ML
WBC # BLD AUTO: 11.9 K/UL (ref 4.1–11.1)

## 2017-09-26 PROCEDURE — 93005 ELECTROCARDIOGRAM TRACING: CPT

## 2017-09-26 PROCEDURE — 96361 HYDRATE IV INFUSION ADD-ON: CPT

## 2017-09-26 PROCEDURE — 84484 ASSAY OF TROPONIN QUANT: CPT | Performed by: FAMILY MEDICINE

## 2017-09-26 PROCEDURE — 85027 COMPLETE CBC AUTOMATED: CPT | Performed by: HOSPITALIST

## 2017-09-26 PROCEDURE — 96376 TX/PRO/DX INJ SAME DRUG ADON: CPT

## 2017-09-26 PROCEDURE — 74011250636 HC RX REV CODE- 250/636: Performed by: HOSPITALIST

## 2017-09-26 PROCEDURE — 96375 TX/PRO/DX INJ NEW DRUG ADDON: CPT

## 2017-09-26 PROCEDURE — 99218 HC RM OBSERVATION: CPT

## 2017-09-26 PROCEDURE — 82550 ASSAY OF CK (CPK): CPT | Performed by: FAMILY MEDICINE

## 2017-09-26 PROCEDURE — 74011250637 HC RX REV CODE- 250/637: Performed by: HOSPITALIST

## 2017-09-26 PROCEDURE — 74011000258 HC RX REV CODE- 258: Performed by: EMERGENCY MEDICINE

## 2017-09-26 PROCEDURE — 74011000250 HC RX REV CODE- 250

## 2017-09-26 PROCEDURE — 83735 ASSAY OF MAGNESIUM: CPT | Performed by: HOSPITALIST

## 2017-09-26 PROCEDURE — 80048 BASIC METABOLIC PNL TOTAL CA: CPT | Performed by: HOSPITALIST

## 2017-09-26 PROCEDURE — 99284 EMERGENCY DEPT VISIT MOD MDM: CPT

## 2017-09-26 PROCEDURE — 36415 COLL VENOUS BLD VENIPUNCTURE: CPT | Performed by: HOSPITALIST

## 2017-09-26 PROCEDURE — 74011000250 HC RX REV CODE- 250: Performed by: HOSPITALIST

## 2017-09-26 PROCEDURE — 65660000000 HC RM CCU STEPDOWN

## 2017-09-26 PROCEDURE — 96365 THER/PROPH/DIAG IV INF INIT: CPT

## 2017-09-26 PROCEDURE — 96366 THER/PROPH/DIAG IV INF ADDON: CPT

## 2017-09-26 PROCEDURE — 83880 ASSAY OF NATRIURETIC PEPTIDE: CPT | Performed by: FAMILY MEDICINE

## 2017-09-26 PROCEDURE — 74011250637 HC RX REV CODE- 250/637: Performed by: FAMILY MEDICINE

## 2017-09-26 PROCEDURE — 84100 ASSAY OF PHOSPHORUS: CPT | Performed by: HOSPITALIST

## 2017-09-26 PROCEDURE — 96360 HYDRATION IV INFUSION INIT: CPT

## 2017-09-26 PROCEDURE — 71010 XR CHEST PORT: CPT

## 2017-09-26 PROCEDURE — 74011000250 HC RX REV CODE- 250: Performed by: EMERGENCY MEDICINE

## 2017-09-26 RX ORDER — DILTIAZEM HYDROCHLORIDE 120 MG/1
240 CAPSULE, COATED, EXTENDED RELEASE ORAL DAILY
Status: DISCONTINUED | OUTPATIENT
Start: 2017-09-27 | End: 2017-09-27 | Stop reason: HOSPADM

## 2017-09-26 RX ORDER — POLYETHYLENE GLYCOL 3350 17 G/17G
17 POWDER, FOR SOLUTION ORAL
Status: DISCONTINUED | OUTPATIENT
Start: 2017-09-26 | End: 2017-09-27 | Stop reason: HOSPADM

## 2017-09-26 RX ORDER — DILTIAZEM HYDROCHLORIDE 5 MG/ML
10 INJECTION INTRAVENOUS
Status: COMPLETED | OUTPATIENT
Start: 2017-09-26 | End: 2017-09-26

## 2017-09-26 RX ORDER — ENOXAPARIN SODIUM 100 MG/ML
120 INJECTION SUBCUTANEOUS DAILY
Status: DISCONTINUED | OUTPATIENT
Start: 2017-09-27 | End: 2017-09-27 | Stop reason: HOSPADM

## 2017-09-26 RX ORDER — SODIUM CHLORIDE 0.9 % (FLUSH) 0.9 %
5-10 SYRINGE (ML) INJECTION AS NEEDED
Status: DISCONTINUED | OUTPATIENT
Start: 2017-09-26 | End: 2017-09-27 | Stop reason: HOSPADM

## 2017-09-26 RX ORDER — SODIUM CHLORIDE 0.9 % (FLUSH) 0.9 %
5-10 SYRINGE (ML) INJECTION EVERY 8 HOURS
Status: DISCONTINUED | OUTPATIENT
Start: 2017-09-26 | End: 2017-09-27 | Stop reason: HOSPADM

## 2017-09-26 RX ORDER — LIDOCAINE AND PRILOCAINE 25; 25 MG/G; MG/G
CREAM TOPICAL AS NEEDED
Status: DISCONTINUED | OUTPATIENT
Start: 2017-09-26 | End: 2017-09-27 | Stop reason: HOSPADM

## 2017-09-26 RX ORDER — DILTIAZEM HYDROCHLORIDE 5 MG/ML
INJECTION INTRAVENOUS
Status: COMPLETED
Start: 2017-09-26 | End: 2017-09-26

## 2017-09-26 RX ORDER — SODIUM CHLORIDE 9 MG/ML
115 INJECTION, SOLUTION INTRAVENOUS CONTINUOUS
Status: DISCONTINUED | OUTPATIENT
Start: 2017-09-26 | End: 2017-09-27 | Stop reason: HOSPADM

## 2017-09-26 RX ORDER — IBUPROFEN 200 MG
1 TABLET ORAL EVERY 24 HOURS
Status: DISCONTINUED | OUTPATIENT
Start: 2017-09-26 | End: 2017-09-27 | Stop reason: HOSPADM

## 2017-09-26 RX ORDER — METOPROLOL TARTRATE 50 MG/1
50 TABLET ORAL EVERY 6 HOURS
Status: DISCONTINUED | OUTPATIENT
Start: 2017-09-26 | End: 2017-09-27

## 2017-09-26 RX ORDER — ASPIRIN 81 MG/1
81 TABLET ORAL DAILY
Status: DISCONTINUED | OUTPATIENT
Start: 2017-09-27 | End: 2017-09-27 | Stop reason: HOSPADM

## 2017-09-26 RX ORDER — ENOXAPARIN SODIUM 150 MG/ML
80 INJECTION SUBCUTANEOUS DAILY
Status: DISCONTINUED | OUTPATIENT
Start: 2017-09-27 | End: 2017-09-26

## 2017-09-26 RX ORDER — PROCHLORPERAZINE MALEATE 5 MG
10 TABLET ORAL
Status: DISCONTINUED | OUTPATIENT
Start: 2017-09-26 | End: 2017-09-27 | Stop reason: HOSPADM

## 2017-09-26 RX ADMIN — METOPROLOL TARTRATE 50 MG: 50 TABLET ORAL at 22:01

## 2017-09-26 RX ADMIN — DILTIAZEM HYDROCHLORIDE 10 MG: 5 INJECTION INTRAVENOUS at 16:46

## 2017-09-26 RX ADMIN — Medication 10 ML: at 23:15

## 2017-09-26 RX ADMIN — SODIUM CHLORIDE 500 ML: 900 INJECTION, SOLUTION INTRAVENOUS at 18:23

## 2017-09-26 RX ADMIN — DILTIAZEM HYDROCHLORIDE 2.5 MG/HR: 5 INJECTION INTRAVENOUS at 17:01

## 2017-09-26 RX ADMIN — SODIUM CHLORIDE 115 ML/HR: 900 INJECTION, SOLUTION INTRAVENOUS at 21:07

## 2017-09-26 NOTE — IP AVS SNAPSHOT
[facility] 
 
 
 566 [facility] 1007 [facility] 
[phone] Patient: Glenis Ricks MRN: [id number] :1945 You are allergic to the following Allergen Reactions Lisinopril Angioedema Hydrochlorothiazide Hives Swelling Sulfa (Sulfonamide Antibiotics) Hives Recent Documentation Height Weight BMI Smoking Status 1.676 m 77.1 kg 27.43 kg/m2 Current Every Day Smoker Unresulted Labs Order Current Status CULTURE, MRSA In process Emergency Contacts Name Discharge Info Relation Home Work [place] [facility] DISCHARGE CAREGIVER [3] Child [2] [phone] About your hospitalization You were admitted on:  2017 You last received care in the:  [facility] 3 INTENSIVE CARE You were discharged on:  2017 Unit phone number:  [phone] Why you were hospitalized Your primary diagnosis was:  Atrial Flutter (Hcc) Your diagnoses also included: Tobacco Abuse, Squamous Cell Carcinoma Of Lung, Stage Iv (Hcc), Atrial Fibrillation (Hcc), Metastatic Lung Cancer (Metastasis From Lung To Other Site) (Hcc), Paroxysmal Atrial Flutter (Hcc), Anemia, Cardiomyopathy (Hcc), Atrial Flutter With Rapid Ventricular Response (Hcc) Providers Seen During Your Hospitalizations Provider Role Specialty Primary office phone Pattie Hutchinson MD Attending Provider Emergency Medicine [phone] Melissa Lucero MD Attending Provider [facility] [phone] Your Primary Care Physician (PCP) Primary Care Physician Office Phone Office Fax Melvin Angelo [phone] Follow-up Information Follow up With Details Comments Contact Info Jazmine Mallory MD Schedule an appointment as soon as possible for a visit in 1 week For followup 566 [facility] [phone] [facility] [address] [phone] [address] [place] [address] on 9/29/2017 For followup, appointment is at 2:20 pm with Dr. Darryl Scherer. [address] [facility] [address] [place] [id number] [phone] Vishal Hidalgo MD   [address] 1007 [facility] 
[phone] [address] [phone] Your Appointments Friday September 29, 2017  2:30 PM EDT TRANSITIONAL CARE MANAGEMENT with Tiffanie Torres MD  
704 [facility] ([facility]) [address] [facility] [address] [address] [phone] Friday October 06, 2017  9:00 AM EDT INFUSION 120 RI with [place] INFUSION NURSE 1  
[address] ([address]) 301 [facility] [address] [place] [phone] Friday October 06, 2017  9:45 AM EDT  
ESTABLISHED PATIENT with Elizabeth Troncoso NP  
[facility]) [address] [place] [address], 2329 [facility] 1007 [facility]  
[phone] Friday October 06, 2017  1:00 PM EDT  
CT CHEST ABD PELV W CONT with [facility] CT 1 SFM RAD CT ([address]) 3001 [facility] 1007 [facility]  
[phone] CONTRAST STUDY: 1. The patient should not eat solid food four hours before the appointment but should be encouraged to drink clear liquids. 2.  If you have to drink oral contrast, please pick it up any weekday prior to your appointment, if you cannot please check in 2 hrs before appt time. 3.  The patient will require IV access for contrast administration. 4.  The patient should not take Ibuprofen (Advil, Motrin, etc.) and Naproxen Sodium (Aleve, etc.)  on the day of the exam. Stopping non-steroidal anti-inflammatory agents (NSAIDs) like Ibuprofen decreases the risk of kidney damage from the x-ray contrast (dye).  5.  Bring any [facility] facility films/images pertaining to the area of interest with you on the day of appointment. 6.  Bring current lab work if available (within last 90 days CMP) ***If scheduled at [facility], iSTAT is not available, labs will need to be done before appointment*** 7. Check in at registration at least 30 minutes before appt time unless you were instructed to do otherwise. Park in designated visitor/patient parking. Enter through the main entrance, which is just to the left of the fountain. Once inside, go around the corner to the left. You will register in Outpatient Registration. Friday October 13, 2017  9:00 AM EDT INFUSION 120 RI with [place] INFUSION NURSE 1  
[place] [address] ([address]) 301 [facility] 70 [facility] 99 [id number]  
[phone] Thursday October 19, 2017  9:00 AM EDT ROUTINE CARE with Carlo Saavedra MD  
704 [facility] ([facility]) [address] [place] [address]  
[phone] Current Discharge Medication List  
  
START taking these medications Dose & Instructions Dispensing Information Comments Morning Noon Evening Bedtime  
 metoprolol succinate 25 mg XL tablet Commonly known as:  TOPROL-XL Your last dose was: Your next dose is:    
   
   
 Dose:  25 mg Take 1 Tab by mouth daily. Quantity:  30 Tab Refills:  0 CONTINUE these medications which have CHANGED Dose & Instructions Dispensing Information Comments Morning Noon Evening Bedtime  
 dilTIAZem  mg ER capsule Commonly known as:  CARDIZEM CD What changed:   
- medication strength 
- how much to take Your last dose was: Your next dose is:    
   
   
 Dose:  240 mg Take 1 Cap by mouth daily. Indications: VENTRICULAR RATE CONTROL IN ATRIAL FIBRILLATION Quantity:  30 Cap Refills:  0 CONTINUE these medications which have NOT CHANGED Dose & Instructions Dispensing Information Comments Morning Noon Evening Bedtime  
 aspirin delayed-release 81 mg tablet Your last dose was: Your next dose is:    
   
   
 Dose:  81 mg Take 81 mg by mouth daily. Refills:  0  
     
   
   
   
  
 diphenhydrAMINE 25 mg tablet Commonly known as:  BENADRYL Your last dose was: Your next dose is:    
   
   
 Dose:  25 mg Take 25 mg by mouth nightly as needed for Sleep. Refills:  0  
     
   
   
   
  
 enoxaparin 120 mg/0.8 mL injection Commonly known as:  LOVENOX Your last dose was: Your next dose is:    
   
   
 Dose:  120 mg  
120 mg by SubCUTAneous route daily. Quantity:  24 mL Refills:  5  
     
   
   
   
  
 lidocaine-prilocaine topical cream  
Commonly known as:  EMLA Your last dose was: Your next dose is:    
   
   
 Apply  to affected area as needed for Pain (Apply 30-60 min. prior to having your port accessed). Quantity:  30 g Refills:  0  
     
   
   
   
  
 magic mouthwash solution Your last dose was: Your next dose is:    
   
   
 Swish and spit 15-30 mL every 2-4 hours as needed for mouth pain. May swallow for throat pain. Magic mouth wash  Maalox Lidocaine 2% viscous  Diphenhydramine oral solution   Pharmacy to mix equal portions of ingredients to a total volume as indicated in the dispense amount. Quantity:  480 mL Refills:  2 MIRALAX 17 gram packet Generic drug:  polyethylene glycol Your last dose was: Your next dose is:    
   
   
 Dose:  17 g Take 17 g by mouth daily. Refills:  0  
     
   
   
   
  
 nicotine 14 mg/24 hr patch Commonly known as:  VoipSwitch Your last dose was: Your next dose is:    
   
   
 Dose:  1 Patch 1 Patch by TransDERmal route every twenty-four (24) hours. Quantity:  30 Patch Refills:  1  
     
   
   
   
  
 ondansetron hcl 8 mg tablet Commonly known as:  Alyssia Bene Your last dose was: Your next dose is:    
   
   
 Dose:  8 mg Take 1 Tab by mouth every eight (8) hours as needed for Nausea. Quantity:  45 Tab Refills:  5  
     
   
   
   
  
 prochlorperazine 10 mg tablet Commonly known as:  COMPAZINE Your last dose was: Your next dose is:    
   
   
 Dose:  10 mg Take 1 Tab by mouth every six (6) hours as needed for Nausea. Quantity:  50 Tab Refills:  5 XYZAL 5 mg tablet Generic drug:  levocetirizine Your last dose was: Your next dose is:    
   
   
 Dose:  5 mg Take 5 mg by mouth daily as needed for Allergies. Refills:  0 Where to Get Your Medications Information on where to get these meds will be given to you by the nurse or doctor. ! Ask your nurse or doctor about these medications  
  dilTIAZem  mg ER capsule  
 metoprolol succinate 25 mg XL tablet Discharge Instructions HOME DISCHARGE INSTRUCTIONS Samariadereje Tapia / [id number] : 1945 Admission date: 2017 Discharge date: 2017 Please bring this form with you to show your care provider at your follow-up appointment. Primary care provider:  Capri Doshi MD 
 
Discharging provider:  Redd Anders MD  - Family Medicine Resident Barb Hood MD - Attending, Family Medicine You have been admitted to the hospital with the following diagnoses: 
 
ACUTE DIAGNOSES: 
· A-flutter Wellington Camacho . . . . . . . . . . . . . . . . . . . . . . . . . . . . . . . . . . . . . . . . . . . . . . . . . . . . . . . . . . . . . . . . . . . . . . Wellington Camacho FOLLOW-UP CARE RECOMMENDATIONS: 
 
Medication changes: We have changed your prescription of diltiazem/cardizem from 180mg to 240mg daily.  You were seen by a cardiologist during your hospital stay who suggested that you start taking metoprolol 25mg daily in addition to your cardizem. Continue to take the ASA. You will continue your other medications as prescribed before this hospital admission. If you continue to have bouts of A-flutter, they may consider performing an an ablation procedure. You will discuss this with your cardiologist at your follow up appointment. For you lower leg wounds you were seen by our wound care specialist and were given a honey based ointment (medihoney) to place on your wounds once daily. You will also start using a new compression stalking called Tubigrip which should be more comfortable than your previous compression stalkings. Appointments: Listed on page 2 of these documents. Follow-up tests needed: None Pending test results: At the time of your discharge the following test results are still pending: none. Please make sure you review these results with your outpatient follow-up provider(s). Specific symptoms to watch for: chest pain, shortness of breath, fever, chills, nausea, vomiting, diarrhea, change in mentation, falling, weakness, bleeding. DIET/what to eat: Cardiac Diet ACTIVITY:  Activity as tolerated Wound care: none Equipment needed:  none What to do if new or unexpected symptoms occur? If you experience any of the above symptoms (or should other concerns or questions arise after discharge) please call your primary care physician. Return to the emergency room if you cannot get hold of your doctor. · It is very important that you keep your follow-up appointment(s). · Please bring discharge papers, medication list (and/or medication bottles) to your follow-up appointments for review by your outpatient provider(s). · Please check the list of medications and be sure it includes every medication (even non-prescription medications) that your provider wants you to take. · It is important that you take the medication exactly as they are prescribed. · Keep your medication in the bottles provided by the pharmacist and keep a list of the medication names, dosages, and times to be taken in your wallet. · Do not take other medications without consulting your doctor. · If you have any questions about your medications or other instructions, please talk to your nurse or care provider before you leave the hospital.  
 
Information obtained by:  
 
I understand that if any problems occur once I am at home I am to contact my physician. These instructions were explained to me and I had the opportunity to ask questions. I understand and acknowledge receipt of the instructions indicated above. Physician's or R.N.'s Signature                                                                  Date/Time Patient or Representative Signature                                                          Date/Time Atrial Flutter: Care Instructions Your Care Instructions Atrial flutter is an abnormal cardiac rhythm, in which the top two chambers of the heart (atria) are beating very fast. Treating this condition is important for several reasons. It can cause blood clots, which can travel from your heart to your brain and cause a stroke. If you have a fast heartbeat, you may feel lightheaded, dizzy, and weak. A very fast heartbeat can also increase your risk for heart failure. Atrial flutter may be the result of changes in your heart, which affect the electrical conduction system. Your cardiologist will discuss treatment options, such as medications, cardioversion, or cardiac ablation. Making changes to improve your heart condition will help you stay healthy and active. Follow-up care is a key part of your treatment and safety. Be sure to make and go to all appointments, and call your doctor if you are having problems. It's also a good idea to know your test results and keep a list of the medicines you take. How can you care for yourself at home? Medicines · Take your medicines exactly as prescribed. Call your doctor if you think you are having a problem with your medicine. You will get more details on the specific medicines your doctor prescribes. · If your doctor has given you a blood thinner to prevent a stroke, be sure you get instructions about how to take your medicine safely. Blood thinners can cause serious bleeding problems. · Do not take any vitamins, over-the-counter drugs, or herbal products without talking to your doctor first. 
Lifestyle changes · Do not smoke. Smoking can increase your chance of a stroke and heart attack. If you need help quitting, talk to your doctor about stop-smoking programs and medicines. These can increase your chances of quitting for good. · Eat a heart-healthy diet. · Stay at a healthy weight. Lose weight if you need to. · Limit alcohol to 2 drinks a day for men and 1 drink a day for women. Too much alcohol can cause health problems. · Avoid colds and flu. Get a pneumococcal vaccine shot. If you have had one before, ask your doctor whether you need another dose. Get a flu shot every year. If you must be around people with colds or flu, wash your hands often. Activity · If your doctor recommends it, get more exercise. Walking is a good choice. Bit by bit, increase the amount you walk every day. Try for at least 30 minutes on most days of the week. You also may want to swim, bike, or do other activities. Your doctor may suggest that you join a cardiac rehabilitation program so that you can have help increasing your physical activity safely. · Start light exercise if your doctor says it is okay. Even a small amount will help you get stronger, have more energy, and manage stress. Walking is an easy way to get exercise. Start out by walking a little more than you did in the hospital. Gradually increase the amount you walk. · When you exercise, watch for signs that your heart is working too hard. You are pushing too hard if you cannot talk while you are exercising. If you become short of breath or dizzy or have chest pain, sit down and rest immediately. · Check your pulse regularly. Place two fingers on the artery at the palm side of your wrist, in line with your thumb. If your heartbeat seems uneven or fast, talk to your doctor. When should you call for help? Call 911 anytime you think you may need emergency care. For example, call if: 
· You have symptoms of a heart attack. These may include: ¨ Chest pain or pressure, or a strange feeling in the chest. 
¨ Sweating. ¨ Shortness of breath. ¨ Nausea or vomiting. ¨ Pain, pressure, or a strange feeling in the back, neck, jaw, or upper belly or in one or both shoulders or arms. ¨ Lightheadedness or sudden weakness. ¨ A fast or irregular heartbeat. After you call 911, the  may tell you to chew 1 adult-strength or 2 to 4 low-dose aspirin. Wait for an ambulance. Do not try to drive yourself. · You have symptoms of a stroke. These may include: 
¨ Sudden numbness, tingling, weakness, or loss of movement in your face, arm, or leg, especially on only one side of your body. ¨ Sudden vision changes. ¨ Sudden trouble speaking. ¨ Sudden confusion or trouble understanding simple statements. ¨ Sudden problems with walking or balance. ¨ A sudden, severe headache that is different from past headaches. · You passed out (lost consciousness). Call your doctor now or seek immediate medical care if: 
· You have new or increased shortness of breath. · You feel dizzy or lightheaded, or you feel like you may faint. · Your heart rate becomes irregular. · You can feel your heart flutter in your chest or skip heartbeats. Tell your doctor if these symptoms are new or worse. Watch closely for changes in your health, and be sure to contact your doctor if you have any problems. Where can you learn more? Go to http://larissa-natalie.info/ Enter U020 in the search box to learn more about \"Atrial Fibrillation: Care Instructions. \" 
Content Version: 60.0.094521; Current as of: January 27, 2016 (modified 9/10/16). Avoiding Triggers With Heart Failure: Care Instructions Your Care Instructions Triggers are anything that make your heart failure flare up. A flare-up is also called \"sudden heart failure\" or \"acute heart failure. \" When you have a flare-up, fluid builds up in your lungs, and you have problems breathing. You might need to go to the hospital. By watching for changes in your condition and avoiding triggers, you can prevent heart failure flare-ups. Follow-up care is a key part of your treatment and safety. Be sure to make and go to all appointments, and call your doctor if you are having problems. It's also a good idea to know your test results and keep a list of the medicines you take. How can you care for yourself at home? Watch for changes in your weight and condition · Weigh yourself without clothing at the same time each day. Record your weight. Call your doctor if you gain 3 pounds or more in 24 hrs or 5 pounds in one week. A sudden weight gain may mean that your heart failure is getting worse. · Keep a daily record of your symptoms. Write down any changes in how you feel, such as new shortness of breath, cough, or problems eating. Also record if your ankles are more swollen than usual and if you have to urinate in the night more often. Note anything that you ate or did that could have triggered these changes. Limit sodium Sodium causes your body to hold on to water, making it harder for your heart to pump. People get most of their sodium from processed foods. Fast food and restaurant meals also tend to be very high in sodium. · Your doctor may suggest that you limit sodium to 1,500 milligrams (mg) a day. That is less than 1 teaspoon of salt a day, including all the salt you eat in cooking or in packaged foods. · Read food labels on cans and food packages. They tell you how much sodium you get in one serving. Check the serving size. If you eat more than one serving, you are getting more sodium. · Be aware that sodium can come in forms other than salt, including monosodium glutamate (MSG), sodium citrate, and sodium bicarbonate (baking soda). MSG is often added to Asian food. You can sometimes ask for food without MSG or salt. · Slowly reducing salt will help you adjust to the taste. Take the salt shaker off the table. · Flavor your food with garlic, lemon juice, onion, vinegar, herbs, and spices instead of salt. Do not use soy sauce, steak sauce, onion salt, garlic salt, mustard, or ketchup on your food, unless it is labeled \"low-sodium\" or \"low-salt. \" 
· Make your own salad dressings, sauces, and ketchup without adding salt. · Use fresh or frozen ingredients, instead of canned ones, whenever you can. Choose low-sodium canned goods. · Eat less processed food and food from restaurants, including fast food. Exercise as directed Moderate, regular exercise is very good for your heart. It improves your blood flow and helps control your weight. But too much exercise can stress your heart and cause a heart failure flare-up. · Check with your doctor before you start an exercise program. 
· Walking is an easy way to get exercise. Start out slowly. Gradually increase the length and pace of your walk. Swimming, riding a bike, and using a treadmill are also good forms of exercise. · When you exercise, watch for signs that your heart is working too hard. You are pushing yourself too hard if you cannot talk while you are exercising. If you become short of breath or dizzy or have chest pain, stop, sit down, and rest. 
· Do not exercise when you do not feel well. Take medicines correctly · Take your medicines exactly as prescribed. Call your doctor if you think you are having a problem with your medicine. · Make a list of all the medicines you take. Include those prescribed to you by other doctors and any over-the-counter medicines, vitamins, or supplements you take. Take this list with you when you go to any doctor. · Take your medicines at the same time every day. It may help you to post a list of all the medicines you take every day and what time of day you take them. · Make taking your medicine as simple as you can. Plan times to take your medicines when you are doing other things, such as eating a meal or getting ready for bed. This will make it easier to remember to take your medicines. · Get organized. Use helpful tools, such as daily or weekly pill containers. When should you call for help? Call 911 if you have symptoms of sudden heart failure such as: 
· You have severe trouble breathing. · You cough up pink, foamy mucus. · You have a new irregular or rapid heartbeat. Call your doctor now or seek immediate medical care if: 
· You have new or increased shortness of breath. · You are dizzy or lightheaded, or you feel like you may faint. · You have sudden weight gain, such as 3 pounds in 24 hours, or 5 pounds in one week. · You have increased swelling in your legs, ankles, or feet. · You are suddenly so tired or weak that you cannot do your usual activities. Watch closely for changes in your health, and be sure to contact your doctor if you develop new symptoms. Where can you learn more? Go to http://larissa-natalie.info/ Enter T931 in the search box to learn more about \"Avoiding Triggers With Heart Failure: Care Instructions. \" 
© 4001-9632 [employer]wise[employer]. Care instructions adapted under license by [employer] (which disclaims liability or warranty for this information). This care instruction is for use with your licensed healthcare professional. If you have questions about a medical condition or this instruction, always ask your healthcare professional. [address] any warranty or liability for your use of this information. Content Version: 26.1.409559; Current as of: January 27, 2016 (modified 10/10/16). Discharge Instructions Attachments/References MEFS - METOPROLOL (LOPRESSOR, TOPROL XL) - (BY MOUTH) (ENGLISH) SMOKING: STOPPING (ENGLISH) Discharge Orders None [employer] Announcement We are excited to announce that we are making your provider's discharge notes available to you in [employer]. You will see these notes when they are completed and signed by the physician that discharged you from your recent hospital stay. If you have any questions or concerns about any information you see in [employer], please call the Health Information Department where you were seen or reach out to your Primary Care Provider for more information about your plan of care. Introducing [facility]! Dear Donavan Rasmussen: Thank you for requesting a [employer] account. Our records indicate that you already have an active [employer] account. You can access your account anytime at [email or web address] Did you know that you can access your hospital and ER discharge instructions at any time in [employer]? You can also review all of your test results from your hospital stay or ER visit. Additional Information If you have questions, please visit the Frequently Asked Questions section of the [employer] website at [email or web address] Remember, MyChart is NOT to be used for urgent needs. For medical emergencies, dial 911. Now available from your iPhone and Android! General Information Please provide this summary of care documentation to your next provider. Patient Signature:  ____________________________________________________________ Date:  ____________________________________________________________  
  
[employer] Provider Signature:  ____________________________________________________________ Date:  ____________________________________________________________ More Information Metoprolol (Lopressor, Toprol XL) - (By mouth) Why this medicine is used:  
Treats high blood pressure, chest pain, and heart failure. Contact a nurse or doctor right away if you have: · Lightheadedness, dizziness, fainting · Rapid weight gain; swelling in your hands, ankles, or feet Common side effects: · Mild dizziness · Tiredness © 2017 [facility] [place] Information is for End User's use only and may not be sold, redistributed or otherwise used for commercial purposes. Stopping Smoking: Care Instructions Your Care Instructions Cigarette smokers crave the nicotine in cigarettes. Giving it up is much harder than simply changing a habit. Your body has to stop craving the nicotine. It is hard to quit, but you can do it. There are many tools that people use to quit smoking. You may find that combining tools works best for you. There are several steps to quitting. First you get ready to quit. Then you get support to help you. After that, you learn new skills and behaviors to become a nonsmoker. For many people, a necessary step is getting and using medicine. Your doctor will help you set up the plan that best meets your needs.  You may want to attend a smoking cessation program to help you quit smoking. When you choose a program, look for one that has proven success. Ask your doctor for ideas. You will greatly increase your chances of success if you take medicine as well as get counseling or join a cessation program. 
Some of the changes you feel when you first quit tobacco are uncomfortable. Your body will miss the nicotine at first, and you may feel short-tempered and grumpy. You may have trouble sleeping or concentrating. Medicine can help you deal with these symptoms. You may struggle with changing your smoking habits and rituals. The last step is the tricky one: Be prepared for the smoking urge to continue for a time. This is a lot to deal with, but keep at it. You will feel better. Follow-up care is a key part of your treatment and safety. Be sure to make and go to all appointments, and call your doctor if you are having problems. Its also a good idea to know your test results and keep a list of the medicines you take. How can you care for yourself at home? · Ask your family, friends, and coworkers for support. You have a better chance of quitting if you have help and support. · Join a support group, such as Nicotine Anonymous, for people who are trying to quit smoking. · Consider signing up for a smoking cessation program, such as the American Lung Association's Freedom from Smoking program. 
· Set a quit date. Pick your date carefully so that it is not right in the middle of a big deadline or stressful time. Once you quit, do not even take a puff. Get rid of all ashtrays and lighters after your last cigarette. Clean your house and your clothes so that they do not smell of smoke. · Learn how to be a nonsmoker. Think about ways you can avoid those things that make you reach for a cigarette. ¨ Avoid situations that put you at greatest risk for smoking. For some people, it is hard to have a drink with friends without smoking.  For others, they might skip a coffee break with coworkers who smoke. ¨ Change your daily routine. Take a different route to work or eat a meal in a different place. · Cut down on stress. Calm yourself or release tension by doing an activity you enjoy, such as reading a book, taking a hot bath, or gardening. · Talk to your doctor or pharmacist about nicotine replacement therapy, which replaces the nicotine in your body. You still get nicotine but you do not use tobacco. Nicotine replacement products help you slowly reduce the amount of nicotine you need. These products come in several forms, many of them available over-the-counter: ¨ Nicotine patches ¨ Nicotine gum and lozenges ¨ Nicotine inhaler · Ask your doctor about bupropion (Wellbutrin) or varenicline (Chantix), which are prescription medicines. They do not contain nicotine. They help you by reducing withdrawal symptoms, such as stress and anxiety. · Some people find hypnosis, acupuncture, and massage helpful for ending the smoking habit. · Eat a healthy diet and get regular exercise. Having healthy habits will help your body move past its craving for nicotine. · Be prepared to keep trying. Most people are not successful the first few times they try to quit. Do not get mad at yourself if you smoke again. Make a list of things you learned and think about when you want to try again, such as next week, next month, or next year. Where can you learn more? Go to http://larissa-natalie.info/. Enter X405 in the search box to learn more about \"Stopping Smoking: Care Instructions. \" Current as of: March 20, 2017 Content Version: 11.3 © 1364-0838 [employer]. Care instructions adapted under license by [employer] (which disclaims liability or warranty for this information).  If you have questions about a medical condition or this instruction, always ask your healthcare professional. [employer] disclaims any warranty or liability for your use of this information.

## 2017-09-26 NOTE — TELEPHONE ENCOUNTER
Received a call from [facility]  She reports -154 on pulse ox  Dr Lloyd Estrada made aware  [facility] informed by Dr Lloyd Estrada if HR continues pt to be evaluated in ED

## 2017-09-26 NOTE — H&P
[address] [facility] [address] [place] [address]   Office [phone]  Fax [phone]       Admission H&P     Name: José Luis Dacosta MRN: [id number]  Sex: Male   YOB: 1945  Age: 67 y.o. PCP: Prieto Zamudio MD     Source of Information: patient, medical records    Chief complaint: Giuliana Méndez said I have a high heart rate\"    History of Present Illness  José Luis Dacosta is a 67 y.o. 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 who presents to the ER complaining of tachycardia. Patient reports that today around 1pm his home health nurse checked his vitals and found him to be tachycardic with  and he was sent to ED to be evaluated. Patient reports not feeling any CP or palpitations at any point. His only complaint is burning in BL lower extremities due to his chemotherapy. Patient denies any SOB. Denies any abdominal pain, n/v/d but does report constipation which he takes miralax daily. Past Medical History:   Diagnosis Date    Dysfunction, erectile     Hyperlipidemia 5/18/2010    Hypertension       Patient Vitals for the past 12 hrs:   Temp Pulse Resp BP SpO2   09/26/17 1630 98.1 °F (36.7 °C) (!) 150 20 117/82 100 %       In the ER, vital signs were remarkable for tachycardia at 150bpm. Labs were remarkable for CBC and BMP wnL. Pt was treated with diatiazem drip of 125mg at 2.5 cc/hr. Home Medications   Prior to Admission medications    Medication Sig Start Date End Date Taking? Authorizing Provider   enoxaparin (LOVENOX) 120 mg/0.8 mL injection 120 mg by SubCUTAneous route daily. 9/17/17   Keara Bazan MD   dilTIAZem CD (CARDIZEM CD) 180 mg ER capsule Take 1 Cap by mouth daily. Indications: VENTRICULAR RATE CONTROL IN ATRIAL FIBRILLATION 9/17/17   Diego Rod MD   nicotine (NICODERM CQ) 14 mg/24 hr patch 1 Patch by TransDERmal route every twenty-four (24) hours.  9/15/17   Thaddeus Zhang NP   polyethylene glycol [facility] 17 gram packet Take 17 g by mouth daily as needed (constipation). Historical Provider   OTHER Chemotherapy: carboplatin + gemcitabine    Historical Provider   magic mouthwash solution Swish and spit 15-30 mL every 2-4 hours as needed for mouth pain. May swallow for throat pain. Magic mouth wash   Maalox  Lidocaine 2% viscous   Diphenhydramine oral solution     Pharmacy to mix equal portions of ingredients to a total volume as indicated in the dispense amount. 9/12/17   Lynn Ng MD   DEXAMETHASONE (DECADRON PO) Take  by mouth. Tapering    Historical Provider   prochlorperazine (COMPAZINE) 10 mg tablet Take 1 Tab by mouth every six (6) hours as needed for Nausea. 8/8/17   Valdez Britt NP   lidocaine-prilocaine (EMLA) topical cream Apply  to affected area as needed for Pain (Apply 30-60 min. prior to having your port accessed). 8/8/17   Valdez Britt NP   ondansetron hcl (ZOFRAN) 8 mg tablet Take 1 Tab by mouth every eight (8) hours as needed for Nausea. 8/8/17   Valedz Britt NP   aspirin delayed-release 81 mg tablet Take 81 mg by mouth daily. Historical Provider       Allergies  Allergies   Allergen Reactions    Lisinopril Angioedema    Hydrochlorothiazide Hives and Swelling    Sulfa (Sulfonamide Antibiotics) Hives       Past Medical History:   Diagnosis Date    Dysfunction, erectile     Hyperlipidemia 5/18/2010    Hypertension        Previous Hospitalization(s)  9/15/17-9/17/17 for BL lower extremity DVT     No past surgical history on file. Family History   Problem Relation Age of Onset    Stroke Father     Diabetes Sister     Diabetes Brother     Heart Disease Sister        Social History  Social History     Social History    Marital status: SINGLE     Spouse name: N/A    Number of children: N/A    Years of education: N/A     Occupational History    Not on file.      Social History Main Topics    Smoking status: Current Every Day Smoker     Packs/day: 1.00     Years: 45.00 Types: Cigarettes    Smokeless tobacco: Never Used    Alcohol use 1.5 oz/week     3 Cans of beer per week    Drug use: No    Sexual activity: Not on file     Other Topics Concern    Not on file     Social History Narrative       Alcohol history: Not at all  Smoking history: Former smoker, smoked 1.5 ppd x 53 years, quit July of this yr after cancer diagnosis  Illicit drug history: Not at all    Living arrangement: patient lives alone. Ambulates: With cane    Review of Systems  Review of Systems   Constitutional: Negative for chills and fever. Respiratory: Negative for shortness of breath. Cardiovascular: Negative for chest pain and palpitations. Gastrointestinal: Positive for constipation. Negative for abdominal pain, blood in stool, diarrhea, nausea and vomiting. Physical Exam  Objective:  General Appearance: In no acute distress. Vital signs: (most recent): Blood pressure 117/82, pulse (!) 150, temperature 98.1 °F (36.7 °C), resp. rate 20, height 5' 6\" (1.676 m), weight 171 lb (77.6 kg), SpO2 100 %. Vital signs are normal.  No fever. HEENT: Normal HEENT exam.    Lungs:  Normal respiratory rate and normal effort. He is not in respiratory distress. Breath sounds clear to auscultation. No wheezes, rales or rhonchi. Heart: Tachycardia. Regular rhythm. S1 normal and S2 normal.  No murmur, gallop or friction rub. (Palpable thrill noted. )  Extremities: There is dependent edema.  (2-3+ pitting LE edema. Small Scattered erythematous rashes along legs and feet.   )  Neurological: Patient is alert and oriented to person, place and time. Abdomen: Abdomen is soft and non-distended. Bowel sounds are normal.   There is no abdominal tenderness. Pupils:  Pupils are equal, round, and reactive to light. O2 Device: Room air     Laboratory Data  No results found for this or any previous visit (from the past 8 hour(s)).     Imaging  CXR Results  (Last 48 hours)    None        CT Results (Last 48 hours)    None          EKG: Atrial flutter at rate of 150bpm and 2:1 AV conduction. Previous EKG showed A-fib on 9/18/17. Assessment and Plan     Romeo Harris is a 67 y.o. male who is admitted for SVT. SVT; A-flutter with 2:1 AV conduction: Patient had  when checked by home health nurse, EKG on admission showed Atrial flutter with rate of 150 and 2:1 AV conduction. Patient reported not feeling any CP or palpitations. On previous admission on 9/15, patient was newly diagnosed with A-fib with RVR and converted back to sinus after being given diltiazem, 1L bolus, and 5mg lopressor.   -admit to telemetry observation   -continue Diltiazem drip - will likely transition to 240 diltiazem Extended release tomorrow  -Trend troponins   -Pro-BNP  -CK w/reflex CKMB  -cardiac monitoring   -cardiology consult    Hx BL Lower extremity DVT: Patient recently admitted for BL lower extremity DVTs (bilateral posterior tibial DVTs), thought to be secondary to hypercoagulable state of patient's malignancy. CTA negative for PE and had no CP, SOB, or hypoxia. Patient discharged with lovenox 120mg daily. - lovenox 80mg BID    Stage IV squamous cell lung cancer - Dx'd 7/2017, noncurative, on palliative chemo, s/p gamma knife for brain mets. Being followed by Dr. Andre Pak, last seen 9/15. Diagnosis made 7/2017. Palliative chemotherapy w carboplatin and gemcitabine q3 weeks started on 8/25/17. Following Dr. Caron Zacarias (neurosurg) for brain mets  - Pt to continue following up with heme/onc. Hx of tobacco abuse - Pt smoked 1ppd for 45 years but quit after cancer diagosis at the end of July. -nicotine patch    HTN: BP nomotensive at 117/82 on admission.  Per chart, was previously taking amlodipine but was discontinued during previous admission because patient was given diltiazem IR for A-fib.  -Patient normotensive currently while on diltiazem drip, will monitor BP and add appropriate anti-hypertensive if necessary FEN/GI - Cardiac diet. Diltiazem drip at 125 mL/hr. Activity - Ambulate as tolerated  DVT prophylaxis - Lovenox 80mg daily, SCDs   GI prophylaxis - Not indicated at this time  Fall prophylaxis - Not indicated at this time. Disposition - Admit to Telemetry  Observation. Plan to d/c to Home. Code Status - Full, discussed with patient / caregivers. Next of [place] 68 Name and Contact: Jacquelin Mcdaniel (daughter) [phone]  Patient Edwige Briscoe will be discussed Dr. Dalton Faith.     5:53 PM, 09/26/17  Carlton Ortiz MD  Family Medicine Resident       For Billing    Chief Complaint   [facility]   [facility]  Date Reviewed: 9/19/2017          Codes Class Noted POA    A-fib [facility]) ICD-10-CM: I48.91  ICD-9-CM: 427.31  9/26/2017 Unknown

## 2017-09-26 NOTE — ED PROVIDER NOTES
HPI Comments: 67 y.o. male with past medical history significant for HTN, erectile dysfunction, and hyperlipidemia who presents from home with chief complaint of rapid heart rate. Per daughter, the pt's home health nurse noted that the pt's HR was 155 and sent him into the ED for further evaluation. Pt was admitted to the hospital 2 weeks ago for a-fib and bilateral LE blood clots. Per daughter, this was the patient's first episode of a-fib and was given 2 bolus of Cardizem and he returned to NSR. Pt currently c/o his \"legs burning\" but is not in any other pain. There are no other acute medical concerns at this time. Old Chart Review: [facility]/17[facility]17 for DVT of tibial vein of bilateral LEs and new onset a-fib with RVR (). Pt was given Cardizem and converted back to NSR with 1L NS and 5 mg Lopressor. Pt was seen by cardiology, pt now on Cardizem  mg / day. Pt anticoagulated with Lovenox and remained in sinus through discharge. ECHO showed EF 45-50%, hypokinesis of basal-mid inferior walls, grade 3 diastolic dysfunction. Social hx: current everyday tobacco smoker; (+) EtOH use    PCP: Danice Collet, MD    Note written by Telly Bhatt, as dictated by Duane Arvizu MD 4:36 PM      The history is provided by the patient and a relative (daughter). No  was used. Past Medical History:   Diagnosis Date    Dysfunction, erectile     Hyperlipidemia 5/18/2010    Hypertension        No past surgical history on file. Family History:   Problem Relation Age of Onset    Stroke Father     Diabetes Sister     Diabetes Brother     Heart Disease Sister        Social History     Social History    Marital status: SINGLE     Spouse name: N/A    Number of children: N/A    Years of education: N/A     Occupational History    Not on file.      Social History Main Topics    Smoking status: Current Every Day Smoker     Packs/day: 1.00     Years: 45.00     Types: Cigarettes    Smokeless tobacco: Never Used    Alcohol use 1.5 oz/week     3 Cans of beer per week    Drug use: No    Sexual activity: Not on file     Other Topics Concern    Not on file     Social History Narrative         ALLERGIES: Lisinopril; Hydrochlorothiazide; and Sulfa (sulfonamide antibiotics)    Review of Systems   Constitutional: Negative for chills and fever. Respiratory: Negative for shortness of breath. Cardiovascular: Negative for chest pain. Rapid heart rate   Gastrointestinal: Negative for nausea. Musculoskeletal: Positive for myalgias (\"leg burning\"). All other systems reviewed and are negative. Vitals:    09/26/17 1630   BP: 117/82   Pulse: (!) 150   Resp: 20   Temp: 98.1 °F (36.7 °C)   SpO2: 100%   Weight: 77.6 kg (171 lb)   Height: 5' 6\" (1.676 m)            Physical Exam   Constitutional: He is oriented to person, place, and time. He appears ill. No distress. Elderly, chronically ill appearing, debilitated, NAD. HENT:   Head: Normocephalic and atraumatic. Eyes: Conjunctivae are normal. No scleral icterus. Neck: Neck supple. No tracheal deviation present. Cardiovascular: Regular rhythm, normal heart sounds and intact distal pulses. Tachycardia present. Exam reveals no gallop and no friction rub. No murmur heard. In a very regular tachycardic rhythm consistent with a-flutter with a rate of 155. Pulmonary/Chest: Effort normal and breath sounds normal. He has no wheezes. He has no rales. Abdominal: Soft. He exhibits no distension. There is no tenderness. There is no rebound and no guarding. Musculoskeletal: He exhibits edema. 2+ peripheral edema. Neurological: He is alert and oriented to person, place, and time. No focal neurological deficits. Skin: Skin is warm and dry. No rash noted. Psychiatric: He has a normal mood and affect. Nursing note and vitals reviewed.      Note written by Flower Workman Telly, as dictated by Ruben Gordillo MD 4:36 PM      MDM  Number of Diagnoses or Management Options  Atrial flutter, unspecified type [facility]):      Amount and/or Complexity of Data Reviewed  Clinical lab tests: ordered and reviewed  Tests in the radiology section of CPT®: ordered and reviewed  Tests in the medicine section of CPT®: ordered and reviewed    Total critical care time spend exclusive of procedures: 34 minutes  ED Course       Procedures    ED EKG interpretation:  Rhythm: atrial flutter with 2:1 AV conduction; and irregular. Rate (approx.): 159; left anterior fascicular block; left ventricular hypertrophy with repolarization abnormality; abnormal ECG; no STEMI.   Note written by Telly Bailon, as dictated by Peter Patiño MD 4:19 PM

## 2017-09-26 NOTE — ED NOTES
Pt adv he was dx with afib for the first time 2 weeka ago and was admitted for same. Pt reports that he was sent by home nurse who stated he was in AFIB. Also states his feet are burning since today and has hx of bilateral blood clots.

## 2017-09-26 NOTE — IP AVS SNAPSHOT
Ngozi Chase 
 
 
 [address] [place] [address] 
[phone] Patient: Gordon Reed MRN: [id number] :1945 Current Discharge Medication List  
  
START taking these medications Dose & Instructions Dispensing Information Comments Morning Noon Evening Bedtime  
 metoprolol succinate 25 mg XL tablet Commonly known as:  TOPROL-XL Your last dose was: Your next dose is:    
   
   
 Dose:  25 mg Take 1 Tab by mouth daily. Quantity:  30 Tab Refills:  0 CONTINUE these medications which have CHANGED Dose & Instructions Dispensing Information Comments Morning Noon Evening Bedtime  
 dilTIAZem  mg ER capsule Commonly known as:  CARDIZEM CD What changed:   
- medication strength 
- how much to take Your last dose was: Your next dose is:    
   
   
 Dose:  240 mg Take 1 Cap by mouth daily. Indications: VENTRICULAR RATE CONTROL IN ATRIAL FIBRILLATION Quantity:  30 Cap Refills:  0 CONTINUE these medications which have NOT CHANGED Dose & Instructions Dispensing Information Comments Morning Noon Evening Bedtime  
 aspirin delayed-release 81 mg tablet Your last dose was: Your next dose is:    
   
   
 Dose:  81 mg Take 81 mg by mouth daily. Refills:  0  
     
   
   
   
  
 diphenhydrAMINE 25 mg tablet Commonly known as:  BENADRYL Your last dose was: Your next dose is:    
   
   
 Dose:  25 mg Take 25 mg by mouth nightly as needed for Sleep. Refills:  0  
     
   
   
   
  
 enoxaparin 120 mg/0.8 mL injection Commonly known as:  LOVENOX Your last dose was: Your next dose is:    
   
   
 Dose:  120 mg  
120 mg by SubCUTAneous route daily. Quantity:  24 mL Refills:  5  
     
   
   
   
  
 lidocaine-prilocaine topical cream  
Commonly known as:  EMLA Your last dose was: Your next dose is:    
   
   
 Apply  to affected area as needed for Pain (Apply 30-60 min. prior to having your port accessed). Quantity:  30 g Refills:  0  
     
   
   
   
  
 magic mouthwash solution Your last dose was: Your next dose is:    
   
   
 Swish and spit 15-30 mL every 2-4 hours as needed for mouth pain. May swallow for throat pain. Magic mouth wash  Maalox Lidocaine 2% viscous  Diphenhydramine oral solution   Pharmacy to mix equal portions of ingredients to a total volume as indicated in the dispense amount. Quantity:  480 mL Refills:  2 MIRALAX 17 gram packet Generic drug:  polyethylene glycol Your last dose was: Your next dose is:    
   
   
 Dose:  17 g Take 17 g by mouth daily. Refills:  0  
     
   
   
   
  
 nicotine 14 mg/24 hr patch Commonly known as:  Gogo Chin Your last dose was: Your next dose is:    
   
   
 Dose:  1 Patch 1 Patch by TransDERmal route every twenty-four (24) hours. Quantity:  30 Patch Refills:  1  
     
   
   
   
  
 ondansetron hcl 8 mg tablet Commonly known as:  Medina Loss Your last dose was: Your next dose is:    
   
   
 Dose:  8 mg Take 1 Tab by mouth every eight (8) hours as needed for Nausea. Quantity:  45 Tab Refills:  5  
     
   
   
   
  
 prochlorperazine 10 mg tablet Commonly known as:  COMPAZINE Your last dose was: Your next dose is:    
   
   
 Dose:  10 mg Take 1 Tab by mouth every six (6) hours as needed for Nausea. Quantity:  50 Tab Refills:  5 XYZAL 5 mg tablet Generic drug:  levocetirizine Your last dose was: Your next dose is:    
   
   
 Dose:  5 mg Take 5 mg by mouth daily as needed for Allergies. Refills:  0 Where to Get Your Medications Information on where to get these meds will be given to you by the nurse or doctor. ! Ask your nurse or doctor about these medications  
  dilTIAZem  mg ER capsule  
 metoprolol succinate 25 mg XL tablet

## 2017-09-27 ENCOUNTER — PATIENT OUTREACH (OUTPATIENT)
Dept: FAMILY MEDICINE CLINIC | Age: 72
End: 2017-09-27

## 2017-09-27 VITALS
SYSTOLIC BLOOD PRESSURE: 118 MMHG | OXYGEN SATURATION: 100 % | HEART RATE: 73 BPM | HEIGHT: 66 IN | BODY MASS INDEX: 27.32 KG/M2 | TEMPERATURE: 97.8 F | WEIGHT: 169.97 LBS | DIASTOLIC BLOOD PRESSURE: 69 MMHG | RESPIRATION RATE: 17 BRPM

## 2017-09-27 PROBLEM — I48.92 ATRIAL FLUTTER WITH RAPID VENTRICULAR RESPONSE (HCC): Status: ACTIVE | Noted: 2017-09-27

## 2017-09-27 PROBLEM — I48.91 ATRIAL FIBRILLATION (HCC): Status: ACTIVE | Noted: 2017-09-27

## 2017-09-27 PROBLEM — I48.92 ATRIAL FLUTTER (HCC): Status: ACTIVE | Noted: 2017-09-26

## 2017-09-27 LAB
ANION GAP SERPL CALC-SCNC: 7 MMOL/L (ref 5–15)
ATRIAL RATE: 318 BPM
BASOPHILS # BLD: 0 K/UL (ref 0–0.1)
BASOPHILS NFR BLD: 0 % (ref 0–1)
BUN SERPL-MCNC: 19 MG/DL (ref 6–20)
BUN/CREAT SERPL: 26 (ref 12–20)
CALCIUM SERPL-MCNC: 8.8 MG/DL (ref 8.5–10.1)
CALCULATED P AXIS, ECG09: 35 DEGREES
CALCULATED R AXIS, ECG10: -51 DEGREES
CALCULATED T AXIS, ECG11: 110 DEGREES
CHLORIDE SERPL-SCNC: 104 MMOL/L (ref 97–108)
CO2 SERPL-SCNC: 28 MMOL/L (ref 21–32)
CREAT SERPL-MCNC: 0.73 MG/DL (ref 0.7–1.3)
DIAGNOSIS, 93000: NORMAL
EOSINOPHIL # BLD: 0 K/UL (ref 0–0.4)
EOSINOPHIL NFR BLD: 0 % (ref 0–7)
ERYTHROCYTE [DISTWIDTH] IN BLOOD BY AUTOMATED COUNT: 19 % (ref 11.5–14.5)
GLUCOSE SERPL-MCNC: 101 MG/DL (ref 65–100)
HCT VFR BLD AUTO: 25.2 % (ref 36.6–50.3)
HGB BLD-MCNC: 8 G/DL (ref 12.1–17)
LYMPHOCYTES # BLD: 3.4 K/UL (ref 0.8–3.5)
LYMPHOCYTES NFR BLD: 37 % (ref 12–49)
MAGNESIUM SERPL-MCNC: 1.9 MG/DL (ref 1.6–2.4)
MCH RBC QN AUTO: 28 PG (ref 26–34)
MCHC RBC AUTO-ENTMCNC: 31.7 G/DL (ref 30–36.5)
MCV RBC AUTO: 88.1 FL (ref 80–99)
MONOCYTES # BLD: 0 K/UL (ref 0–1)
MONOCYTES NFR BLD: 0 % (ref 5–13)
NEUTS SEG # BLD: 5.8 K/UL (ref 1.8–8)
NEUTS SEG NFR BLD: 63 % (ref 32–75)
PHOSPHATE SERPL-MCNC: 4.1 MG/DL (ref 2.6–4.7)
PLATELET # BLD AUTO: 159 K/UL (ref 150–400)
POTASSIUM SERPL-SCNC: 3.9 MMOL/L (ref 3.5–5.1)
Q-T INTERVAL, ECG07: 296 MS
QRS DURATION, ECG06: 110 MS
QTC CALCULATION (BEZET), ECG08: 481 MS
RBC # BLD AUTO: 2.86 M/UL (ref 4.1–5.7)
SODIUM SERPL-SCNC: 139 MMOL/L (ref 136–145)
TROPONIN I SERPL-MCNC: 0.11 NG/ML
TROPONIN I SERPL-MCNC: 0.12 NG/ML
VENTRICULAR RATE, ECG03: 159 BPM
WBC # BLD AUTO: 9.2 K/UL (ref 4.1–11.1)

## 2017-09-27 PROCEDURE — 77030029211 HC GEL MEDIH TU INLC -B

## 2017-09-27 PROCEDURE — 74011250636 HC RX REV CODE- 250/636: Performed by: FAMILY MEDICINE

## 2017-09-27 PROCEDURE — 74011250637 HC RX REV CODE- 250/637: Performed by: HOSPITALIST

## 2017-09-27 PROCEDURE — 99218 HC RM OBSERVATION: CPT

## 2017-09-27 PROCEDURE — 97116 GAIT TRAINING THERAPY: CPT

## 2017-09-27 PROCEDURE — G8978 MOBILITY CURRENT STATUS: HCPCS

## 2017-09-27 PROCEDURE — 97530 THERAPEUTIC ACTIVITIES: CPT

## 2017-09-27 PROCEDURE — 97161 PT EVAL LOW COMPLEX 20 MIN: CPT

## 2017-09-27 PROCEDURE — 74011250636 HC RX REV CODE- 250/636: Performed by: STUDENT IN AN ORGANIZED HEALTH CARE EDUCATION/TRAINING PROGRAM

## 2017-09-27 PROCEDURE — 96367 TX/PROPH/DG ADDL SEQ IV INF: CPT

## 2017-09-27 PROCEDURE — 97165 OT EVAL LOW COMPLEX 30 MIN: CPT

## 2017-09-27 PROCEDURE — 77030012856

## 2017-09-27 PROCEDURE — 77030011256 HC DRSG MEPILEX <16IN NO BORD MOLN -A

## 2017-09-27 PROCEDURE — 36415 COLL VENOUS BLD VENIPUNCTURE: CPT | Performed by: HOSPITALIST

## 2017-09-27 PROCEDURE — 96372 THER/PROPH/DIAG INJ SC/IM: CPT

## 2017-09-27 PROCEDURE — 85025 COMPLETE CBC W/AUTO DIFF WBC: CPT | Performed by: HOSPITALIST

## 2017-09-27 PROCEDURE — 96366 THER/PROPH/DIAG IV INF ADDON: CPT

## 2017-09-27 PROCEDURE — 83735 ASSAY OF MAGNESIUM: CPT | Performed by: HOSPITALIST

## 2017-09-27 PROCEDURE — 74011250637 HC RX REV CODE- 250/637: Performed by: SPECIALIST

## 2017-09-27 PROCEDURE — 84484 ASSAY OF TROPONIN QUANT: CPT | Performed by: HOSPITALIST

## 2017-09-27 PROCEDURE — 74011000258 HC RX REV CODE- 258: Performed by: EMERGENCY MEDICINE

## 2017-09-27 PROCEDURE — 97535 SELF CARE MNGMENT TRAINING: CPT

## 2017-09-27 PROCEDURE — 80048 BASIC METABOLIC PNL TOTAL CA: CPT | Performed by: HOSPITALIST

## 2017-09-27 PROCEDURE — 96361 HYDRATE IV INFUSION ADD-ON: CPT

## 2017-09-27 PROCEDURE — 84100 ASSAY OF PHOSPHORUS: CPT | Performed by: HOSPITALIST

## 2017-09-27 PROCEDURE — G8979 MOBILITY GOAL STATUS: HCPCS

## 2017-09-27 PROCEDURE — 74011000250 HC RX REV CODE- 250: Performed by: EMERGENCY MEDICINE

## 2017-09-27 PROCEDURE — 74011250636 HC RX REV CODE- 250/636: Performed by: HOSPITALIST

## 2017-09-27 RX ORDER — MAGNESIUM SULFATE 1 G/100ML
1 INJECTION INTRAVENOUS ONCE
Status: COMPLETED | OUTPATIENT
Start: 2017-09-27 | End: 2017-09-27

## 2017-09-27 RX ORDER — MAGNESIUM SULFATE 1 G/100ML
INJECTION INTRAVENOUS
Status: DISPENSED
Start: 2017-09-27 | End: 2017-09-27

## 2017-09-27 RX ORDER — DILTIAZEM HYDROCHLORIDE 240 MG/1
240 CAPSULE, COATED, EXTENDED RELEASE ORAL DAILY
Qty: 30 CAP | Refills: 0 | Status: SHIPPED | OUTPATIENT
Start: 2017-09-27 | End: 2017-10-24 | Stop reason: SDUPTHER

## 2017-09-27 RX ORDER — METOPROLOL SUCCINATE 25 MG/1
25 TABLET, EXTENDED RELEASE ORAL DAILY
Status: DISCONTINUED | OUTPATIENT
Start: 2017-09-27 | End: 2017-09-27 | Stop reason: HOSPADM

## 2017-09-27 RX ORDER — LEVOCETIRIZINE DIHYDROCHLORIDE 5 MG/1
5 TABLET, FILM COATED ORAL
COMMUNITY

## 2017-09-27 RX ORDER — METOPROLOL SUCCINATE 25 MG/1
25 TABLET, EXTENDED RELEASE ORAL DAILY
Qty: 30 TAB | Refills: 0 | Status: SHIPPED | OUTPATIENT
Start: 2017-09-27 | End: 2017-10-24 | Stop reason: SDUPTHER

## 2017-09-27 RX ORDER — DIPHENHYDRAMINE HCL 25 MG
25 TABLET ORAL
COMMUNITY

## 2017-09-27 RX ADMIN — Medication 10 ML: at 05:25

## 2017-09-27 RX ADMIN — DILTIAZEM HYDROCHLORIDE 240 MG: 120 CAPSULE, COATED, EXTENDED RELEASE ORAL at 09:23

## 2017-09-27 RX ADMIN — SODIUM CHLORIDE 115 ML/HR: 900 INJECTION, SOLUTION INTRAVENOUS at 06:44

## 2017-09-27 RX ADMIN — DILTIAZEM HYDROCHLORIDE 7.5 MG/HR: 5 INJECTION INTRAVENOUS at 00:31

## 2017-09-27 RX ADMIN — METOPROLOL SUCCINATE 25 MG: 25 TABLET, FILM COATED, EXTENDED RELEASE ORAL at 09:24

## 2017-09-27 RX ADMIN — ASPIRIN 81 MG: 81 TABLET, COATED ORAL at 09:24

## 2017-09-27 RX ADMIN — ENOXAPARIN SODIUM 120 MG: 60 INJECTION SUBCUTANEOUS at 09:23

## 2017-09-27 RX ADMIN — MAGNESIUM SULFATE HEPTAHYDRATE 1 G: 1 INJECTION, SOLUTION INTRAVENOUS at 01:20

## 2017-09-27 NOTE — PROGRESS NOTES
0700: Bedside and Verbal shift change report given to Doris RN (oncoming nurse) by Tyra Thurman RN (offgoing nurse). Report included the following information SBAR, Kardex, ED Summary, Intake/Output, MAR, Recent Results and Cardiac Rhythm Afib.     0810: Cardizem turned off per Dr. Carlyn Jimenez, will continue with PO. Pt resting comfortably, sitting up in chair. Complaints of leg pain from sores. Will address with family practice. 1130: Pt being discharged today. Cardiology has signed off on pt. Per Family practice, wound care consulted for wounds on feet. 1200: Pt stating that he will not have a ride until 4pm this afternoon. 1600: pt taken off tele, IVs removed, changed into his clothes. Will wait until daughter gets here to go over discharge paperwork. 1800: Pt now stating ride will be here by 1900 now. 1845: Pt discharged.

## 2017-09-27 NOTE — PROGRESS NOTES
Problem: Self Care Deficits Care Plan (Adult)  Goal: *Acute Goals and Plan of Care (Insert Text)  Occupational Therapy Goals  Initiated 9/27/2017    1. Patient will perform lower body dressing with supervision/set-up using adaptive dressing aids PRN within 7 day(s). 2. Patient will perform toilet transfers with supervision/set-up within 7 day(s). 3. Patient will perform all aspects of toileting with supervision/set-up within 7 day(s). 4. Patient will participate in upper extremity therapeutic exercise/activities with modified independence for 10 minutes within 7 day(s). 5. Patient will utilize energy conservation techniques during functional activities with verbal cues within 7 day(s). OCCUPATIONAL THERAPY EVALUATION  Patient: Carlos Edwards (46 y.o. male)  Date: 9/27/2017  Primary Diagnosis: A-fib [facility])  Atrial flutter with rapid ventricular response ([address]        Precautions: fall        ASSESSMENT :  Based on the objective data described below, the patient presents with decreased activity tolerance following admission on 9/26 for Afib. Patient recently admitted (9/15-9/17) for Afib and B LE DVTs; He was discharged home and managing well until this admission. PTA, patient lives alone, checks in with his son daily, and manages all care, ADLs + IADLs, independently. Patient presenting with B LE edema, unable to reach distal LE, therefore requiring increased assistance for LB ADLs. Overall, he was independent to mod I level for UB ADLs, required max A for LB dressing, and min A for bathing and toileting. Patient required supervision for bed mobility and up to min A needed for OOB transfers using SPC. Patient was educated on adaptive ADL techniques, pursed lip breathing technique for improved recovery d/t SOB during activity, and energy conservation techniques. Patient would benefit from continued skilled OT to progress towards goals and improve overall independence.        Patient will benefit from skilled intervention to address the above impairments. Patients rehabilitation potential is considered to be Good  Factors which may influence rehabilitation potential include:   [X]             None noted  [ ]             Mental ability/status  [ ]             Medical condition  [ ]             Home/family situation and support systems  [ ]             Safety awareness  [ ]             Pain tolerance/management  [ ]             Other:        PLAN :  Recommendations and Planned Interventions:  [X]               Self Care Training                  [X]        Therapeutic Activities  [X]               Functional Mobility Training    [ ]        Cognitive Retraining  [X]               Therapeutic Exercises           [X]        Endurance Activities  [X]               Balance Training                   [ ]        Neuromuscular Re-Education  [ ]               Visual/Perceptual Training     [X]   Home Safety Training  [X]               Patient Education                 [X]        Family Training/Education  [ ]               Other (comment):     Frequency/Duration: Patient will be followed by occupational therapy 5 times a week to address goals. Discharge Recommendations: home health   Further Equipment Recommendations for Discharge: none        SUBJECTIVE:   Patient stated I am ready to get home.       OBJECTIVE DATA SUMMARY:   HISTORY:   Past Medical History:   Diagnosis Date    Anemia      Cardiomyopathy ([facility]       mild LV regional/global dysfunction, likely ischemic etiology    Coronary artery calcification seen on CT scan      Dysfunction, erectile      Hyperlipidemia 5/18/2010    Hypertension      Metastatic lung cancer (metastasis from lung to other site) [facility])       brain    Paroxysmal atrial flutter ([facility]     No past surgical history on file. Prior Level of Function/Home Situation: Patient lives alone. He reports independence with ADLs and functional mobility PTA.        Home Situation  Home Environment: Apartment  # Steps to Enter: 0  One/Two Story Residence: One story  Living Alone: Yes  Support Systems: Child(malena), Friends \ neighbors (son)  Patient Expects to be Discharged to[de-identified] Apartment  Current DME Used/Available at Home: Cane, straight, Grab bars  Tub or Shower Type: Tub/Shower combination (built in )  [X]  Right hand dominant             [ ]  Left hand dominant     EXAMINATION OF PERFORMANCE DEFICITS:  Cognitive/Behavioral Status:  Neurologic State: Alert  Orientation Level: Oriented X4  Cognition: Appropriate decision making; Appropriate for age attention/concentration; Appropriate safety awareness; Follows commands  Perception: Appears intact  Perseveration: No perseveration noted  Safety/Judgement: Awareness of environment;Good awareness of safety precautions     Skin: Intact in the uppers     Edema: None noted in the uppers     Hearing: Auditory  Auditory Impairment: None     Vision/Perceptual:    Tracking: Able to track stimulus in all quadrants w/o difficulty    Diplopia: No    Acuity: Within Defined Limits       Range of Motion:  AROM: Within functional limits in the uppers  PROM: Within functional limits in the uppers     Strength:  Decreased but functional in the uppers- 4/5 bilaterally + fatigues easily      Coordination:  Fine Motor Skills-Upper: Left Intact; Right Intact    Gross Motor Skills-Upper: Left Intact; Right Intact     Tone & Sensation:  Tone: normal  Sensation: Intact     Balance:  Sitting: Intact  Standing: Impaired  Standing - Static: Good  Standing - Dynamic : Fair     Functional Mobility and Transfers for ADLs:  Bed Mobility:  Rolling: Supervision  Supine to Sit: Supervision  Sit to Supine:  (pt remained up at end of tx)  Scooting: Supervision     Transfers:  Sit to Stand: Minimum assistance  Stand to Sit: Minimum assistance  Bed to Chair: Minimum assistance  Toilet Transfer : Minimum assistance     ADL Assessment:  Feeding: Independent     Oral Facial Hygiene/Grooming: Modified Independent     Bathing: Minimum assistance     Upper Body Dressing: Modified independent     Lower Body Dressing: Maximum assistance; Patient instructed to don all clothing while sitting prior to standing, doff all clothing to knees while standing, then sit to doff clothing off from knees to feet in order to facilitate fall prevention, pain management, and energy conservation with ADLS. Patient indicated understanding/recalled strategies with min instruction. Toileting: Minimum assistance; Patient was educated on safe technique for toileting. Patient instructed to stay seated on standard commode and attempt to reach around with both feet stable on the ground. Patient encouraged to use one hand to stabilize self on stable surface (sink) and other hand to complete task. If needing to switch hands, ensure that one hand is used for stabilizing at all times. He confirmed understanding. Provided verbal cuing for education for breathing techniques including pursed lip breathing techniques (PLB) and circulatory breathing. Additionally, patient was educated on energy conservation techniques, including how they specifically apply to functional activities; This includes pacing, rest breaks, and planning. Patient with good verbal understanding however needing additional cuing through out tasks to use techniques. Additional time needed during tasks.        Cognitive Retraining  Safety/Judgement: Awareness of environment;Good awareness of safety precautions     Functional Measure:  Barthel Index:      Bathin  Bladder: 10  Bowels: 10  Groomin  Dressin  Feeding: 10  Mobility: 10  Stairs: 5  Toilet Use: 5  Transfer (Bed to Chair and Back): 10  Total: 70         Barthel and G-code impairment scale:  Percentage of impairment CH  0% CI  1-19% CJ  20-39% CK  40-59% CL  60-79% CM  80-99% CN  100%   Barthel Score 0-100 100 99-80 79-60 59-40 20-39 1-19    0   Barthel Score 0-20 20 17-19 13-16 9-12 5-8 1-4 0 The Barthel ADL Index: Guidelines  1. The index should be used as a record of what a patient does, not as a record of what a patient could do. 2. The main aim is to establish degree of independence from any help, physical or verbal, however minor and for whatever reason. 3. The need for supervision renders the patient not independent. 4. A patient's performance should be established using the best available evidence. Asking the patient, friends/relatives and nurses are the usual sources, but direct observation and common sense are also important. However direct testing is not needed. 5. Usually the patient's performance over the preceding 24-48 hours is important, but occasionally longer periods will be relevant. 6. Middle categories imply that the patient supplies over 50 per cent of the effort. 7. Use of aids to be independent is allowed. Iván Berman., Barthel, D.W. (3492). Functional evaluation: the Barthel Index. [facility] (14)2. Yamilet Bah shannon SHORTY Ocampo, John Bailey., Jewels Campos., Aman Ha, [address] [place] [address] (1999). Measuring the change indisability after inpatient rehabilitation; comparison of the responsiveness of the Barthel Index and Functional [place] Measure. Journal of Neurology, Neurosurgery, and Psychiatry, 66(4), 299-414. Alexandria Beal, N.J.A, JULIA Price, & Camila Vallecillo M.A. (2004.) Assessment of post-stroke quality of life in cost-effectiveness studies: The usefulness of the Barthel Index and the EuroQoL-5D. Quality of Life Research, 13, 213-86         G codes: In compliance with CMSs Claims Based Outcome Reporting, the following G-code set was chosen for this patient based on their primary functional limitation being treated: The outcome measure chosen to determine the severity of the functional limitation was the Barthel Index with a score of 70/100 which was correlated with the impairment scale.       · Self Care:               - CURRENT STATUS:    CJ - 20%-39% impaired, limited or restricted               - GOAL STATUS:           CI - 1%-19% impaired, limited or restricted               - D/C STATUS:                       ---------------To be determined---------------      Occupational Therapy Evaluation Charge Determination   History Examination Decision-Making   LOW Complexity : Brief history review  LOW Complexity : 1-3 performance deficits relating to physical, cognitive , or psychosocial skils that result in activity limitations and / or participation restrictions  LOW Complexity : No comorbidities that affect functional and no verbal or physical assistance needed to complete eval tasks       Based on the above components, the patient evaluation is determined to be of the following complexity level: LOW   Pain:  Pain Scale 1: Numeric (0 - 10)  Pain Intensity 1: 0              Activity Tolerance:   Patient tolerated eval well. Please refer to the flowsheet for vital signs taken during this treatment. After treatment:   [X] Patient left in no apparent distress sitting up in chair  [ ] Patient left in no apparent distress in bed  [X] Call bell left within reach  [X] Nursing notified  [ ] Caregiver present  [ ] Bed alarm activated      COMMUNICATION/EDUCATION:   The patients plan of care was discussed with: Physical Therapist, Registered Nurse and Patient. [X] Home safety education was provided and the patient/caregiver indicated understanding. [X] Patient/family have participated as able in goal setting and plan of care. [X] Patient/family agree to work toward stated goals and plan of care. [ ] Patient understands intent and goals of therapy, but is neutral about his/her participation. [ ] Patient is unable to participate in goal setting and plan of care. This patients plan of care is appropriate for delegation to [facility].      Thank you for this referral.  Branden Banks, OTR/L  Time Calculation: 37 mins

## 2017-09-27 NOTE — PROGRESS NOTES
Problem: Mobility Impaired (Adult and Pediatric)  Goal: *Acute Goals and Plan of Care (Insert Text)  Physical Therapy Goals  Initiated 9/27/2017  1. Patient will move from supine to sit and sit to supine , scoot up and down and roll side to side in bed with independence within 7 day(s). 2. Patient will transfer from bed to chair and chair to bed with independence using the least restrictive device within 7 day(s). 3. Patient will perform sit to stand with independence within 7 day(s). 4. Patient will ambulate with modified independence for 200 feet with the least restrictive device within 7 day(s). PHYSICAL THERAPY EVALUATION  Patient: Alina Richardson (27 y.o. male)  Date: 9/27/2017  Primary Diagnosis: A-fib [facility])  Atrial flutter with rapid ventricular response ([facility])        Precautions: fall         ASSESSMENT :  Based on the objective data described below, the patient presents with generalized weakness and debility, sit on the edge of bed with SBA, sit to stand SBA, ambulated with single point cane in the room CGA, initial;ly patient was unsteady however after ambulation more and after assisting on and off the commode patient became more stable and steady. OOB to chair as tolerated performed some active range of motion on both LE all planes. Notified nurse who agreed to monitor and assist back to bed when ask. Patient will benefit from skilled intervention to address the above impairments.   Patients rehabilitation potential is considered to be Excellent  Factors which may influence rehabilitation potential include:   [ ]         None noted  [ ]         Mental ability/status  [X]         Medical condition  [ ]         Home/family situation and support systems  [ ]         Safety awareness  [ ]         Pain tolerance/management  [ ]         Other:        PLAN :  Recommendations and Planned Interventions:  [X]           Bed Mobility Training             [ ]    Neuromuscular Re-Education  [X] Transfer Training                   [ ]    Orthotic/Prosthetic Training  [X]           Gait Training                         [ ]    Modalities  [X]           Therapeutic Exercises           [ ]    Edema Management/Control  [X]           Therapeutic Activities            [ ]    Patient and Family Training/Education  [ ]           Other (comment):     Frequency/Duration: Patient will be followed by physical therapy  5 times a week to address goals. Discharge Recommendations: Home Health to resume once medically stable to go back home  Further Equipment Recommendations for Discharge: already has DME's       SUBJECTIVE:   Patient stated I feel stiff from lying down on this bed.       OBJECTIVE DATA SUMMARY:   HISTORY:    Past Medical History:   Diagnosis Date    Anemia      Cardiomyopathy ([facility] [address]       mild LV regional/global dysfunction, likely ischemic etiology    Coronary artery calcification seen on CT scan      Dysfunction, erectile      Hyperlipidemia 5/18/2010    Hypertension      Metastatic lung cancer (metastasis from lung to other site) [facility])       brain    Paroxysmal atrial flutter ([facility] [address]     No past surgical history on file. Prior Level of Function/Home Situation: modified independent with single point cane  Personal factors and/or comorbidities impacting plan of care:      Home Situation  Home Environment: Apartment  # Steps to Enter: 0  One/Two Story Residence: One story  Living Alone: Yes  Support Systems: Child(malena), Friends \ neighbors (son)  Patient Expects to be Discharged to[de-identified] Apartment  Current DME Used/Available at Home: Cane, straight, Grab bars  Tub or Shower Type: Tub/Shower combination (built in )     EXAMINATION/PRESENTATION/DECISION MAKING:   Critical Behavior:              Hearing:   Auditory  Auditory Impairment: None     Range Of Motion:  AROM: Within functional limits           PROM: Within functional limits           Strength:    Strength: Generally decreased, functional Tone & Sensation:                                  Coordination:  Coordination: Within functional limits  Vision:      Functional Mobility:  Bed Mobility:  Rolling: Stand-by asssistance  Supine to Sit: Stand-by asssistance  Sit to Supine: Stand-by asssistance  Scooting: Stand-by asssistance  Transfers:  Sit to Stand: Stand-by asssistance  Stand to Sit: Stand-by asssistance  Stand Pivot Transfers: Stand-by asssistance     Bed to Chair: Stand-by asssistance              Balance:   Sitting: Intact  Standing: With support; Impaired  Standing - Static: Good  Standing - Dynamic : Fair  Ambulation/Gait Training:  Distance (ft): 10 Feet (ft)     Ambulation - Level of Assistance: Contact guard assistance     Gait Description (WDL): Exceptions to WDL  Gait Abnormalities: Path deviations        Base of Support: Widened     Speed/Allie: Fluctuations                          Therapeutic Exercises:    Instructed patient to continue active range of motion exercise on both legs while up on chair or on bed. Functional Measure:  Tinetti test:      Sitting Balance: 1  Arises: 1  Attempts to Rise: 2  Immediate Standing Balance: 1  Standing Balance: 1  Nudged: 1  Eyes Closed: 1  Turn 360 Degrees - Continuous/Discontinuous: 1  Turn 360 Degrees - Steady/Unsteady: 1  Sitting Down: 2  Balance Score: 12  Indication of Gait: 1  R Step Length/Height: 1  L Step Length/Height: 1  R Foot Clearance: 1  L Foot Clearance: 1  Step Symmetry: 1  Step Continuity: 0  Path: 1  Trunk: 1  Walking Time: 0  Gait Score: 8  Total Score: 20         Tinetti Test and G-code impairment scale:  Percentage of Impairment CH     0%    CI     1-19% CJ     20-39% CK     40-59% CL     60-79% CM     80-99% CN      100%   Tinetti  Score 0-28 28 23-27 17-22 12-16 6-11 1-5 0          Tinetti Tool Score Risk of Falls  <19 = High Fall Risk  19-24 = Moderate Fall Risk  25-28 = Low Fall Risk  Kjetti ME.  Performance-Oriented Assessment of Mobility Problems in Elderly Patients. [facility]; [id number]. (Scoring Description: PT Bulletin Feb. 10, 1993)     Older adults: Barbara Shannon et al, 2009; n = 1000 [facility] elderly evaluated with ABC, CORI, ADL, and IADL)  · Mean CORI score for males aged 69-68 years = 26.21(3.40)  · Mean CORI score for females age 69-68 years = 25.16(4.30)  · Mean CORI score for males over 80 years = 23.29(6.02)  · Mean CORI score for females over 80 years = 17.20(8.32)         G codes: In compliance with CMSs Claims Based Outcome Reporting, the following G-code set was chosen for this patient based on their primary functional limitation being treated: The outcome measure chosen to determine the severity of the functional limitation was the tinetti with a score of 20/28 which was correlated with the impairment scale. · Mobility - Walking and Moving Around:               - CURRENT STATUS:    CJ - 20%-39% impaired, limited or restricted               - GOAL STATUS:           CI - 1%-19% impaired, limited or restricted               - D/C STATUS:                       ---------------To be determined---------------      Physical Therapy Evaluation Charge Determination   History Examination Presentation Decision-Making   LOW Complexity : Zero comorbidities / personal factors that will impact the outcome / POC LOW Complexity : 1-2 Standardized tests and measures addressing body structure, function, activity limitation and / or participation in recreation  LOW Complexity : Stable, uncomplicated  Other outcome measures tinetti  MEDIUM      Based on the above components, the patient evaluation is determined to be of the following complexity level: LOW      Pain:  Pain Scale 1: Numeric (0 - 10)  Pain Intensity 1: 0              Activity Tolerance:   Good. Please refer to the flowsheet for vital signs taken during this treatment.   After treatment:   [X]         Patient left in no apparent distress sitting up in chair  [ ]         Patient left in no apparent distress in bed  [X]         Call bell left within reach  [X]         Nursing notified  [ ]         Caregiver present  [ ]         Bed alarm activated      COMMUNICATION/EDUCATION:   The patients plan of care was discussed with: Occupational Therapist, Registered Nurse and patient. [X]         Fall prevention education was provided and the patient/caregiver indicated understanding. [X]         Patient/family have participated as able in goal setting and plan of care. [X]         Patient/family agree to work toward stated goals and plan of care. [ ]         Patient understands intent and goals of therapy, but is neutral about his/her participation. [ ]         Patient is unable to participate in goal setting and plan of care. Thank you for this referral.  Juan Pablo Carter, PT,WCC.    Time Calculation: 25 mins

## 2017-09-27 NOTE — CDMP QUERY
1) =>Chronic Bilateral lower extremity DVT's treated with lovenox 80 mg BID    =>Other Explanation of clinical findings  =>Unable to Determine (no explanation of clinical findings)    The medical record reflects the following clinical findings, treatment, and risk factors:  Risk Factors: 66 yo M admitted with Atrial flutter    Clinical Indicators: recent bilateral lower ext DVT's discovered on 9/15/17   h/p states \"Hx BL Lower extremity DVT: Patient recently admitted for BL lower extremity DVTs (bilateral posterior tibial DVTs), thought to be secondary to hypercoagulable state of patient's malignancy. \"    Treatment:  lovenox 80mg BID       Please clarify and document your clinical opinion in the progress notes and discharge summary including the definitive and/or presumptive diagnosis, (suspected or probable), related to the above clinical findings. Please include clinical findings supporting your diagnosis.     Thank you for your time   [facility] RN/BSN, [address] [place] [address]  Desk:   [phone]   Other:  [phone]

## 2017-09-27 NOTE — DISCHARGE SUMMARY
[address] [facility] [address] [place] [address]   Office [phone]  Fax [phone]       Discharge / Transfer / Off-Service Note     Name: Evelina Moses MRN: [id number]  Sex: Male   YOB: 1945  Age: 67 y.o. PCP: August Santacruz MD     Date of admission: 9/26/2017  Date of discharge/transfer: 9/27/2017    Attending physician at admission: Dr. Katie Salmeron  Attending physician at discharge/transfer: Dr. Wilson Faviola  Resident physician at discharge/transfer: Keshav Monzon MD     Consultants during hospitalization  Dr. Mack Delgado, Heme/Onc  Dr. Villa Leigh, Cardiology      Admission diagnoses   A-fib [facility])  Atrial flutter with rapid ventricular response ([address]    Recommended follow-up after discharge  · PCP  · Cardiology        History of Present Illness  Evelina Moses is a 67 y.o. 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 who presents to the ER complaining of tachycardia. Patient reports that today around 1pm his home health nurse checked his vitals and found him to be tachycardic with  and he was sent to ED to be evaluated. Patient reports not feeling any CP or palpitations at any point. His only complaint is burning in BL lower extremities due to his chemotherapy. Patient denies any SOB. Denies any abdominal pain, n/v/d but does report constipation which he takes miralax daily. Hospital course    SVT; A-flutter with 2:1 AV conduction: Patient had  when checked by home health nurse. EKG on admission showed Atrial flutter with rate of 150 and 2:1 AV conduction. Patient reported not feeling any CP or palpitations. On previous admission on 9/15, patient was newly diagnosed with A-fib with RVR and converted back to sinus after being given diltiazem, 1L bolus, and 5mg lopressor. During this admission, troponins mildly elevated at 0.12-->0.12-->0. 11. Patient was placed on diltiazem drip and converted to NSR overnight.  Patient was then switched to 240mg diltiazem PO (he was previously on 180mg at home). Patient will continue diltiazem 240mg daily, ASA 81mg daily, and will start metoprolol 25mg XL (per consulted cardiologist). Patient will follow up with cardiologist.     Hx BL Lower extremity DVT: Patient recently admitted for BL lower extremity DVTs (bilateral posterior tibial DVTs), thought to be secondary to hypercoagulable state of patient's malignancy. CTA negative for PE and had no CP, SOB, or hypoxia. Patient discharged with lovenox 120mg daily. During this hospital stay, patient was given lovenox 80mg BID and was discharged with home dose of 120mg lovenox daily. Patient will follow up with Hematology.      Stage IV squamous cell lung cancer - Diagnosed 7/2017, noncurative, on palliative chemo, s/p gamma knife for brain mets. Being followed by Dr. Syd Hinson, last seen 9/15. Diagnosis made 7/2017. Palliative chemotherapy w carboplatin and gemcitabine q3 weeks started on 8/25/17. Following Dr. Jasmine Bull (neurosurg) for brain mets. Patient will continue following up with heme/onc.      Hx of tobacco abuse - Pt smoked 1ppd for 45 years but quit after cancer diagosis at the end of July. Patient given nicotine patch during hospital stay and will continue use OP. Patient will follow up with PCP.      HTN: BP nomotensive at 117/82 on admission. Per chart, was previously taking amlodipine but was discontinued during previous admission because patient was given diltiazem IR for A-fib. Patient was normotensive during admission while on diltiazem drip and was discharged with metoprolol 25mg XL daily per consulted cardiologist. Patient will follow up with PCP and cardiologist.     Lower extremity wounds and swelling: On admission, patient reported having burning pain in lower extremities ever since starting his chemotherapy.  On physical exam, patient found to have 3-4+ pitting edema on BL lower extremities up to the knees with multiple open superficial wounds, mainly along ankles. Patient was seen by wound care specialist and was discharged with medihoney ointment for wounds as well as tubigrip compression stalkings for his edema. Patient reports having discomfort from previous compression stalkings, however these new stalkings have a medium compressive strength and should be more easily tolerated. Patient will follow up with wound care OP as well as PCP. Physical exam at discharge:    Vitals Reviewed. Visit Vitals    /69    Pulse 73    Temp 97.8 °F (36.6 °C)    Resp 17    Ht 5' 6\" (1.676 m)    Wt 169 lb 15.6 oz (77.1 kg)    SpO2 100%    BMI 27.43 kg/m2      General Oriented to person, place, and time and well-developed. Appears well-nourished, no distress. Not diaphoretic. HENT Head Normocephalic and atraumatic. Eyes Conjunctivae are normal, no discharge. No scleral icterus. Nose Nose normal, clear turbinates. Oral Oropharynx is clear and moist. No oropharyngeal exudate. Neck No thyromegaly present. No cervical adenopathy. Cardio Normal rate, regular rhythm. Exam reveals no gallop and no friction rub. No murmur heard. No chest wall tenderness. Pulmonary Effort normal and breath sounds normal. No respiratory distress. No wheezes, no rales. Abdominal Soft. Bowel sounds normal. No distension. No tenderness.  Deferred. Extremities 3-4+ pitting BL lower extremity edema up to knees. BL lower extremities are TTP. Distal pulses intact. Neurological Alert and oriented to person, place, and time. Dermatology Skin is warm and dry. No rash noted. No erythema or pallor. Psychiatric Affect and judgment normal.        Condition at discharge: Stable.     Labs  Recent Labs      09/27/17   0435  09/26/17   1649   WBC  9.2  11.9*   HGB  8.0*  8.3*   HCT  25.2*  25.6*   PLT  159  175     Recent Labs      09/27/17   0435  09/26/17   1649   NA  139  139   K  3.9  4.2   CL  104  103   CO2  28  28   BUN  19  23*   CREA 0. 73  0.80   GLU  101*  122*   CA  8.8  9.3   MG  1.9  1.6   PHOS  4.1  3.7     No results for input(s): SGOT, GPT, ALT, AP, TBIL, TBILI, TP, ALB, GLOB, GGT, AML, LPSE in the last 72 hours. No lab exists for component: AMYP, HLPSE  Recent Labs      09/27/17   0435  09/26/17   2324  09/26/17   1649   TROIQ  0.11*  0.12*  0.12*     Cultures  · none    Procedures / Diagnostic Studies  · none    Imaging    Results from [place] encounter on 09/26/17   XR CHEST PORT   Narrative EXAM:  XR CHEST PORT    INDICATION:  a  flutter    COMPARISON:  September 15    FINDINGS: A portable AP radiograph of the chest was obtained at 2153 hours. The  patient is on a cardiac monitor. The left hilar mass is again seen. The  cardiac and mediastinal contours and pulmonary vascularity are normal.  The  bones and soft tissues are grossly within normal limits. The rhonda catheter seen  in place. Impression IMPRESSION: No change in left hilar mass. Results from Hospital Encounter encounter on 09/15/17   CTA CHEST W OR W WO CONT   Narrative EXAM: CT ANGIOGRAPHY CHEST   INDICATION: Leg pain and tachycardia, now chest pain or shortness of breath. COMPARISON: 7/14/2017. CONTRAST: 80 mL of Isovue-370. TECHNIQUE:   Precontrast  images were obtained to localize the volume for acquisition. Multislice helical CT arteriography was performed from the diaphragm to the  thoracic inlet during uneventful rapid bolus intravenous contrast  administration. Lung and soft tissue windows were generated. Coronal and  sagittal  reformatted images were also generated and 3D post processing  consisting of coronal maximum intensity projection images was performed. CT dose  reduction was achieved through use of a standardized protocol tailored for this  examination and automatic exposure control for dose modulation.     FINDINGS:  LOWER NECK: The visualized portions of the thyroid and structures of the lower  neck are within normal limits. LUNGS: There is a lobulated and spiculated lingular lung mass which measures 4.3  x 4 x 3.8 cm and contains central areas of cavitation which has decreased in  size since the prior examination at which time it measured 4.9 x 4.3 x 5.3 cm. There is a 1.9 x 1.9 cm right middle lobe nodule (series 3 image 39) which has  decreased in size and is less solid than on the prior examination when it  measured 2.7 x 2.5 cm. PLEURA: PULMONARY ARTERIES: The pulmonary arteries are well enhanced and no  pulmonary emboli are identified. AORTA: The aorta enhances normally without evidence of aneurysm or dissection. There is coronary artery calcification. MEDIASTINUM: There is a large 5 x 4.2 x 5.3 cm left hilar mass which previously  measured 6.1 x 5.3 x 5.7 cm and encases the left main pulmonary artery. The  artery measures 4 mm in diameter and previously measured 2 mm. There is a 3.7 x  2.5 cm right paratracheal lymph node which previously measured 3.7 x 2.3 cm. BONES AND SOFT TISSUES: The bones and soft tissues of the chest wall are within  normal limits. UPPER ABDOMEN: The visualized portions of the upper abdominal organs are normal.         Impression IMPRESSION:   1. No pulmonary embolus. 2. Bilateral lung masses and mediastinal nodes decreased in size. 3. Atherosclerosis with coronary artery calcifications. No procedure found.       Chronic diagnoses   Problem List as of 9/27/2017  Date Reviewed: 9/19/2017          Codes Class Noted - Resolved    Atrial fibrillation [facility]) ICD-10-CM: I48.91  ICD-9-CM: 427.31  9/27/2017 - Present        Metastatic lung cancer (metastasis from lung to other site) [facility]) ICD-10-CM: C34.90  ICD-9-CM: 162.9  Unknown - Present    Overview Signed 9/27/2017  7:57 AM by Cameron Paiz MD     brain             Paroxysmal atrial flutter ([facility] ICD-10-CM: B15.93  ICD-9-CM: 427.32  Unknown - Present        Anemia ICD-10-CM: D64.9  ICD-9-CM: 285.9  Unknown - Present        Cardiomyopathy ([facility] ICD-10-CM: I42.9  ICD-9-CM: 425.4  Unknown - Present    Overview Signed 9/27/2017  7:58 AM by Ronnie Reynoso MD     mild LV regional/global dysfunction, likely ischemic etiology             Atrial flutter with rapid ventricular response (HCC) ICD-10-CM: I48.92  ICD-9-CM: 427.32  9/27/2017 - Present        * (Principal)Atrial flutter (HCC) ICD-10-CM: D88.20  ICD-9-CM: 427.32  9/26/2017 - Present        (HFpEF) heart failure with preserved ejection fraction (HCC) (Chronic) ICD-10-CM: I50.30  ICD-9-CM: 428.9  9/17/2017 - Present        Squamous cell carcinoma of lung, stage IV ([facility] ICD-10-CM: C34.90  ICD-9-CM: 162.9  9/16/2017 - Present        Atrial fibrillation with rapid ventricular response ([facility] ICD-10-CM: I48.91  ICD-9-CM: 427.31  9/16/2017 - Present        Deep vein thrombosis (DVT) of tibial vein of both lower extremities (HCC) ICD-10-CM: Z22.691  ICD-9-CM: 453.42  9/15/2017 - Present        Primary malignant neoplasm of left lung metastatic to other site [facility]) ICD-10-CM: C34.92  ICD-9-CM: 162.9  7/28/2017 - Present        Brain metastases ([facility] ICD-10-CM: C79.31  ICD-9-CM: 198.3  7/28/2017 - Present        Non-compliance ICD-10-CM: Z91.19  ICD-9-CM: V15.81  5/27/2016 - Present        Groin fullness ICD-10-CM: R19.00  ICD-9-CM: 789.30  3/5/2014 - Present        Benign hypertensive heart disease with HF (heart failure) (HCC) ICD-10-CM: I11.0, I50.9  ICD-9-CM: 402.11, 428.9  10/11/2011 - Present        Tobacco abuse ICD-10-CM: Z72.0  ICD-9-CM: 305.1  10/11/2011 - Present        Alcohol abuse ICD-10-CM: F10.10  ICD-9-CM: 305.00  10/11/2011 - Present        Cough ICD-10-CM: R05  ICD-9-CM: 786.2  4/4/2011 - Present        ED (erectile dysfunction) ICD-10-CM: N52.9  ICD-9-CM: 607.84  5/18/2010 - Present        Hyperlipidemia ICD-10-CM: E78.5  ICD-9-CM: 272.4  5/18/2010 - Present        RESOLVED: Pulmonary mass ICD-10-CM: R91.8  ICD-9-CM: 786.6  7/14/2017 - 7/28/2017        RESOLVED: Hypertension ICD-10-CM: I10  ICD-9-CM: 401.9  Unknown - 10/11/2011              Discharge/Transfer Medications  Discharge Medication List as of 9/27/2017  4:21 PM      START taking these medications    Details   metoprolol succinate (TOPROL-XL) 25 mg XL tablet Take 1 Tab by mouth daily. , Print, Disp-30 Tab, R-0         CONTINUE these medications which have CHANGED    Details   dilTIAZem CD (CARDIZEM CD) 240 mg ER capsule Take 1 Cap by mouth daily. Indications: VENTRICULAR RATE CONTROL IN ATRIAL FIBRILLATION, Print, Disp-30 Cap, R-0         CONTINUE these medications which have NOT CHANGED    Details   diphenhydrAMINE (BENADRYL) 25 mg tablet Take 25 mg by mouth nightly as needed for Sleep., Historical Med      levocetirizine (XYZAL) 5 mg tablet Take 5 mg by mouth daily as needed for Allergies. , Historical Med      enoxaparin (LOVENOX) 120 mg/0.8 mL injection 120 mg by SubCUTAneous route daily. , Print, Disp-24 mL, R-5      nicotine (NICODERM CQ) 14 mg/24 hr patch 1 Patch by TransDERmal route every twenty-four (24) hours. , Normal, Disp-30 Patch, R-1      polyethylene glycol (MIRALAX) 17 gram packet Take 17 g by mouth daily. , Historical Med      magic mouthwash solution Swish and spit 15-30 mL every 2-4 hours as needed for mouth pain. May swallow for throat pain. Magic mouth wash   Maalox  Lidocaine 2% viscous   Diphenhydramine oral solution     Pharmacy to mix equal portions of ingredients to a total volume as ind icated in the dispense amount. , Print, Disp-480 mL, R-2      prochlorperazine (COMPAZINE) 10 mg tablet Take 1 Tab by mouth every six (6) hours as needed for Nausea., Normal, Disp-50 Tab, R-5      lidocaine-prilocaine (EMLA) topical cream Apply  to affected area as needed for Pain (Apply 30-60 min. prior to having your port accessed). , Normal, Disp-30 g, R-0      ondansetron hcl (ZOFRAN) 8 mg tablet Take 1 Tab by mouth every eight (8) hours as needed for Nausea., Normal, Disp-45 Tab, R-5      aspirin delayed-release 81 mg tablet Take 81 mg by mouth daily. , Historical Med              Diet:  Cardiac diet. Activity:  As tolerated    Disposition: Home Health Care [facility]    Discharge instructions to patient/family  Please seek medical attention for any new or worsening symptoms particularly fever, chest pain, shortness of breath, abdominal pain, nausea, vomiting    Follow up plans/appointments  Follow-up Information     Follow up With Details Comments Cecilio Howell MD Schedule an appointment as soon as possible for a visit in 1 week For followup [address] [place] [address]      [address] [facility] on 9/29/2017 For followup, appointment is at 2:20 pm with Dr. Kassandra Esquivel. [address] Omarlori Balderas, [address] [place] [address] [phone]            Erik Ramírez MD  Family Medicine Resident       For Billing    Chief Complaint   Patient presents with   [place] Problems  Date Reviewed: 9/19/2017          Codes Class Noted POA    Atrial fibrillation [facility]) ICD-10-CM: I48.91  ICD-9-CM: 427.31  9/27/2017 Unknown        Metastatic lung cancer (metastasis from lung to other site) [facility]) ICD-10-CM: C34.90  ICD-9-CM: 162.9  Unknown Yes    Overview Signed 9/27/2017  7:57 AM by Lex Macias MD     brain             Paroxysmal atrial flutter ([facility] ICD-10-CM: I48.92  ICD-9-CM: 427.32  Unknown Yes        Anemia ICD-10-CM: D64.9  ICD-9-CM: 266. 9  Unknown Yes        Cardiomyopathy ([facility] ICD-10-CM: I42.9  ICD-9-CM: 425.4  Unknown Yes    Overview Signed 9/27/2017  7:58 AM by Lex Macias MD     mild LV regional/global dysfunction, likely ischemic etiology             Atrial flutter with rapid ventricular response ([facility] ICD-10-CM: I48.92  ICD-9-CM: 427.32  9/27/2017 Unknown        * (Principal)Atrial flutter ([facility] ICD-10-CM: I48.92  ICD-9-CM: 427.32  9/26/2017 Unknown        Squamous cell carcinoma of lung, stage IV ([facility] ICD-10-CM: C34.90  ICD-9-CM: 162.9  9/16/2017 Yes        Tobacco abuse ICD-10-CM: Z72.0  ICD-9-CM: 305.1  10/11/2011 Yes

## 2017-09-27 NOTE — PROGRESS NOTES
Problem: Afib Pathway: Day 1  Goal: Consults, if ordered  Outcome: Progressing Towards Goal  Dr. Janneth Ceballos spoke with Dr. Eduarda Vásquez  Goal: Diagnostic Test/Procedures  Outcome: Progressing Towards Goal  Troponin flat at 0.12  Goal: Nutrition/Diet  Outcome: Progressing Towards Goal  Ate an entire healthy choice meal.  Goal: Medications  Outcome: Progressing Towards Goal  Weaning cardizem drip. Goal: Respiratory  Outcome: Progressing Towards Goal  On room air. Goal: *Stable cardiac rhythm  Outcome: Progressing Towards Goal  HR now in the 70s.

## 2017-09-27 NOTE — CONSULTS
Dane Celestin MD [facility]  Office [phone]  Mobile [phone]    Date of  Admission: 9/26/2017  4:23 PM  PCP- Milana Harrell MD    Reva Ashley is a 67 y.o. male admitted for A-fib [facility]). Consult requested by Jennifer Menendez MD    Assessment  · Recurrent atrial flutter with 2:1 block, asymptomatic - resolved  · Minor myocardial injury - no MI  · Suspected CAD with mild LV dysfunction, asx  · Met lung cancer => brain  · Bilateral DVT on lovenox  · Chronic anemia  · COPD, currently quiescent        Discussion/Recommendations:  Recurrent atrial flutter, completely asymptomatic, with minor troponin elevation without rise/fall, in the setting of underlying CAD/mild LV dysfunction by recent echo and metastatic lung cancer to brain, recent bilateral DVT dx without PE on lovenox. No evidence of HF. Agree with increased oral diltiazem 240/d, add toprol xl 25/d, stop iv diltiazem. Consider flutter ablation if frequent recurrence. Would not pursue coronary evaluation with cath (ideal strategy) given his malignancy, inability to utilize DAPT, etc.     Home later today. I will be happy to arrange f/u cardiac wise. Subjective:  Met lung cancer to brain s/p gamma knife, ongoing systemic chemo, chronic HTN now admitted with recurrent atrial flutter with RVR. Doing well at home with recent hospitalization for same. HR noted to be elevated by [facility]. ED evaluation with atrial flutter 2:1 conduction,  bpm, completely asx. Low grade but flat troponin elevation noted. Patient denies palps/worsening dyspnea or chest pain. Converted to sinus rhythm after iv dilt. Feels fine. Persistent bilateral LE edema. Recently dx with bilateral DVT. Chest CTA without PE, but evidence of coronary Ca2+. Stable/decreased hilar masses. Has been on lovenox at home.      Recent echo with EF 45%, inferior HK  Past Medical History:   Diagnosis Date  Anemia     Cardiomyopathy ([facility]     mild LV regional/global dysfunction, likely ischemic etiology    Coronary artery calcification seen on CT scan     Dysfunction, erectile     Hyperlipidemia 5/18/2010    Hypertension     Metastatic lung cancer (metastasis from lung to other site) [facility])     brain    Paroxysmal atrial flutter ([facility]       No past surgical history on file. No current facility-administered medications on file prior to encounter. Current Outpatient Prescriptions on File Prior to Encounter   Medication Sig Dispense Refill    enoxaparin (LOVENOX) 120 mg/0.8 mL injection 120 mg by SubCUTAneous route daily. 24 mL 5    dilTIAZem CD (CARDIZEM CD) 180 mg ER capsule Take 1 Cap by mouth daily. Indications: VENTRICULAR RATE CONTROL IN ATRIAL FIBRILLATION 30 Cap 0    nicotine (NICODERM CQ) 14 mg/24 hr patch 1 Patch by TransDERmal route every twenty-four (24) hours. 30 Patch 1    polyethylene glycol (MIRALAX) 17 gram packet Take 17 g by mouth daily as needed (constipation).  OTHER Chemotherapy: carboplatin + gemcitabine      magic mouthwash solution Swish and spit 15-30 mL every 2-4 hours as needed for mouth pain. May swallow for throat pain. Magic mouth wash   Maalox  Lidocaine 2% viscous   Diphenhydramine oral solution     Pharmacy to mix equal portions of ingredients to a total volume as indicated in the dispense amount. 480 mL 2    DEXAMETHASONE (DECADRON PO) Take  by mouth. Tapering      prochlorperazine (COMPAZINE) 10 mg tablet Take 1 Tab by mouth every six (6) hours as needed for Nausea. 50 Tab 5    lidocaine-prilocaine (EMLA) topical cream Apply  to affected area as needed for Pain (Apply 30-60 min. prior to having your port accessed). 30 g 0    ondansetron hcl (ZOFRAN) 8 mg tablet Take 1 Tab by mouth every eight (8) hours as needed for Nausea. 45 Tab 5    aspirin delayed-release 81 mg tablet Take 81 mg by mouth daily.        Allergies   Allergen Reactions    Lisinopril Angioedema    Hydrochlorothiazide Hives and Swelling    Sulfa (Sulfonamide Antibiotics) Hives             Review of Symptoms:  Constitutional: negative for fevers, chills, anorexia and weight loss  Respiratory: negative for cough, sputum, hemoptysis or pleurisy/chest pain  Gastrointestinal: negative for dysphagia, odynophagia and abdominal pain  Musculoskeletal:negative  Neurological: negative for dizziness, vertigo and seizures  Other systems reviewed and negative except as above. Physical Exam    Visit Vitals    /58    Pulse 77    Temp 97.5 °F (36.4 °C)    Resp 15    Ht 5' 6\" (1.676 m)    Wt 169 lb 15.6 oz (77.1 kg)    SpO2 94%    BMI 27.43 kg/m2     Visit Vitals    /58    Pulse 77    Temp 97.8 °F (36.6 °C)    Resp 15    Ht 5' 6\" (1.676 m)    Wt 169 lb 15.6 oz (77.1 kg)    SpO2 94%    BMI 27.43 kg/m2     General Appearance:  Well developed, well nourished,alert and oriented x 3, and individual in no acute distress. Ears/Nose/Mouth/Throat:   Hearing grossly normal.         Neck: Supple. JVP nl   Chest:   Lungs clear to auscultation bilaterally. Cardiovascular:  Regular rate and rhythm, S1, S2 normal, no murmur. Abdomen:   Soft, non-tender, bowel sounds are active. Extremities: 2+ edema bilaterally. Skin: Warm and dry.                  Cardiographics    Telemetry: normal sinus rhythm  ECG: yesterday - atrial flutter 2:1 block,  bpm      Recent Results (from the past 12 hour(s))   TROPONIN I    Collection Time: 09/26/17 11:24 PM   Result Value Ref Range    Troponin-I, Qt. 0.12 (H) <1.80 ng/mL   METABOLIC PANEL, BASIC    Collection Time: 09/27/17  4:35 AM   Result Value Ref Range    Sodium 139 136 - 145 mmol/L    Potassium 3.9 3.5 - 5.1 mmol/L    Chloride 104 97 - 108 mmol/L    CO2 28 21 - 32 mmol/L    Anion gap 7 5 - 15 mmol/L    Glucose 101 (H) 65 - 100 mg/dL    BUN 19 6 - 20 MG/DL    Creatinine 0.73 0.70 - 1.30 MG/DL    BUN/Creatinine ratio 26 (H) 12 - 20      GFR est AA >60 >60 ml/min/1.73m2    GFR est non-AA >60 >60 ml/min/1.73m2    Calcium 8.8 8.5 - 10.1 MG/DL   CBC WITH AUTOMATED DIFF    Collection Time: 09/27/17  4:35 AM   Result Value Ref Range    WBC 9.2 4.1 - 11.1 K/uL    RBC 2.86 (L) 4.10 - 5.70 M/uL    HGB 8.0 (L) 12.1 - 17.0 g/dL    HCT 25.2 (L) 36.6 - 50.3 %    MCV 88.1 80.0 - 99.0 FL    MCH 28.0 26.0 - 34.0 PG    MCHC 31.7 30.0 - 36.5 g/dL    RDW 19.0 (H) 11.5 - 14.5 %    PLATELET 076 496 - 166 K/uL    NEUTROPHILS 63 32 - 75 %    LYMPHOCYTES 37 12 - 49 %    MONOCYTES 0 (L) 5 - 13 %    EOSINOPHILS 0 0 - 7 %    BASOPHILS 0 0 - 1 %    ABS. NEUTROPHILS 5.8 1.8 - 8.0 K/UL    ABS. LYMPHOCYTES 3.4 0.8 - 3.5 K/UL    ABS. MONOCYTES 0.0 0.0 - 1.0 K/UL    ABS. EOSINOPHILS 0.0 0.0 - 0.4 K/UL    ABS.  BASOPHILS 0.0 0.0 - 0.1 K/UL   MAGNESIUM    Collection Time: 09/27/17  4:35 AM   Result Value Ref Range    Magnesium 1.9 1.6 - 2.4 mg/dL   PHOSPHORUS    Collection Time: 09/27/17  4:35 AM   Result Value Ref Range    Phosphorus 4.1 2.6 - 4.7 MG/DL   TROPONIN I    Collection Time: 09/27/17  4:35 AM   Result Value Ref Range    Troponin-I, Qt. 0.11 (H) <0.05 ng/mL

## 2017-09-27 NOTE — CONSULTS
[id number] [facility] Oncology at [facility]  [phone]    Hematology / Oncology Consult    Reason for Visit:   Kendall Grier is a 67 y.o. male who is seen in consultation at the request of Dr. Syd Hylton as courtesy consult and continuity of care of cancer. Willian Wray History of Present Illness:   Mr Demetris Perdomo was admitted on 9/27/2017 from the ED whe he presented to the ED with c/o tachycardia; seen by [facility] nurse earlier in the day who directed him to the ED for further eval. ED pulse 150; started on diltiazem gtt and admitted for further eval and management. Sitting up in chair at bedside. Reports feeling much better. Didn't know his heart was going fast until the [facility] nurse told him. Appetite has been improving; occasional pain to legs. Denies N/V. Denies SOB. States going home this evening. No family at bedside    Treatment History:   · CXR 7/11/2017: Mediastinal lymphadenopathy with left hilar mass. · CT Chest 7/14/2017: Bulky mediastinal and left hilar lymphadenopathy. Bilateral pulmonary masses and nodules  · PET-CT 7/24/2017: Right parietal mass. Hypermetabolic right supraclavicular, right paratracheal, AP window, prevascular, precarinal, subcarinal and left hilar lymphadenopathy. Hypermetabolic pulmonary nodules bilaterally. · MRI Brain 7/24/2017: Junction right posterior temporal lobe and occipital lobe 2.7 cm mass likely metastasis from lung cancer.  No additional acute abnormalities  · FNA supraclavicular node 7/28/2017: Metastatic squamous cell carcinoma, PD-L1 5%, BRAF negative, EGFR negative, ALK & ROS-1 pending   · Stage IV Non-small cell lung cancer (Squamous Cell)  · Gamma knife by Dr. Zane Muñoz 8/15/2017   · Palliative chemotherapy with carboplatin and gemcitabine beginning 8/25/2017    Last seen in the clinic on 9/15/2017; received C#2    Past Medical History:   Diagnosis Date    Anemia     Cardiomyopathy ([facility] [address]     mild LV regional/global dysfunction, likely ischemic etiology    Coronary artery calcification seen on CT scan     Dysfunction, erectile     Hyperlipidemia 5/18/2010    Hypertension     Metastatic lung cancer (metastasis from lung to other site) [facility])     brain    Paroxysmal atrial flutter ([address]       No past surgical history on file. Social History   Substance Use Topics    Smoking status: Current Every Day Smoker     Packs/day: 1.00     Years: 45.00     Types: Cigarettes    Smokeless tobacco: Never Used    Alcohol use 1.5 oz/week     3 Cans of beer per week      Family History   Problem Relation Age of Onset    Stroke Father     Diabetes Sister     Diabetes Brother     Heart Disease Sister      Current Facility-Administered Medications   Medication Dose Route Frequency    magnesium sulfate 1 gram/100 mL IVPB premix pgbk        metoprolol succinate (TOPROL-XL) XL tablet 25 mg  25 mg Oral DAILY    prochlorperazine (COMPAZINE) tablet 10 mg  10 mg Oral Q6H PRN    polyethylene glycol (MIRALAX) packet 17 g  17 g Oral DAILY PRN    dilTIAZem CD (CARDIZEM CD) capsule 240 mg  240 mg Oral DAILY    nicotine (NICODERM CQ) 14 mg/24 hr patch 1 Patch  1 Patch TransDERmal Q24H    sodium chloride (NS) flush 5-10 mL  5-10 mL IntraVENous Q8H    sodium chloride (NS) flush 5-10 mL  5-10 mL IntraVENous PRN    0.9% sodium chloride infusion  115 mL/hr IntraVENous CONTINUOUS    aspirin delayed-release tablet 81 mg  81 mg Oral DAILY    lidocaine-prilocaine (EMLA) 2.5-2.5 % cream   Topical PRN    enoxaparin (LOVENOX) injection 120 mg  120 mg SubCUTAneous DAILY    magic mouthwash cpd (without sucralfate)  15-30 mL Oral Q4H PRN      Allergies   Allergen Reactions    Lisinopril Angioedema    Hydrochlorothiazide Hives and Swelling    Sulfa (Sulfonamide Antibiotics) Hives        Review of Systems: A complete review of systems was obtained, negative except as described above.     Physical Exam:     Visit Vitals    /69    Pulse 73    Temp 97.8 °F (36.6 °C)    Resp 17    Ht 5' 6\" (1.676 m)    Wt 169 lb 15.6 oz (77.1 kg)    SpO2 100%    BMI 27.43 kg/m2     General: No distress  Eyes: PERRLA, icteric sclerae  HENT: Atraumatic with normal appearance of ears and nose; OP clear  Neck: Supple; no thyromegaly   Lymphatic: No cervical, supraclavicular, or axillary adenopathy  Respiratory: CTAB, normal respiratory effort  CV: Normal rate, regular rhythm, no murmurs, no peripheral edema  GI: Soft, nontender, distended, no masses, no hepatomegaly, no splenomegaly  Skin: No rashes, ecchymoses, or petechiae. Normal temperature, turgor, and texture. Shan/Psych: Alert, oriented, appropriate affect, normal judgment/insight      Results:     Lab Results   Component Value Date/Time    WBC 9.2 09/27/2017 04:35 AM    HGB 8.0 09/27/2017 04:35 AM    HCT 25.2 09/27/2017 04:35 AM    PLATELET 691 72/30/8382 04:35 AM    MCV 88.1 09/27/2017 04:35 AM    ABS. NEUTROPHILS 5.8 09/27/2017 04:35 AM     Lab Results   Component Value Date/Time    Sodium 139 09/27/2017 04:35 AM    Potassium 3.9 09/27/2017 04:35 AM    Chloride 104 09/27/2017 04:35 AM    CO2 28 09/27/2017 04:35 AM    Glucose 101 09/27/2017 04:35 AM    BUN 19 09/27/2017 04:35 AM    Creatinine 0.73 09/27/2017 04:35 AM    GFR est AA >60 09/27/2017 04:35 AM    GFR est non-AA >60 09/27/2017 04:35 AM    Calcium 8.8 09/27/2017 04:35 AM     Lab Results   Component Value Date/Time    Bilirubin, total 0.2 09/17/2017 05:02 AM    ALT (SGPT) 88 09/17/2017 05:02 AM    AST (SGOT) 36 09/17/2017 05:02 AM    Alk.  phosphatase 62 09/17/2017 05:02 AM    Protein, total 5.8 09/17/2017 05:02 AM    Albumin 2.3 09/17/2017 05:02 AM    Globulin 3.5 09/17/2017 05:02 AM     Lab Results   Component Value Date/Time    Iron % saturation 22 05/27/2016 12:01 PM    TIBC 255 05/27/2016 12:01 PM    Ferritin 780 05/27/2016 12:01 PM    Vitamin B12 694 05/27/2016 12:01 PM    TSH 0.61 09/15/2017 07:39 PM    Hep C Virus Ab <0.1 03/21/2012 10:00 AM    HIV 1/O/2 Abs <1.00 03/21/2012 10:00 AM Lab Results   Component Value Date/Time    Prostate Specific Ag 7.5 10/18/2016 01:36 PM    Prostate Specific Ag 1.7 10/13/2010 11:04 AM         9/15/2017 CTA CHEST   IMPRESSION:   1. No pulmonary embolus. 2. Bilateral lung masses and mediastinal nodes decreased in size. 3. Atherosclerosis with coronary artery calcifications. 9/26/2017 XR CHEST  IMPRESSION: No change in left hilar mass. Assessment and Recommendations:   1. Aflutter  asymptomatic  Cardio following; will follow out patient for possible ablation if frequent recurrence        2. Metastatic non-small cell lung cancer (squamous cell)  EGFR, ALK, ROS1, BRAF negative  He has disease within his chest and brain. His cancer is not curable and management is with palliative intent. He is s/p gamma knife to CNS disease. He is currently receiving palliative chemotherapy with carboplatin and gemcitabine given every 3 weeks with plans for 4 cycles, potentially followed by maintenance chemotherapy. Received C#2 Gemcitabine/Carbo on 9/15/2017; C#3 due on 10/6/2017. Recent CT chest shows a response to therapy so far. 3. Brain metastasis  S/P gamma knife. Followed by  Dr. Sharif Alcazar. 4. Hx of Dion LE DVT (9/15/2017)  Rt posterior tibial vein; peroneal vein and Left posterior tibial vein  Remains on Lovenox       Patient seen in conjunction with Eboni Hansen NP. He will follow up with us as an outpatient next week.     Signed By: Marco Connor MD     September 27, 2017

## 2017-09-27 NOTE — PROGRESS NOTES
Pharmacist Discharge Medication Reconciliation    Discharge Provider:  Dr Lucianne Mohs       Discharge Medications:      Current Discharge Medication List        START taking these medications         Dose & Instructions Dispensing Information Comments   Morning Noon Evening Bedtime      metoprolol succinate 25 mg XL tablet   Commonly known as:  TOPROL-XL       Your last dose was: Your next dose is:              Dose:  25 mg   Take 1 Tab by mouth daily. Quantity:  30 Tab   Refills:  0                               CONTINUE these medications which have CHANGED         Dose & Instructions Dispensing Information Comments   Morning Noon Evening Bedtime      dilTIAZem  mg ER capsule   Commonly known as:  CARDIZEM CD   What changed:    - medication strength  - how much to take       Your last dose was: Your next dose is:              Dose:  240 mg   Take 1 Cap by mouth daily. Indications: VENTRICULAR RATE CONTROL IN ATRIAL FIBRILLATION    Quantity:  30 Cap   Refills:  0                               CONTINUE these medications which have NOT CHANGED         Dose & Instructions Dispensing Information Comments   Morning Noon Evening Bedtime      aspirin delayed-release 81 mg tablet       Your last dose was: Your next dose is:              Dose:  81 mg   Take 81 mg by mouth daily. Refills:  0                         diphenhydrAMINE 25 mg tablet   Commonly known as:  BENADRYL       Your last dose was: Your next dose is:              Dose:  25 mg   Take 25 mg by mouth nightly as needed for Sleep. Refills:  0                         enoxaparin 120 mg/0.8 mL injection   Commonly known as:  LOVENOX       Your last dose was: Your next dose is:              Dose:  120 mg   120 mg by SubCUTAneous route daily. Quantity:  24 mL   Refills:  5                         lidocaine-prilocaine topical cream   Commonly known as:  EMLA       Your last dose was:          Your next dose is: Apply  to affected area as needed for Pain (Apply 30-60 min. prior to having your port accessed). Quantity:  30 g   Refills:  0                         magic mouthwash solution       Your last dose was: Your next dose is:              Swish and spit 15-30 mL every 2-4 hours as needed for mouth pain. May swallow for throat pain. Magic mouth wash  Maalox Lidocaine 2% viscous  Diphenhydramine oral solution   Pharmacy to mix equal portions of ingredients to a total volume as indicated in the dispense amount. Quantity:  480 mL   Refills:  2                         MIRALAX 17 gram packet   Generic drug:  polyethylene glycol       Your last dose was: Your next dose is:              Dose:  17 g   Take 17 g by mouth daily. Refills:  0                         nicotine 14 mg/24 hr patch   Commonly known as:  Iris Will       Your last dose was: Your next dose is:              Dose:  1 Patch   1 Patch by TransDERmal route every twenty-four (24) hours. Quantity:  30 Patch   Refills:  1                         ondansetron hcl 8 mg tablet   Commonly known as:  ZOFRAN       Your last dose was: Your next dose is:              Dose:  8 mg   Take 1 Tab by mouth every eight (8) hours as needed for Nausea. Quantity:  45 Tab   Refills:  5                         prochlorperazine 10 mg tablet   Commonly known as:  COMPAZINE       Your last dose was: Your next dose is:              Dose:  10 mg   Take 1 Tab by mouth every six (6) hours as needed for Nausea. Quantity:  50 Tab   Refills:  5                         XYZAL 5 mg tablet   Generic drug:  levocetirizine       Your last dose was: Your next dose is:              Dose:  5 mg   Take 5 mg by mouth daily as needed for Allergies. Refills:  0                                  Where to Get Your Medications        Information on where to get these meds will be given to you by the nurse or doctor. !  Ask your nurse or doctor about these medications     dilTIAZem  mg ER capsule    metoprolol succinate 25 mg XL tablet                    The patient's chart, MAR, and AVS were reviewed by   Hosea Casillas.  [facility]: [phone]

## 2017-09-27 NOTE — ED NOTES
TRANSFER - OUT REPORT:    Verbal report given to [facility] RN(name) on Jasmyne Art  being transferred to [facility](unit) for routine progression of care       Report consisted of patients Situation, Background, Assessment and   Recommendations(SBAR). Information from the following report(s) SBAR, ED Summary, Recent Results and Cardiac Rhythm AF was reviewed with the receiving nurse. Lines:   Venous Access Device [employer] 07/28/17 Upper chest (subclavicular area, right (Active)       Peripheral IV 09/26/17 Left Antecubital (Active)   Site Assessment Clean, dry, & intact 9/26/2017  4:44 PM   Phlebitis Assessment 0 9/26/2017  4:44 PM   Infiltration Assessment 0 9/26/2017  4:44 PM   Dressing Status Clean, dry, & intact 9/26/2017  4:44 PM   Dressing Type Transparent 9/26/2017  4:44 PM   Hub Color/Line Status Pink;Flushed 9/26/2017  4:44 PM   Action Taken Blood drawn 9/26/2017  4:44 PM   Alcohol Cap Used Yes 9/26/2017  4:44 PM       Peripheral IV 09/26/17 Right Antecubital (Active)   Site Assessment Clean, dry, & intact 9/26/2017  6:23 PM   Phlebitis Assessment 0 9/26/2017  6:23 PM   Infiltration Assessment 0 9/26/2017  6:23 PM   Dressing Status Clean, dry, & intact 9/26/2017  6:23 PM   Dressing Type Transparent 9/26/2017  6:23 PM   Hub Color/Line Status Pink;Flushed 9/26/2017  6:23 PM   Alcohol Cap Used Yes 9/26/2017  6:23 PM        Opportunity for questions and clarification was provided. Receiving RN aware that pt is on Cardizem gtt for continued Afib and that pt has notable slurred speech.

## 2017-09-27 NOTE — WOUND CARE
Wound Care consult per MD to assess lower leg wounds, alert, no distress. Sitting up in chair in the ICU, pending discharge to home today with [facility] for assistance. Assessment  Bilateral lower legs elevated in chair, edematous, skin is thin and fragile with scattered areas of resolving partial thickness skin loss from previous blistering. Medial left and right lower legs- 2 open areas- ~2x2 cm yellow pink base with small amount of serous drainage, donna wound skin is thin and fragile. Heels intact. States he wears compression hose at home, but unable to wear and painful due to increasing edema. Treatment  Wounds cleansed, medihoney and foam dressing applied to open wounds, moisturizer to dry intact skin. TUBIGRIP F applied bilaterally to assist with edema. Education given to patient on plan of care for wounds, verbalized understanding. Home health to follow for wound care. Plan of care and assessment discussed with family medicine, orders obtained.  advised of wound care needs on discharge, supplies at bedside. Staff nurse advised of care given.     Jeannette Reyes

## 2017-09-27 NOTE — PROGRESS NOTES
Discussed with cardiology. Pt does not need to be inpt from a cardiology standpoint and will be discharged today. No need for intensivist. Signing off.

## 2017-09-27 NOTE — PROGRESS NOTES
Continuation: [facility] 9/15-17/2017: Acute bilateral deep vein thrombosis (DVT) of popliteal veins (HCC)  Deep vein thrombosis (DVT) of tibial vein of both lower extremities ([address]): Hx of Stage IV squamous cell carcinoma with metastasis to brain, HLD, HTN. Patient readmitted to [facility] 9/26/2017: Per Dr. Elaine Byrd  Recurrent atrial flutter with 2:1 block  No evidence of HF  Increased diltiazem 240/d, add Toprol XL 25/d  Consider flutter ablation if frequent recurrence. Patient needs Advance Directive Discussion please. Appointment with PCP 9/29/2017.  _____________________________________________  Patient attended follow up with PCP 9/18/2017:  Acute deep vein thrombosis (DVT) of proximal vein of both lower extremities ([address] Yes     Squamous cell carcinoma of lung, stage IV, unspecified laterality ([address]      Brain metastases [facility]) [facility] discharge follow-up [address] [place] [address] maintenance        Patient was diagnosed with Stage 4 squamous cell lung cancer in 7/2017. Followed by Dr. Kathleen Palacios for palliative chemo with carboplatin and gemcitabine starting 8/25/17. Patient is s/p gamma knife procedure for brain metastases. Endorsed developing Bilateral leg swelling around 9/6/17 with gradual worsening, though denies pain. Seen by Dr Kathleen Palacios on 9/15 and sent to ED where found to have bilateral DVT. No PE was present. Patient discharged with Lovenox 120 mg daily until further notice according to Oncology. During admission newly discovered Afib with RVR controlled on diltiazem. Patient also stopped smoking during this admission. Per daughter and patient, appetite has significantly improved, improved energy levels/activity overall, and improved cough. Patient to continue Lovenox daily for treatment of DVTs. Keep follow up with oncology. Discussed wound care for lymphedema of legs secondary to DVT. HH to follow up and assist patient, good family support availabe.   Future Appointments  Date Time Provider Suzy Jara   9/22/2017  ATTENDED   7:30 AM [place] INFUSION NURSE 1 [facility]   10/6/2017 9:00 AM [place] INFUSION NURSE 1 [facility]   10/6/2017 9:45 AM Bodfish Older, NP [facility] SCHED   10/6/2017 1:00 PM [facility]   10/13/2017 9:00 AM [place] INFUSION NURSE 1 [facility]   10/20/2017 10:00 AM [facility]

## 2017-09-27 NOTE — PROGRESS NOTES
Readmission-I discussed pt with nurse ,wound care nurseand FP team.Pt is being discharged home today. Home Health resumption orders faxed to [facility] for SN for wound care,PT and OT. I notified Jessie Mann with Dr Jackie Chauhan practice regarding pt.'s readmission and discharge home today.   Number for home health placed on pt.'s AVS.  Sun Calderon

## 2017-09-27 NOTE — PROGRESS NOTES
Additional wound care orders faxed to [facility] provided by wound care nurse. Family is transporting pt home today after 4 pm.  Windy Quinteros

## 2017-09-27 NOTE — PROGRESS NOTES
@2300 Patient arrives to ICU 7 no signs of acute distress. VSS. Troponin drawn and sent to lab. @9272 Dr. Jerry Duron and Dr. Babar Bender arrive and exam the patient. BP looking soft at this time plan is to wean cardizem drip and hold lopressor at 0400 based on hemodynamics. @0000 Cardizem wean to 10 mg/H. Patient resting comfortable just finish eating. @0030 Cardizem wean to 7.5 mg/H.    @0100 Inquired about correcting magnesium level with Dr. Babar Bender and orders received to give 1 GM Magnesium sulfate. @7198 Am lab work obtain and sent. Also patient converts to sinus rhythm.

## 2017-09-27 NOTE — DISCHARGE INSTRUCTIONS
HOME DISCHARGE INSTRUCTIONS    Alina Oh / [id number] : 1945    Admission date: 2017 Discharge date: 2017     Please bring this form with you to show your care provider at your follow-up appointment. Primary care provider:  Gee Judd MD    Discharging provider:  Alma Meadows MD  - Family Medicine Resident  Padma Swan MD - Attending, Family Medicine     You have been admitted to the hospital with the following diagnoses:    ACUTE DIAGNOSES:  · A-flutter   . . . . . . . . . . . . . . . . . . . . . . . . . . . . . . . . . . . . . . . . . . . . . . . . . . . . . . . . . . . . . . . . . . . . . . . Anna Rodriguez FOLLOW-UP CARE RECOMMENDATIONS:    Medication changes: We have changed your prescription of diltiazem/cardizem from 180mg to 240mg daily. You were seen by a cardiologist during your hospital stay who suggested that you start taking metoprolol 25mg daily in addition to your cardizem. Continue to take the ASA. You will continue your other medications as prescribed before this hospital admission. If you continue to have bouts of A-flutter, they may consider performing an an ablation procedure. You will discuss this with your cardiologist at your follow up appointment. For you lower leg wounds you were seen by our wound care specialist and were given a honey based ointment (medihoney) to place on your wounds once daily. You will also start using a new compression stalking called Tubigrip which should be more comfortable than your previous compression stalkings. Appointments: Listed on page 2 of these documents. Follow-up tests needed: None    Pending test results: At the time of your discharge the following test results are still pending: none. Please make sure you review these results with your outpatient follow-up provider(s).     Specific symptoms to watch for: chest pain, shortness of breath, fever, chills, nausea, vomiting, diarrhea, change in mentation, falling, weakness, bleeding. DIET/what to eat: Cardiac Diet    ACTIVITY:  Activity as tolerated    Wound care: none    Equipment needed:  none    What to do if new or unexpected symptoms occur? If you experience any of the above symptoms (or should other concerns or questions arise after discharge) please call your primary care physician. Return to the emergency room if you cannot get hold of your doctor. · It is very important that you keep your follow-up appointment(s). · Please bring discharge papers, medication list (and/or medication bottles) to your follow-up appointments for review by your outpatient provider(s). · Please check the list of medications and be sure it includes every medication (even non-prescription medications) that your provider wants you to take. · It is important that you take the medication exactly as they are prescribed. · Keep your medication in the bottles provided by the pharmacist and keep a list of the medication names, dosages, and times to be taken in your wallet. · Do not take other medications without consulting your doctor. · If you have any questions about your medications or other instructions, please talk to your nurse or care provider before you leave the hospital.     Information obtained by:     I understand that if any problems occur once I am at home I am to contact my physician. These instructions were explained to me and I had the opportunity to ask questions. I understand and acknowledge receipt of the instructions indicated above.                                                                                                                                                Physician's or R.N.'s Signature                                                                  Date/Time                                                                                                                                              Patient or Representative Signature                                                          Date/Time            Atrial Flutter: Care Instructions  Your Care Instructions     Atrial flutter is an abnormal cardiac rhythm, in which the top two chambers of the heart (atria) are beating very fast. Treating this condition is important for several reasons. It can cause blood clots, which can travel from your heart to your brain and cause a stroke. If you have a fast heartbeat, you may feel lightheaded, dizzy, and weak. A very fast heartbeat can also increase your risk for heart failure. Atrial flutter may be the result of changes in your heart, which affect the electrical conduction system. Your cardiologist will discuss treatment options, such as medications, cardioversion, or cardiac ablation. Making changes to improve your heart condition will help you stay healthy and active. Follow-up care is a key part of your treatment and safety. Be sure to make and go to all appointments, and call your doctor if you are having problems. It's also a good idea to know your test results and keep a list of the medicines you take. How can you care for yourself at home? Medicines  · Take your medicines exactly as prescribed. Call your doctor if you think you are having a problem with your medicine. You will get more details on the specific medicines your doctor prescribes. · If your doctor has given you a blood thinner to prevent a stroke, be sure you get instructions about how to take your medicine safely. Blood thinners can cause serious bleeding problems. · Do not take any vitamins, over-the-counter drugs, or herbal products without talking to your doctor first.  Lifestyle changes  · Do not smoke. Smoking can increase your chance of a stroke and heart attack. If you need help quitting, talk to your doctor about stop-smoking programs and medicines. These can increase your chances of quitting for good. · Eat a heart-healthy diet.   · Stay at a healthy weight. Lose weight if you need to. · Limit alcohol to 2 drinks a day for men and 1 drink a day for women. Too much alcohol can cause health problems. · Avoid colds and flu. Get a pneumococcal vaccine shot. If you have had one before, ask your doctor whether you need another dose. Get a flu shot every year. If you must be around people with colds or flu, wash your hands often. Activity  · If your doctor recommends it, get more exercise. Walking is a good choice. Bit by bit, increase the amount you walk every day. Try for at least 30 minutes on most days of the week. You also may want to swim, bike, or do other activities. Your doctor may suggest that you join a cardiac rehabilitation program so that you can have help increasing your physical activity safely. · Start light exercise if your doctor says it is okay. Even a small amount will help you get stronger, have more energy, and manage stress. Walking is an easy way to get exercise. Start out by walking a little more than you did in the hospital. Gradually increase the amount you walk. · When you exercise, watch for signs that your heart is working too hard. You are pushing too hard if you cannot talk while you are exercising. If you become short of breath or dizzy or have chest pain, sit down and rest immediately. · Check your pulse regularly. Place two fingers on the artery at the palm side of your wrist, in line with your thumb. If your heartbeat seems uneven or fast, talk to your doctor. When should you call for help? Call 911 anytime you think you may need emergency care. For example, call if:  · You have symptoms of a heart attack. These may include:  ¨ Chest pain or pressure, or a strange feeling in the chest.  ¨ Sweating. ¨ Shortness of breath. ¨ Nausea or vomiting. ¨ Pain, pressure, or a strange feeling in the back, neck, jaw, or upper belly or in one or both shoulders or arms. ¨ Lightheadedness or sudden weakness.   ¨ A fast or irregular heartbeat. After you call 911, the  may tell you to chew 1 adult-strength or 2 to 4 low-dose aspirin. Wait for an ambulance. Do not try to drive yourself. · You have symptoms of a stroke. These may include:  ¨ Sudden numbness, tingling, weakness, or loss of movement in your face, arm, or leg, especially on only one side of your body. ¨ Sudden vision changes. ¨ Sudden trouble speaking. ¨ Sudden confusion or trouble understanding simple statements. ¨ Sudden problems with walking or balance. ¨ A sudden, severe headache that is different from past headaches. · You passed out (lost consciousness). Call your doctor now or seek immediate medical care if:  · You have new or increased shortness of breath. · You feel dizzy or lightheaded, or you feel like you may faint. · Your heart rate becomes irregular. · You can feel your heart flutter in your chest or skip heartbeats. Tell your doctor if these symptoms are new or worse. Watch closely for changes in your health, and be sure to contact your doctor if you have any problems. Where can you learn more? Go to http://[employer] Dynamics Energynatalie.info/  Enter U020 in the search box to learn more about \"Atrial Fibrillation: Care Instructions. \"  Content Version: 17.7.742420; Current as of: January 27, 2016 (modified 9/10/16). Avoiding Triggers With Heart Failure: Care Instructions  Your Care Instructions  Triggers are anything that make your heart failure flare up. A flare-up is also called \"sudden heart failure\" or \"acute heart failure. \" When you have a flare-up, fluid builds up in your lungs, and you have problems breathing. You might need to go to the hospital. By watching for changes in your condition and avoiding triggers, you can prevent heart failure flare-ups. Follow-up care is a key part of your treatment and safety. Be sure to make and go to all appointments, and call your doctor if you are having problems.  It's also a good idea to know your test results and keep a list of the medicines you take. How can you care for yourself at home? Watch for changes in your weight and condition  · Weigh yourself without clothing at the same time each day. Record your weight. Call your doctor if you gain 3 pounds or more in 24 hrs or 5 pounds in one week. A sudden weight gain may mean that your heart failure is getting worse. · Keep a daily record of your symptoms. Write down any changes in how you feel, such as new shortness of breath, cough, or problems eating. Also record if your ankles are more swollen than usual and if you have to urinate in the night more often. Note anything that you ate or did that could have triggered these changes. Limit sodium  Sodium causes your body to hold on to water, making it harder for your heart to pump. People get most of their sodium from processed foods. Fast food and restaurant meals also tend to be very high in sodium. · Your doctor may suggest that you limit sodium to 1,500 milligrams (mg) a day. That is less than 1 teaspoon of salt a day, including all the salt you eat in cooking or in packaged foods. · Read food labels on cans and food packages. They tell you how much sodium you get in one serving. Check the serving size. If you eat more than one serving, you are getting more sodium. · Be aware that sodium can come in forms other than salt, including monosodium glutamate (MSG), sodium citrate, and sodium bicarbonate (baking soda). MSG is often added to Asian food. You can sometimes ask for food without MSG or salt. · Slowly reducing salt will help you adjust to the taste. Take the salt shaker off the table. · Flavor your food with garlic, lemon juice, onion, vinegar, herbs, and spices instead of salt. Do not use soy sauce, steak sauce, onion salt, garlic salt, mustard, or ketchup on your food, unless it is labeled \"low-sodium\" or \"low-salt. \"  · Make your own salad dressings, sauces, and ketchup without adding salt. · Use fresh or frozen ingredients, instead of canned ones, whenever you can. Choose low-sodium canned goods. · Eat less processed food and food from restaurants, including fast food. Exercise as directed  Moderate, regular exercise is very good for your heart. It improves your blood flow and helps control your weight. But too much exercise can stress your heart and cause a heart failure flare-up. · Check with your doctor before you start an exercise program.  · Walking is an easy way to get exercise. Start out slowly. Gradually increase the length and pace of your walk. Swimming, riding a bike, and using a treadmill are also good forms of exercise. · When you exercise, watch for signs that your heart is working too hard. You are pushing yourself too hard if you cannot talk while you are exercising. If you become short of breath or dizzy or have chest pain, stop, sit down, and rest.  · Do not exercise when you do not feel well. Take medicines correctly  · Take your medicines exactly as prescribed. Call your doctor if you think you are having a problem with your medicine. · Make a list of all the medicines you take. Include those prescribed to you by other doctors and any over-the-counter medicines, vitamins, or supplements you take. Take this list with you when you go to any doctor. · Take your medicines at the same time every day. It may help you to post a list of all the medicines you take every day and what time of day you take them. · Make taking your medicine as simple as you can. Plan times to take your medicines when you are doing other things, such as eating a meal or getting ready for bed. This will make it easier to remember to take your medicines. · Get organized. Use helpful tools, such as daily or weekly pill containers. When should you call for help? Call 911 if you have symptoms of sudden heart failure such as:  · You have severe trouble breathing.   · You cough up pink, foamy mucus.  · You have a new irregular or rapid heartbeat. Call your doctor now or seek immediate medical care if:  · You have new or increased shortness of breath. · You are dizzy or lightheaded, or you feel like you may faint. · You have sudden weight gain, such as 3 pounds in 24 hours, or 5 pounds in one week. · You have increased swelling in your legs, ankles, or feet. · You are suddenly so tired or weak that you cannot do your usual activities. Watch closely for changes in your health, and be sure to contact your doctor if you develop new symptoms. Where can you learn more? Go to http://larissa-natalie.info/  Enter V089 in the search box to learn more about \"Avoiding Triggers With Heart Failure: Care Instructions. \"  © 8622-7258 [employer]. Care instructions adapted under license by [employer] (which disclaims liability or warranty for this information). This care instruction is for use with your licensed healthcare professional. If you have questions about a medical condition or this instruction, always ask your healthcare professional. [address] any warranty or liability for your use of this information. Content Version: 02.5.741939; Current as of: January 27, 2016 (modified 10/10/16).

## 2017-09-27 NOTE — PROGRESS NOTES
[facility]: MED RECONCILIATION    Comments/Recommendations:    Interview conducted with patient's daughter Supriya Silva via phone   Denies changes to allergies   Reports compliance to prescribed regiem   Verifies pharmacy listed in the EMR   The patient is currently receiving carboplatin and gemcitabine at the outpatient infusion center. The patient last received C2D8 gemcitabine on 17   The patient is taking diphenhydramine for sleep at the direction of a provider. Counseled on the adverse effects of diphenhydramine. Patient's daughter will monitor for any adverse effects. Medications added:     · Levocetirizine  · Diphenhydramine    Medications removed:    · Dexamethasone taper    Medications adjusted:    · Miralax changed from daily prn to daily      Allergies: Lisinopril; Hydrochlorothiazide; and Sulfa (sulfonamide antibiotics)    Prior to Admission Medications:     Prior to Admission Medications   Prescriptions Last Dose Informant Patient Reported? Taking?   aspirin delayed-release 81 mg tablet 2017 at Unknown time Self Yes Yes   Sig: Take 81 mg by mouth daily. dilTIAZem CD (CARDIZEM CD) 180 mg ER capsule 2017 at Unknown time  No Yes   Sig: Take 1 Cap by mouth daily. Indications: VENTRICULAR RATE CONTROL IN ATRIAL FIBRILLATION   diphenhydrAMINE (BENADRYL) 25 mg tablet   Yes Yes   Sig: Take 25 mg by mouth nightly as needed for Sleep.   enoxaparin (LOVENOX) 120 mg/0.8 mL injection 2017 at 730am  No Yes   Si mg by SubCUTAneous route daily. levocetirizine (XYZAL) 5 mg tablet   Yes Yes   Sig: Take 5 mg by mouth daily as needed for Allergies. lidocaine-prilocaine (EMLA) topical cream  Self No Yes   Sig: Apply  to affected area as needed for Pain (Apply 30-60 min. prior to having your port accessed). magic mouthwash solution 2017 at Unknown time Self No Yes   Sig: Swish and spit 15-30 mL every 2-4 hours as needed for mouth pain. May swallow for throat pain.      Magic mouth wash   Maalox  Lidocaine 2% viscous   Diphenhydramine oral solution     Pharmacy to mix equal portions of ingredients to a total volume as indicated in the dispense amount. nicotine (NICODERM CQ) 14 mg/24 hr patch 2017 at Unknown time Self No Yes   Si Patch by TransDERmal route every twenty-four (24) hours. ondansetron hcl (ZOFRAN) 8 mg tablet  Self No Yes   Sig: Take 1 Tab by mouth every eight (8) hours as needed for Nausea. polyethylene glycol (MIRALAX) 17 gram packet 2017 at Unknown time Self Yes Yes   Sig: Take 17 g by mouth daily. prochlorperazine (COMPAZINE) 10 mg tablet  Self No Yes   Sig: Take 1 Tab by mouth every six (6) hours as needed for Nausea.       Facility-Administered Medications: None      Thank you,      Conchis Carlos, PharmD, BCPS

## 2017-09-27 NOTE — CARDIO/PULMONARY
Cardiac Rehab: Received consult for cardiac education on AFib. 66 yo male admitted with tachycardia. Per Dr Stacy Huffman, dx includes: Recurrent AFlutter with 2:1 conduction. LVEF 45-50% by echo (9/15/17), with grade 3 diastolic dysfunction. Cardiac Meds:  ACE/ARB - none  BB - started metoprolol succinate  Statin - none  ASA - 81 mg  Prior to admission meds also included: diltiazem. Also on Lovenox SC injection at home. Smoking History: Current 1 ppd smoker. Met with Shruthi Darling on CCU. Patient reported he resides alone in an apartment in [facility]. His daughter Mack Barkley) helps him as needed. 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 Pt has very limited memory. He remembered being in hospital earlier this month, but was unclear on what he was treated for. Chart review revealed: recent admission with DVT, new AFib/RVR and HFpEF (9/15-17). 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 Provided AFib education folder with HF handouts, as well. Reported he has a scale at home and only uses \"a little salt. \" Explained purpose of new med (metoprolol). Pt gave permission for this nurse to call his daughter to discuss this information further. Shruthi Trejos will need ongoing reinforcement on all topics discussed, due to cognitive impairment (cancer with mets to brain). Info attached to AVS on AFlutter, avoiding triggers with HF, new PO med (metoprolol), and smoking cessation.

## 2017-09-28 ENCOUNTER — TELEPHONE (OUTPATIENT)
Dept: ONCOLOGY | Age: 72
End: 2017-09-28

## 2017-09-28 ENCOUNTER — PATIENT OUTREACH (OUTPATIENT)
Dept: FAMILY MEDICINE CLINIC | Age: 72
End: 2017-09-28

## 2017-09-28 LAB
BACTERIA SPEC CULT: NORMAL
BACTERIA SPEC CULT: NORMAL
SERVICE CMNT-IMP: NORMAL

## 2017-09-28 NOTE — PROGRESS NOTES
[facility] 9/26-9/24/2017 Atrial flutter    TRANSITIONS OF CARE NOTE     Patient readmitted to [facility] 9/26/2017: Per Dr. Elle Peterson  Recurrent atrial flutter with 2:1 block  No evidence of HF  Increased diltiazem 240/d, add Toprol XL 25/d  Consider flutter ablation if frequent recurrence.     Patient needs Advance Directive Discussion please. Appointment with PCP 9/29/2017. NN spoke with patient's daughter Mai Ford (Carlton Lozano). Daughter states \"He is doing fine\". Home health nurse scheduled to be back in home tomorrow 9/29/2017. Next oncology appointment is 10/6/2017. Daughter is calling to schedule Cardiology appointment this afternoon. Will update during visit tomorrow. RED FLAGS reviewed with daughter who verbalized understanding of when to seek immediate medical attention. Red flags/sepsis: fever or below normal temperature (97f), chills, nausea, vomiting, diarrhea, chest pain, shortness of breath, unable to take medications, unusual sensations, and BFAST. Daughter states they have appointment next week to have POA completed. Will provide copy when done. Physician will assess wounds on legs tomorrow and decide if [facility] can manage or if patient will need follow up with wound care doctor. I am going to forward chart to Oncology and Cardiology NN to assist with high risk patient. Per daughter, patient is compliant with all medications. Goals      Advance Directive            Discuss and complete Advance Directive       Attends follow-up appointments as directed. · 10/6/2017 with Oncology  · PCP to follow DVD resolution and prior auth for continued Lovenox 9/19/2017. · [facility] in place (confirmed)   · Advance directive to be completed at PCP appointment 9/29/2017.  Knowledge and adherence of prescribed medication (ie. action, side effects, missed dose, etc.). Patient 's daughter assists       Understands red flags post discharge.             Red Flags: Fever,chills,Nausea,Vomiting,Diarrhea, unable to take medications,pain,SOB,chest pain,unusual sensations,bleeding. NN remains available to patient and family. RRAT score: 25 High    Advance Medical Directive on file in EMR? no    Barriers to care?  none    Support System consists of: family    [address], if so what agency? Yes. [facility]    Medical History:     Past Medical History:   Diagnosis Date    Anemia     Cardiomyopathy ([facility] [address]     mild LV regional/global dysfunction, likely ischemic etiology    Coronary artery calcification seen on CT scan     Dysfunction, erectile     Hyperlipidemia 5/18/2010    Hypertension     Metastatic lung cancer (metastasis from lung to other site) [facility])     brain    Paroxysmal atrial flutter ([facility] [address]      This represents Transitions of Care b/c NN spoke with patient and/or caregiver within 1 business days of discharge. Pt's TCM follow up appt is scheduled with Dr. Luis Fernando Stauffer on 9/29/2017, which is within 2 days of discharge. Called patient on 9/28/2017 and verified patient with 2 identifiers. Fall Risk Assessment:    Fall Risk Assessment, last 12 mths 9/18/2017   Able to walk? Yes   Fall in past 12 months? No     Patient presenting symptoms (referenced by Justin Aquino MD):   presents to the ER complaining of tachycardia. Patient reports that today around 1pm his home health nurse checked his vitals and found him to be tachycardic with  and he was sent to ED to be evaluated. Patient reports not feeling any CP or palpitations at any point. His only complaint is burning in BL lower extremities due to his chemotherapy. Patient denies any SOB. Denies any abdominal pain, n/v/d but does report constipation which he takes miralax daily. Course of current Hospitalization (referenced by  Justin Aquino MD note dated 9/27/2017):   SVT; A-flutter with 2:1 AV conduction: Patient had  when checked by home health nurse.  EKG on admission showed Atrial flutter with rate of 150 and 2:1 AV conduction. Patient reported not feeling any CP or palpitations. On previous admission on 9/15, patient was newly diagnosed with A-fib with RVR and converted back to sinus after being given diltiazem, 1L bolus, and 5mg lopressor. During this admission, troponins mildly elevated at 0.12-->0.12-->0. 11. Patient was placed on diltiazem drip and converted to NSR overnight. Patient was then switched to 240mg diltiazem PO (he was previously on 180mg at home). Patient will continue diltiazem 240mg daily, ASA 81mg daily, and will start metoprolol 25mg XL (per consulted cardiologist). Patient will follow up with cardiologist.      Hx BL Lower extremity DVT: Patient recently admitted for BL lower extremity DVTs (bilateral posterior tibial DVTs), thought to be secondary to hypercoagulable state of patient's malignancy. CTA negative for PE and had no CP, SOB, or hypoxia. Patient discharged with lovenox 120mg daily. During this hospital stay, patient was given lovenox 80mg BID and was discharged with home dose of 120mg lovenox daily. Patient will follow up with Hematology.       Stage IV squamous cell lung cancer - Diagnosed 7/2017, noncurative, on palliative chemo, s/p gamma knife for brain mets. Being followed by Dr. Silvino Plascencia, last seen 9/15. Diagnosis made 7/2017. Palliative chemotherapy w carboplatin and gemcitabine q3 weeks started on 8/25/17. Following Dr. Cleo Archuleta (neurosurg) for brain mets. Patient will continue following up with heme/onc.       Hx of tobacco abuse - Pt smoked 1ppd for 45 years but quit after cancer diagosis at the end of July. Patient given nicotine patch during hospital stay and will continue use OP. Patient will follow up with PCP.       HTN: BP nomotensive at 117/82 on admission. Per chart, was previously taking amlodipine but was discontinued during previous admission because patient was given diltiazem IR for A-fib.  Patient was normotensive during admission while on diltiazem drip and was discharged with metoprolol 25mg XL daily per consulted cardiologist. Patient will follow up with PCP and cardiologist.      Lower extremity wounds and swelling: On admission, patient reported having burning pain in lower extremities ever since starting his chemotherapy. On physical exam, patient found to have 3-4+ pitting edema on BL lower extremities up to the knees with multiple open superficial wounds, mainly along ankles. Patient was seen by wound care specialist and was discharged with medihoney ointment for wounds as well as tubigrip compression stalkings for his edema. Patient reports having discomfort from previous compression stalkings, however these new stalkings have a medium compressive strength and should be more easily tolerated. Patient will follow up with wound care OP as well as PCP. Lab/Diagnostics at time of Discharge:     Results for Angelina Braga (MRN [id number]) as of 9/28/2017 17:03   Ref. Range 9/27/2017 04:35   WBC Latest Ref Range: 4.1 - 11.1 K/uL 9.2   RBC Latest Ref Range: 4.10 - 5.70 M/uL 2.86 (L)   HGB Latest Ref Range: 12.1 - 17.0 g/dL 8.0 (L)   HCT Latest Ref Range: 36.6 - 50.3 % 25.2 (L)   MCV Latest Ref Range: 80.0 - 99.0 FL 88.1   MCH Latest Ref Range: 26.0 - 34.0 PG 28.0   MCHC Latest Ref Range: 30.0 - 36.5 g/dL 31.7   RDW Latest Ref Range: 11.5 - 14.5 % 19.0 (H)   PLATELET Latest Ref Range: 150 - 400 K/uL 159   NEUTROPHILS Latest Ref Range: 32 - 75 % 63   LYMPHOCYTES Latest Ref Range: 12 - 49 % 37   MONOCYTES Latest Ref Range: 5 - 13 % 0 (L)   EOSINOPHILS Latest Ref Range: 0 - 7 % 0   BASOPHILS Latest Ref Range: 0 - 1 % 0   ABS. NEUTROPHILS Latest Ref Range: 1.8 - 8.0 K/UL 5.8   ABS. LYMPHOCYTES Latest Ref Range: 0.8 - 3.5 K/UL 3.4   ABS. MONOCYTES Latest Ref Range: 0.0 - 1.0 K/UL 0.0   ABS. EOSINOPHILS Latest Ref Range: 0.0 - 0.4 K/UL 0.0   ABS.  BASOPHILS Latest Ref Range: 0.0 - 0.1 K/UL 0.0   Sodium Latest Ref Range: 136 - 145 mmol/L 139 Potassium Latest Ref Range: 3.5 - 5.1 mmol/L 3.9   Chloride Latest Ref Range: 97 - 108 mmol/L 104   CO2 Latest Ref Range: 21 - 32 mmol/L 28   Anion gap Latest Ref Range: 5 - 15 mmol/L 7   Glucose Latest Ref Range: 65 - 100 mg/dL 101 (H)   BUN Latest Ref Range: 6 - 20 MG/DL 19   Creatinine Latest Ref Range: 0.70 - 1.30 MG/DL 0.73   BUN/Creatinine ratio Latest Ref Range: 12 - 20   26 (H)   Calcium Latest Ref Range: 8.5 - 10.1 MG/DL 8.8   Phosphorus Latest Ref Range: 2.6 - 4.7 MG/DL 4.1   Magnesium Latest Ref Range: 1.6 - 2.4 mg/dL 1.9   GFR est non-AA Latest Ref Range: >60 ml/min/1.73m2 >60   GFR est AA Latest Ref Range: >60 ml/min/1.73m2 >60   Troponin-I, Qt. Latest Ref Range: <0.05 ng/mL 0.11 (H)       Medication Reconciliation completed: yes       Past Hospitalizations and/or ED visits last 12 months? 1    NN remains available to patient and family.

## 2017-09-28 NOTE — TELEPHONE ENCOUNTER
[facility] DISTRICT  Medical Oncology at [facility]    09/28/17- TCM call, patient was discharged from the hospital on 9/27. Patient's daughter stated he's doing well, heart rate is stable with new cardiac medications. She stated patient will be seeing his PCP tomorrow and questioned if he could get a flu shot. We do recommend for patients to receive the flu vaccine, informed her that the best time is right before his chemotherapy cycle. Reviewed upcoming appointments on 10/6 for office visit, treatment, and CT scan. Patient's daughter verbalized understanding, no further questions or concerns.

## 2017-09-29 ENCOUNTER — OFFICE VISIT (OUTPATIENT)
Dept: FAMILY MEDICINE CLINIC | Age: 72
End: 2017-09-29

## 2017-09-29 VITALS
TEMPERATURE: 98.4 F | SYSTOLIC BLOOD PRESSURE: 102 MMHG | OXYGEN SATURATION: 98 % | BODY MASS INDEX: 27.16 KG/M2 | RESPIRATION RATE: 18 BRPM | DIASTOLIC BLOOD PRESSURE: 55 MMHG | HEART RATE: 91 BPM | HEIGHT: 66 IN | WEIGHT: 169 LBS

## 2017-09-29 DIAGNOSIS — I50.30 (HFPEF) HEART FAILURE WITH PRESERVED EJECTION FRACTION (HCC): Primary | Chronic | ICD-10-CM

## 2017-09-29 DIAGNOSIS — Z23 ENCOUNTER FOR IMMUNIZATION: ICD-10-CM

## 2017-09-29 DIAGNOSIS — I82.443 ACUTE DEEP VEIN THROMBOSIS (DVT) OF TIBIAL VEIN OF BOTH LOWER EXTREMITIES (HCC): ICD-10-CM

## 2017-09-29 DIAGNOSIS — S81.809A OPEN LEG WOUND, UNSPECIFIED LATERALITY, INITIAL ENCOUNTER: ICD-10-CM

## 2017-09-29 DIAGNOSIS — I48.92 ATRIAL FLUTTER, UNSPECIFIED TYPE (HCC): ICD-10-CM

## 2017-09-29 NOTE — PATIENT INSTRUCTIONS

## 2017-09-29 NOTE — PROGRESS NOTES
Reviewed record in preparation for visit and have necessary documentation  Opportunity was given for questions  Goals that were addressed and/or need to be completed after this appointment include   Health Maintenance Due   Topic Date Due    DTaP/Tdap/Td series (1 - Tdap) 07/[birth year]/1965    ZOSTER VACCINE AGE 60>  05/26/2004    GLAUCOMA SCREENING Q2Y  07/26/2009    Pneumococcal 65+ High/Highest Risk (2 of 2 - PCV13) 02/06/2014    INFLUENZA AGE 9 TO ADULT  08/01/2017

## 2017-09-29 NOTE — MR AVS SNAPSHOT
Visit Information Date & Time Provider Department Dept. Phone Encounter #  
 9/29/2017  2:30 PM Diaz Ricks MD [address] [id number] Follow-up Instructions Return in about 4 weeks (around 10/27/2017) for routine/ wound check on legs with Dr. Patrizia Ford. Your Appointments 10/4/2017 11:20 AM  
HOSPITAL DISCHARGE with Catherine Bell MD  
[facility] ([address] [facility]) Appt Note: 1 week f/u  
 [address] [facility] [address] [facility] [address]  
[phone]  
  
   
 [address] [facility] [address] [facility] [address]  
  
    
 10/6/2017  9:45 AM  
ESTABLISHED PATIENT with [facility] at [address]) Appt Note: labs, Kip Mccarthy #3D1 [address] [facility], [address] [facility] [address]  
[phone]  
  
   
 [address] [place] [address], [facility] [facility] [address] [facility]  
  
    
 10/19/2017  9:00 AM  
ROUTINE CARE with Daisy Gonzalez MD  
[address]) Appt Note: 6 mnth fu est pt /fasting/HTN/Hyperlipidemia// due; HM due; 1 mo f/u DVT/6 mnth fu est pt /fasting/HTN/Hyperlipidemia// due 2005 A [address] [address]  
[address] [address] Upcoming Health Maintenance Date Due DTaP/Tdap/Td series (1 - Tdap) 7/26/1965 ZOSTER VACCINE AGE 60> 5/26/2004 GLAUCOMA SCREENING Q2Y 7/26/2009 Pneumococcal 65+ High/Highest Risk (2 of 2 - PCV13) 2/6/2014 INFLUENZA AGE 9 TO ADULT 8/1/2017 MEDICARE YEARLY EXAM 10/19/2017 Allergies as of 9/29/2017  Review Complete On: 9/29/2017 By: Diaz Ricks MD  
  
 Severity Noted Reaction Type Reactions Lisinopril High 10/27/2011   Systemic Angioedema Hydrochlorothiazide  07/18/2017    Hives, Swelling Sulfa (Sulfonamide Antibiotics)  10/27/2011    Hives Current Immunizations  Reviewed on 9/22/2017 Name Date Influenza High Dose Vaccine PF  Incomplete, 1/10/2013 Influenza Vaccine 11/6/2015, 12/10/2014, 10/2/2013 Influenza Vaccine (Quad) PF 10/18/2016 Influenza Vaccine Split 10/12/2011, 5/10/2011, 10/13/2010 Pneumococcal Polysaccharide (PPSV-23) 2/6/2013 Not reviewed this visit You Were Diagnosed With   
  
 Codes Comments Acute deep vein thrombosis (DVT) of tibial vein of both lower extremities (HCC)    -  Primary ICD-10-CM: F13.602 ICD-9-CM: 453.42 Open leg wound, unspecified laterality, initial encounter     ICD-10-CM: S81.809A ICD-9-CM: 891.0 Encounter for immunization     ICD-10-CM: K21 ICD-9-CM: V03.89 Vitals BP Pulse Temp Resp Height(growth percentile) Weight(growth percentile) 102/55 91 98.4 °F (36.9 °C) (Oral) 18 5' 6\" (1.676 m) 169 lb (76.7 kg) SpO2 BMI Smoking Status 98% 27.28 kg/m2 Former Smoker Vitals History BMI and BSA Data Body Mass Index Body Surface Area  
 27.28 kg/m 2 1.89 m 2 Preferred Pharmacy Pharmacy Name Phone [address] [address] [facility] [phone] Your Updated Medication List  
  
   
This list is accurate as of: 9/29/17  2:48 PM.  Always use your most recent med list.  
  
  
  
  
 aspirin delayed-release 81 mg tablet Take 81 mg by mouth daily. dilTIAZem  mg ER capsule Commonly known as:  CARDIZEM CD Take 1 Cap by mouth daily. Indications: VENTRICULAR RATE CONTROL IN ATRIAL FIBRILLATION  
  
 diphenhydrAMINE 25 mg tablet Commonly known as:  BENADRYL Take 25 mg by mouth nightly as needed for Sleep.  
  
 enoxaparin 120 mg/0.8 mL injection Commonly known as:  LOVENOX  
120 mg by SubCUTAneous route daily. lidocaine-prilocaine topical cream  
Commonly known as:  EMLA Apply  to affected area as needed for Pain (Apply 30-60 min. prior to having your port accessed). magic mouthwash solution Swish and spit 15-30 mL every 2-4 hours as needed for mouth pain. May swallow for throat pain. Magic mouth wash  Maalox Lidocaine 2% viscous  Diphenhydramine oral solution   Pharmacy to mix equal portions of ingredients to a total volume as indicated in the dispense amount. metoprolol succinate 25 mg XL tablet Commonly known as:  TOPROL-XL Take 1 Tab by mouth daily. MIRALAX 17 gram packet Generic drug:  polyethylene glycol Take 17 g by mouth daily. nicotine 14 mg/24 hr patch Commonly known as:  NICODERM CQ  
1 Patch by TransDERmal route every twenty-four (24) hours. ondansetron hcl 8 mg tablet Commonly known as:  Candiss Raveling Take 1 Tab by mouth every eight (8) hours as needed for Nausea. prochlorperazine 10 mg tablet Commonly known as:  COMPAZINE Take 1 Tab by mouth every six (6) hours as needed for Nausea. XYZAL 5 mg tablet Generic drug:  levocetirizine Take 5 mg by mouth daily as needed for Allergies. We Performed the Following ADMIN INFLUENZA VIRUS VAC [ [facility]] INFLUENZA VIRUS VACCINE, HIGH DOSE SEASONAL, PRESERVATIVE FREE [50992 CPT(R)] [address] Comments:  
 [facility] - for Wound care of bilateral LE wounds. Follow-up Instructions Return in about 4 weeks (around 10/27/2017) for routine/ wound check on legs with Dr. Elmer Corbin. To-Do List   
 10/06/2017 9:00 AM  
  Appointment with Fabio Anaya 1 at [place] [address] ([phone]) 10/06/2017 1:00 PM  
  Appointment with [facility] 1 at [facility] ([phone]) CONTRAST STUDY: 1. The patient should not eat solid food four hours before the appointment but should be encouraged to drink clear liquids.  2.  If you have to drink oral contrast, please pick it up any weekday prior to your appointment, if you cannot please check in 2 hrs before appt time. 3.  The patient will require IV access for contrast administration. 4.  The patient should not take Ibuprofen (Advil, Motrin, etc.) and Naproxen Sodium (Aleve, etc.)  on the day of the exam. Stopping non-steroidal anti-inflammatory agents (NSAIDs) like Ibuprofen decreases the risk of kidney damage from the x-ray contrast (dye). 5.  Bring any non [facility] facility films/images pertaining to the area of interest with you on the day of appointment. 6.  Bring current lab work if available (within last 90 days [facility]) ***If scheduled at [facility], iSTAT is not available, labs will need to be done before appointment*** 7. Check in at registration at least 30 minutes before appt time unless you were instructed to do otherwise. 10/13/2017 9:00 AM  
  Appointment with Fabio Anthony Winslow [address] at [place] [address] ([phone]) Referral Information Referral ID Referred By Referred To  
  
 [id number] [facility] Not Available Visits Status Start Date End Date 1 New Request 9/29/17 9/29/18 If your referral has a status of pending review or denied, additional information will be sent to support the outcome of this decision. Patient Instructions Influenza (Flu) Vaccine (Inactivated or Recombinant): What You Need to Know Why get vaccinated? Influenza (\"flu\") is a contagious disease that spreads around the [place] every winter, usually between October and May. Flu is caused by influenza viruses and is spread mainly by coughing, sneezing, and close contact. Anyone can get flu. Flu strikes suddenly and can last several days. Symptoms vary by age, but can include: · Fever/chills. · Sore throat. · Muscle aches. · Fatigue. · Cough. · Headache. · Runny or stuffy nose.  
Flu can also lead to pneumonia and blood infections, and cause diarrhea and seizures in children. If you have a medical condition, such as heart or lung disease, flu can make it worse. Flu is more dangerous for some people. Infants and young children, people 72years of age and older, pregnant women, and people with certain health conditions or a weakened immune system are at greatest risk. Each year thousands of people in the [facility] die from flu, and many more are hospitalized. Flu vaccine can: · Keep you from getting flu. · Make flu less severe if you do get it. · Keep you from spreading flu to your family and other people. Inactivated and recombinant flu vaccines A dose of flu vaccine is recommended every flu season. Children 6 months through 6years of age may need two doses during the same flu season. Everyone else needs only one dose each flu season. Some inactivated flu vaccines contain a very small amount of a mercury-based preservative called thimerosal. Studies have not shown thimerosal in vaccines to be harmful, but flu vaccines that do not contain thimerosal are available. There is no live flu virus in flu shots. They cannot cause the flu. There are many flu viruses, and they are always changing. Each year a new flu vaccine is made to protect against three or four viruses that are likely to cause disease in the upcoming flu season. But even when the vaccine doesn't exactly match these viruses, it may still provide some protection. Flu vaccine cannot prevent: · Flu that is caused by a virus not covered by the vaccine. · Illnesses that look like flu but are not. Some people should not get this vaccine Tell the person who is giving you the vaccine: · If you have any severe (life-threatening) allergies. If you ever had a life-threatening allergic reaction after a dose of flu vaccine, or have a severe allergy to any part of this vaccine, you may be advised not to get vaccinated.  Most, but not all, types of flu vaccine contain a small amount of egg protein. · If you ever had Guillain-Barré syndrome (also called GBS) Some people with a history of GBS should not get this vaccine. This should be discussed with your doctor. · If you are not feeling well. It is usually okay to get flu vaccine when you have a mild illness, but you might be asked to come back when you feel better. Risks of a vaccine reaction With any medicine, including vaccines, there is a chance of reactions. These are usually mild and go away on their own, but serious reactions are also possible. Most people who get a flu shot do not have any problems with it. Minor problems following a flu shot include: · Soreness, redness, or swelling where the shot was given · Hoarseness · Sore, red or itchy eyes · Cough · Fever · Aches · Headache · Itching · Fatigue If these problems occur, they usually begin soon after the shot and last 1 or 2 days. More serious problems following a flu shot can include the following: · There may be a small increased risk of Guillain-Barré Syndrome (GBS) after inactivated flu vaccine. This risk has been estimated at 1 or 2 additional cases per million people vaccinated. This is much lower than the risk of severe complications from flu, which can be prevented by flu vaccine. · Shagufta Muñoz children who get the flu shot along with pneumococcal vaccine (PCV13) and/or DTaP vaccine at the same time might be slightly more likely to have a seizure caused by fever. Ask your doctor for more information. Tell your doctor if a child who is getting flu vaccine has ever had a seizure Problems that could happen after any injected vaccine: · People sometimes faint after a medical procedure, including vaccination. Sitting or lying down for about 15 minutes can help prevent fainting, and injuries caused by a fall. Tell your doctor if you feel dizzy, or have vision changes or ringing in the ears.  
· Some people get severe pain in the shoulder and have difficulty moving the arm where a shot was given. This happens very rarely. · Any medication can cause a severe allergic reaction. Such reactions from a vaccine are very rare, estimated at about 1 in a million doses, and would happen within a few minutes to a few hours after the vaccination. As with any medicine, there is a very remote chance of a vaccine causing a serious injury or death. The safety of vaccines is always being monitored. For more information, visit: www.cdc.gov/vaccinesafety/. What if there is a serious reaction? What should I look for? · Look for anything that concerns you, such as signs of a severe allergic reaction, very high fever, or unusual behavior. Signs of a severe allergic reaction can include hives, swelling of the face and throat, difficulty breathing, a fast heartbeat, dizziness, and weakness  usually within a few minutes to a few hours after the vaccination. What should I do? · If you think it is a severe allergic reaction or other emergency that can't wait, call 9-1-1 and get the person to the nearest hospital. Otherwise, call your doctor. · Reactions should be reported to the \"Vaccine Adverse Event Reporting System\" (VAERS). Your doctor should file this report, or you can do it yourself through the VAERS website at www.vaers. [facility].gov, or by calling 8-946.541.5597. [employer] does not give medical advice. The National Vaccine Injury Compensation Program 
The National Vaccine Injury Compensation Program (VICP) is a federal program that was created to compensate people who may have been injured by certain vaccines. Persons who believe they may have been injured by a vaccine can learn about the program and about filing a claim by calling 5-521.303.2806 or visiting the [employer] website at www.[facility].gov/vaccinecompensation. There is a time limit to file a claim for compensation. How can I learn more? · Ask your healthcare provider.  He or she can give you the vaccine package insert or suggest other sources of information. · Call your local or state health department. · Contact the Centers for Disease Control and Prevention (CDC): 
¨ Call 7-579.889.4805 (1-800-CDC-INFO) or ¨ Visit CDC's website at www.cdc.gov/flu Vaccine Information Statement Inactivated Influenza Vaccine 8/7/2015) 42 SWATHI Sykes 304WC-77 [facility] and [employer] Centers for Disease Control and Prevention Many Vaccine Information Statements are available in [place] and other languages. See www.immunize.org/vis. Muchas hojas de información sobre vacunas están disponibles en español y en otros idiomas. Visite www.immunize.org/vis. Care instructions adapted under license by [employer] (which disclaims liability or warranty for this information). If you have questions about a medical condition or this instruction, always ask your healthcare professional. [address]  any warranty or liability for your use of this information. Introducing [facility] & HEALTH SERVICES! Dear Lawrence Colmenares: Thank you for requesting a [employer] account. Our records indicate that you already have an active [employer] account. You can access your account anytime at https://[employer]. [employer] Did you know that you can access your hospital and ER discharge instructions at any time in [employer]? You can also review all of your test results from your hospital stay or ER visit. Additional Information If you have questions, please visit the Frequently Asked Questions section of the [employer] website at [email or web address]://[employer]. [employer]/. Remember, [employer] is NOT to be used for urgent needs. For medical emergencies, dial 911. Now available from your iPhone and Android! Please provide this summary of care documentation to your next provider. Your primary care clinician is listed as Maria L Marquez.  If you have any questions after today's visit, please call [phone].

## 2017-10-02 ENCOUNTER — TELEPHONE (OUTPATIENT)
Dept: FAMILY MEDICINE CLINIC | Age: 72
End: 2017-10-02

## 2017-10-02 DIAGNOSIS — I50.30 (HFPEF) HEART FAILURE WITH PRESERVED EJECTION FRACTION (HCC): Primary | ICD-10-CM

## 2017-10-02 NOTE — TELEPHONE ENCOUNTER
Patient was referred by Dr Kanwal Jim to go see Hayes oMnzon  Cardiology at [facility]    They have an appt on Wednesday  And that office needs a referral    Deana Myers can not do the referral unless you send an order    Patient has an appt this Wednesday    11:20 at [address]

## 2017-10-03 RX ORDER — SODIUM CHLORIDE 9 MG/ML
25 INJECTION, SOLUTION INTRAVENOUS CONTINUOUS
Status: DISPENSED | OUTPATIENT
Start: 2017-10-06 | End: 2017-10-06

## 2017-10-03 RX ORDER — PALONOSETRON 0.05 MG/ML
0.25 INJECTION, SOLUTION INTRAVENOUS ONCE
Status: COMPLETED | OUTPATIENT
Start: 2017-10-06 | End: 2017-10-06

## 2017-10-04 ENCOUNTER — PATIENT OUTREACH (OUTPATIENT)
Dept: FAMILY MEDICINE CLINIC | Age: 72
End: 2017-10-04

## 2017-10-04 NOTE — PROGRESS NOTES
Continuation High Risk Readmission Patient:    NN spoke with patient's daughter, Callie(HIPPA). Daughter states \"Everything is going good with my dad this week\". Home health in place with [facility]. Nurse is coming 2 days per week and PT 2 days per week. Cardiology appointment scheduled for today had to be postponed. Insurance is requiring a prior auth be done. Patient has Oncology appointments on 10/6/2017 and 10/13/2017. PCP appointment 10/19/2017. Daughter states she has appointment with  tomorrow to complete ACP and POA. She will bring copies of both to next appointment. NN remains available to patient and family.

## 2017-10-05 RX ORDER — SODIUM CHLORIDE 9 MG/ML
10 INJECTION INTRAMUSCULAR; INTRAVENOUS; SUBCUTANEOUS AS NEEDED
Status: ACTIVE | OUTPATIENT
Start: 2017-10-06 | End: 2017-10-07

## 2017-10-05 RX ORDER — SODIUM CHLORIDE 0.9 % (FLUSH) 0.9 %
10-40 SYRINGE (ML) INJECTION AS NEEDED
Status: ACTIVE | OUTPATIENT
Start: 2017-10-06 | End: 2017-10-07

## 2017-10-05 RX ORDER — HEPARIN 100 UNIT/ML
500 SYRINGE INTRAVENOUS AS NEEDED
Status: ACTIVE | OUTPATIENT
Start: 2017-10-06 | End: 2017-10-07

## 2017-10-05 NOTE — PROGRESS NOTES
[facility]fusion [facility] - Chemotherapy Progress Note    0946  Pt admit to [facility] for C3D1 Carbo/Gemzar ambulatory in stable condition. Assessment completed. Pt reports occasional SOB with exertion, and swelling noted to b/l legs. Pt had recent hospitalization. Right chest port accessed with 20 gauge 0.75 inch kellogg needle with positive blood return. Labs drawn per order and sent. Visit Vitals    /64    Pulse 78    Temp 97.3 °F (36.3 °C)    Resp 18    Ht 5' 8.9\" (1.75 m)    Wt 73.9 kg (162 lb 14.4 oz)    BMI 24.13 kg/m2        Pt proceeded to appt with MD.    Recent Results (from the past 12 hour(s))   METABOLIC PANEL, BASIC    Collection Time: 10/06/17  9:21 AM   Result Value Ref Range    Sodium 138 136 - 145 mmol/L    Potassium 4.3 3.5 - 5.1 mmol/L    Chloride 103 97 - 108 mmol/L    CO2 27 21 - 32 mmol/L    Anion gap 8 5 - 15 mmol/L    Glucose 97 65 - 100 mg/dL    BUN 6 6 - 20 MG/DL    Creatinine 0.66 (L) 0.70 - 1.30 MG/DL    BUN/Creatinine ratio 9 (L) 12 - 20      GFR est AA >60 >60 ml/min/1.73m2    GFR est non-AA >60 >60 ml/min/1.73m2    Calcium 8.5 8.5 - 10.1 MG/DL   HEPATIC FUNCTION PANEL    Collection Time: 10/06/17  9:21 AM   Result Value Ref Range    Protein, total 6.2 (L) 6.4 - 8.2 g/dL    Albumin 2.8 (L) 3.5 - 5.0 g/dL    Globulin 3.4 2.0 - 4.0 g/dL    A-G Ratio 0.8 (L) 1.1 - 2.2      Bilirubin, total 0.3 0.2 - 1.0 MG/DL    Bilirubin, direct 0.1 0.0 - 0.2 MG/DL    Alk.  phosphatase 65 45 - 117 U/L    AST (SGOT) 19 15 - 37 U/L    ALT (SGPT) 35 12 - 78 U/L   CBC WITH AUTOMATED DIFF    Collection Time: 10/06/17  9:21 AM   Result Value Ref Range    WBC 7.4 4.1 - 11.1 K/uL    RBC 2.68 (L) 4.10 - 5.70 M/uL    HGB 7.6 (L) 12.1 - 17.0 g/dL    HCT 23.5 (L) 36.6 - 50.3 %    MCV 87.7 80.0 - 99.0 FL    MCH 28.4 26.0 - 34.0 PG    MCHC 32.3 30.0 - 36.5 g/dL    RDW 20.7 (H) 11.5 - 14.5 %    PLATELET 286 (H) 101 - 400 K/uL    NEUTROPHILS 48 32 - 75 %    LYMPHOCYTES 29 12 - 49 %    MONOCYTES 20 (H) 5 - 13 %    EOSINOPHILS 2 0 - 7 %    BASOPHILS 0 0 - 1 %    MYELOCYTES 1 (H) 0 %    ABS. NEUTROPHILS 3.6 1.8 - 8.0 K/UL    ABS. LYMPHOCYTES 2.1 0.8 - 3.5 K/UL    ABS. MONOCYTES 1.5 (H) 0.0 - 1.0 K/UL    ABS. EOSINOPHILS 0.1 0.0 - 0.4 K/UL    ABS. BASOPHILS 0.0 0.0 - 0.1 K/UL    DF MANUAL      PLATELET COMMENTS LARGE PLATELETS      RBC COMMENTS ANISOCYTOSIS  2+        RBC COMMENTS OVALOCYTES  PRESENT        RBC COMMENTS TEARDROP CELLS  PRESENT           Labs within treatment parameters. Notified Julio Singh LPN of pt's Hgb 7.6. Pt to return next Thursday for blood transfusion due to no transportation early next week. Order received, ok to treat with recent hospitalization. Medications:  NS  Aloxi IV  Decadron IV  Carboplatin IV  Gemzar IV    Blood pressure 120/71, pulse 81, temperature 97.1 °F (36.2 °C), resp. rate 18, height 5' 8.9\" (1.75 m), weight 73.9 kg (162 lb 14.4 oz). 1345  Pt tolerated treatment well. Positive blood return pre and post infusion, port flushed and deaccessed per [employer]. D/c home ambulatory in no distress. Pt aware of next [facility] appointment scheduled for 10/12/17 at 0730 for blood and type and cross.

## 2017-10-06 ENCOUNTER — DOCUMENTATION ONLY (OUTPATIENT)
Dept: ONCOLOGY | Age: 72
End: 2017-10-06

## 2017-10-06 ENCOUNTER — HOSPITAL ENCOUNTER (OUTPATIENT)
Dept: CT IMAGING | Age: 72
Discharge: HOME | End: 2017-10-06
Attending: NURSE PRACTITIONER
Payer: MEDICARE

## 2017-10-06 ENCOUNTER — HOSPITAL ENCOUNTER (OUTPATIENT)
Dept: NON INVASIVE DIAGNOSTICS | Age: 72
Discharge: HOME | End: 2017-10-06
Attending: NURSE PRACTITIONER
Payer: MEDICARE

## 2017-10-06 ENCOUNTER — HOSPITAL ENCOUNTER (OUTPATIENT)
Dept: INFUSION THERAPY | Age: 72
Discharge: HOME | End: 2017-10-06
Payer: MEDICARE

## 2017-10-06 ENCOUNTER — OFFICE VISIT (OUTPATIENT)
Dept: ONCOLOGY | Age: 72
End: 2017-10-06

## 2017-10-06 VITALS
RESPIRATION RATE: 18 BRPM | TEMPERATURE: 97.1 F | HEART RATE: 81 BPM | WEIGHT: 162.9 LBS | HEIGHT: 69 IN | DIASTOLIC BLOOD PRESSURE: 71 MMHG | BODY MASS INDEX: 24.13 KG/M2 | SYSTOLIC BLOOD PRESSURE: 120 MMHG

## 2017-10-06 VITALS
WEIGHT: 164.4 LBS | RESPIRATION RATE: 20 BRPM | BODY MASS INDEX: 26.42 KG/M2 | HEIGHT: 66 IN | SYSTOLIC BLOOD PRESSURE: 116 MMHG | OXYGEN SATURATION: 100 % | TEMPERATURE: 95.9 F | HEART RATE: 77 BPM | DIASTOLIC BLOOD PRESSURE: 64 MMHG

## 2017-10-06 DIAGNOSIS — T45.1X5A CHEMOTHERAPY-INDUCED NEUROPATHY (HCC): ICD-10-CM

## 2017-10-06 DIAGNOSIS — I48.92 PAROXYSMAL ATRIAL FLUTTER (HCC): ICD-10-CM

## 2017-10-06 DIAGNOSIS — C34.92 PRIMARY MALIGNANT NEOPLASM OF LEFT LUNG METASTATIC TO OTHER SITE (HCC): ICD-10-CM

## 2017-10-06 DIAGNOSIS — C34.90 PRIMARY MALIGNANT NEOPLASM OF LUNG METASTATIC TO OTHER SITE, UNSPECIFIED LATERALITY (HCC): Primary | ICD-10-CM

## 2017-10-06 DIAGNOSIS — G62.0 CHEMOTHERAPY-INDUCED NEUROPATHY (HCC): ICD-10-CM

## 2017-10-06 DIAGNOSIS — C79.31 BRAIN METASTASES (HCC): ICD-10-CM

## 2017-10-06 DIAGNOSIS — R00.0 TACHYCARDIA: ICD-10-CM

## 2017-10-06 DIAGNOSIS — I48.91 ATRIAL FIBRILLATION, UNSPECIFIED TYPE (HCC): ICD-10-CM

## 2017-10-06 DIAGNOSIS — I82.443 ACUTE DEEP VEIN THROMBOSIS (DVT) OF TIBIAL VEIN OF BOTH LOWER EXTREMITIES (HCC): ICD-10-CM

## 2017-10-06 DIAGNOSIS — I48.91 ATRIAL FIBRILLATION WITH RAPID VENTRICULAR RESPONSE (HCC): ICD-10-CM

## 2017-10-06 LAB
ALBUMIN SERPL-MCNC: 2.8 G/DL (ref 3.5–5)
ALBUMIN/GLOB SERPL: 0.8 {RATIO} (ref 1.1–2.2)
ALP SERPL-CCNC: 65 U/L (ref 45–117)
ALT SERPL-CCNC: 35 U/L (ref 12–78)
ANION GAP SERPL CALC-SCNC: 8 MMOL/L (ref 5–15)
AST SERPL-CCNC: 19 U/L (ref 15–37)
ATRIAL RATE: 79 BPM
BASOPHILS # BLD: 0 K/UL (ref 0–0.1)
BASOPHILS NFR BLD: 0 % (ref 0–1)
BILIRUB DIRECT SERPL-MCNC: 0.1 MG/DL (ref 0–0.2)
BILIRUB SERPL-MCNC: 0.3 MG/DL (ref 0.2–1)
BUN SERPL-MCNC: 6 MG/DL (ref 6–20)
BUN/CREAT SERPL: 9 (ref 12–20)
CALCIUM SERPL-MCNC: 8.5 MG/DL (ref 8.5–10.1)
CALCULATED P AXIS, ECG09: 69 DEGREES
CALCULATED R AXIS, ECG10: -45 DEGREES
CALCULATED T AXIS, ECG11: 64 DEGREES
CHLORIDE SERPL-SCNC: 103 MMOL/L (ref 97–108)
CO2 SERPL-SCNC: 27 MMOL/L (ref 21–32)
CREAT SERPL-MCNC: 0.66 MG/DL (ref 0.7–1.3)
DIAGNOSIS, 93000: NORMAL
DIFFERENTIAL METHOD BLD: ABNORMAL
EOSINOPHIL # BLD: 0.1 K/UL (ref 0–0.4)
EOSINOPHIL NFR BLD: 2 % (ref 0–7)
ERYTHROCYTE [DISTWIDTH] IN BLOOD BY AUTOMATED COUNT: 20.7 % (ref 11.5–14.5)
GLOBULIN SER CALC-MCNC: 3.4 G/DL (ref 2–4)
GLUCOSE SERPL-MCNC: 97 MG/DL (ref 65–100)
HCT VFR BLD AUTO: 23.5 % (ref 36.6–50.3)
HGB BLD-MCNC: 7.6 G/DL (ref 12.1–17)
LYMPHOCYTES # BLD: 2.1 K/UL (ref 0.8–3.5)
LYMPHOCYTES NFR BLD: 29 % (ref 12–49)
MCH RBC QN AUTO: 28.4 PG (ref 26–34)
MCHC RBC AUTO-ENTMCNC: 32.3 G/DL (ref 30–36.5)
MCV RBC AUTO: 87.7 FL (ref 80–99)
MONOCYTES # BLD: 1.5 K/UL (ref 0–1)
MONOCYTES NFR BLD: 20 % (ref 5–13)
MYELOCYTES NFR BLD MANUAL: 1 %
NEUTS SEG # BLD: 3.6 K/UL (ref 1.8–8)
NEUTS SEG NFR BLD: 48 % (ref 32–75)
P-R INTERVAL, ECG05: 144 MS
PLATELET # BLD AUTO: 517 K/UL (ref 150–400)
PLATELET COMMENTS,PCOM: ABNORMAL
POTASSIUM SERPL-SCNC: 4.3 MMOL/L (ref 3.5–5.1)
PROT SERPL-MCNC: 6.2 G/DL (ref 6.4–8.2)
Q-T INTERVAL, ECG07: 418 MS
QRS DURATION, ECG06: 124 MS
QTC CALCULATION (BEZET), ECG08: 479 MS
RBC # BLD AUTO: 2.68 M/UL (ref 4.1–5.7)
RBC MORPH BLD: ABNORMAL
SODIUM SERPL-SCNC: 138 MMOL/L (ref 136–145)
VENTRICULAR RATE, ECG03: 79 BPM
WBC # BLD AUTO: 7.4 K/UL (ref 4.1–11.1)

## 2017-10-06 PROCEDURE — 74011250636 HC RX REV CODE- 250/636: Performed by: INTERNAL MEDICINE

## 2017-10-06 PROCEDURE — 74011636320 HC RX REV CODE- 636/320: Performed by: RADIOLOGY

## 2017-10-06 PROCEDURE — 36415 COLL VENOUS BLD VENIPUNCTURE: CPT | Performed by: INTERNAL MEDICINE

## 2017-10-06 PROCEDURE — 80048 BASIC METABOLIC PNL TOTAL CA: CPT | Performed by: INTERNAL MEDICINE

## 2017-10-06 PROCEDURE — 93005 ELECTROCARDIOGRAM TRACING: CPT

## 2017-10-06 PROCEDURE — 85025 COMPLETE CBC W/AUTO DIFF WBC: CPT | Performed by: INTERNAL MEDICINE

## 2017-10-06 PROCEDURE — 96413 CHEMO IV INFUSION 1 HR: CPT

## 2017-10-06 PROCEDURE — 74177 CT ABD & PELVIS W/CONTRAST: CPT

## 2017-10-06 PROCEDURE — 74011000250 HC RX REV CODE- 250: Performed by: INTERNAL MEDICINE

## 2017-10-06 PROCEDURE — 80076 HEPATIC FUNCTION PANEL: CPT | Performed by: INTERNAL MEDICINE

## 2017-10-06 PROCEDURE — 96417 CHEMO IV INFUS EACH ADDL SEQ: CPT

## 2017-10-06 PROCEDURE — 74011000258 HC RX REV CODE- 258: Performed by: INTERNAL MEDICINE

## 2017-10-06 PROCEDURE — 96375 TX/PRO/DX INJ NEW DRUG ADDON: CPT

## 2017-10-06 RX ORDER — DULOXETIN HYDROCHLORIDE 30 MG/1
30 CAPSULE, DELAYED RELEASE ORAL DAILY
Qty: 30 CAP | Refills: 11 | Status: SHIPPED | OUTPATIENT
Start: 2017-10-06 | End: 2018-01-01 | Stop reason: SDUPTHER

## 2017-10-06 RX ADMIN — SODIUM CHLORIDE, PRESERVATIVE FREE 500 UNITS: 5 INJECTION INTRAVENOUS at 13:42

## 2017-10-06 RX ADMIN — Medication 20 ML: at 09:20

## 2017-10-06 RX ADMIN — SODIUM CHLORIDE 25 ML/HR: 900 INJECTION, SOLUTION INTRAVENOUS at 11:02

## 2017-10-06 RX ADMIN — DEXAMETHASONE SODIUM PHOSPHATE 12 MG: 4 INJECTION, SOLUTION INTRAMUSCULAR; INTRAVENOUS at 11:52

## 2017-10-06 RX ADMIN — SODIUM CHLORIDE 1890 MG: 900 INJECTION, SOLUTION INTRAVENOUS at 13:07

## 2017-10-06 RX ADMIN — CARBOPLATIN 646 MG: 10 INJECTION, SOLUTION INTRAVENOUS at 12:30

## 2017-10-06 RX ADMIN — Medication 10 ML: at 13:42

## 2017-10-06 RX ADMIN — PALONOSETRON HYDROCHLORIDE 0.25 MG: 0.25 INJECTION INTRAVENOUS at 11:02

## 2017-10-06 RX ADMIN — IOPAMIDOL 100 ML: 755 INJECTION, SOLUTION INTRAVENOUS at 15:35

## 2017-10-06 RX ADMIN — SODIUM CHLORIDE 10 ML: 9 INJECTION INTRAMUSCULAR; INTRAVENOUS; SUBCUTANEOUS at 09:20

## 2017-10-06 NOTE — PROGRESS NOTES
***  Patient arrived ambulatory and in no distress for daily antibiotic treatment. Problem focused assessment unchanged, no new concerns voiced. Vibativ via PIV placed in *** patient tolerated this without difficulty. [VS] ***    ***  Pt left ambulatory in no distress, has a return appointment tomorrow.

## 2017-10-06 NOTE — PROGRESS NOTES
Problem: Chemotherapy Treatment  Goal: *Chemotherapy regimen followed  Outcome: Progressing Towards Goal  Pt received C3D1 Carbo/Gemzar as ordered, tolerated well.

## 2017-10-06 NOTE — PROGRESS NOTES
[employer]  Medical Oncology at [facility]  [phone]    Hematology / Oncology Followup    Reason for Visit:   Caterina Gil is a 67 y.o. male who is seen for follow up of lung cancer. Treatment History:   · CXR 7/11/2017: Mediastinal lymphadenopathy with left hilar mass. · CT Chest 7/14/2017: Bulky mediastinal and left hilar lymphadenopathy. Bilateral pulmonary masses and nodules  · PET-CT 7/24/2017: Right parietal mass. Hypermetabolic right supraclavicular, right paratracheal, AP window, prevascular, precarinal, subcarinal and left hilar lymphadenopathy. Hypermetabolic pulmonary nodules bilaterally. · MRI Brain 7/24/2017: Junction right posterior temporal lobe and occipital lobe 2.7 cm mass likely metastasis from lung cancer. No additional acute abnormalities  · FNA supraclavicular node 7/28/2017: Metastatic squamous cell carcinoma, PD-L1 5%, BRAF negative, EGFR negative, ALK & ROS-1 pending   · Stage IV Non-small cell lung cancer (Squamous Cell)  · Gamma knife by Dr. Deshawn De La Cruz 8/15/2017   · Palliative chemotherapy with carboplatin and gemcitabine beginning 8/25/2017    History of Present Illness:   Here today for follow up. Hospitalized 9/15-9/17 for bilateral DVT and 9/27-9/27 for a-fib with RVR. Nurse spoke with patient on 9/28 and he was doing well. He reports feeling okay today. He reports some burning sensation in his feet which is bothersome. Tolerating Lovenox BID well, denies bleeding or bruising. He is otherwise doing well. Denies any additional episodes of confusion, using nicotine patches. He is accompanied by his daughter today. He smoked 1ppd for 50+ years but quit at diagnosis. He lives alone in [address] [place] [address]. His son lives about 15 minutes from him. His daughter lives about 30 minutes away. PAST HISTORY: The following sections were reviewed and updated in the EMR as appropriate: PMH, SH, FH, Medications, Allergies.       Allergies   Allergen Reactions    Lisinopril Angioedema    Hydrochlorothiazide Hives and Swelling    Sulfa (Sulfonamide Antibiotics) Hives      Review of Systems: A complete review of systems was obtained, reviewed, and scanned into the EMR. Pertinent findings reviewed above. Physical Exam:     Visit Vitals    /64 (BP 1 Location: Right arm, BP Patient Position: Sitting)    Pulse 77    Temp 95.9 °F (35.5 °C) (Oral)    Resp 20    Ht 5' 6\" (1.676 m)    Wt 164 lb 6.4 oz (74.6 kg)    SpO2 100%    BMI 26.53 kg/m2     ECOG PS: 1  General: No distress  Eyes: PERRLA, anicteric sclerae  HENT: Atraumatic, OP clear  Neck: Supple  Lymphatic: No cervical, supraclavicular, or inguinal adenopathy  Respiratory: CTAB, normal respiratory effort  CV: Normal rate, regular rhythm, no murmurs, no peripheral edema  GI: Soft, nontender, nondistended, no masses, no hepatomegaly, no splenomegaly  MS: Normal gait and station. Digits without clubbing or cyanosis. Skin: No rashes, ecchymoses, or petechiae. Normal temperature, turgor, and texture. Psych: Alert, oriented, appropriate affect, normal judgment/insight    Results:     Lab Results   Component Value Date/Time    WBC 7.4 10/06/2017 09:21 AM    HGB 7.6 10/06/2017 09:21 AM    HCT 23.5 10/06/2017 09:21 AM    PLATELET 890 56/72/4354 09:21 AM    MCV 87.7 10/06/2017 09:21 AM    ABS. NEUTROPHILS 3.6 10/06/2017 09:21 AM     Lab Results   Component Value Date/Time    Sodium 138 10/06/2017 09:21 AM    Potassium 4.3 10/06/2017 09:21 AM    Chloride 103 10/06/2017 09:21 AM    CO2 27 10/06/2017 09:21 AM    Glucose 97 10/06/2017 09:21 AM    BUN 6 10/06/2017 09:21 AM    Creatinine 0.66 10/06/2017 09:21 AM    GFR est AA >60 10/06/2017 09:21 AM    GFR est non-AA >60 10/06/2017 09:21 AM    Calcium 8.5 10/06/2017 09:21 AM     Lab Results   Component Value Date/Time    Bilirubin, total 0.3 10/06/2017 09:21 AM    ALT (SGPT) 35 10/06/2017 09:21 AM    AST (SGOT) 19 10/06/2017 09:21 AM    Alk.  phosphatase 65 10/06/2017 09:21 AM    Protein, total 6.2 10/06/2017 09:21 AM    Albumin 2.8 10/06/2017 09:21 AM    Globulin 3.4 10/06/2017 09:21 AM       CTA Chest 9/15/2017:   1. No pulmonary embolus. 2. Bilateral lung masses and mediastinal nodes decreased in size. 3. Atherosclerosis with coronary artery calcifications    Venous doppler 9/15/2017: Bilateral lower extremity venous duplex positive for deep  venous thrombosis in right posterior tibial vein, right peroneal vein, and the left posterior tibial vein. There are superficial venous  thrombosis in bilateral greater saphenous veins      Assessment:   1) Metastatic non-small cell lung cancer (squamous cell)  EGFR, ALK, ROS1, BRAF negative  He has disease within his chest and brain. His cancer is not curable and management is with palliative intent. He is s/p gamma knife to CNS disease. He is currently receiving palliative chemotherapy with carboplatin and gemcitabine given every 3 weeks with plans for 4 cycles, potentially followed by maintenance chemotherapy. He is tolerating therapy fairly well with side effects as below. CTA done during hospital stay showed response to therapy, discussed with patient in detail today. We will proceed with therapy today and get CT as scheduled later today. Previously discussed Lung-MAP trial with patient and his daughter and provided handouts to patient regarding this. They will give some thought to pre-screening. 2) Brain metastasis  S/P gamma knife. Now off dexamethasone. Will defer reimaging to Dr. Reyna Glover    3) Tachycardia  Improved today. Due to a-flutter. Cardiology now following, taking metoprolol and cardizem. 4) DVT 9/15/2017  Now on Lovenox BID. I recommend he continue this long term in the setting of malignancy. 5) Hypotension  Improved today. Secondary to tachycardia and a-flutter. Cardiology following. 6) Altered mental status  No recurrence. Unclear etiology. Seen by neurosurgery yesterday. ?  Nicotine withdrawal per neurosurgery. Patient to notify us if this recurs. 7) Cough  Improving. Secondary to disease. Monitor. 8) Weight loss  Likely due to cancer. Treatment as above. May need dietician to intervene if this continues. 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 Start Cymbalta 30mg daily. Monitor and increase dose if needed. We also discussed gabapentin but he wants to hold off due to potential drowsiness side effect.      Plan:     Proceed today with C#3 Gemcitabine (1000 mg/m2 on days 1 and 8) and Carboplatin (AUC 5 on day 1) given every 3 weeks x 4 cycles  Labs: CBC on days 1 and 8, BMP, Magnesium, Phosphorus on day 1  Antiemetic Prophylaxis: Palonosetron on day 1, Dexamethasone on day 1 and day 8, Ondansetron prior to day 8  PRN Antiemetics: Ondansetron, Compazine   following   Continue dexamethasone per Dr. Kartik Scott  Follow up with Dr. Kartik Scott as scheduled  Follow up with cardiology as planned  Continue Lovenox BID  Start Cymbalta 30 mg daily  CT later today  Return to clinic in 3 weeks with next cycle or sooner if needed        Signed By: Gm Castellano MD     October 6, 2017

## 2017-10-06 NOTE — MR AVS SNAPSHOT
Visit Information Date & Time Provider Department Dept. Phone Encounter #  
 10/6/2017  9:45 AM Diana Randolph NP [address] at [facility]  Follow-up Instructions Return in 3 weeks (on 10/27/2017) for Hemant whitman #4. Your Appointments 10/19/2017  9:00 AM  
ROUTINE CARE with Uriel Frost MD  
[address] [place] [address] [facility]) Appt Note: 6 mnth fu est pt /fasting/HTN/Hyperlipidemia// due; HM due; 1 mo f/u DVT/6 mnth fu est pt /fasting/HTN/Hyperlipidemia// due [address] [address]  
[phone]  
  
   
 [address] [facility] [address] [address]  
  
    
 10/27/2017  9:15 AM  
ESTABLISHED PATIENT with Diana Randolph NP  
[facility]) Appt Note: Hemant whitman #4.  
 [address] [id number]  
[phone]  
  
   
 [address] [place] [address] [facility] 788 Upcoming Health Maintenance Date Due DTaP/Tdap/Td series (1 - Tdap) 7/26/1965 ZOSTER VACCINE AGE 60> 5/26/2004 Pneumococcal 65+ High/Highest Risk (2 of 2 - PCV13) 2/6/2014 MEDICARE YEARLY EXAM 10/19/2017 GLAUCOMA SCREENING Q2Y 9/8/2019 Allergies as of 10/6/2017  Review Complete On: 10/6/2017 By: Diana Randolph NP Severity Noted Reaction Type Reactions Lisinopril High 10/27/2011   Systemic Angioedema Hydrochlorothiazide  07/18/2017    Hives, Swelling Sulfa (Sulfonamide Antibiotics)  10/27/2011    Hives Current Immunizations  Reviewed on 10/6/2017 Name Date Influenza High Dose Vaccine PF 9/29/2017, 1/10/2013 Influenza Vaccine 11/6/2015, 12/10/2014, 10/2/2013 Influenza Vaccine (Quad) PF 10/18/2016 Influenza Vaccine Split 10/12/2011, 5/10/2011, 10/13/2010 Pneumococcal Polysaccharide (PPSV-23) 2/6/2013 Reviewed by Isabela Medina RN on 10/6/2017 at  9:08 AM  
You Were Diagnosed With   
  
 Codes Comments Primary malignant neoplasm of lung metastatic to other site, unspecified laterality ([facility]    -  Primary ICD-10-CM: C34.90 ICD-9-CM: 162.9 Atrial fibrillation, unspecified type ([facility]     ICD-10-CM: I48.91 
ICD-9-CM: 427.31 Paroxysmal atrial flutter (HCC)     ICD-10-CM: I48.92 
ICD-9-CM: 427.32 Acute deep vein thrombosis (DVT) of tibial vein of both lower extremities (HCC)     ICD-10-CM: Q49.180 ICD-9-CM: 453.42 Atrial fibrillation with rapid ventricular response (HCC)     ICD-10-CM: I48.91 
ICD-9-CM: 427.31 Brain metastases ([facility]     ICD-10-CM: C79.31 
ICD-9-CM: 198. 3 Chemotherapy-induced neuropathy (HCC)     ICD-10-CM: G62.0, T45.1X5A 
ICD-9-CM: 357.6, E933.1 Vitals BP Pulse Temp Resp Height(growth percentile) Weight(growth percentile) 116/64 (BP 1 Location: Right arm, BP Patient Position: Sitting) 77 95.9 °F (35.5 °C) (Oral) 20 5' 6\" (1.676 m) 164 lb 6.4 oz (74.6 kg) SpO2 BMI Smoking Status 100% 26.53 kg/m2 Former Smoker Vitals History BMI and BSA Data Body Mass Index Body Surface Area  
 26.53 kg/m 2 1.86 m 2 Preferred Pharmacy Pharmacy Name Phone [address] [place], [place] - [address] [phone] Your Updated Medication List  
  
   
This list is accurate as of: 10/6/17 10:19 AM.  Always use your most recent med list.  
  
  
  
  
 aspirin delayed-release 81 mg tablet Take 81 mg by mouth daily. dilTIAZem  mg ER capsule Commonly known as:  CARDIZEM CD Take 1 Cap by mouth daily. Indications: VENTRICULAR RATE CONTROL IN ATRIAL FIBRILLATION  
  
 diphenhydrAMINE 25 mg tablet Commonly known as:  BENADRYL Take 25 mg by mouth nightly as needed for Sleep. DULoxetine 30 mg capsule Commonly known as:  CYMBALTA Take 1 Cap by mouth daily. enoxaparin 120 mg/0.8 mL injection Commonly known as:  LOVENOX  
120 mg by SubCUTAneous route daily. lidocaine-prilocaine topical cream  
Commonly known as:  EMLA Apply  to affected area as needed for Pain (Apply 30-60 min. prior to having your port accessed). magic mouthwash solution Swish and spit 15-30 mL every 2-4 hours as needed for mouth pain. May swallow for throat pain. Magic mouth wash  Maalox Lidocaine 2% viscous  Diphenhydramine oral solution   Pharmacy to mix equal portions of ingredients to a total volume as indicated in the dispense amount. metoprolol succinate 25 mg XL tablet Commonly known as:  TOPROL-XL Take 1 Tab by mouth daily. MIRALAX 17 gram packet Generic drug:  polyethylene glycol Take 17 g by mouth daily. nicotine 14 mg/24 hr patch Commonly known as:  NICODERM CQ  
1 Patch by TransDERmal route every twenty-four (24) hours. ondansetron hcl 8 mg tablet Commonly known as:  Reese Collet Take 1 Tab by mouth every eight (8) hours as needed for Nausea. prochlorperazine 10 mg tablet Commonly known as:  COMPAZINE Take 1 Tab by mouth every six (6) hours as needed for Nausea. XYZAL 5 mg tablet Generic drug:  levocetirizine Take 5 mg by mouth daily as needed for Allergies. Prescriptions Sent to Pharmacy Refills DULoxetine (CYMBALTA) 30 mg capsule 11 Sig: Take 1 Cap by mouth daily. Class: Normal  
 Pharmacy: [address] [place] [address] #: [phone] Route: Oral  
  
Follow-up Instructions Return in 3 weeks (on 10/27/2017) for labs, Luis Enrique Courser #4. To-Do List   
 10/06/2017 1:00 PM  
  Appointment with [facility] CT 1 at [facility] ([phone]) CONTRAST STUDY: 1. The patient should not eat solid food four hours before the appointment but should be encouraged to drink clear liquids.  2. If you have to drink oral contrast, please pick it up any weekday prior to your appointment, if you cannot please check in 2 hrs before appt time. 3.  The patient will require IV access for contrast administration. 4.  The patient should not take Ibuprofen (Advil, Motrin, etc.) and Naproxen Sodium (Aleve, etc.)  on the day of the exam. Stopping non-steroidal anti-inflammatory agents (NSAIDs) like Ibuprofen decreases the risk of kidney damage from the x-ray contrast (dye). 5.  Bring any non [facility] facility films/images pertaining to the area of interest with you on the day of appointment. 6.  Bring current lab work if available (within last 90 days CMP) ***If scheduled at [facility], iSTAT is not available, labs will need to be done before appointment*** 7. Check in at registration at least 30 minutes before appt time unless you were instructed to do otherwise. 10/13/2017 9:00 AM  
  Appointment with [address] at [place] [address] ([phone])  
  
 10/27/2017 9:00 AM  
  Appointment with [address] at [place] [address] ([phone])  
  
 11/03/2017 9:00 AM  
  Appointment with [address] at [place] [address] ([phone]) \[facility]\"" & HEALTH SERVICES! Dear Stacy Carrington: Thank you for requesting a [employer] account. Our records indicate that you already have an active [employer] account. You can access your account anytime at [email or web address] Did you know that you can access your hospital and ER discharge instructions at any time in [employer]? You can also review all of your test results from your hospital stay or ER visit. Additional Information If you have questions, please visit the Frequently Asked Questions section of the [employer] website at [email or web address] Remember, [employer] is NOT to be used for urgent needs. For medical emergencies, dial 911. Now available from your iPhone and Android! Please provide this summary of care documentation to your next provider. Your primary care clinician is listed as Ezequiel Barker. If you have any questions after today's visit, please call [phone].

## 2017-10-06 NOTE — PROGRESS NOTES
[employer]  Social Work Navigator Encounter     Patient Name:  Erik Arrington    Medical History: lung cancer    Advance Directives: Pt/daughter tell me they have an appointment scheduled with an  for 10/10 to completed POA and AD documentation. Narrative: Met with pt and pt's daughter to follow-up. Pt tells me he is doing \"fair\"; reports feeling cold b/c of Lovenox. Pt's daughter tells me order for rollator has been placed. Pt's daughter plans to purchase pt a digital scale to monitor weight. Pt continues to receive home health and PT. No other barriers identified at this time. Plan:   1.  Ongoing psychosocial support as needed    -DEJAH Thompson

## 2017-10-09 ENCOUNTER — TELEPHONE (OUTPATIENT)
Dept: ONCOLOGY | Age: 72
End: 2017-10-09

## 2017-10-09 DIAGNOSIS — C34.90 STAGE IV SQUAMOUS CELL CARCINOMA OF LUNG, UNSPECIFIED LATERALITY (HCC): ICD-10-CM

## 2017-10-09 DIAGNOSIS — R30.0 DYSURIA: Primary | ICD-10-CM

## 2017-10-09 NOTE — TELEPHONE ENCOUNTER
Patients daughter, Caleb Cisse, left a voicemail and stated that patient is complaining of pain while urinating and wanted to know what they needed to do.  # [phone]

## 2017-10-09 NOTE — TELEPHONE ENCOUNTER
[address]  Medical Oncology at [address] [place] [address]    10/09/17- Returned Callie's call, no answer, voicemail left requesting a return call when available. 1:26 PM- Patient's daughter reports a weak stream and pain with urination starting yesterday. She denies fevers. Informed her we would like to get a urine sample either today or tomorrow to evaluate for UTI. She requested to have it done at patient's PCP, as they are closer to patient's house. Will contact Dr. Mikael Garza office regarding UA and urine culture order. 3:55 PM- Page notified that patient can walk in to PCP office tomorrow for urine testing. Order has been placed in [facility]. She verbalized understanding, no further questions or concerns.

## 2017-10-10 RX ORDER — DEXAMETHASONE SODIUM PHOSPHATE 4 MG/ML
8 INJECTION, SOLUTION INTRA-ARTICULAR; INTRALESIONAL; INTRAMUSCULAR; INTRAVENOUS; SOFT TISSUE ONCE
Status: COMPLETED | OUTPATIENT
Start: 2017-10-13 | End: 2017-10-13

## 2017-10-10 RX ORDER — SODIUM CHLORIDE 9 MG/ML
25 INJECTION, SOLUTION INTRAVENOUS CONTINUOUS
Status: DISPENSED | OUTPATIENT
Start: 2017-10-13 | End: 2017-10-13

## 2017-10-11 RX ORDER — ACETAMINOPHEN 325 MG/1
650 TABLET ORAL ONCE
Status: COMPLETED | OUTPATIENT
Start: 2017-10-12 | End: 2017-10-12

## 2017-10-11 RX ORDER — DIPHENHYDRAMINE HCL 25 MG
25 CAPSULE ORAL ONCE
Status: COMPLETED | OUTPATIENT
Start: 2017-10-12 | End: 2017-10-12

## 2017-10-12 ENCOUNTER — HOSPITAL ENCOUNTER (OUTPATIENT)
Dept: INFUSION THERAPY | Age: 72
Discharge: HOME | End: 2017-10-12
Payer: MEDICARE

## 2017-10-12 VITALS
TEMPERATURE: 97.4 F | SYSTOLIC BLOOD PRESSURE: 135 MMHG | HEART RATE: 74 BPM | DIASTOLIC BLOOD PRESSURE: 71 MMHG | RESPIRATION RATE: 16 BRPM | OXYGEN SATURATION: 97 %

## 2017-10-12 LAB
APPEARANCE UR: ABNORMAL
BACTERIA #/AREA URNS HPF: NORMAL /[HPF]
BACTERIA UR CULT: NORMAL
BILIRUB UR QL STRIP: NEGATIVE
CASTS URNS QL MICRO: NORMAL /LPF
COLOR UR: YELLOW
EPI CELLS #/AREA URNS HPF: NORMAL /HPF
GLUCOSE UR QL: NEGATIVE
HGB UR QL STRIP: NEGATIVE
KETONES UR QL STRIP: NEGATIVE
LEUKOCYTE ESTERASE UR QL STRIP: NEGATIVE
MICRO URNS: ABNORMAL
MICRO URNS: ABNORMAL
MUCOUS THREADS URNS QL MICRO: PRESENT
NITRITE UR QL STRIP: NEGATIVE
PH UR STRIP: 7 [PH] (ref 5–7.5)
PROT UR QL STRIP: NEGATIVE
RBC #/AREA URNS HPF: NORMAL /HPF
SP GR UR: 1.02 (ref 1–1.03)
UROBILINOGEN UR STRIP-MCNC: 1 MG/DL (ref 0.2–1)
WBC #/AREA URNS HPF: NORMAL /HPF

## 2017-10-12 PROCEDURE — 74011000250 HC RX REV CODE- 250: Performed by: INTERNAL MEDICINE

## 2017-10-12 PROCEDURE — 86900 BLOOD TYPING SEROLOGIC ABO: CPT | Performed by: INTERNAL MEDICINE

## 2017-10-12 PROCEDURE — 86923 COMPATIBILITY TEST ELECTRIC: CPT | Performed by: INTERNAL MEDICINE

## 2017-10-12 PROCEDURE — 74011250637 HC RX REV CODE- 250/637: Performed by: NURSE PRACTITIONER

## 2017-10-12 PROCEDURE — 74011250636 HC RX REV CODE- 250/636: Performed by: INTERNAL MEDICINE

## 2017-10-12 PROCEDURE — 36430 TRANSFUSION BLD/BLD COMPNT: CPT

## 2017-10-12 PROCEDURE — P9016 RBC LEUKOCYTES REDUCED: HCPCS | Performed by: INTERNAL MEDICINE

## 2017-10-12 PROCEDURE — 36415 COLL VENOUS BLD VENIPUNCTURE: CPT | Performed by: INTERNAL MEDICINE

## 2017-10-12 PROCEDURE — 77030013169 SET IV BLD ICUM -A

## 2017-10-12 RX ORDER — HEPARIN 100 UNIT/ML
500 SYRINGE INTRAVENOUS AS NEEDED
Status: ACTIVE | OUTPATIENT
Start: 2017-10-12 | End: 2017-10-13

## 2017-10-12 RX ORDER — SODIUM CHLORIDE 9 MG/ML
10 INJECTION INTRAMUSCULAR; INTRAVENOUS; SUBCUTANEOUS AS NEEDED
Status: ACTIVE | OUTPATIENT
Start: 2017-10-12 | End: 2017-10-13

## 2017-10-12 RX ORDER — SODIUM CHLORIDE 0.9 % (FLUSH) 0.9 %
10 SYRINGE (ML) INJECTION AS NEEDED
Status: ACTIVE | OUTPATIENT
Start: 2017-10-12 | End: 2017-10-13

## 2017-10-12 RX ADMIN — SODIUM CHLORIDE 10 ML: 9 INJECTION, SOLUTION INTRAMUSCULAR; INTRAVENOUS; SUBCUTANEOUS at 07:45

## 2017-10-12 RX ADMIN — Medication 10 ML: at 07:50

## 2017-10-12 RX ADMIN — ACETAMINOPHEN 650 MG: 325 TABLET ORAL at 10:40

## 2017-10-12 RX ADMIN — DIPHENHYDRAMINE HYDROCHLORIDE 25 MG: 25 CAPSULE ORAL at 10:40

## 2017-10-12 RX ADMIN — SODIUM CHLORIDE, PRESERVATIVE FREE 500 UNITS: 5 INJECTION INTRAVENOUS at 17:35

## 2017-10-12 RX ADMIN — Medication 10 ML: at 07:51

## 2017-10-12 RX ADMIN — Medication 10 ML: at 17:35

## 2017-10-12 NOTE — PROGRESS NOTES
[facility] VISIT NOTE    0740  Pt arrived at [facility] ambulatory with cane and in no distress for blood transfusion. Assessment completed, no new complaints voiced at this time. Blood pressure 133/80, pulse 84, temperature 96.8 °F (36 °C), resp. rate 18. Right chest port accessed with 0.75 in kellogg with no difficulty. Positive blood return noted and labs drawn. Medications received:  Tylenol PO  Benadryl PO    1140  1st unit of PRBC's initiated and titrated per protocol. No s/s of adverse reaction noted. 1445  2nd unit of PRBC's initiated and titrated per protocol. No s/s of adverse reaction noted. Patient declined to remain in [facility] for 1 hour post transfusion, but did remain for about 15 minutes. Tolerated treatment well, no adverse reaction noted. Port de-accessed and flushed per protocol. Positive blood return noted post treatment. Discharge instructions reviewed with patient and daughter. All questions answered. Patient Vitals for the past 12 hrs:   Temp Pulse Resp BP SpO2   10/12/17 1730 97.4 °F (36.3 °C) 74 16 135/71 -   10/12/17 1715 97.1 °F (36.2 °C) 73 16 137/74 -   10/12/17 1645 96.7 °F (35.9 °C) 73 16 137/74 -   10/12/17 1545 98.1 °F (36.7 °C) 73 16 130/73 -   10/12/17 1515 97.9 °F (36.6 °C) 69 16 131/74 -   10/12/17 1500 97.9 °F (36.6 °C) 67 16 122/73 -   10/12/17 1440 96.7 °F (35.9 °C) 70 16 118/70 -   10/12/17 1415 97.8 °F (36.6 °C) 68 16 120/71 -   10/12/17 1340 97.3 °F (36.3 °C) 66 16 137/77 -   10/12/17 1240 97 °F (36.1 °C) 69 16 129/72 -   10/12/17 1210 96.7 °F (35.9 °C) 82 18 136/81 -   10/12/17 1155 98 °F (36.7 °C) 72 18 118/67 -   10/12/17 1133 97.4 °F (36.3 °C) 65 18 119/55 97 %   10/12/17 0741 96.8 °F (36 °C) 84 18 133/80 -       1735  D/C'd from [facility] ambulatory with cane and in no distress accompanied by daughter. Next appointment is 10/13/17 at 0900.

## 2017-10-13 ENCOUNTER — HOSPITAL ENCOUNTER (OUTPATIENT)
Dept: INFUSION THERAPY | Age: 72
Discharge: HOME | End: 2017-10-13
Payer: MEDICARE

## 2017-10-13 VITALS
RESPIRATION RATE: 18 BRPM | TEMPERATURE: 98 F | DIASTOLIC BLOOD PRESSURE: 71 MMHG | BODY MASS INDEX: 24.29 KG/M2 | HEIGHT: 69 IN | WEIGHT: 164 LBS | HEART RATE: 71 BPM | SYSTOLIC BLOOD PRESSURE: 132 MMHG

## 2017-10-13 DIAGNOSIS — R39.11 URINARY HESITANCY: Primary | ICD-10-CM

## 2017-10-13 LAB
ABO + RH BLD: NORMAL
BASOPHILS # BLD: 0.1 K/UL (ref 0–0.1)
BASOPHILS NFR BLD: 1 % (ref 0–1)
BLD PROD TYP BPU: NORMAL
BLD PROD TYP BPU: NORMAL
BLOOD GROUP ANTIBODIES SERPL: NORMAL
BPU ID: NORMAL
BPU ID: NORMAL
CROSSMATCH RESULT,%XM: NORMAL
CROSSMATCH RESULT,%XM: NORMAL
EOSINOPHIL # BLD: 0 K/UL (ref 0–0.4)
EOSINOPHIL NFR BLD: 0 % (ref 0–7)
ERYTHROCYTE [DISTWIDTH] IN BLOOD BY AUTOMATED COUNT: 20.1 % (ref 11.5–14.5)
HCT VFR BLD AUTO: 31.3 % (ref 36.6–50.3)
HGB BLD-MCNC: 10.1 G/DL (ref 12.1–17)
LYMPHOCYTES # BLD: 2.3 K/UL (ref 0.8–3.5)
LYMPHOCYTES NFR BLD: 36 % (ref 12–49)
MCH RBC QN AUTO: 28.2 PG (ref 26–34)
MCHC RBC AUTO-ENTMCNC: 32.3 G/DL (ref 30–36.5)
MCV RBC AUTO: 87.4 FL (ref 80–99)
MONOCYTES # BLD: 0.7 K/UL (ref 0–1)
MONOCYTES NFR BLD: 11 % (ref 5–13)
NEUTS SEG # BLD: 3.4 K/UL (ref 1.8–8)
NEUTS SEG NFR BLD: 52 % (ref 32–75)
PLATELET # BLD AUTO: 634 K/UL (ref 150–400)
RBC # BLD AUTO: 3.58 M/UL (ref 4.1–5.7)
RBC MORPH BLD: ABNORMAL
SPECIMEN EXP DATE BLD: NORMAL
STATUS OF UNIT,%ST: NORMAL
STATUS OF UNIT,%ST: NORMAL
UNIT DIVISION, %UDIV: 0
UNIT DIVISION, %UDIV: 0
WBC # BLD AUTO: 6.5 K/UL (ref 4.1–11.1)

## 2017-10-13 PROCEDURE — 74011250636 HC RX REV CODE- 250/636: Performed by: INTERNAL MEDICINE

## 2017-10-13 PROCEDURE — 77030012965 HC NDL HUBR BBMI -A

## 2017-10-13 PROCEDURE — 36415 COLL VENOUS BLD VENIPUNCTURE: CPT | Performed by: INTERNAL MEDICINE

## 2017-10-13 PROCEDURE — 96375 TX/PRO/DX INJ NEW DRUG ADDON: CPT

## 2017-10-13 PROCEDURE — 85025 COMPLETE CBC W/AUTO DIFF WBC: CPT | Performed by: INTERNAL MEDICINE

## 2017-10-13 PROCEDURE — 96413 CHEMO IV INFUSION 1 HR: CPT

## 2017-10-13 PROCEDURE — 74011000250 HC RX REV CODE- 250: Performed by: INTERNAL MEDICINE

## 2017-10-13 RX ORDER — TAMSULOSIN HYDROCHLORIDE 0.4 MG/1
0.4 CAPSULE ORAL DAILY
Qty: 30 CAP | Refills: 5 | Status: SHIPPED | OUTPATIENT
Start: 2017-10-13 | End: 2018-01-01 | Stop reason: SDUPTHER

## 2017-10-13 RX ORDER — SODIUM CHLORIDE 0.9 % (FLUSH) 0.9 %
10 SYRINGE (ML) INJECTION AS NEEDED
Status: DISCONTINUED | OUTPATIENT
Start: 2017-10-13 | End: 2017-10-17 | Stop reason: HOSPADM

## 2017-10-13 RX ORDER — SODIUM CHLORIDE 9 MG/ML
10 INJECTION INTRAMUSCULAR; INTRAVENOUS; SUBCUTANEOUS AS NEEDED
Status: ACTIVE | OUTPATIENT
Start: 2017-10-13 | End: 2017-10-14

## 2017-10-13 RX ORDER — SODIUM CHLORIDE 9 MG/ML
INJECTION INTRAMUSCULAR; INTRAVENOUS; SUBCUTANEOUS
Status: DISPENSED
Start: 2017-10-13 | End: 2017-10-13

## 2017-10-13 RX ORDER — HEPARIN 100 UNIT/ML
500 SYRINGE INTRAVENOUS AS NEEDED
Status: ACTIVE | OUTPATIENT
Start: 2017-10-13 | End: 2017-10-14

## 2017-10-13 RX ADMIN — Medication 10 ML: at 09:40

## 2017-10-13 RX ADMIN — SODIUM CHLORIDE 10 ML: 9 INJECTION, SOLUTION INTRAMUSCULAR; INTRAVENOUS; SUBCUTANEOUS at 09:40

## 2017-10-13 RX ADMIN — DEXAMETHASONE SODIUM PHOSPHATE 8 MG: 4 INJECTION, SOLUTION INTRA-ARTICULAR; INTRALESIONAL; INTRAMUSCULAR; INTRAVENOUS; SOFT TISSUE at 11:15

## 2017-10-13 RX ADMIN — Medication 10 ML: at 12:51

## 2017-10-13 RX ADMIN — Medication 500 UNITS: at 12:51

## 2017-10-13 RX ADMIN — SODIUM CHLORIDE 1890 MG: 900 INJECTION, SOLUTION INTRAVENOUS at 12:19

## 2017-10-13 RX ADMIN — SODIUM CHLORIDE 25 ML/HR: 900 INJECTION, SOLUTION INTRAVENOUS at 11:14

## 2017-10-13 NOTE — PROGRESS NOTES
[facility] VISIT NOTE    1878  Pt arrived at [facility] ambulatory and in no distress for C3D8 of Carbo/Gemzar. Assessment completed, pt c/o continued hesitation with urination. William Bledsoe RN notified and [place] called in to pt's pharmacy. Pt and his daughter aware. Port accessed with 0.75 in kellogg with no difficulty. Positive blood return noted and labs drawn. Recent Results (from the past 12 hour(s))   CBC WITH AUTOMATED DIFF    Collection Time: 10/13/17  9:30 AM   Result Value Ref Range    WBC 6.5 4.1 - 11.1 K/uL    RBC 3.58 (L) 4.10 - 5.70 M/uL    HGB 10.1 (L) 12.1 - 17.0 g/dL    HCT 31.3 (L) 36.6 - 50.3 %    MCV 87.4 80.0 - 99.0 FL    MCH 28.2 26.0 - 34.0 PG    MCHC 32.3 30.0 - 36.5 g/dL    RDW 20.1 (H) 11.5 - 14.5 %    PLATELET 934 (H) 023 - 400 K/uL    NEUTROPHILS 52 32 - 75 %    LYMPHOCYTES 36 12 - 49 %    MONOCYTES 11 5 - 13 %    EOSINOPHILS 0 0 - 7 %    BASOPHILS 1 0 - 1 %    ABS. NEUTROPHILS 3.4 1.8 - 8.0 K/UL    ABS. LYMPHOCYTES 2.3 0.8 - 3.5 K/UL    ABS. MONOCYTES 0.7 0.0 - 1.0 K/UL    ABS. EOSINOPHILS 0.0 0.0 - 0.4 K/UL    ABS. BASOPHILS 0.1 0.0 - 0.1 K/UL    RBC COMMENTS ANISOCYTOSIS  1+       TYLER Delaney notified of pt's platelet level. No new orders received. Medications received:  NS at [address] [place] [address] IVP  Gemzar IV    Patient Vitals for the past 12 hrs:   Temp Pulse Resp BP   10/13/17 1255 98 °F (36.7 °C) 71 18 132/71   10/13/17 0926 97.2 °F (36.2 °C) 87 18 136/80     Tolerated treatment well, no adverse reaction noted. Port de-accessed and flushed per protocol. Positive blood return noted. 1300  D/C'd from [facility] ambulatory and in no distress accompanied by his daughter. Next appointment is 10/27/17 at 0900.

## 2017-10-16 ENCOUNTER — TELEPHONE (OUTPATIENT)
Dept: ONCOLOGY | Age: 72
End: 2017-10-16

## 2017-10-16 NOTE — TELEPHONE ENCOUNTER
[address]  Medical Oncology at [address] [place] [address]    10/16/17- Returned Sarah Freshwater phone call she reported she picked up patients Lovenox on Friday and it was 80 mg BID. She questioned if this was okay as before it was 120 mg once daily. Per Kush Rebollar. NP she reported she prefers 80 mg BID- Tyro verbalized understanding and denied any further questions or concerns.

## 2017-10-16 NOTE — TELEPHONE ENCOUNTER
Pts daughter called and left a voicemail stating she picked up pts lovenox injections and they are 80 mg twice a day but they are usually 120 mg once a day. She just wanted to make sure this correct.  Call back number for Arianna Toribio is [phone]

## 2017-10-24 RX ORDER — SODIUM CHLORIDE 9 MG/ML
25 INJECTION, SOLUTION INTRAVENOUS CONTINUOUS
Status: DISPENSED | OUTPATIENT
Start: 2017-10-27 | End: 2017-10-27

## 2017-10-24 RX ORDER — DILTIAZEM HYDROCHLORIDE 240 MG/1
240 CAPSULE, COATED, EXTENDED RELEASE ORAL DAILY
Qty: 90 CAP | Refills: 3 | Status: SHIPPED | OUTPATIENT
Start: 2017-10-24 | End: 2018-01-01 | Stop reason: SDUPTHER

## 2017-10-24 RX ORDER — METOPROLOL SUCCINATE 25 MG/1
25 TABLET, EXTENDED RELEASE ORAL DAILY
Qty: 90 TAB | Refills: 3 | Status: SHIPPED | OUTPATIENT
Start: 2017-10-24 | End: 2018-01-01 | Stop reason: SDUPTHER

## 2017-10-24 RX ORDER — PALONOSETRON 0.05 MG/ML
0.25 INJECTION, SOLUTION INTRAVENOUS ONCE
Status: COMPLETED | OUTPATIENT
Start: 2017-10-27 | End: 2017-10-27

## 2017-10-26 ENCOUNTER — TELEPHONE (OUTPATIENT)
Dept: ONCOLOGY | Age: 72
End: 2017-10-26

## 2017-10-26 NOTE — TELEPHONE ENCOUNTER
[email or web address] at [facility] [address]  [phone]    10/26/17- Patient's daughter, Ryan Bourgeois, stated she won't be at patient's appointment tomorrow, but wanted to let Dr. Jim Meelndez and Tiffany Zaidi know that patient is not wanting to do lovenox injections anymore. Stated he has knots in his abdomen and feels like there's \"no where left to give the injection\". She stated he was interested in switching to an oral medication if possible. Will pass this information on to Dr. Jim Melendez and Tiffany Zaidi. No further questions or concerns.

## 2017-10-26 NOTE — TELEPHONE ENCOUNTER
Pts daughter called and left a voicemail requesting a return call from a nurse.  Call back number [phone] for [facility]

## 2017-10-27 ENCOUNTER — TELEPHONE (OUTPATIENT)
Dept: ONCOLOGY | Age: 72
End: 2017-10-27

## 2017-10-27 ENCOUNTER — HOSPITAL ENCOUNTER (OUTPATIENT)
Dept: INFUSION THERAPY | Age: 72
Discharge: HOME | End: 2017-10-27
Payer: MEDICARE

## 2017-10-27 ENCOUNTER — OFFICE VISIT (OUTPATIENT)
Dept: ONCOLOGY | Age: 72
End: 2017-10-27

## 2017-10-27 VITALS
BODY MASS INDEX: 24.94 KG/M2 | TEMPERATURE: 97 F | HEART RATE: 72 BPM | WEIGHT: 168.4 LBS | SYSTOLIC BLOOD PRESSURE: 124 MMHG | HEIGHT: 69 IN | OXYGEN SATURATION: 98 % | RESPIRATION RATE: 18 BRPM | DIASTOLIC BLOOD PRESSURE: 74 MMHG

## 2017-10-27 VITALS
OXYGEN SATURATION: 98 % | WEIGHT: 166.8 LBS | TEMPERATURE: 95.6 F | HEIGHT: 69 IN | BODY MASS INDEX: 24.71 KG/M2 | RESPIRATION RATE: 20 BRPM | HEART RATE: 78 BPM

## 2017-10-27 DIAGNOSIS — T45.1X5A CHEMOTHERAPY-INDUCED NEUROPATHY (HCC): ICD-10-CM

## 2017-10-27 DIAGNOSIS — C79.31 BRAIN METASTASES (HCC): ICD-10-CM

## 2017-10-27 DIAGNOSIS — I48.92 PAROXYSMAL ATRIAL FLUTTER (HCC): ICD-10-CM

## 2017-10-27 DIAGNOSIS — I82.443 ACUTE DEEP VEIN THROMBOSIS (DVT) OF TIBIAL VEIN OF BOTH LOWER EXTREMITIES (HCC): ICD-10-CM

## 2017-10-27 DIAGNOSIS — C34.90 STAGE IV SQUAMOUS CELL CARCINOMA OF LUNG, UNSPECIFIED LATERALITY (HCC): ICD-10-CM

## 2017-10-27 DIAGNOSIS — G62.0 CHEMOTHERAPY-INDUCED NEUROPATHY (HCC): ICD-10-CM

## 2017-10-27 DIAGNOSIS — C34.90 PRIMARY MALIGNANT NEOPLASM OF LUNG METASTATIC TO OTHER SITE, UNSPECIFIED LATERALITY (HCC): Primary | ICD-10-CM

## 2017-10-27 LAB
ALBUMIN SERPL-MCNC: 3 G/DL (ref 3.5–5)
ALBUMIN/GLOB SERPL: 0.8 {RATIO} (ref 1.1–2.2)
ALP SERPL-CCNC: 76 U/L (ref 45–117)
ALT SERPL-CCNC: 28 U/L (ref 12–78)
ANION GAP SERPL CALC-SCNC: 7 MMOL/L (ref 5–15)
AST SERPL-CCNC: 19 U/L (ref 15–37)
BASOPHILS # BLD: 0 K/UL (ref 0–0.1)
BASOPHILS NFR BLD: 0 % (ref 0–1)
BILIRUB SERPL-MCNC: 0.2 MG/DL (ref 0.2–1)
BUN SERPL-MCNC: 12 MG/DL (ref 6–20)
BUN/CREAT SERPL: 17 (ref 12–20)
CALCIUM SERPL-MCNC: 9.3 MG/DL (ref 8.5–10.1)
CHLORIDE SERPL-SCNC: 102 MMOL/L (ref 97–108)
CO2 SERPL-SCNC: 27 MMOL/L (ref 21–32)
CREAT SERPL-MCNC: 0.71 MG/DL (ref 0.7–1.3)
DIFFERENTIAL METHOD BLD: ABNORMAL
EOSINOPHIL # BLD: 0.1 K/UL (ref 0–0.4)
EOSINOPHIL NFR BLD: 1 % (ref 0–7)
ERYTHROCYTE [DISTWIDTH] IN BLOOD BY AUTOMATED COUNT: 20.1 % (ref 11.5–14.5)
GLOBULIN SER CALC-MCNC: 3.9 G/DL (ref 2–4)
GLUCOSE SERPL-MCNC: 86 MG/DL (ref 65–100)
HCT VFR BLD AUTO: 29.8 % (ref 36.6–50.3)
HGB BLD-MCNC: 9.5 G/DL (ref 12.1–17)
LYMPHOCYTES # BLD: 2.7 K/UL (ref 0.8–3.5)
LYMPHOCYTES NFR BLD: 34 % (ref 12–49)
MCH RBC QN AUTO: 28.4 PG (ref 26–34)
MCHC RBC AUTO-ENTMCNC: 31.9 G/DL (ref 30–36.5)
MCV RBC AUTO: 89 FL (ref 80–99)
MONOCYTES # BLD: 1.3 K/UL (ref 0–1)
MONOCYTES NFR BLD: 16 % (ref 5–13)
NEUTS SEG # BLD: 3.9 K/UL (ref 1.8–8)
NEUTS SEG NFR BLD: 49 % (ref 32–75)
PLATELET # BLD AUTO: 209 K/UL (ref 150–400)
POTASSIUM SERPL-SCNC: 4.2 MMOL/L (ref 3.5–5.1)
PROT SERPL-MCNC: 6.9 G/DL (ref 6.4–8.2)
RBC # BLD AUTO: 3.35 M/UL (ref 4.1–5.7)
RBC MORPH BLD: ABNORMAL
SODIUM SERPL-SCNC: 136 MMOL/L (ref 136–145)
WBC # BLD AUTO: 8 K/UL (ref 4.1–11.1)
WBC MORPH BLD: ABNORMAL

## 2017-10-27 PROCEDURE — 96375 TX/PRO/DX INJ NEW DRUG ADDON: CPT

## 2017-10-27 PROCEDURE — 74011000250 HC RX REV CODE- 250: Performed by: INTERNAL MEDICINE

## 2017-10-27 PROCEDURE — 96413 CHEMO IV INFUSION 1 HR: CPT

## 2017-10-27 PROCEDURE — 74011000258 HC RX REV CODE- 258: Performed by: INTERNAL MEDICINE

## 2017-10-27 PROCEDURE — 80053 COMPREHEN METABOLIC PANEL: CPT | Performed by: INTERNAL MEDICINE

## 2017-10-27 PROCEDURE — 36415 COLL VENOUS BLD VENIPUNCTURE: CPT | Performed by: INTERNAL MEDICINE

## 2017-10-27 PROCEDURE — 74011250636 HC RX REV CODE- 250/636: Performed by: INTERNAL MEDICINE

## 2017-10-27 PROCEDURE — 77030012965 HC NDL HUBR BBMI -A

## 2017-10-27 PROCEDURE — 85025 COMPLETE CBC W/AUTO DIFF WBC: CPT | Performed by: INTERNAL MEDICINE

## 2017-10-27 PROCEDURE — 96417 CHEMO IV INFUS EACH ADDL SEQ: CPT

## 2017-10-27 RX ORDER — SODIUM CHLORIDE 0.9 % (FLUSH) 0.9 %
10 SYRINGE (ML) INJECTION AS NEEDED
Status: ACTIVE | OUTPATIENT
Start: 2017-10-27 | End: 2017-10-28

## 2017-10-27 RX ORDER — SODIUM CHLORIDE 9 MG/ML
10 INJECTION INTRAMUSCULAR; INTRAVENOUS; SUBCUTANEOUS AS NEEDED
Status: ACTIVE | OUTPATIENT
Start: 2017-10-27 | End: 2017-10-28

## 2017-10-27 RX ORDER — HEPARIN 100 UNIT/ML
500 SYRINGE INTRAVENOUS AS NEEDED
Status: ACTIVE | OUTPATIENT
Start: 2017-10-27 | End: 2017-10-28

## 2017-10-27 RX ADMIN — Medication 10 ML: at 14:10

## 2017-10-27 RX ADMIN — SODIUM CHLORIDE 25 ML/HR: 900 INJECTION, SOLUTION INTRAVENOUS at 11:30

## 2017-10-27 RX ADMIN — Medication 10 ML: at 14:11

## 2017-10-27 RX ADMIN — CARBOPLATIN 646 MG: 10 INJECTION, SOLUTION INTRAVENOUS at 12:23

## 2017-10-27 RX ADMIN — PALONOSETRON HYDROCHLORIDE 0.25 MG: 0.25 INJECTION INTRAVENOUS at 11:57

## 2017-10-27 RX ADMIN — Medication 10 ML: at 09:00

## 2017-10-27 RX ADMIN — SODIUM CHLORIDE, PRESERVATIVE FREE 500 UNITS: 5 INJECTION INTRAVENOUS at 14:11

## 2017-10-27 RX ADMIN — DEXAMETHASONE SODIUM PHOSPHATE 12 MG: 4 INJECTION, SOLUTION INTRA-ARTICULAR; INTRALESIONAL; INTRAMUSCULAR; INTRAVENOUS; SOFT TISSUE at 12:00

## 2017-10-27 RX ADMIN — SODIUM CHLORIDE 1890 MG: 900 INJECTION, SOLUTION INTRAVENOUS at 13:09

## 2017-10-27 RX ADMIN — SODIUM CHLORIDE 10 ML: 9 INJECTION, SOLUTION INTRAMUSCULAR; INTRAVENOUS; SUBCUTANEOUS at 09:00

## 2017-10-27 NOTE — PROGRESS NOTES
[facility] VISIT NOTE    0845 hrs   Pt arrived at [address] [place] [address] ambulatory and in no distress for Carbo/Gemcitabine C4D1. Assessment completed, pt c/o frequent lovenox injections causing discomfort of the abdominal area; wants them to stop; educated pt and son about the need to continue on regimen as pt has hx of DVT. Pt will wants to discuss alternative options with MD.     Willma Block port accessed with . 75 in kellogg with no difficulty. Positive blood return noted and labs drawn. Pt proceeded to MD visit. Blood pressure 119/70, pulse 73, temperature 97 °F (36.1 °C), resp. rate 18, height 5' 8.9\" (1.75 m), weight 76.4 kg (168 lb 6.4 oz). Recent Results (from the past 12 hour(s))   METABOLIC PANEL, COMPREHENSIVE    Collection Time: 10/27/17  9:07 AM   Result Value Ref Range    Sodium 136 136 - 145 mmol/L    Potassium 4.2 3.5 - 5.1 mmol/L    Chloride 102 97 - 108 mmol/L    CO2 27 21 - 32 mmol/L    Anion gap 7 5 - 15 mmol/L    Glucose 86 65 - 100 mg/dL    BUN 12 6 - 20 MG/DL    Creatinine 0.71 0.70 - 1.30 MG/DL    BUN/Creatinine ratio 17 12 - 20      GFR est AA >60 >60 ml/min/1.73m2    GFR est non-AA >60 >60 ml/min/1.73m2    Calcium 9.3 8.5 - 10.1 MG/DL    Bilirubin, total 0.2 0.2 - 1.0 MG/DL    ALT (SGPT) 28 12 - 78 U/L    AST (SGOT) 19 15 - 37 U/L    Alk. phosphatase 76 45 - 117 U/L    Protein, total 6.9 6.4 - 8.2 g/dL    Albumin 3.0 (L) 3.5 - 5.0 g/dL    Globulin 3.9 2.0 - 4.0 g/dL    A-G Ratio 0.8 (L) 1.1 - 2.2     CBC WITH AUTOMATED DIFF    Collection Time: 10/27/17  9:07 AM   Result Value Ref Range    WBC 8.0 4.1 - 11.1 K/uL    RBC 3.35 (L) 4.10 - 5.70 M/uL    HGB 9.5 (L) 12.1 - 17.0 g/dL    HCT 29.8 (L) 36.6 - 50.3 %    MCV 89.0 80.0 - 99.0 FL    MCH 28.4 26.0 - 34.0 PG    MCHC 31.9 30.0 - 36.5 g/dL    RDW 20.1 (H) 11.5 - 14.5 %    PLATELET 411 142 - 338 K/uL    NEUTROPHILS 49 32 - 75 %    LYMPHOCYTES 34 12 - 49 %    MONOCYTES 16 (H) 5 - 13 %    EOSINOPHILS 1 0 - 7 %    BASOPHILS 0 0 - 1 %    ABS.  NEUTROPHILS 3.9 1.8 - 8.0 K/UL    ABS. LYMPHOCYTES 2.7 0.8 - 3.5 K/UL    ABS. MONOCYTES 1.3 (H) 0.0 - 1.0 K/UL    ABS. EOSINOPHILS 0.1 0.0 - 0.4 K/UL    ABS. BASOPHILS 0.0 0.0 - 0.1 K/UL    DF SMEAR SCANNED      RBC COMMENTS MACROCYTOSIS  1+        RBC COMMENTS ANISOCYTOSIS  2+        RBC COMMENTS MICROCYTOSIS  1+        WBC COMMENTS ATYPICAL LYMPHOCYTES PRESENT         Medications received:  Aloxi IVP  Dexamethasone IVP  Carboplatin IV  Gemcitabine IV    Tolerated treatment well, no adverse reaction noted. Port de-accessed and flushed per protocol. Positive blood return noted. Blood pressure 124/74, pulse 72, temperature 97 °F (36.1 °C), resp. rate 18, height 5' 8.9\" (1.75 m), weight 76.4 kg (168 lb 6.4 oz), SpO2 98 %. 1415 hrs   D/C'd from [facility] ambulatory and in no distress accompanied by son.  Next appointment is 11/03/17 @ 10am.

## 2017-10-27 NOTE — PROGRESS NOTES
[facility] [employer]  Medical Oncology at [facility]  [phone]    Hematology / Oncology Followup    Reason for Visit:   Cornelius Toscano is a 67 y.o. male who is seen for follow up of lung cancer. Treatment History:   · CXR 7/11/2017: Mediastinal lymphadenopathy with left hilar mass. · CT Chest 7/14/2017: Bulky mediastinal and left hilar lymphadenopathy. Bilateral pulmonary masses and nodules  · PET-CT 7/24/2017: Right parietal mass. Hypermetabolic right supraclavicular, right paratracheal, AP window, prevascular, precarinal, subcarinal and left hilar lymphadenopathy. Hypermetabolic pulmonary nodules bilaterally. · MRI Brain 7/24/2017: Junction right posterior temporal lobe and occipital lobe 2.7 cm mass likely metastasis from lung cancer. No additional acute abnormalities  · FNA supraclavicular node 7/28/2017: Metastatic squamous cell carcinoma, PD-L1 5%, BRAF negative, EGFR negative, ALK & ROS-1 pending   · Stage IV Non-small cell lung cancer (Squamous Cell)  · Gamma knife by Dr. Angie Campo 8/15/2017   · Palliative chemotherapy with carboplatin and gemcitabine beginning 8/25/2017    History of Present Illness:   Here today for follow up. He reports feeling okay. His biggest complaint is pain at injection sites from Lovenox. He requests to switch to an oral anticoagulant. Denies bleeding or bruising. Feeling pretty well otherwise. No new complaints. He is accompanied by his son today. He smoked 1ppd for 50+ years but quit at diagnosis. He lives alone in [address] [place] [address]. His son lives about 15 minutes from him. His daughter lives about 30 minutes away. PAST HISTORY: The following sections were reviewed and updated in the EMR as appropriate: PMH, SH, FH, Medications, Allergies.       Allergies   Allergen Reactions    Lisinopril Angioedema    Hydrochlorothiazide Hives and Swelling    Sulfa (Sulfonamide Antibiotics) Hives      Review of Systems: A complete review of systems was obtained, reviewed, and scanned into the EMR. Pertinent findings reviewed above. Physical Exam:     Visit Vitals    Pulse 78    Temp 95.6 °F (35.3 °C) (Temporal)    Resp 20    Ht 5' 8.9\" (1.75 m)    Wt 166 lb 12.8 oz (75.7 kg)    SpO2 98%    BMI 24.7 kg/m2     ECOG PS: 1  General: No distress  Eyes: PERRLA, anicteric sclerae  HENT: Atraumatic, OP clear  Neck: Supple  Lymphatic: No cervical, supraclavicular, or inguinal adenopathy  Respiratory: CTAB, normal respiratory effort  CV: Normal rate, regular rhythm, no murmurs, no peripheral edema  GI: Soft, nontender, nondistended, no masses, no hepatomegaly, no splenomegaly  MS: Normal gait and station. Digits without clubbing or cyanosis. Skin: No rashes, ecchymoses, or petechiae. Normal temperature, turgor, and texture. Psych: Alert, oriented, appropriate affect, normal judgment/insight    Results:     Lab Results   Component Value Date/Time    WBC 8.0 10/27/2017 09:07 AM    HGB 9.5 10/27/2017 09:07 AM    HCT 29.8 10/27/2017 09:07 AM    PLATELET 473 79/34/9351 09:07 AM    MCV 89.0 10/27/2017 09:07 AM    ABS. NEUTROPHILS 3.9 10/27/2017 09:07 AM     Lab Results   Component Value Date/Time    Sodium 136 10/27/2017 09:07 AM    Potassium 4.2 10/27/2017 09:07 AM    Chloride 102 10/27/2017 09:07 AM    CO2 27 10/27/2017 09:07 AM    Glucose 86 10/27/2017 09:07 AM    BUN 12 10/27/2017 09:07 AM    Creatinine 0.71 10/27/2017 09:07 AM    GFR est AA >60 10/27/2017 09:07 AM    GFR est non-AA >60 10/27/2017 09:07 AM    Calcium 9.3 10/27/2017 09:07 AM     Lab Results   Component Value Date/Time    Bilirubin, total 0.2 10/27/2017 09:07 AM    ALT (SGPT) 28 10/27/2017 09:07 AM    AST (SGOT) 19 10/27/2017 09:07 AM    Alk. phosphatase 76 10/27/2017 09:07 AM    Protein, total 6.9 10/27/2017 09:07 AM    Albumin 3.0 10/27/2017 09:07 AM    Globulin 3.9 10/27/2017 09:07 AM       CTA Chest 9/15/2017:   1. No pulmonary embolus.   2. Bilateral lung masses and mediastinal nodes decreased in size.  3. Atherosclerosis with coronary artery calcifications    Venous doppler 9/15/2017: Bilateral lower extremity venous duplex positive for deep  venous thrombosis in right posterior tibial vein, right peroneal vein, and the left posterior tibial vein. There are superficial venous  thrombosis in bilateral greater saphenous veins      Assessment:   1) Metastatic non-small cell lung cancer (squamous cell)  EGFR, ALK, ROS1, BRAF negative  He has disease within his chest and brain. His cancer is not curable and management is with palliative intent. He is s/p gamma knife to CNS disease. He is currently receiving palliative chemotherapy with carboplatin and gemcitabine given every 3 weeks with plans for 4 cycles, potentially followed by maintenance chemotherapy. He continues tolerating therapy pretty well with side effects as below. We will proceed with therapy today and plan to reimage prior to next visit in 3 weeks, transitioning to maintenance therapy if scans are okay. Discussed Lung-MAP trial with patient and his son today and have previously provided handouts to patient regarding this. The patient and his daughter are meeting with research nurse next week to discuss further. 2) Brain metastasis  S/P gamma knife. Now off dexamethasone. Will defer reimaging to Dr. Kayla Burgos. 3) Tachycardia  Improved. Due to a-flutter. Cardiology now following, taking metoprolol and cardizem. 4) DVT 9/15/2017  On Lovenox BID but unable to tolerate injections. Stop Lovenox and start Apixaban 5mg BID. I recommend he continue this long term in the setting of malignancy. 5) Hypotension  Improved. Secondary to tachycardia and a-flutter. Cardiology following. 6) Altered mental status  No recurrence. Unclear etiology. Seen by neurosurgery. ? Nicotine withdrawal per neurosurgery. Patient to notify us if this recurs. 7) Cough  Improving. Secondary to disease. Monitor. 8) Weight loss  Stable today.  Likely due to cancer. Treatment as above. May need dietician to intervene if this continues. 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 Continue Cymbalta 30mg daily. Monitor and increase dose if needed. We also discussed gabapentin but he wants to hold off due to potential drowsiness side effect.      Plan:     Proceed today with C#4 Gemcitabine (1000 mg/m2 on days 1 and 8) and Carboplatin (AUC 5 on day 1) given every 3 weeks x 4 cycles  Labs: CBC on days 1 and 8, BMP, Magnesium, Phosphorus on day 1  Antiemetic Prophylaxis: Palonosetron on day 1, Dexamethasone on day 1 and day 8, Ondansetron prior to day 8  PRN Antiemetics: Ondansetron, Compazine   following   Follow up with Dr. Eduardo Aguero as scheduled  Follow up with cardiology as planned  D/C Lovenox  Start Apixaban 5mg BID  Continue Cymbalta 30 mg daily  CT C/A/P prior to next visit  Return to clinic in 3 weeks with maintenance Gemcitabine or sooner if needed        Signed By: Yasmani Villa MD     October 27, 2017

## 2017-10-27 NOTE — TELEPHONE ENCOUNTER
[facility] at [facility]  [phone]    10/27/17- Eliquis sent to [facility]- spoke to pharmacist she confirmed new prescription received  and co-pay cost is $3.60. Returned phone call to Williams informed her of update- she verbalized understanding and denied any further questions or concerns.

## 2017-10-27 NOTE — TELEPHONE ENCOUNTER
Patients daughter left a voicemail stating she would like a return call to discuss medication that is supposed to be sent to the pharmacy per patients son.  # [phone]

## 2017-10-31 RX ORDER — DEXAMETHASONE SODIUM PHOSPHATE 4 MG/ML
8 INJECTION, SOLUTION INTRA-ARTICULAR; INTRALESIONAL; INTRAMUSCULAR; INTRAVENOUS; SOFT TISSUE ONCE
Status: COMPLETED | OUTPATIENT
Start: 2017-11-03 | End: 2017-11-03

## 2017-10-31 RX ORDER — SODIUM CHLORIDE 9 MG/ML
25 INJECTION, SOLUTION INTRAVENOUS CONTINUOUS
Status: DISPENSED | OUTPATIENT
Start: 2017-11-03 | End: 2017-11-03

## 2017-11-03 ENCOUNTER — APPOINTMENT (OUTPATIENT)
Dept: INFUSION THERAPY | Age: 72
End: 2017-11-03
Payer: MEDICARE

## 2017-11-03 ENCOUNTER — RESEARCH ENCOUNTER (OUTPATIENT)
Dept: ONCOLOGY | Age: 72
End: 2017-11-03

## 2017-11-03 ENCOUNTER — HOSPITAL ENCOUNTER (OUTPATIENT)
Dept: INFUSION THERAPY | Age: 72
Discharge: HOME | End: 2017-11-03
Payer: MEDICARE

## 2017-11-03 VITALS
SYSTOLIC BLOOD PRESSURE: 108 MMHG | RESPIRATION RATE: 18 BRPM | WEIGHT: 167.1 LBS | HEIGHT: 69 IN | BODY MASS INDEX: 24.75 KG/M2 | HEART RATE: 75 BPM | TEMPERATURE: 97.9 F | OXYGEN SATURATION: 98 % | DIASTOLIC BLOOD PRESSURE: 67 MMHG

## 2017-11-03 LAB
BASOPHILS # BLD: 0 K/UL (ref 0–0.1)
BASOPHILS NFR BLD: 1 % (ref 0–1)
EOSINOPHIL # BLD: 0 K/UL (ref 0–0.4)
EOSINOPHIL NFR BLD: 1 % (ref 0–7)
ERYTHROCYTE [DISTWIDTH] IN BLOOD BY AUTOMATED COUNT: 19.6 % (ref 11.5–14.5)
HCT VFR BLD AUTO: 25.6 % (ref 36.6–50.3)
HGB BLD-MCNC: 8.5 G/DL (ref 12.1–17)
LYMPHOCYTES # BLD: 1.7 K/UL (ref 0.8–3.5)
LYMPHOCYTES NFR BLD: 47 % (ref 12–49)
MCH RBC QN AUTO: 29 PG (ref 26–34)
MCHC RBC AUTO-ENTMCNC: 33.2 G/DL (ref 30–36.5)
MCV RBC AUTO: 87.4 FL (ref 80–99)
MONOCYTES # BLD: 0.2 K/UL (ref 0–1)
MONOCYTES NFR BLD: 4 % (ref 5–13)
NEUTS SEG # BLD: 1.7 K/UL (ref 1.8–8)
NEUTS SEG NFR BLD: 47 % (ref 32–75)
PLATELET # BLD AUTO: 379 K/UL (ref 150–400)
RBC # BLD AUTO: 2.93 M/UL (ref 4.1–5.7)
WBC # BLD AUTO: 3.7 K/UL (ref 4.1–11.1)

## 2017-11-03 PROCEDURE — 85025 COMPLETE CBC W/AUTO DIFF WBC: CPT | Performed by: INTERNAL MEDICINE

## 2017-11-03 PROCEDURE — 74011000250 HC RX REV CODE- 250: Performed by: INTERNAL MEDICINE

## 2017-11-03 PROCEDURE — 74011250636 HC RX REV CODE- 250/636: Performed by: INTERNAL MEDICINE

## 2017-11-03 PROCEDURE — 36415 COLL VENOUS BLD VENIPUNCTURE: CPT | Performed by: INTERNAL MEDICINE

## 2017-11-03 RX ORDER — SODIUM CHLORIDE 9 MG/ML
10 INJECTION INTRAMUSCULAR; INTRAVENOUS; SUBCUTANEOUS AS NEEDED
Status: ACTIVE | OUTPATIENT
Start: 2017-11-03 | End: 2017-11-04

## 2017-11-03 RX ORDER — SODIUM CHLORIDE 0.9 % (FLUSH) 0.9 %
10-40 SYRINGE (ML) INJECTION AS NEEDED
Status: ACTIVE | OUTPATIENT
Start: 2017-11-03 | End: 2017-11-04

## 2017-11-03 RX ORDER — HEPARIN 100 UNIT/ML
500 SYRINGE INTRAVENOUS AS NEEDED
Status: ACTIVE | OUTPATIENT
Start: 2017-11-03 | End: 2017-11-04

## 2017-11-03 RX ADMIN — Medication 20 ML: at 10:45

## 2017-11-03 RX ADMIN — SODIUM CHLORIDE 10 ML: 9 INJECTION INTRAMUSCULAR; INTRAVENOUS; SUBCUTANEOUS at 10:40

## 2017-11-03 RX ADMIN — SODIUM CHLORIDE 25 ML/HR: 900 INJECTION, SOLUTION INTRAVENOUS at 11:42

## 2017-11-03 RX ADMIN — Medication 500 UNITS: at 13:34

## 2017-11-03 RX ADMIN — SODIUM CHLORIDE 1890 MG: 900 INJECTION, SOLUTION INTRAVENOUS at 12:25

## 2017-11-03 RX ADMIN — DEXAMETHASONE SODIUM PHOSPHATE 8 MG: 4 INJECTION, SOLUTION INTRA-ARTICULAR; INTRALESIONAL; INTRAMUSCULAR; INTRAVENOUS; SOFT TISSUE at 11:47

## 2017-11-03 RX ADMIN — Medication 10 ML: at 13:34

## 2017-11-03 NOTE — PROGRESS NOTES
[facility] Nursing Progress Note    1025  Patient arrived to [facility] with cane accompanied by family for scheduled Carbo/Gemzar  Cycle 4 Day 8. PT denies complaints. L port  Accessed with  0.75 in. kellogg needle, labs drawn, port flushed, positive blood return noted. Vitals Blood pressure 108/67, pulse 75, temperature 97.9 °F (36.6 °C), resp. rate 18, height 5' 8.9\" (1.75 m), weight 75.8 kg (167 lb 1.6 oz), SpO2 98 %.      labs   Recent Results (from the past 12 hour(s))   CBC WITH AUTOMATED DIFF    Collection Time: 11/03/17 10:40 AM   Result Value Ref Range    WBC 3.7 (L) 4.1 - 11.1 K/uL    RBC 2.93 (L) 4.10 - 5.70 M/uL    HGB 8.5 (L) 12.1 - 17.0 g/dL    HCT 25.6 (L) 36.6 - 50.3 %    MCV 87.4 80.0 - 99.0 FL    MCH 29.0 26.0 - 34.0 PG    MCHC 33.2 30.0 - 36.5 g/dL    RDW 19.6 (H) 11.5 - 14.5 %    PLATELET 623 697 - 074 K/uL    NEUTROPHILS 47 32 - 75 %    LYMPHOCYTES 47 12 - 49 %    MONOCYTES 4 (L) 5 - 13 %    EOSINOPHILS 1 0 - 7 %    BASOPHILS 1 0 - 1 %    ABS. NEUTROPHILS 1.7 (L) 1.8 - 8.0 K/UL    ABS. LYMPHOCYTES 1.7 0.8 - 3.5 K/UL    ABS. MONOCYTES 0.2 0.0 - 1.0 K/UL    ABS. EOSINOPHILS 0.0 0.0 - 0.4 K/UL    ABS. BASOPHILS 0.0 0.0 - 0.1 K/UL       Patient received infusion without difficulty. Medications this visit:    NS KVO  Decadron IVP  Gemzar IVPB    Vitals Blood pressure 108/67, pulse 75, temperature 97.9 °F (36.6 °C), resp. rate 18, height 5' 8.9\" (1.75 m), weight 75.8 kg (167 lb 1.6 oz), SpO2 98 %. Next appointment,  11/17/17 @ [phone] VAD maintained +blood return throughout [facility] and prior to D/C. PT D/C from [facility] in no distress, accompanied by family.

## 2017-11-03 NOTE — PROGRESS NOTES
Met with pt and daughter in [address] [place] [address] this morning to discuss possible participation in [facility]  pre-screen step. Given a read only copy of consent and some pt handouts pertaining to the study. Will follow-up with daughter at next visit or sooner.

## 2017-11-10 ENCOUNTER — TELEPHONE (OUTPATIENT)
Dept: FAMILY MEDICINE CLINIC | Age: 72
End: 2017-11-10

## 2017-11-10 ENCOUNTER — TELEPHONE (OUTPATIENT)
Dept: ONCOLOGY | Age: 72
End: 2017-11-10

## 2017-11-10 NOTE — TELEPHONE ENCOUNTER
Kimiroberto Emanuel, patient's daughter, called requesting a new insurance referral for Dr. Pravin García for 11/14/17.  Please advise [phone]

## 2017-11-10 NOTE — TELEPHONE ENCOUNTER
Patients daughter, Linda Wellington, left a voicemail and stated that she has some questions about patients chemo.  # [phone]

## 2017-11-10 NOTE — TELEPHONE ENCOUNTER
[facility] [employer] at [facility]  [phone]    11/10/17- Phone call placed to Ramontripp she wanted clarification on treatment schedule. Advised her that chemotherapy treatment is scheduled for every 3 weeks- next appointment scheduled 11/17/17 with Dr.Rad beach and PATRICK. Treatment will continue until no longer working or intolerable. Needs scans scheduled prior to appointment- she will get scan rescheduled. She verbalized understanding and denied any further questions or concerns.

## 2017-11-10 NOTE — TELEPHONE ENCOUNTER
Called and talked to the patient's daughter. Informed her that the insurance does not require a referral.  She stated that Dr. Tracee Landis office is requesting one. Please put in a referral order as the insurance does not require one. Thanks.

## 2017-11-13 ENCOUNTER — OFFICE VISIT (OUTPATIENT)
Dept: CARDIOLOGY CLINIC | Age: 72
End: 2017-11-13

## 2017-11-13 VITALS
OXYGEN SATURATION: 99 % | DIASTOLIC BLOOD PRESSURE: 64 MMHG | HEIGHT: 69 IN | SYSTOLIC BLOOD PRESSURE: 138 MMHG | BODY MASS INDEX: 25.74 KG/M2 | WEIGHT: 173.8 LBS | RESPIRATION RATE: 18 BRPM | HEART RATE: 85 BPM

## 2017-11-13 DIAGNOSIS — I42.9 CARDIOMYOPATHY, UNSPECIFIED TYPE (HCC): Primary | ICD-10-CM

## 2017-11-13 DIAGNOSIS — I42.9 CARDIOMYOPATHY, UNSPECIFIED TYPE (HCC): ICD-10-CM

## 2017-11-13 DIAGNOSIS — I48.92 ATRIAL FLUTTER WITH RAPID VENTRICULAR RESPONSE (HCC): ICD-10-CM

## 2017-11-13 DIAGNOSIS — I48.91 ATRIAL FIBRILLATION WITH RAPID VENTRICULAR RESPONSE (HCC): ICD-10-CM

## 2017-11-13 DIAGNOSIS — E78.5 DYSLIPIDEMIA: ICD-10-CM

## 2017-11-13 DIAGNOSIS — I50.30 (HFPEF) HEART FAILURE WITH PRESERVED EJECTION FRACTION (HCC): Chronic | ICD-10-CM

## 2017-11-13 DIAGNOSIS — I48.92 PAROXYSMAL ATRIAL FLUTTER (HCC): Primary | ICD-10-CM

## 2017-11-13 DIAGNOSIS — Z87.891 HISTORY OF TOBACCO ABUSE: ICD-10-CM

## 2017-11-13 DIAGNOSIS — I11.0: ICD-10-CM

## 2017-11-13 RX ORDER — DEXAMETHASONE SODIUM PHOSPHATE 4 MG/ML
8 INJECTION, SOLUTION INTRA-ARTICULAR; INTRALESIONAL; INTRAMUSCULAR; INTRAVENOUS; SOFT TISSUE ONCE
Status: COMPLETED | OUTPATIENT
Start: 2017-01-01 | End: 2017-01-01

## 2017-11-13 RX ORDER — SODIUM CHLORIDE 9 MG/ML
25 INJECTION, SOLUTION INTRAVENOUS CONTINUOUS
Status: DISPENSED | OUTPATIENT
Start: 2017-01-01 | End: 2017-01-01

## 2017-11-13 NOTE — PROGRESS NOTES
[address]  [address]  [address]  [place], [address] [facility]    Office [phone]  Fax [phone]  Cell [phone]        Cornelius Toscano is a 67 y.o. male. Follow up from hospitalization late September with transient atrial flutter. Assessment  Encounter Diagnoses   Name Primary?  Paroxysmal atrial flutter (HCC) Yes    Cardiomyopathy, unspecified type ([address]     (HFpEF) heart failure with preserved ejection fraction (HCC)     Atrial flutter with rapid ventricular response (HCC)     Atrial fibrillation with rapid ventricular response (HCC)     Benign hypertensive heart disease with HF (heart failure) (HCC)     History of tobacco abuse     Dyslipidemia        Recommendations:    Cornelius Toscano has paroxysmal atrial flutter in the setting of HTN, mild cardiomyopathy, suspected CAD and metastatic lung cancer. He has no sxs of recurrent AF. He has no hx of HF. Favor conservative management given his underlying malignancy. Continue anticoagulation for DVT treatment in the setting of malignancy. I will see him back in 6 months for reevaluation. Follow-up Disposition:  Return in about 6 months (around 5/13/2018). Subjective:  See my consult note. He has metastatic lung cancer and completed chemotherapy under Dr. Shonda Victoria guidance two weeks ago. He has a hx of recurrent atrial flutter, completely asx with mild LV dysfunction (EF 45% by echo with inferior HK, evidence of coronary calcification by chest CTA). He was hospitalized in September with bilateral DVT without PE. Mr. Gurjit Arizmendi leads a fairly sedentary lifestyle. He notes occasional MCLEAN with walking. He does not wear oxygen at home. Patient denies any exertional chest pain, palpitations, syncope, orthopnea, edema or paroxysmal nocturnal dyspnea. Patient lives independently and cooks for himself.      Cardiac risk factors   HTN no  DM no      Cardiac testing  ECHO 9/25/17: EF 45-50% mild HK basal-mid inferior wall(s). Mild LVH, G3DD, mild-mod LAE, mild HARSHIL, mild- mod MAC, mild MR    Past Medical History:   Diagnosis Date    Anemia     Cardiomyopathy ([address]     mild LV regional/global dysfunction, likely ischemic etiology    Coronary artery calcification seen on CT scan     Dysfunction, erectile     Hyperlipidemia 5/18/2010    Hypertension     Metastatic lung cancer (metastasis from lung to other site) [facility])     brain    Paroxysmal atrial flutter (HCC)         Current Outpatient Prescriptions   Medication Sig Dispense Refill    apixaban (ELIQUIS) 5 mg tablet Take 1 Tab by mouth two (2) times a day. 60 Tab 11    metoprolol succinate (TOPROL-XL) 25 mg XL tablet Take 1 Tab by mouth daily. 90 Tab 3    dilTIAZem CD (CARDIZEM CD) 240 mg ER capsule Take 1 Cap by mouth daily. Indications: VENTRICULAR RATE CONTROL IN ATRIAL FIBRILLATION 90 Cap 3    tamsulosin (FLOMAX) 0.4 mg capsule Take 1 Cap by mouth daily. 30 Cap 5    DULoxetine (CYMBALTA) 30 mg capsule Take 1 Cap by mouth daily. 30 Cap 11    diphenhydrAMINE (BENADRYL) 25 mg tablet Take 25 mg by mouth nightly as needed for Sleep.  levocetirizine (XYZAL) 5 mg tablet Take 5 mg by mouth daily as needed for Allergies.  nicotine (NICODERM CQ) 14 mg/24 hr patch 1 Patch by TransDERmal route every twenty-four (24) hours. 30 Patch 1    polyethylene glycol (MIRALAX) 17 gram packet Take 17 g by mouth daily.  magic mouthwash solution Swish and spit 15-30 mL every 2-4 hours as needed for mouth pain. May swallow for throat pain. Magic mouth wash   Maalox  Lidocaine 2% viscous   Diphenhydramine oral solution     Pharmacy to mix equal portions of ingredients to a total volume as indicated in the dispense amount. 480 mL 2    prochlorperazine (COMPAZINE) 10 mg tablet Take 1 Tab by mouth every six (6) hours as needed for Nausea.  50 Tab 5    lidocaine-prilocaine (EMLA) topical cream Apply  to affected area as needed for Pain (Apply 30-60 min. prior to having your port accessed). 30 g 0    ondansetron hcl (ZOFRAN) 8 mg tablet Take 1 Tab by mouth every eight (8) hours as needed for Nausea. 45 Tab 5    aspirin delayed-release 81 mg tablet Take 81 mg by mouth daily. Facility-Administered Medications Ordered in Other Visits   Medication Dose Route Frequency Provider Last Rate Last Dose    [START ON 11/22/2017] gemcitabine (GEMZAR) 1,920 mg in 0.9% sodium chloride 250 mL, overfill volume 25 mL chemo infusion  1,920 mg IntraVENous ONCE Barbara Ng MD        [START ON 11/22/2017] dexamethasone (DECADRON) 4 mg/mL injection 8 mg  8 mg IntraVENous ONCE Barbara Ng MD       [address] [place] [address] ON 11/22/2017] 0.9% sodium chloride infusion  25 mL/hr IntraVENous CONTINUOUS Barbara Ng MD       [address] [place] [address] ON 11/22/2017] prochlorperazine (COMPAZINE) with saline injection 10 mg  10 mg IntraVENous Q6H PRN Barbara gN MD           Allergies   Allergen Reactions    Lisinopril Angioedema    Hydrochlorothiazide Hives and Swelling    Sulfa (Sulfonamide Antibiotics) Hives          Review of Systems  Constitutional: Negative for fever, chills, malaise/fatigue and diaphoresis. Respiratory: Negative for cough, hemoptysis, sputum production, and wheezing. +MCLEAN  Cardiovascular: Negative for chest pain, palpitations, orthopnea, claudication, leg swelling and PND. Gastrointestinal: Negative for heartburn, nausea, vomiting, blood in stool and melena. Genitourinary: Negative for dysuria and flank pain. Musculoskeletal: Negative for joint pain and back pain. Skin: Negative for rash. Neurological: Negative for focal weakness, seizures, loss of consciousness, weakness and headaches. Endo/Heme/Allergies: Does not bruise/bleed easily. Psychiatric/Behavioral: Negative for memory loss. The patient does not have insomnia.       Physical Exam    Visit Vitals    /64 (BP 1 Location: Left arm, BP Patient Position: Sitting)    Pulse 85    Resp 18    Ht 5' 8.9\" (1.75 m)    Wt 173 lb 12.8 oz (78.8 kg)    SpO2 99%    BMI 25.74 kg/m2     Wt Readings from Last 3 Encounters:   11/17/17 173 lb (78.5 kg)   11/17/17 172 lb (78 kg)   11/13/17 173 lb 12.8 oz (78.8 kg)      General - well developed well nourished  Neck - JVP normal, thyroid nl  Cardiac - normal S1, S2, no murmurs, rubs or gallops.  No clicks  Vascular - carotids without bruits, radials, femorals and pedal pulses equal bilateral  Lungs - clear to auscultation bilaterals, no rales, wheezing or rhonchi  Abd - soft nontender, no HSM, no abd bruits  Extremities - no edema  Skin - no rash  Neuro - nonfocal  Psych - normal mood and affect      Cardiographics  EKG 10/6/17- SR, LAHB, LVH    Written by Marquis Burton, as dictated by Bonita Rayo MD.  Bonita Rayo MD

## 2017-11-13 NOTE — MR AVS SNAPSHOT
Visit Information Date & Time Provider Department Dept. Phone Encounter #  
 11/13/2017  1:40 PM Lex Macias MD [facility] Brett Figueroa [phone] [id number] Follow-up Instructions Return in about 6 months (around 5/13/2018). Your Appointments 11/17/2017 11:15 AM  
ESTABLISHED PATIENT with Judd Melvin NP  
[address] at [address] [place] [address]) Appt Note: labs, g/r, [facility] [address] [address]  
[phone]  
  
   
 [address] [place] [address]  
  
    
 11/21/2017 10:00 AM  
ROUTINE CARE with Mateo Noel MD  
[address] [facility]) Appt Note: est pt rountine appt HTN  
 [address] [address]  
[place] [address] [place] [address] [address] [address] Upcoming Health Maintenance Date Due DTaP/Tdap/Td series (1 - Tdap) 7/26/1965 ZOSTER VACCINE AGE 60> 5/26/2004 Pneumococcal 65+ High/Highest Risk (2 of 2 - PCV13) 2/6/2014 MEDICARE YEARLY EXAM 10/19/2017 GLAUCOMA SCREENING Q2Y 9/8/2019 Allergies as of 11/13/2017  Review Complete On: 11/13/2017 By: Dell Waggoner LPN Severity Noted Reaction Type Reactions Lisinopril High 10/27/2011   Systemic Angioedema Hydrochlorothiazide  07/18/2017    Hives, Swelling Sulfa (Sulfonamide Antibiotics)  10/27/2011    Hives Current Immunizations  Reviewed on 11/3/2017 Name Date Influenza High Dose Vaccine PF 9/29/2017, 1/10/2013 Influenza Vaccine 11/6/2015, 12/10/2014, 10/2/2013 Influenza Vaccine (Quad) PF 10/18/2016 Influenza Vaccine Split 10/12/2011, 5/10/2011, 10/13/2010 Pneumococcal Polysaccharide (PPSV-23) 2/6/2013 Not reviewed this visit You Were Diagnosed With   
  
 Codes Comments  Paroxysmal atrial flutter (HCC)    -  Primary ICD-10-CM: I48.92 
ICD-9-CM: 427.32   
 Cardi[facility]     ICD-10-CM: I42.9 ICD-9-CM: 425.4 (HFpEF) heart failure with preserved ejection fraction (HCC)     ICD-10-CM: I50.30 ICD-9-CM: 577. 9 Atrial flutter with rapid ventricular response (HCC)     ICD-10-CM: I48.92 
ICD-9-CM: 427.32 Atrial fibrillation with rapid ventricular response (HCC)     ICD-10-CM: I48.91 
ICD-9-CM: 427.31 Benign hypertensive heart disease with HF (heart failure) (HCC)     ICD-10-CM: I11.0 ICD-9-CM: 402.11, 428.9 History of tobacco abuse     ICD-10-CM: Z87.891 ICD-9-CM: V15.82 Dyslipidemia     ICD-10-CM: E78.5 ICD-9-CM: 272.4 Vitals BP Pulse Resp Height(growth percentile) Weight(growth percentile) SpO2  
 138/64 (BP 1 Location: Left arm, BP Patient Position: Sitting) 85 18 5' 8.9\" (1.75 m) 173 lb 12.8 oz (78.8 kg) 99% BMI Smoking Status 25.74 kg/m2 Former Smoker Vitals History BMI and BSA Data Body Mass Index Body Surface Area 25.74 kg/m 2 1.96 m 2 Preferred Pharmacy Pharmacy Name Phone [address] [employer] [place] - [address] [phone] Your Updated Medication List  
  
   
This list is accurate as of: 11/13/17  3:05 PM.  Always use your most recent med list.  
  
  
  
  
 apixaban 5 mg tablet Commonly known as:  Jodelle Patient Take 1 Tab by mouth two (2) times a day. aspirin delayed-release 81 mg tablet Take 81 mg by mouth daily. dilTIAZem  mg ER capsule Commonly known as:  CARDIZEM CD Take 1 Cap by mouth daily. Indications: VENTRICULAR RATE CONTROL IN ATRIAL FIBRILLATION  
  
 diphenhydrAMINE 25 mg tablet Commonly known as:  BENADRYL Take 25 mg by mouth nightly as needed for Sleep. DULoxetine 30 mg capsule Commonly known as:  CYMBALTA Take 1 Cap by mouth daily. lidocaine-prilocaine topical cream  
Commonly known as:  EMLA Apply  to affected area as needed for Pain (Apply 30-60 min. prior to having your port accessed). magic mouthwash solution Swish and spit 15-30 mL every 2-4 hours as needed for mouth pain. May swallow for throat pain. Magic mouth wash  Maalox Lidocaine 2% viscous  Diphenhydramine oral solution   Pharmacy to mix equal portions of ingredients to a total volume as indicated in the dispense amount. metoprolol succinate 25 mg XL tablet Commonly known as:  TOPROL-XL Take 1 Tab by mouth daily. MIRALAX 17 gram packet Generic drug:  polyethylene glycol Take 17 g by mouth daily. nicotine 14 mg/24 hr patch Commonly known as:  NICODERM CQ  
1 Patch by TransDERmal route every twenty-four (24) hours. ondansetron hcl 8 mg tablet Commonly known as:  Caterina Line Take 1 Tab by mouth every eight (8) hours as needed for Nausea. prochlorperazine 10 mg tablet Commonly known as:  COMPAZINE Take 1 Tab by mouth every six (6) hours as needed for Nausea. tamsulosin 0.4 mg capsule Commonly known as:  FLOMAX Take 1 Cap by mouth daily. XYZAL 5 mg tablet Generic drug:  levocetirizine Take 5 mg by mouth daily as needed for Allergies. Follow-up Instructions Return in about 6 months (around 5/13/2018). To-Do List   
 11/16/2017 3:30 PM  
  [facility] ([phone]) CONTRAST STUDY: 1. The patient should not eat solid food four hours before the appointment but should be encouraged to drink clear liquids. 2.  If you have to drink oral contrast, please pick it up any weekday prior to your appointment, if you cannot please check in 2 hrs before appt time. 3.  The patient will require IV access for contrast administration.  4.  The patient should not take Ibuprofen (Advil, Motrin, etc.) and Naproxen Sodium (Aleve, etc.)  on the day of the exam. Stopping non-steroidal anti-inflammatory agents (NSAIDs) like Ibuprofen decreases the risk of kidney damage from the x-ray contrast (dye). 5.  Bring any non [facility] facility films/images pertaining to the area of interest with you on the day of appointment. 6.  Bring current lab work if available (within last 90 days CMP) ***If scheduled at [facility], iSTAT is not available, labs will need to be done before appointment*** 7. Check in at registration at least 30 minutes before appt time unless you were instructed to do otherwise. 11/17/2017 11:00 AM  
  Appointment with [address] at [facility] 73 ([phone])  
  
 11/22/2017 7:30 AM  
  Appointment with [address] at [place] [address] ([phone]) [facility] SERVICES! Dear Rebeca Orozco: Thank you for requesting a [employer] account. Our records indicate that you already have an active [employer] account. You can access your account anytime at [email or web address] Did you know that you can access your hospital and ER discharge instructions at any time in [employer]? You can also review all of your test results from your hospital stay or ER visit. Additional Information If you have questions, please visit the Frequently Asked Questions section of the [employer] website at [email or web address] Remember, [employer] is NOT to be used for urgent needs. For medical emergencies, dial 911. Now available from your iPhone and Android! Please provide this summary of care documentation to your next provider. Your primary care clinician is listed as Noam Owens. If you have any questions after today's visit, please call [phone].

## 2017-11-17 NOTE — PROGRESS NOTES
[facility] VISIT NOTE    3969: Pt arrived at [facility] ambulatory and in no distress for Gemzar C1D1 (maintenance). Assessment completed, pt had no c/o at present. 1125: R chest port accessed with 0.75 in kellogg with no difficulty. Positive blood return noted and labs drawn. Patient Vitals for the past 12 hrs:   Temp Pulse Resp BP SpO2   11/17/17 1410 96.6 °F (35.9 °C) 69 16 106/62 -   11/17/17 1112 97.9 °F (36.6 °C) 72 18 107/64 98 %     Recent Results (from the past 12 hour(s))   CBC WITH AUTOMATED DIFF    Collection Time: 11/17/17 11:30 AM   Result Value Ref Range    WBC 5.3 4.1 - 11.1 K/uL    RBC 2.83 (L) 4.10 - 5.70 M/uL    HGB 8.0 (L) 12.1 - 17.0 g/dL    HCT 25.9 (L) 36.6 - 50.3 %    MCV 91.5 80.0 - 99.0 FL    MCH 28.3 26.0 - 34.0 PG    MCHC 30.9 30.0 - 36.5 g/dL    RDW 21.4 (H) 11.5 - 14.5 %    PLATELET 517 564 - 207 K/uL    NEUTROPHILS 39 32 - 75 %    LYMPHOCYTES 37 12 - 49 %    MONOCYTES 23 (H) 5 - 13 %    EOSINOPHILS 1 0 - 7 %    BASOPHILS 0 0 - 1 %    NRBC 2.0 (H) 0  WBC    ABS. NEUTROPHILS 2.0 1.8 - 8.0 K/UL    ABS. LYMPHOCYTES 2.0 0.8 - 3.5 K/UL    ABS. MONOCYTES 1.2 (H) 0.0 - 1.0 K/UL    ABS. EOSINOPHILS 0.1 0.0 - 0.4 K/UL    ABS. BASOPHILS 0.0 0.0 - 0.1 K/UL    DF MANUAL      PLATELET COMMENTS LARGE PLATELETS      RBC COMMENTS ANISOCYTOSIS  3+        RBC COMMENTS POLYCHROMASIA  PRESENT       METABOLIC PANEL, COMPREHENSIVE    Collection Time: 11/17/17 11:30 AM   Result Value Ref Range    Sodium 137 136 - 145 mmol/L    Potassium 4.5 3.5 - 5.1 mmol/L    Chloride 102 97 - 108 mmol/L    CO2 28 21 - 32 mmol/L    Anion gap 7 5 - 15 mmol/L    Glucose 94 65 - 100 mg/dL    BUN 10 6 - 20 MG/DL    Creatinine 0.77 0.70 - 1.30 MG/DL    BUN/Creatinine ratio 13 12 - 20      GFR est AA >60 >60 ml/min/1.73m2    GFR est non-AA >60 >60 ml/min/1.73m2    Calcium 9.2 8.5 - 10.1 MG/DL    Bilirubin, total 0.2 0.2 - 1.0 MG/DL    ALT (SGPT) 29 12 - 78 U/L    AST (SGOT) 20 15 - 37 U/L    Alk.  phosphatase 94 45 - 117 U/L    Protein, total 7.1 6.4 - 8.2 g/dL    Albumin 3.2 (L) 3.5 - 5.0 g/dL    Globulin 3.9 2.0 - 4.0 g/dL    A-G Ratio 0.8 (L) 1.1 - 2.2     NUCLEATED RBC    Collection Time: 11/17/17 11:30 AM   Result Value Ref Range    NRBC 1.1 (H) 0  WBC    ABSOLUTE NRBC 0.06 (H) 0.00 - 0.01 K/uL    WBC CORRECTED FOR NR ADJUSTED FOR NUCLEATED RBC'S         Medications received:  NS  Decadron IVP  Gemzar IV    1410: Tolerated treatment well, no adverse reaction noted. Port de-accessed and flushed per protocol. Positive blood return noted. 1415: D/C'd from [facility] ambulatory and in no distress accompanied by daughter.  Next appointment is 11/27 @ 1:00pm.

## 2017-11-17 NOTE — PROGRESS NOTES
Pt has been screened for all eligibility/ineligibility criteria and has been determined eligible for this protocol. Informed consent was reviewed by RN with patient prior to signing. Possible side effects were discussed in detail, with patient being advised to inform RN or Dr. Jim Melendez immediately in the event of any side effects once drug is started. All questions were answered adequately by RN or Dr. Jim Melendez. Patient signed consent today for study  and DCP-001. A copy of ICF given to patient. No procedures done prior to patient signing consent. No additional questions at this time.

## 2017-11-17 NOTE — PROGRESS NOTES
[facility] [place] at [place] [address]  301 [facility], 2[address] [facility] 100[address] [facility]  [place]: [phone]  F: [phone]      Reason for Visit:   Odilia Jaimes is a 67 y.o. male who is seen for follow up of lung cancer. Treatment History:   · CXR 7/11/2017: Mediastinal lymphadenopathy with left hilar mass. · CT Chest 7/14/2017: Bulky mediastinal and left hilar lymphadenopathy. Bilateral pulmonary masses and nodules  · PET-CT 7/24/2017: Right parietal mass. Hypermetabolic right supraclavicular, right paratracheal, AP window, prevascular, precarinal, subcarinal and left hilar lymphadenopathy. Hypermetabolic pulmonary nodules bilaterally. · MRI Brain 7/24/2017: Junction right posterior temporal lobe and occipital lobe 2.7 cm mass likely metastasis from lung cancer. No additional acute abnormalities  · FNA supraclavicular node 7/28/2017: Metastatic squamous cell carcinoma, PD-L1 5%, BRAF negative, EGFR negative, ALK & ROS-1 pending   · Stage IV Non-small cell lung cancer (Squamous Cell)  · Gamma knife by Dr. Ger Callaway 8/15/2017   · Palliative chemotherapy with carboplatin and gemcitabine beginning 8/25/2017    History of Present Illness:   Here today for follow up. He reports feeling pretty good today. He reports compliance with axipaban, tolerating it without difficulty. Denies bruising or bleeding. Eating and drinking okay. No new complaints. He is accompanied by his daughter today. He smoked 1ppd for 50+ years but quit at diagnosis. He lives alone in [address] [place] [address]. His son lives about 15 minutes from him. His daughter lives about 30 minutes away. PAST HISTORY: The following sections were reviewed and updated in the EMR as appropriate: PMH, SH, FH, Medications, Allergies.       Allergies   Allergen Reactions    Lisinopril Angioedema    Hydrochlorothiazide Hives and Swelling    Sulfa (Sulfonamide Antibiotics) Hives      Review of Systems: A complete review of systems was obtained, reviewed, and scanned into the EMR. Pertinent findings reviewed above. Physical Exam:     Visit Vitals    /65 (BP 1 Location: Right arm, BP Patient Position: Sitting)    Pulse 79    Temp 97.8 °F (36.6 °C) (Temporal)    Resp 18    Ht 5' 8.9\" (1.75 m)    Wt 173 lb (78.5 kg)    SpO2 98%    BMI 25.62 kg/m2     ECOG PS: 1  General: No distress  Eyes: PERRLA, anicteric sclerae  HENT: Atraumatic, OP clear  Neck: Supple  Lymphatic: No cervical, supraclavicular, or inguinal adenopathy  Respiratory: CTAB, normal respiratory effort  CV: Normal rate, regular rhythm, no murmurs, no peripheral edema  GI: Soft, nontender, nondistended, no masses, no hepatomegaly, no splenomegaly  MS: Normal gait and station. Digits without clubbing or cyanosis. Skin: No rashes, ecchymoses, or petechiae. Normal temperature, turgor, and texture. Psych: Alert, oriented, appropriate affect, normal judgment/insight    Results:     Lab Results   Component Value Date/Time    WBC 5.3 11/17/2017 11:30 AM    HGB 8.0 11/17/2017 11:30 AM    HCT 25.9 11/17/2017 11:30 AM    PLATELET 494 53/31/3209 11:30 AM    MCV 91.5 11/17/2017 11:30 AM    ABS. NEUTROPHILS 2.0 11/17/2017 11:30 AM     Lab Results   Component Value Date/Time    Sodium 137 11/17/2017 11:30 AM    Potassium 4.5 11/17/2017 11:30 AM    Chloride 102 11/17/2017 11:30 AM    CO2 28 11/17/2017 11:30 AM    Glucose 94 11/17/2017 11:30 AM    BUN 10 11/17/2017 11:30 AM    Creatinine 0.77 11/17/2017 11:30 AM    GFR est AA >60 11/17/2017 11:30 AM    GFR est non-AA >60 11/17/2017 11:30 AM    Calcium 9.2 11/17/2017 11:30 AM     Lab Results   Component Value Date/Time    Bilirubin, total 0.2 11/17/2017 11:30 AM    ALT (SGPT) 29 11/17/2017 11:30 AM    AST (SGOT) 20 11/17/2017 11:30 AM    Alk. phosphatase 94 11/17/2017 11:30 AM    Protein, total 7.1 11/17/2017 11:30 AM    Albumin 3.2 11/17/2017 11:30 AM    Globulin 3.9 11/17/2017 11:30 AM       CTA Chest 9/15/2017:   1.  No pulmonary embolus. 2. Bilateral lung masses and mediastinal nodes decreased in size. 3. Atherosclerosis with coronary artery calcifications    Venous doppler 9/15/2017: Bilateral lower extremity venous duplex positive for deep  venous thrombosis in right posterior tibial vein, right peroneal vein, and the left posterior tibial vein. There are superficial venous  thrombosis in bilateral greater saphenous veins    CT C/A/P 11/16/2017: No significant interval change in size of bilateral pulmonary nodules as described above. Slight interval decrease in size of mediastinal lymph nodes. No evidence of metastatic disease in the abdomen or pelvis. Assessment:   1) Metastatic non-small cell lung cancer (squamous cell)  EGFR, ALK, ROS1, BRAF negative  He has disease within his chest and brain. His cancer is not curable and management is with palliative intent. He is s/p gamma knife to CNS disease. He has completed palliative chemotherapy with carboplatin and gemcitabine given every 3 weeks x 4 cycles. He is feeling well and CT is stable, discussed with patient today. We will proceed with maintenance therapy today and plan to see him back in 3 weeks with next cycle. He consented to Lung-MAP trial today after meeting with research nurse. 2) Brain metastasis  S/P gamma knife. Now off dexamethasone. Will defer reimaging to Dr. Jasmine Bull. 3) Tachycardia  Improved. Due to a-flutter. Cardiology now following, taking metoprolol and cardizem. 4) DVT 9/15/2017  Previously on Lovenox BID but unable to tolerate injections. Continue Apixaban 5mg BID. I recommend he continue this long term in the setting of malignancy. 5) Hypotension  Resolved today. Secondary to tachycardia and a-flutter. Cardiology following. 6) Cough  Improving. Secondary to disease. Monitor. 7) Weight loss  Stable today. Likely due to cancer. Treatment as above. May need dietician to intervene if this continues.      8) Neuropathy  Secondary to therapy. Continue Cymbalta 30mg daily. Monitor and increase dose if needed. We also discussed gabapentin but he wants to hold off due to potential drowsiness side effect.      Plan:     Proceed today with C#1 of maintenance Gemcitabine 1000mg/m2 on days 1, 8 given every 21 days   Labs prior to each treatment: CBC with diff on day 1 and day 8; CMP on day 1   Antiemetic prophylaxis: Dexamethasone prior to therapy  PRN Antiemetics at home: Ondansetron and Prochlorperazine   following   Follow up with Dr. Refugio Carr as scheduled  Follow up with cardiology as planned  Continue Apixaban 5mg BID  Continue Cymbalta 30 mg daily  Return to clinic in 3 weeks         Signed By: Issac Wood MD

## 2017-11-27 NOTE — PROGRESS NOTES
Problem: Chemotherapy Treatment  Goal: *Chemotherapy regimen followed  Outcome: Progressing Towards Goal  Patient to receive C1D8 of Gemzar today as ordered.

## 2017-11-27 NOTE — PROGRESS NOTES
[facility] VISIT NOTE    1310  Pt arrived at [facility] ambulatory with cane and in no distress for C1D8 of Gemzar. Assessment completed, pt c/o unchanged dyspnea with exertion. No other concerns voiced at this time. Blood pressure 110/59, pulse 72, temperature 97.4 °F (36.3 °C), resp. rate 16, height 5' 8.9\" (1.75 m), weight 81.4 kg (179 lb 8 oz). Right chest port accessed with 0.75 in kellogg with no difficulty. Positive blood return noted and labs drawn. Recent Results (from the past 12 hour(s))   CBC WITH AUTOMATED DIFF    Collection Time: 11/27/17  1:20 PM   Result Value Ref Range    WBC 10.6 4.1 - 11.1 K/uL    RBC 2.71 (L) 4.10 - 5.70 M/uL    HGB 7.8 (L) 12.1 - 17.0 g/dL    HCT 24.7 (L) 36.6 - 50.3 %    MCV 91.1 80.0 - 99.0 FL    MCH 28.8 26.0 - 34.0 PG    MCHC 31.6 30.0 - 36.5 g/dL    RDW 22.7 (H) 11.5 - 14.5 %    PLATELET 791 (H) 175 - 400 K/uL    NEUTROPHILS 56 32 - 75 %    BAND NEUTROPHILS 1 0 - 6 %    LYMPHOCYTES 21 12 - 49 %    MONOCYTES 16 (H) 5 - 13 %    EOSINOPHILS 3 0 - 7 %    BASOPHILS 1 0 - 1 %    METAMYELOCYTES 2 (H) 0 %    ABS. NEUTROPHILS 6.0 1.8 - 8.0 K/UL    ABS. LYMPHOCYTES 2.2 0.8 - 3.5 K/UL    ABS. MONOCYTES 1.7 (H) 0.0 - 1.0 K/UL    ABS. EOSINOPHILS 0.3 0.0 - 0.4 K/UL    ABS. BASOPHILS 0.1 0.0 - 0.1 K/UL    DF MANUAL      RBC COMMENTS OVALOCYTES  PRESENT        RBC COMMENTS POLYCHROMASIA  1+        RBC COMMENTS RBC FRAGMENTS  TARGET CELLS  PRESENT        RBC COMMENTS ANISOCYTOSIS  1+       Dr. Deion Darling office notified of patient's hemoglobin level. Blood transfusion was offered to patient and patient declined. Patient educated to inform Dr. Stacy Duron right away should he experience any chest pain, dizziness or worsening MCLEAN. Dr. Deion Darling office notified of patient's refusal.     Medications received:  NS IV  Decadron IVP  Gemzar IV    Tolerated treatment well, no adverse reaction noted. Port de-accessed and flushed per protocol. Positive blood return noted post treatment.      Blood pressure 112/62, pulse 69, temperature 96.5 °F (35.8 °C), resp. rate 16, height 5' 8.9\" (1.75 m), weight 81.4 kg (179 lb 8 oz). 1600  D/C'd from [facility] ambulatory and in no distress accompanied by daughter. Next appointment is 12/8/17 at 1000.

## 2017-12-08 NOTE — PROGRESS NOTES
[facility] Progress Note    1020  Patient arrived to [facility] accompanied by family for scheduled Gemzar Cycle 2 Day 1. PT denies complaints. L port  Accessed with  0.75 in. kellogg needle, labs drawn, port flushed, positive blood return noted. Vitals Blood pressure 119/72, pulse 70, temperature 96.8 °F (36 °C), resp. rate 18, height 5' 8.9\" (1.75 m), weight 82.1 kg (181 lb 1.6 oz). labs   Recent Results (from the past 12 hour(s))   CBC WITH AUTOMATED DIFF    Collection Time: 12/08/17 10:36 AM   Result Value Ref Range    WBC 9.7 4.1 - 11.1 K/uL    RBC 2.90 (L) 4.10 - 5.70 M/uL    HGB 8.7 (L) 12.1 - 17.0 g/dL    HCT 28.0 (L) 36.6 - 50.3 %    MCV 96.6 80.0 - 99.0 FL    MCH 30.0 26.0 - 34.0 PG    MCHC 31.1 30.0 - 36.5 g/dL    RDW 23.4 (H) 11.5 - 14.5 %    PLATELET 241 151 - 628 K/uL    NEUTROPHILS 47 32 - 75 %    LYMPHOCYTES 26 12 - 49 %    MONOCYTES 24 (H) 5 - 13 %    EOSINOPHILS 2 0 - 7 %    BASOPHILS 1 0 - 1 %    ABS. NEUTROPHILS 4.6 1.8 - 8.0 K/UL    ABS. LYMPHOCYTES 2.5 0.8 - 3.5 K/UL    ABS. MONOCYTES 2.3 (H) 0.0 - 1.0 K/UL    ABS. EOSINOPHILS 0.2 0.0 - 0.4 K/UL    ABS.  BASOPHILS 0.1 0.0 - 0.1 K/UL    DF SMEAR SCANNED      RBC COMMENTS OVALOCYTES  1+        RBC COMMENTS ANISOCYTOSIS  2+        RBC COMMENTS POLYCHROMASIA  1+        RBC COMMENTS MACROCYTOSIS  1+        RBC COMMENTS MICROCYTOSIS  2+        RBC COMMENTS SCHISTOCYTES  2+       METABOLIC PANEL, COMPREHENSIVE    Collection Time: 12/08/17 10:36 AM   Result Value Ref Range    Sodium 138 136 - 145 mmol/L    Potassium 3.9 3.5 - 5.1 mmol/L    Chloride 104 97 - 108 mmol/L    CO2 27 21 - 32 mmol/L    Anion gap 7 5 - 15 mmol/L    Glucose 93 65 - 100 mg/dL    BUN 12 6 - 20 MG/DL    Creatinine 0.70 0.70 - 1.30 MG/DL    BUN/Creatinine ratio 17 12 - 20      GFR est AA >60 >60 ml/min/1.73m2    GFR est non-AA >60 >60 ml/min/1.73m2    Calcium 9.2 8.5 - 10.1 MG/DL    Bilirubin, total 0.2 0.2 - 1.0 MG/DL    ALT (SGPT) 24 12 - 78 U/L    AST (SGOT) 19 15 - 37 U/L    Alk. phosphatase 78 45 - 117 U/L    Protein, total 7.1 6.4 - 8.2 g/dL    Albumin 3.2 (L) 3.5 - 5.0 g/dL    Globulin 3.9 2.0 - 4.0 g/dL    A-G Ratio 0.8 (L) 1.1 - 2.2           Patient received infusion without difficulty. Medications this visit:    NS KVO  Decadron IVP  Gemzar IVPB    Vitals Blood pressure 119/72, pulse 70, temperature 96.8 °F (36 °C), resp. rate 18, height 5' 8.9\" (1.75 m), weight 82.1 kg (181 lb 1.6 oz). Next appointment,  12/15/17 @ 0900    [id number] 68 71 79 VAD maintained +blood return throughout [place] and prior to D/C. PT D/C from [facility] ambulatory in no distress, accompanied by family  .

## 2017-12-08 NOTE — PROGRESS NOTES
[facility] [place] at [address], [facility]  Valentin [phone]  F: [phone]      Reason for Visit:   Gracie Ernandez is a 67 y.o. male who is seen for follow up of lung cancer. Treatment History:   · CXR 2017: Mediastinal lymphadenopathy with left hilar mass. · CT Chest 2017: Bulky mediastinal and left hilar lymphadenopathy. Bilateral pulmonary masses and nodules  · PET-CT 2017: Right parietal mass. Hypermetabolic right supraclavicular, right paratracheal, AP window, prevascular, precarinal, subcarinal and left hilar lymphadenopathy. Hypermetabolic pulmonary nodules bilaterally. · MRI Brain 2017: Junction right posterior temporal lobe and occipital lobe 2.7 cm mass likely metastasis from lung cancer. No additional acute abnormalities  · FNA supraclavicular node 2017: Metastatic squamous cell carcinoma, PD-L1 5%, BRAF negative, EGFR negative, ALK & ROS-1 pending   · Stage IV Non-small cell lung cancer (Squamous Cell)  · Gamma knife by Dr. Shyam Aguirre 8/15/2017   · Palliative chemotherapy with carboplatin and gemcitabine beginning 2017 - 11/3/2017  · Maintenance gemcitabine beginning 2017    History of Present Illness:   Here today for follow up. He states he continues to feel pretty well. Tolerating apixaban without bleeding. No new complaints. Joking a lot today. He is accompanied by his daughter today. He smoked 1ppd for 50+ years but quit at diagnosis. He lives alone in [address] [place] [address]. His son lives about 15 minutes from him. His daughter lives about 30 minutes away. PAST HISTORY: The following sections were reviewed and updated in the EMR as appropriate: PMH, SH, FH, Medications, Allergies.       Allergies   Allergen Reactions    Lisinopril Angioedema    Hydrochlorothiazide Hives and Swelling    Sulfa (Sulfonamide Antibiotics) Hives      Review of Systems: A complete review of systems was obtained, reviewed, and scanned into the EMR. Pertinent findings reviewed above. Physical Exam:     Visit Vitals    /54 (BP 1 Location: Left arm, BP Patient Position: Sitting)  Comment: .  Pulse 74    Temp 97.8 °F (36.6 °C) (Temporal)    Resp 20    Ht 5' 8.9\" (1.75 m)    Wt 181 lb (82.1 kg)    SpO2 99%    BMI 26.81 kg/m2     ECOG PS: 1  General: No distress  Eyes: PERRLA, anicteric sclerae  HENT: Atraumatic, OP clear  Neck: Supple  Lymphatic: No cervical, supraclavicular, or inguinal adenopathy  Respiratory: CTAB, normal respiratory effort  CV: Normal rate, regular rhythm, no murmurs, no peripheral edema  GI: Soft, nontender, nondistended, no masses, no hepatomegaly, no splenomegaly  MS: Normal gait and station. Digits without clubbing or cyanosis. Skin: No rashes, ecchymoses, or petechiae. Normal temperature, turgor, and texture. Psych: Alert, oriented, appropriate affect, normal judgment/insight    Results:     Lab Results   Component Value Date/Time    WBC 9.7 12/08/2017 10:36 AM    HGB 8.7 12/08/2017 10:36 AM    HCT 28.0 12/08/2017 10:36 AM    PLATELET 629 01/38/3156 10:36 AM    MCV 96.6 12/08/2017 10:36 AM    ABS. NEUTROPHILS 4.6 12/08/2017 10:36 AM     Lab Results   Component Value Date/Time    Sodium 138 12/08/2017 10:36 AM    Potassium 3.9 12/08/2017 10:36 AM    Chloride 104 12/08/2017 10:36 AM    CO2 27 12/08/2017 10:36 AM    Glucose 93 12/08/2017 10:36 AM    BUN 12 12/08/2017 10:36 AM    Creatinine 0.70 12/08/2017 10:36 AM    GFR est AA >60 12/08/2017 10:36 AM    GFR est non-AA >60 12/08/2017 10:36 AM    Calcium 9.2 12/08/2017 10:36 AM     Lab Results   Component Value Date/Time    Bilirubin, total 0.2 12/08/2017 10:36 AM    ALT (SGPT) 24 12/08/2017 10:36 AM    AST (SGOT) 19 12/08/2017 10:36 AM    Alk. phosphatase 78 12/08/2017 10:36 AM    Protein, total 7.1 12/08/2017 10:36 AM    Albumin 3.2 12/08/2017 10:36 AM    Globulin 3.9 12/08/2017 10:36 AM       CTA Chest 9/15/2017:   1.  No pulmonary embolus. 2. Bilateral lung masses and mediastinal nodes decreased in size. 3. Atherosclerosis with coronary artery calcifications    Venous doppler 9/15/2017: Bilateral lower extremity venous duplex positive for deep  venous thrombosis in right posterior tibial vein, right peroneal vein, and the left posterior tibial vein. There are superficial venous  thrombosis in bilateral greater saphenous veins    CT C/A/P 11/16/2017: No significant interval change in size of bilateral pulmonary nodules as described above. Slight interval decrease in size of mediastinal lymph nodes. No evidence of metastatic disease in the abdomen or pelvis. Assessment:   1) Metastatic non-small cell lung cancer (squamous cell)  EGFR, ALK, ROS1, BRAF negative  He has disease within his chest and brain. His cancer is not curable and management is with palliative intent. He is s/p gamma knife to CNS disease. He has completed palliative chemotherapy with carboplatin and gemcitabine given every 3 weeks x 4 cycles. He continues feeling well and is tolerating therapy well. We will continue with maintenance therapy and see him back in 3 weeks with next cycle. We did discuss the option of radiation to his chest, given his treated oligometastatic disease. There have been some studies recently demonstrating a benefit to this approach in NSCLC. He is not interested in referral to radiation oncology at this time. He has consented to Lung-MAP trial.    2) Brain metastasis  S/P gamma knife. Now off dexamethasone. Will defer reimaging to Dr. Eduardo Aguero. 3) Tachycardia  Improved. Due to a-flutter. Cardiology now following, taking metoprolol and cardizem. 4) DVT 9/15/2017  Previously on Lovenox BID but unable to tolerate injections. Continue Apixaban 5mg BID. I recommend he continue this long term in the setting of malignancy. 5) Hypotension  Improved recently. Secondary to tachycardia and a-flutter. Cardiology following.      6) Cough  Nearly resolved. Secondary to disease. Monitor. 7) Weight loss  Stable today. Likely due to cancer. Monitor and consider dietician consult if weight loss worsens. 8) Neuropathy  Secondary to therapy. Continue Cymbalta 30mg daily. Monitor and increase dose if needed. We previously discussed gabapentin but he wants to hold off due to potential drowsiness side effect.      Plan:     Proceed today with C#2 of maintenance Gemcitabine 1000mg/m2 on days 1, 8 given every 21 days   Labs prior to each treatment: CBC with diff on day 1 and day 8; CMP on day 1   Antiemetic prophylaxis: Dexamethasone prior to therapy  PRN Antiemetics at home: Ondansetron and Prochlorperazine   following   Follow up with Dr. Viktoria Curling as scheduled  Follow up with cardiology as planned  Continue Apixaban 5mg BID  Continue Cymbalta 30 mg daily  Return to clinic in 3 weeks with next cycle of therapy or sooner if needed        Signed By: Oc Diana MD

## 2017-12-08 NOTE — PROGRESS NOTES
Problem: Chemotherapy Treatment  Goal: *Chemotherapy regimen followed  Outcome: Progressing Towards Goal  PT arrived at ambulatory and in no distress for Gemzar

## 2017-12-15 NOTE — PROGRESS NOTES
[facility] VISIT NOTE    0915: Pt arrived at [facility] ambulatory w/ cane and in no distress for Gemzar C2D8. Assessment completed, pt denies any acute s/s or pain at present. 0930: R chest port accessed with 0.75 in kellogg with no difficulty. Positive blood return noted and labs drawn. Patient Vitals for the past 12 hrs:   Temp Pulse Resp BP   12/15/17 1205 97.1 °F (36.2 °C) 63 17 114/67   12/15/17 0919 97.5 °F (36.4 °C) 70 17 108/66       Recent Results (from the past 12 hour(s))   CBC WITH AUTOMATED DIFF    Collection Time: 12/15/17  9:38 AM   Result Value Ref Range    WBC 5.7 4.1 - 11.1 K/uL    RBC 2.92 (L) 4.10 - 5.70 M/uL    HGB 8.9 (L) 12.1 - 17.0 g/dL    HCT 28.9 (L) 36.6 - 50.3 %    MCV 99.0 80.0 - 99.0 FL    MCH 30.5 26.0 - 34.0 PG    MCHC 30.8 30.0 - 36.5 g/dL    RDW 21.3 (H) 11.5 - 14.5 %    PLATELET 312 269 - 564 K/uL    NEUTROPHILS 35 32 - 75 %    LYMPHOCYTES 38 12 - 49 %    MONOCYTES 21 (H) 5 - 13 %    EOSINOPHILS 1 0 - 7 %    BASOPHILS 2 (H) 0 - 1 %    METAMYELOCYTES 1 (H) 0 %    MYELOCYTES 2 (H) 0 %    ABS. NEUTROPHILS 2.0 1.8 - 8.0 K/UL    ABS. LYMPHOCYTES 2.2 0.8 - 3.5 K/UL    ABS. MONOCYTES 1.2 (H) 0.0 - 1.0 K/UL    ABS. EOSINOPHILS 0.1 0.0 - 0.4 K/UL    ABS. BASOPHILS 0.1 0.0 - 0.1 K/UL    DF MANUAL      RBC COMMENTS ANISOCYTOSIS  3+        RBC COMMENTS MACROCYTOSIS  1+        RBC COMMENTS OVALOCYTES  PRESENT        RBC COMMENTS POLYCHROMASIA  PRESENT        RBC COMMENTS SCHISTOCYTES  PRESENT        RBC COMMENTS ACANTHOCYTES       Labs reviewed and w/in tx parameters, chemo ordered and pre-meds given. Medications received:  NS IV  Decadron IVP  Gemzar IV    1200: Tolerated treatment well, no adverse reaction noted. Port de-accessed and flushed per protocol. Positive blood return noted. 1205: D/C'd from [facility] ambulatory w/ cane and in no distress accompanied by daughter.  Next appointment is 12/29 @ 9:00am.

## 2017-12-29 PROBLEM — C77.1 METASTASIS TO MEDIASTINAL LYMPH NODE (HCC): Status: ACTIVE | Noted: 2017-01-01

## 2017-12-29 PROBLEM — I48.92 ATRIAL FLUTTER WITH RAPID VENTRICULAR RESPONSE (HCC): Status: RESOLVED | Noted: 2017-09-27 | Resolved: 2017-01-01

## 2017-12-29 PROBLEM — G62.0 CHEMOTHERAPY-INDUCED NEUROPATHY (HCC): Status: ACTIVE | Noted: 2017-01-01

## 2017-12-29 PROBLEM — C77.0 METASTASIS TO SUPRACLAVICULAR LYMPH NODE (HCC): Status: ACTIVE | Noted: 2017-01-01

## 2017-12-29 PROBLEM — T45.1X5A CHEMOTHERAPY-INDUCED NEUROPATHY (HCC): Status: ACTIVE | Noted: 2017-01-01

## 2017-12-29 NOTE — PROGRESS NOTES
[facility] [place] at [place] [address] [facility] [address] [facility]  Javier Gabrohit: [phone]  F: [phone]      Reason for Visit:   Lillian Lamb is a 67 y.o. male who is seen for follow up of lung cancer. Treatment History:   · CXR 7/11/2017: Mediastinal lymphadenopathy with left hilar mass. · CT Chest 7/14/2017: Bulky mediastinal and left hilar lymphadenopathy. Bilateral pulmonary masses and nodules  · PET-CT 7/24/2017: Right parietal mass. Hypermetabolic right supraclavicular, right paratracheal, AP window, prevascular, precarinal, subcarinal and left hilar lymphadenopathy. Hypermetabolic pulmonary nodules bilaterally. · MRI Brain 7/24/2017: Junction right posterior temporal lobe and occipital lobe 2.7 cm mass likely metastasis from lung cancer. No additional acute abnormalities  · FNA supraclavicular node 7/28/2017: Metastatic squamous cell carcinoma, PD-L1 5%, BRAF negative, EGFR negative, ALK & ROS-1 pending   · Stage IV Non-small cell lung cancer (Squamous Cell)  · Gamma knife by Dr. Chay Irby 8/15/2017   · Palliative chemotherapy with carboplatin and gemcitabine beginning 8/25/2017 - 11/3/2017  · Maintenance gemcitabine beginning 11/17/2017    History of Present Illness:   He had a good Gabriela. Feeling pretty well. No more coughing. Breathing ok. Energy fair. No bleeding apixaban. Neuropathy in feet doing ok. He is accompanied by his daughter today. He smoked 1ppd for 50+ years but quit at diagnosis. He lives alone in [address] [place] [address]. His son lives about 15 minutes from him. His daughter lives about 30 minutes away. PAST HISTORY: The following sections were reviewed and updated in the EMR as appropriate: PMH, SH, FH, Medications, Allergies.       Allergies   Allergen Reactions    Lisinopril Angioedema    Hydrochlorothiazide Hives and Swelling    Sulfa (Sulfonamide Antibiotics) Hives      Review of Systems: A complete review of systems was obtained, reviewed, and scanned into the EMR. Pertinent findings reviewed above. Physical Exam:     Visit Vitals    /62 (BP 1 Location: Right arm, BP Patient Position: Sitting)    Pulse 72    Temp 97.8 °F (36.6 °C) (Temporal)    Resp 20    Ht 5' 8.9\" (1.75 m)    Wt 190 lb (86.2 kg)    SpO2 98%    BMI 28.14 kg/m2     ECOG PS: 1  General: No distress  Eyes: PERRLA, anicteric sclerae  HENT: Atraumatic, OP clear  Neck: Supple  Lymphatic: No cervical, supraclavicular, or inguinal adenopathy  Respiratory: CTAB, normal respiratory effort  CV: Normal rate, regular rhythm, no murmurs, no peripheral edema  GI: Soft, nontender, nondistended, no masses, no hepatomegaly, no splenomegaly  MS: Normal gait and station. Digits without clubbing or cyanosis. Skin: No rashes, ecchymoses, or petechiae. Normal temperature, turgor, and texture. Psych: Alert, oriented, appropriate affect, normal judgment/insight    Results:     Lab Results   Component Value Date/Time    WBC 5.7 12/15/2017 09:38 AM    HGB 8.9 12/15/2017 09:38 AM    HCT 28.9 12/15/2017 09:38 AM    PLATELET 055 70/58/5024 09:38 AM    MCV 99.0 12/15/2017 09:38 AM    ABS. NEUTROPHILS 2.0 12/15/2017 09:38 AM     Lab Results   Component Value Date/Time    Sodium 138 12/08/2017 10:36 AM    Potassium 3.9 12/08/2017 10:36 AM    Chloride 104 12/08/2017 10:36 AM    CO2 27 12/08/2017 10:36 AM    Glucose 93 12/08/2017 10:36 AM    BUN 12 12/08/2017 10:36 AM    Creatinine 0.70 12/08/2017 10:36 AM    GFR est AA >60 12/08/2017 10:36 AM    GFR est non-AA >60 12/08/2017 10:36 AM    Calcium 9.2 12/08/2017 10:36 AM     Lab Results   Component Value Date/Time    Bilirubin, total 0.2 12/08/2017 10:36 AM    ALT (SGPT) 24 12/08/2017 10:36 AM    AST (SGOT) 19 12/08/2017 10:36 AM    Alk. phosphatase 78 12/08/2017 10:36 AM    Protein, total 7.1 12/08/2017 10:36 AM    Albumin 3.2 12/08/2017 10:36 AM    Globulin 3.9 12/08/2017 10:36 AM       CTA Chest 9/15/2017:   1.  No pulmonary embolus. 2. Bilateral lung masses and mediastinal nodes decreased in size. 3. Atherosclerosis with coronary artery calcifications    Venous doppler 9/15/2017: Bilateral lower extremity venous duplex positive for deep  venous thrombosis in right posterior tibial vein, right peroneal vein, and the left posterior tibial vein. There are superficial venous  thrombosis in bilateral greater saphenous veins    CT C/A/P 11/16/2017: No significant interval change in size of bilateral pulmonary nodules as described above. Slight interval decrease in size of mediastinal lymph nodes. No evidence of metastatic disease in the abdomen or pelvis. Assessment:   1) Metastatic non-small cell lung cancer (squamous cell)  EGFR, ALK, ROS1, BRAF negative  He has disease within his chest and brain. His cancer is not curable and management is with palliative intent. He is s/p gamma knife to CNS disease. He has completed palliative chemotherapy with carboplatin and gemcitabine given every 3 weeks x 4 cycles. He continues feeling well and is tolerating therapy well. We will continue with maintenance therapy and see him back in 3 weeks with next cycle. Plan to repeat CT C/A/P after C#4. He has consented to Lung-MAP trial, which will direct his next line of therapy when appropriate. 2) Brain metastasis  S/P gamma knife. Follow up with Dr. Karolyn Mariee for surveillance MRI brain. 3) Atrial fibrillation, CHF  Cardiology following. On apixaban. 4) DVT 9/15/2017  Previously on Lovenox BID but unable to tolerate injections. Continue Apixaban 5mg BID. I recommend he continue this long term in the setting of malignancy. 5) Cough  Resolved. 6) Weight loss  Improving. Likely due to cancer. Monitor. 7) Neuropathy, chemotherapy induced  Secondary to therapy. Continue Cymbalta 30mg daily. Monitor and increase dose if needed.     Plan:     Proceed today with C#3 of maintenance Gemcitabine 1000mg/m2 on days 1, 8 given every 21 days Labs prior to each treatment: CBC with diff on day 1 and day 8; CMP on day 1   Antiemetic prophylaxis: Dexamethasone prior to therapy  PRN Antiemetics at home: Ondansetron and Prochlorperazine  Continue Apixaban 5mg BID  Continue Cymbalta 30 mg daily  Follow up with Dr. Dion Harp  Follow up with cardiology  CT C/A/P after C#4  Return to clinic in 3 weeks with next cycle of therapy or sooner if needed        Signed By: Ifeanyi An MD

## 2017-12-29 NOTE — PROGRESS NOTES
[facility] Nursing Progress Note    0915  Patient arrived to [facility] with cane accompanied by family for scheduled Gemzar  Cycle 3 Day 1. PT denies complaints. R port  Accessed with  0.75 in. kellogg needle, labs drawn, port flushed, positive blood return noted. Vitals Blood pressure 133/70, pulse 71, temperature 97 °F (36.1 °C), resp. rate 18, height 5' 8.9\" (1.75 m), weight 86.6 kg (190 lb 14.4 oz). 3764  PT sent to MD .    0124 PT returned from MD clinic, no changes in current orders and labs met parameters for treatment. labs   Recent Results (from the past 12 hour(s))   CBC WITH AUTOMATED DIFF    Collection Time: 12/29/17  9:33 AM   Result Value Ref Range    WBC 10.2 4.1 - 11.1 K/uL    RBC 3.05 (L) 4.10 - 5.70 M/uL    HGB 9.3 (L) 12.1 - 17.0 g/dL    HCT 29.0 (L) 36.6 - 50.3 %    MCV 95.1 80.0 - 99.0 FL    MCH 30.5 26.0 - 34.0 PG    MCHC 32.1 30.0 - 36.5 g/dL    RDW 21.0 (H) 11.5 - 14.5 %    PLATELET 066 860 - 495 K/uL    NEUTROPHILS 52 32 - 75 %    LYMPHOCYTES 26 12 - 49 %    MONOCYTES 17 (H) 5 - 13 %    EOSINOPHILS 4 0 - 7 %    BASOPHILS 1 0 - 1 %    ABS. NEUTROPHILS 5.3 1.8 - 8.0 K/UL    ABS. LYMPHOCYTES 2.7 0.8 - 3.5 K/UL    ABS. MONOCYTES 1.7 (H) 0.0 - 1.0 K/UL    ABS. EOSINOPHILS 0.4 0.0 - 0.4 K/UL    ABS.  BASOPHILS 0.1 0.0 - 0.1 K/UL    RBC COMMENTS ANISOCYTOSIS  1+        RBC COMMENTS MICROCYTOSIS  1+        RBC COMMENTS HYPOCHROMIA  PRESENT       METABOLIC PANEL, COMPREHENSIVE    Collection Time: 12/29/17  9:33 AM   Result Value Ref Range    Sodium 140 136 - 145 mmol/L    Potassium 3.9 3.5 - 5.1 mmol/L    Chloride 104 97 - 108 mmol/L    CO2 27 21 - 32 mmol/L    Anion gap 9 5 - 15 mmol/L    Glucose 99 65 - 100 mg/dL    BUN 12 6 - 20 MG/DL    Creatinine 0.73 0.70 - 1.30 MG/DL    BUN/Creatinine ratio 16 12 - 20      GFR est AA >60 >60 ml/min/1.73m2    GFR est non-AA >60 >60 ml/min/1.73m2    Calcium 9.2 8.5 - 10.1 MG/DL    Bilirubin, total 0.2 0.2 - 1.0 MG/DL    ALT (SGPT) 22 12 - 78 U/L    AST (SGOT) 22 15 - 37 U/L    Alk. phosphatase 79 45 - 117 U/L    Protein, total 6.7 6.4 - 8.2 g/dL    Albumin 3.1 (L) 3.5 - 5.0 g/dL    Globulin 3.6 2.0 - 4.0 g/dL    A-G Ratio 0.9 (L) 1.1 - 2.2       Patient received infusion without difficulty. Medications this visit:    NS KVO  Decadron IVP  Gemzar    Vitals Blood pressure 106/57, pulse 68, temperature 97.1 °F (36.2 °C), resp. rate 16, height 5' 8.9\" (1.75 m), weight 86.6 kg (190 lb 14.4 oz). Next appointment,  1/5/18 @ 0900    1225 VAD maintained +blood return throughout [address] [place] [address] and prior to D/C. PT D/C from [facility] ambulatory with cane in no distress, accompanied by family.

## 2018-01-01 ENCOUNTER — HOSPITAL ENCOUNTER (OUTPATIENT)
Dept: CT IMAGING | Age: 73
Discharge: HOME | End: 2018-11-02
Attending: NURSE PRACTITIONER
Payer: MEDICARE

## 2018-01-01 ENCOUNTER — HOSPITAL ENCOUNTER (OUTPATIENT)
Dept: INFUSION THERAPY | Age: 73
Discharge: HOME | End: 2018-03-23
Payer: MEDICARE

## 2018-01-01 ENCOUNTER — TELEPHONE (OUTPATIENT)
Dept: ONCOLOGY | Age: 73
End: 2018-01-01

## 2018-01-01 ENCOUNTER — HOSPITAL ENCOUNTER (OUTPATIENT)
Dept: INFUSION THERAPY | Age: 73
Discharge: HOME | End: 2018-07-27
Payer: MEDICARE

## 2018-01-01 ENCOUNTER — HOSPITAL ENCOUNTER (INPATIENT)
Age: 73
LOS: 2 days | DRG: 689 | End: 2018-11-15
Attending: EMERGENCY MEDICINE | Admitting: FAMILY MEDICINE
Payer: MEDICARE

## 2018-01-01 ENCOUNTER — RESEARCH ENCOUNTER (OUTPATIENT)
Dept: ONCOLOGY | Age: 73
End: 2018-01-01

## 2018-01-01 ENCOUNTER — OFFICE VISIT (OUTPATIENT)
Dept: ONCOLOGY | Age: 73
End: 2018-01-01

## 2018-01-01 ENCOUNTER — HOSPITAL ENCOUNTER (OUTPATIENT)
Dept: INFUSION THERAPY | Age: 73
Discharge: HOME | End: 2018-09-28
Payer: MEDICARE

## 2018-01-01 ENCOUNTER — APPOINTMENT (OUTPATIENT)
Dept: GENERAL RADIOLOGY | Age: 73
DRG: 689 | End: 2018-01-01
Attending: EMERGENCY MEDICINE
Payer: MEDICARE

## 2018-01-01 ENCOUNTER — APPOINTMENT (OUTPATIENT)
Dept: INFUSION THERAPY | Age: 73
End: 2018-01-01

## 2018-01-01 ENCOUNTER — HOSPITAL ENCOUNTER (OUTPATIENT)
Age: 73
Setting detail: OBSERVATION
Discharge: HOME HEALTH | End: 2018-11-07
Attending: STUDENT IN AN ORGANIZED HEALTH CARE EDUCATION/TRAINING PROGRAM | Admitting: STUDENT IN AN ORGANIZED HEALTH CARE EDUCATION/TRAINING PROGRAM
Payer: MEDICARE

## 2018-01-01 ENCOUNTER — APPOINTMENT (OUTPATIENT)
Dept: CT IMAGING | Age: 73
DRG: 689 | End: 2018-01-01
Attending: STUDENT IN AN ORGANIZED HEALTH CARE EDUCATION/TRAINING PROGRAM
Payer: MEDICARE

## 2018-01-01 ENCOUNTER — APPOINTMENT (OUTPATIENT)
Dept: INFUSION THERAPY | Age: 73
End: 2018-01-01
Payer: MEDICARE

## 2018-01-01 ENCOUNTER — HOSPITAL ENCOUNTER (OUTPATIENT)
Dept: CT IMAGING | Age: 73
Discharge: HOME | End: 2018-08-13
Attending: NURSE PRACTITIONER
Payer: MEDICARE

## 2018-01-01 ENCOUNTER — HOSPITAL ENCOUNTER (OUTPATIENT)
Dept: INFUSION THERAPY | Age: 73
Discharge: HOME | End: 2018-06-15
Payer: MEDICARE

## 2018-01-01 ENCOUNTER — HOSPITAL ENCOUNTER (OUTPATIENT)
Dept: INFUSION THERAPY | Age: 73
Discharge: HOME | End: 2018-01-19
Payer: MEDICARE

## 2018-01-01 ENCOUNTER — HOSPITAL ENCOUNTER (OUTPATIENT)
Dept: INFUSION THERAPY | Age: 73
Discharge: HOME | End: 2018-01-05
Payer: MEDICARE

## 2018-01-01 ENCOUNTER — HOSPITAL ENCOUNTER (OUTPATIENT)
Dept: GENERAL RADIOLOGY | Age: 73
Discharge: HOME | End: 2018-02-14
Attending: RADIOLOGY
Payer: MEDICARE

## 2018-01-01 ENCOUNTER — HOSPITAL ENCOUNTER (OUTPATIENT)
Dept: CT IMAGING | Age: 73
Discharge: HOME | End: 2018-02-05
Attending: INTERNAL MEDICINE
Payer: MEDICARE

## 2018-01-01 ENCOUNTER — APPOINTMENT (OUTPATIENT)
Dept: CT IMAGING | Age: 73
DRG: 689 | End: 2018-01-01
Attending: EMERGENCY MEDICINE
Payer: MEDICARE

## 2018-01-01 ENCOUNTER — APPOINTMENT (OUTPATIENT)
Dept: GENERAL RADIOLOGY | Age: 73
DRG: 689 | End: 2018-01-01
Attending: INTERNAL MEDICINE
Payer: MEDICARE

## 2018-01-01 ENCOUNTER — PATIENT OUTREACH (OUTPATIENT)
Dept: FAMILY MEDICINE CLINIC | Age: 73
End: 2018-01-01

## 2018-01-01 ENCOUNTER — TELEPHONE (OUTPATIENT)
Dept: FAMILY MEDICINE CLINIC | Age: 73
End: 2018-01-01

## 2018-01-01 ENCOUNTER — APPOINTMENT (OUTPATIENT)
Dept: MRI IMAGING | Age: 73
DRG: 689 | End: 2018-01-01
Attending: STUDENT IN AN ORGANIZED HEALTH CARE EDUCATION/TRAINING PROGRAM
Payer: MEDICARE

## 2018-01-01 ENCOUNTER — HOSPITAL ENCOUNTER (OUTPATIENT)
Dept: INFUSION THERAPY | Age: 73
Discharge: HOME | End: 2018-01-26
Payer: MEDICARE

## 2018-01-01 ENCOUNTER — HOSPITAL ENCOUNTER (EMERGENCY)
Age: 73
Discharge: HOME | End: 2018-09-23
Attending: EMERGENCY MEDICINE
Payer: MEDICARE

## 2018-01-01 ENCOUNTER — APPOINTMENT (OUTPATIENT)
Dept: CT IMAGING | Age: 73
End: 2018-01-01
Attending: EMERGENCY MEDICINE
Payer: MEDICARE

## 2018-01-01 ENCOUNTER — HOSPITAL ENCOUNTER (OUTPATIENT)
Dept: INFUSION THERAPY | Age: 73
Discharge: HOME | End: 2018-07-06
Payer: MEDICARE

## 2018-01-01 ENCOUNTER — HOSPITAL ENCOUNTER (OUTPATIENT)
Dept: INFUSION THERAPY | Age: 73
Discharge: HOME | End: 2018-02-09
Payer: MEDICARE

## 2018-01-01 ENCOUNTER — HOSPITAL ENCOUNTER (OUTPATIENT)
Dept: CT IMAGING | Age: 73
Discharge: HOME | End: 2018-05-02
Attending: INTERNAL MEDICINE

## 2018-01-01 ENCOUNTER — HOSPITAL ENCOUNTER (OUTPATIENT)
Dept: INFUSION THERAPY | Age: 73
Discharge: HOME | End: 2018-08-17
Payer: MEDICARE

## 2018-01-01 ENCOUNTER — HOSPITAL ENCOUNTER (OUTPATIENT)
Dept: INFUSION THERAPY | Age: 73
End: 2018-01-01

## 2018-01-01 ENCOUNTER — HOSPITAL ENCOUNTER (OUTPATIENT)
Dept: INFUSION THERAPY | Age: 73
Discharge: HOME | End: 2018-09-07
Payer: MEDICARE

## 2018-01-01 ENCOUNTER — HOSPITAL ENCOUNTER (OUTPATIENT)
Dept: INFUSION THERAPY | Age: 73
Discharge: HOME | End: 2018-05-04
Payer: MEDICARE

## 2018-01-01 ENCOUNTER — HOSPITAL ENCOUNTER (OUTPATIENT)
Dept: INFUSION THERAPY | Age: 73
Discharge: HOME | End: 2018-10-19
Payer: MEDICARE

## 2018-01-01 ENCOUNTER — HOSPITAL ENCOUNTER (OUTPATIENT)
Dept: INFUSION THERAPY | Age: 73
Discharge: HOME | End: 2018-04-13
Payer: MEDICARE

## 2018-01-01 ENCOUNTER — HOSPITAL ENCOUNTER (OUTPATIENT)
Dept: CT IMAGING | Age: 73
Discharge: HOME | End: 2018-02-14
Attending: NURSE PRACTITIONER
Payer: MEDICARE

## 2018-01-01 ENCOUNTER — HOSPITAL ENCOUNTER (OUTPATIENT)
Dept: INFUSION THERAPY | Age: 73
Discharge: HOME | End: 2018-03-15
Payer: MEDICARE

## 2018-01-01 ENCOUNTER — HOSPITAL ENCOUNTER (OUTPATIENT)
Dept: INFUSION THERAPY | Age: 73
Discharge: HOME | End: 2018-05-25
Payer: MEDICARE

## 2018-01-01 VITALS
OXYGEN SATURATION: 97 % | SYSTOLIC BLOOD PRESSURE: 136 MMHG | HEIGHT: 69 IN | WEIGHT: 185 LBS | BODY MASS INDEX: 27.4 KG/M2 | TEMPERATURE: 97.5 F | DIASTOLIC BLOOD PRESSURE: 69 MMHG | HEART RATE: 78 BPM | RESPIRATION RATE: 18 BRPM

## 2018-01-01 VITALS
BODY MASS INDEX: 28.51 KG/M2 | WEIGHT: 182 LBS | DIASTOLIC BLOOD PRESSURE: 75 MMHG | RESPIRATION RATE: 15 BRPM | OXYGEN SATURATION: 97 % | HEART RATE: 77 BPM | SYSTOLIC BLOOD PRESSURE: 135 MMHG | TEMPERATURE: 98.5 F

## 2018-01-01 VITALS
OXYGEN SATURATION: 95 % | BODY MASS INDEX: 27.34 KG/M2 | RESPIRATION RATE: 18 BRPM | TEMPERATURE: 96.6 F | DIASTOLIC BLOOD PRESSURE: 62 MMHG | WEIGHT: 174.2 LBS | HEART RATE: 63 BPM | SYSTOLIC BLOOD PRESSURE: 114 MMHG | HEIGHT: 67 IN

## 2018-01-01 VITALS
WEIGHT: 190.7 LBS | BODY MASS INDEX: 28.24 KG/M2 | TEMPERATURE: 97.7 F | HEIGHT: 69 IN | DIASTOLIC BLOOD PRESSURE: 58 MMHG | SYSTOLIC BLOOD PRESSURE: 108 MMHG | RESPIRATION RATE: 18 BRPM | HEART RATE: 63 BPM

## 2018-01-01 VITALS
BODY MASS INDEX: 26.86 KG/M2 | WEIGHT: 167.1 LBS | DIASTOLIC BLOOD PRESSURE: 71 MMHG | TEMPERATURE: 97.9 F | RESPIRATION RATE: 21 BRPM | SYSTOLIC BLOOD PRESSURE: 126 MMHG | HEART RATE: 99 BPM | OXYGEN SATURATION: 97 % | HEIGHT: 66 IN

## 2018-01-01 VITALS
HEART RATE: 69 BPM | TEMPERATURE: 97.8 F | SYSTOLIC BLOOD PRESSURE: 119 MMHG | OXYGEN SATURATION: 97 % | DIASTOLIC BLOOD PRESSURE: 69 MMHG | RESPIRATION RATE: 18 BRPM

## 2018-01-01 VITALS
SYSTOLIC BLOOD PRESSURE: 85 MMHG | OXYGEN SATURATION: 96 % | TEMPERATURE: 98.3 F | RESPIRATION RATE: 18 BRPM | WEIGHT: 180.3 LBS | DIASTOLIC BLOOD PRESSURE: 56 MMHG | HEART RATE: 79 BPM | HEIGHT: 66 IN | BODY MASS INDEX: 28.98 KG/M2

## 2018-01-01 VITALS
WEIGHT: 176.6 LBS | HEART RATE: 67 BPM | RESPIRATION RATE: 18 BRPM | HEIGHT: 68 IN | BODY MASS INDEX: 26.76 KG/M2 | TEMPERATURE: 95.9 F | SYSTOLIC BLOOD PRESSURE: 118 MMHG | OXYGEN SATURATION: 94 % | DIASTOLIC BLOOD PRESSURE: 64 MMHG

## 2018-01-01 VITALS
WEIGHT: 174 LBS | HEART RATE: 75 BPM | TEMPERATURE: 97.6 F | RESPIRATION RATE: 18 BRPM | HEIGHT: 67 IN | SYSTOLIC BLOOD PRESSURE: 124 MMHG | OXYGEN SATURATION: 97 % | BODY MASS INDEX: 27.31 KG/M2 | DIASTOLIC BLOOD PRESSURE: 65 MMHG

## 2018-01-01 VITALS
BODY MASS INDEX: 27.24 KG/M2 | WEIGHT: 173.9 LBS | SYSTOLIC BLOOD PRESSURE: 109 MMHG | TEMPERATURE: 97.1 F | RESPIRATION RATE: 16 BRPM | DIASTOLIC BLOOD PRESSURE: 57 MMHG | HEART RATE: 61 BPM

## 2018-01-01 VITALS
TEMPERATURE: 97 F | BODY MASS INDEX: 27.45 KG/M2 | RESPIRATION RATE: 16 BRPM | DIASTOLIC BLOOD PRESSURE: 61 MMHG | SYSTOLIC BLOOD PRESSURE: 105 MMHG | OXYGEN SATURATION: 97 % | WEIGHT: 181.1 LBS | HEIGHT: 68 IN | HEART RATE: 65 BPM

## 2018-01-01 VITALS
WEIGHT: 176.8 LBS | OXYGEN SATURATION: 96 % | DIASTOLIC BLOOD PRESSURE: 68 MMHG | TEMPERATURE: 95.7 F | BODY MASS INDEX: 27.75 KG/M2 | HEART RATE: 71 BPM | SYSTOLIC BLOOD PRESSURE: 123 MMHG | HEIGHT: 67 IN | RESPIRATION RATE: 18 BRPM

## 2018-01-01 VITALS
HEART RATE: 71 BPM | DIASTOLIC BLOOD PRESSURE: 68 MMHG | HEIGHT: 67 IN | TEMPERATURE: 95.5 F | SYSTOLIC BLOOD PRESSURE: 120 MMHG | BODY MASS INDEX: 28.6 KG/M2 | WEIGHT: 182.2 LBS | RESPIRATION RATE: 19 BRPM | OXYGEN SATURATION: 98 %

## 2018-01-01 VITALS
SYSTOLIC BLOOD PRESSURE: 114 MMHG | DIASTOLIC BLOOD PRESSURE: 61 MMHG | RESPIRATION RATE: 16 BRPM | TEMPERATURE: 97.7 F | BODY MASS INDEX: 26.57 KG/M2 | OXYGEN SATURATION: 97 % | HEART RATE: 67 BPM | HEIGHT: 68 IN | WEIGHT: 175.3 LBS

## 2018-01-01 VITALS
SYSTOLIC BLOOD PRESSURE: 123 MMHG | WEIGHT: 184.2 LBS | BODY MASS INDEX: 27.28 KG/M2 | DIASTOLIC BLOOD PRESSURE: 75 MMHG | TEMPERATURE: 97.1 F | HEART RATE: 81 BPM | OXYGEN SATURATION: 97 % | RESPIRATION RATE: 12 BRPM | HEIGHT: 69 IN

## 2018-01-01 VITALS
HEART RATE: 56 BPM | TEMPERATURE: 97.3 F | SYSTOLIC BLOOD PRESSURE: 109 MMHG | RESPIRATION RATE: 16 BRPM | DIASTOLIC BLOOD PRESSURE: 55 MMHG

## 2018-01-01 VITALS
WEIGHT: 178 LBS | RESPIRATION RATE: 20 BRPM | SYSTOLIC BLOOD PRESSURE: 138 MMHG | BODY MASS INDEX: 27.94 KG/M2 | HEIGHT: 67 IN | OXYGEN SATURATION: 98 % | OXYGEN SATURATION: 97 % | HEART RATE: 74 BPM | DIASTOLIC BLOOD PRESSURE: 81 MMHG | HEART RATE: 69 BPM | WEIGHT: 169 LBS | DIASTOLIC BLOOD PRESSURE: 78 MMHG | HEIGHT: 67 IN | TEMPERATURE: 98 F | TEMPERATURE: 96.8 F | SYSTOLIC BLOOD PRESSURE: 145 MMHG | RESPIRATION RATE: 20 BRPM | BODY MASS INDEX: 26.53 KG/M2

## 2018-01-01 VITALS
SYSTOLIC BLOOD PRESSURE: 111 MMHG | TEMPERATURE: 97.1 F | RESPIRATION RATE: 18 BRPM | HEART RATE: 66 BPM | DIASTOLIC BLOOD PRESSURE: 66 MMHG | OXYGEN SATURATION: 96 %

## 2018-01-01 VITALS
DIASTOLIC BLOOD PRESSURE: 65 MMHG | SYSTOLIC BLOOD PRESSURE: 125 MMHG | HEART RATE: 78 BPM | BODY MASS INDEX: 28.31 KG/M2 | RESPIRATION RATE: 20 BRPM | OXYGEN SATURATION: 98 % | TEMPERATURE: 97.8 F | WEIGHT: 180.4 LBS | HEIGHT: 67 IN

## 2018-01-01 VITALS
TEMPERATURE: 97.6 F | BODY MASS INDEX: 25.72 KG/M2 | DIASTOLIC BLOOD PRESSURE: 66 MMHG | HEIGHT: 68 IN | WEIGHT: 169.7 LBS | HEART RATE: 80 BPM | SYSTOLIC BLOOD PRESSURE: 109 MMHG

## 2018-01-01 VITALS
SYSTOLIC BLOOD PRESSURE: 95 MMHG | HEART RATE: 92 BPM | RESPIRATION RATE: 18 BRPM | WEIGHT: 182 LBS | BODY MASS INDEX: 29.25 KG/M2 | DIASTOLIC BLOOD PRESSURE: 64 MMHG | TEMPERATURE: 95.5 F | HEIGHT: 66 IN | OXYGEN SATURATION: 96 %

## 2018-01-01 VITALS
RESPIRATION RATE: 18 BRPM | OXYGEN SATURATION: 96 % | HEART RATE: 69 BPM | BODY MASS INDEX: 28.22 KG/M2 | HEIGHT: 66 IN | TEMPERATURE: 97.4 F | DIASTOLIC BLOOD PRESSURE: 62 MMHG | WEIGHT: 175.6 LBS | SYSTOLIC BLOOD PRESSURE: 99 MMHG

## 2018-01-01 VITALS
TEMPERATURE: 101.7 F | HEART RATE: 91 BPM | HEIGHT: 67 IN | HEIGHT: 66 IN | BODY MASS INDEX: 26.55 KG/M2 | DIASTOLIC BLOOD PRESSURE: 61 MMHG | RESPIRATION RATE: 45 BRPM | SYSTOLIC BLOOD PRESSURE: 85 MMHG | OXYGEN SATURATION: 100 % | WEIGHT: 165.2 LBS | DIASTOLIC BLOOD PRESSURE: 42 MMHG | WEIGHT: 183 LBS | OXYGEN SATURATION: 94 % | RESPIRATION RATE: 24 BRPM | BODY MASS INDEX: 28.72 KG/M2 | SYSTOLIC BLOOD PRESSURE: 104 MMHG | HEART RATE: 99 BPM

## 2018-01-01 VITALS
HEIGHT: 69 IN | DIASTOLIC BLOOD PRESSURE: 57 MMHG | TEMPERATURE: 96.6 F | BODY MASS INDEX: 27.99 KG/M2 | WEIGHT: 189 LBS | RESPIRATION RATE: 18 BRPM | HEART RATE: 71 BPM | OXYGEN SATURATION: 93 % | SYSTOLIC BLOOD PRESSURE: 114 MMHG

## 2018-01-01 VITALS
BODY MASS INDEX: 26.31 KG/M2 | HEART RATE: 68 BPM | OXYGEN SATURATION: 97 % | TEMPERATURE: 97.8 F | RESPIRATION RATE: 18 BRPM | DIASTOLIC BLOOD PRESSURE: 62 MMHG | WEIGHT: 168 LBS | SYSTOLIC BLOOD PRESSURE: 113 MMHG

## 2018-01-01 VITALS
DIASTOLIC BLOOD PRESSURE: 70 MMHG | SYSTOLIC BLOOD PRESSURE: 122 MMHG | HEART RATE: 80 BPM | HEIGHT: 67 IN | OXYGEN SATURATION: 97 % | WEIGHT: 170 LBS | TEMPERATURE: 95.9 F | RESPIRATION RATE: 16 BRPM | BODY MASS INDEX: 26.68 KG/M2

## 2018-01-01 VITALS
HEIGHT: 66 IN | SYSTOLIC BLOOD PRESSURE: 87 MMHG | OXYGEN SATURATION: 98 % | HEART RATE: 71 BPM | DIASTOLIC BLOOD PRESSURE: 59 MMHG | TEMPERATURE: 97.6 F | BODY MASS INDEX: 28.12 KG/M2 | RESPIRATION RATE: 20 BRPM | WEIGHT: 175 LBS

## 2018-01-01 VITALS
WEIGHT: 182 LBS | SYSTOLIC BLOOD PRESSURE: 134 MMHG | DIASTOLIC BLOOD PRESSURE: 63 MMHG | HEIGHT: 68 IN | HEART RATE: 70 BPM | TEMPERATURE: 98 F | OXYGEN SATURATION: 99 % | BODY MASS INDEX: 27.58 KG/M2 | RESPIRATION RATE: 18 BRPM

## 2018-01-01 VITALS
SYSTOLIC BLOOD PRESSURE: 122 MMHG | RESPIRATION RATE: 16 BRPM | OXYGEN SATURATION: 97 % | DIASTOLIC BLOOD PRESSURE: 69 MMHG | HEART RATE: 73 BPM | TEMPERATURE: 96.8 F

## 2018-01-01 VITALS
RESPIRATION RATE: 18 BRPM | DIASTOLIC BLOOD PRESSURE: 78 MMHG | OXYGEN SATURATION: 97 % | TEMPERATURE: 97.8 F | SYSTOLIC BLOOD PRESSURE: 122 MMHG | HEART RATE: 76 BPM

## 2018-01-01 VITALS
DIASTOLIC BLOOD PRESSURE: 60 MMHG | RESPIRATION RATE: 18 BRPM | SYSTOLIC BLOOD PRESSURE: 110 MMHG | WEIGHT: 188.9 LBS | BODY MASS INDEX: 27.98 KG/M2 | HEART RATE: 62 BPM | HEIGHT: 69 IN | TEMPERATURE: 97.8 F

## 2018-01-01 VITALS
TEMPERATURE: 97.2 F | SYSTOLIC BLOOD PRESSURE: 108 MMHG | HEART RATE: 65 BPM | RESPIRATION RATE: 18 BRPM | BODY MASS INDEX: 28.1 KG/M2 | WEIGHT: 189.7 LBS | DIASTOLIC BLOOD PRESSURE: 64 MMHG | HEIGHT: 69 IN

## 2018-01-01 DIAGNOSIS — I11.0: ICD-10-CM

## 2018-01-01 DIAGNOSIS — C79.31 METASTASIS TO BRAIN (HCC): ICD-10-CM

## 2018-01-01 DIAGNOSIS — C77.1 METASTASIS TO MEDIASTINAL LYMPH NODE (HCC): ICD-10-CM

## 2018-01-01 DIAGNOSIS — I48.91 ATRIAL FIBRILLATION WITH RAPID VENTRICULAR RESPONSE (HCC): ICD-10-CM

## 2018-01-01 DIAGNOSIS — C34.92 PRIMARY MALIGNANT NEOPLASM OF LEFT LUNG METASTATIC TO OTHER SITE (HCC): Primary | ICD-10-CM

## 2018-01-01 DIAGNOSIS — C77.0 METASTASIS TO SUPRACLAVICULAR LYMPH NODE (HCC): ICD-10-CM

## 2018-01-01 DIAGNOSIS — C34.90 MALIGNANT NEOPLASM OF LUNG, UNSPECIFIED LATERALITY, UNSPECIFIED PART OF LUNG (HCC): ICD-10-CM

## 2018-01-01 DIAGNOSIS — R53.0 NEOPLASTIC MALIGNANT RELATED FATIGUE: ICD-10-CM

## 2018-01-01 DIAGNOSIS — T45.1X5A CHEMOTHERAPY-INDUCED NEUROPATHY (HCC): ICD-10-CM

## 2018-01-01 DIAGNOSIS — R63.0 APPETITE LOSS: ICD-10-CM

## 2018-01-01 DIAGNOSIS — C34.92 PRIMARY MALIGNANT NEOPLASM OF LEFT LUNG METASTATIC TO OTHER SITE (HCC): ICD-10-CM

## 2018-01-01 DIAGNOSIS — I82.443 ACUTE DEEP VEIN THROMBOSIS (DVT) OF TIBIAL VEIN OF BOTH LOWER EXTREMITIES (HCC): ICD-10-CM

## 2018-01-01 DIAGNOSIS — G62.0 CHEMOTHERAPY-INDUCED NEUROPATHY (HCC): ICD-10-CM

## 2018-01-01 DIAGNOSIS — I82.443 DEEP VEIN THROMBOSIS (DVT) OF TIBIAL VEIN OF BOTH LOWER EXTREMITIES, UNSPECIFIED CHRONICITY (HCC): ICD-10-CM

## 2018-01-01 DIAGNOSIS — Z72.0 TOBACCO ABUSE: ICD-10-CM

## 2018-01-01 DIAGNOSIS — I50.30 (HFPEF) HEART FAILURE WITH PRESERVED EJECTION FRACTION (HCC): Chronic | ICD-10-CM

## 2018-01-01 DIAGNOSIS — C77.0 METASTASIS TO SUPRACLAVICULAR LYMPH NODE (HCC): Primary | ICD-10-CM

## 2018-01-01 DIAGNOSIS — M19.90 ARTHRITIS: ICD-10-CM

## 2018-01-01 DIAGNOSIS — E87.1 HYPONATREMIA: Primary | ICD-10-CM

## 2018-01-01 DIAGNOSIS — G25.3 MYOCLONUS: ICD-10-CM

## 2018-01-01 DIAGNOSIS — I95.89 HYPOTENSION DUE TO HYPOVOLEMIA: Primary | ICD-10-CM

## 2018-01-01 DIAGNOSIS — C34.90 PRIMARY MALIGNANT NEOPLASM OF LUNG METASTATIC TO OTHER SITE, UNSPECIFIED LATERALITY (HCC): ICD-10-CM

## 2018-01-01 DIAGNOSIS — R41.82 ALTERED MENTAL STATUS, UNSPECIFIED ALTERED MENTAL STATUS TYPE: ICD-10-CM

## 2018-01-01 DIAGNOSIS — R57.1 HYPOVOLEMIC SHOCK (HCC): ICD-10-CM

## 2018-01-01 DIAGNOSIS — Z51.11 CHEMOTHERAPY MANAGEMENT, ENCOUNTER FOR: ICD-10-CM

## 2018-01-01 DIAGNOSIS — E86.1 HYPOTENSION DUE TO HYPOVOLEMIA: Primary | ICD-10-CM

## 2018-01-01 DIAGNOSIS — R62.7 FAILURE TO THRIVE IN ADULT: ICD-10-CM

## 2018-01-01 DIAGNOSIS — R39.11 URINARY HESITANCY: ICD-10-CM

## 2018-01-01 DIAGNOSIS — I95.2 HYPOTENSION DUE TO DRUGS: Primary | ICD-10-CM

## 2018-01-01 LAB
ALBUMIN SERPL-MCNC: 1.7 G/DL (ref 3.5–5)
ALBUMIN SERPL-MCNC: 2.5 G/DL (ref 3.5–5)
ALBUMIN SERPL-MCNC: 2.5 G/DL (ref 3.5–5)
ALBUMIN SERPL-MCNC: 2.7 G/DL (ref 3.5–5)
ALBUMIN SERPL-MCNC: 2.8 G/DL (ref 3.5–5)
ALBUMIN SERPL-MCNC: 2.8 G/DL (ref 3.5–5)
ALBUMIN SERPL-MCNC: 2.9 G/DL (ref 3.5–5)
ALBUMIN SERPL-MCNC: 3 G/DL (ref 3.5–5)
ALBUMIN SERPL-MCNC: 3.2 G/DL (ref 3.5–5)
ALBUMIN SERPL-MCNC: 3.3 G/DL (ref 3.5–5)
ALBUMIN SERPL-MCNC: 3.5 G/DL (ref 3.5–5)
ALBUMIN SERPL-MCNC: 3.6 G/DL (ref 3.5–5)
ALBUMIN/GLOB SERPL: 0.5 {RATIO} (ref 1.1–2.2)
ALBUMIN/GLOB SERPL: 0.6 {RATIO} (ref 1.1–2.2)
ALBUMIN/GLOB SERPL: 0.7 {RATIO} (ref 1.1–2.2)
ALBUMIN/GLOB SERPL: 0.8 {RATIO} (ref 1.1–2.2)
ALBUMIN/GLOB SERPL: 0.9 {RATIO} (ref 1.1–2.2)
ALP SERPL-CCNC: 105 U/L (ref 45–117)
ALP SERPL-CCNC: 105 U/L (ref 45–117)
ALP SERPL-CCNC: 45 U/L (ref 45–117)
ALP SERPL-CCNC: 54 U/L (ref 45–117)
ALP SERPL-CCNC: 55 U/L (ref 45–117)
ALP SERPL-CCNC: 64 U/L (ref 45–117)
ALP SERPL-CCNC: 67 U/L (ref 45–117)
ALP SERPL-CCNC: 74 U/L (ref 45–117)
ALP SERPL-CCNC: 79 U/L (ref 45–117)
ALP SERPL-CCNC: 81 U/L (ref 45–117)
ALP SERPL-CCNC: 82 U/L (ref 45–117)
ALP SERPL-CCNC: 83 U/L (ref 45–117)
ALP SERPL-CCNC: 86 U/L (ref 45–117)
ALP SERPL-CCNC: 88 U/L (ref 45–117)
ALP SERPL-CCNC: 90 U/L (ref 45–117)
ALP SERPL-CCNC: 90 U/L (ref 45–117)
ALP SERPL-CCNC: 92 U/L (ref 45–117)
ALP SERPL-CCNC: 97 U/L (ref 45–117)
ALP SERPL-CCNC: 99 U/L (ref 45–117)
ALT SERPL-CCNC: 15 U/L (ref 12–78)
ALT SERPL-CCNC: 18 U/L (ref 12–78)
ALT SERPL-CCNC: 18 U/L (ref 12–78)
ALT SERPL-CCNC: 20 U/L (ref 12–78)
ALT SERPL-CCNC: 21 U/L (ref 12–78)
ALT SERPL-CCNC: 22 U/L (ref 12–78)
ALT SERPL-CCNC: 25 U/L (ref 12–78)
ALT SERPL-CCNC: 26 U/L (ref 12–78)
ALT SERPL-CCNC: 28 U/L (ref 12–78)
ALT SERPL-CCNC: 29 U/L (ref 12–78)
ALT SERPL-CCNC: 30 U/L (ref 12–78)
ALT SERPL-CCNC: 32 U/L (ref 12–78)
ALT SERPL-CCNC: 35 U/L (ref 12–78)
ALT SERPL-CCNC: 36 U/L (ref 12–78)
ALT SERPL-CCNC: 38 U/L (ref 12–78)
ALT SERPL-CCNC: 46 U/L (ref 12–78)
ALT SERPL-CCNC: 47 U/L (ref 12–78)
AMMONIA PLAS-SCNC: 21 UMOL/L
AMORPH CRY URNS QL MICRO: ABNORMAL
AMPHET UR QL SCN: NEGATIVE
ANION GAP SERPL CALC-SCNC: 10 MMOL/L (ref 5–15)
ANION GAP SERPL CALC-SCNC: 11 MMOL/L (ref 5–15)
ANION GAP SERPL CALC-SCNC: 14 MMOL/L (ref 5–15)
ANION GAP SERPL CALC-SCNC: 15 MMOL/L (ref 5–15)
ANION GAP SERPL CALC-SCNC: 15 MMOL/L (ref 5–15)
ANION GAP SERPL CALC-SCNC: 16 MMOL/L (ref 5–15)
ANION GAP SERPL CALC-SCNC: 3 MMOL/L (ref 5–15)
ANION GAP SERPL CALC-SCNC: 3 MMOL/L (ref 5–15)
ANION GAP SERPL CALC-SCNC: 4 MMOL/L (ref 5–15)
ANION GAP SERPL CALC-SCNC: 6 MMOL/L (ref 5–15)
ANION GAP SERPL CALC-SCNC: 6 MMOL/L (ref 5–15)
ANION GAP SERPL CALC-SCNC: 7 MMOL/L (ref 5–15)
ANION GAP SERPL CALC-SCNC: 8 MMOL/L (ref 5–15)
ANION GAP SERPL CALC-SCNC: 9 MMOL/L (ref 5–15)
ANION GAP SERPL CALC-SCNC: 9 MMOL/L (ref 5–15)
APPEARANCE UR: ABNORMAL
APPEARANCE UR: ABNORMAL
APTT PPP: 40.2 SEC (ref 22.1–32)
APTT PPP: 68.1 SEC (ref 22.1–32.5)
ARTERIAL PATENCY WRIST A: YES
AST SERPL-CCNC: 16 U/L (ref 15–37)
AST SERPL-CCNC: 22 U/L (ref 15–37)
AST SERPL-CCNC: 22 U/L (ref 15–37)
AST SERPL-CCNC: 24 U/L (ref 15–37)
AST SERPL-CCNC: 25 U/L (ref 15–37)
AST SERPL-CCNC: 26 U/L (ref 15–37)
AST SERPL-CCNC: 26 U/L (ref 15–37)
AST SERPL-CCNC: 27 U/L (ref 15–37)
AST SERPL-CCNC: 27 U/L (ref 15–37)
AST SERPL-CCNC: 30 U/L (ref 15–37)
AST SERPL-CCNC: 31 U/L (ref 15–37)
AST SERPL-CCNC: 37 U/L (ref 15–37)
AST SERPL-CCNC: 37 U/L (ref 15–37)
AST SERPL-CCNC: 38 U/L (ref 15–37)
AST SERPL-CCNC: 39 U/L (ref 15–37)
AST SERPL-CCNC: 44 U/L (ref 15–37)
AST SERPL-CCNC: 45 U/L (ref 15–37)
ATRIAL RATE: 119 BPM
ATRIAL RATE: 120 BPM
ATRIAL RATE: 163 BPM
ATRIAL RATE: 72 BPM
ATRIAL RATE: 97 BPM
BACTERIA SPEC CULT: NORMAL
BACTERIA SPEC CULT: NORMAL
BACTERIA URNS QL MICRO: NEGATIVE /HPF
BACTERIA URNS QL MICRO: NEGATIVE /HPF
BARBITURATES UR QL SCN: NEGATIVE
BASE DEFICIT BLDA-SCNC: 10.1 MMOL/L
BASOPHILS # BLD: 0 K/UL (ref 0–0.1)
BASOPHILS # BLD: 0.1 K/UL (ref 0–0.1)
BASOPHILS NFR BLD: 0 % (ref 0–1)
BASOPHILS NFR BLD: 1 % (ref 0–1)
BASOPHILS NFR BLD: 2 % (ref 0–1)
BASOPHILS NFR BLD: 2 % (ref 0–1)
BDY SITE: ABNORMAL
BENZODIAZ UR QL: NEGATIVE
BILIRUB DIRECT SERPL-MCNC: 0.1 MG/DL (ref 0–0.2)
BILIRUB DIRECT SERPL-MCNC: 0.4 MG/DL (ref 0–0.2)
BILIRUB SERPL-MCNC: 0.2 MG/DL (ref 0.2–1)
BILIRUB SERPL-MCNC: 0.3 MG/DL (ref 0.2–1)
BILIRUB SERPL-MCNC: 0.6 MG/DL (ref 0.2–1)
BILIRUB SERPL-MCNC: 0.7 MG/DL (ref 0.2–1)
BILIRUB SERPL-MCNC: 0.7 MG/DL (ref 0.2–1)
BILIRUB SERPL-MCNC: 0.9 MG/DL (ref 0.2–1)
BILIRUB UR QL CFM: NEGATIVE
BILIRUB UR QL CFM: NEGATIVE
BNP SERPL-MCNC: 1198 PG/ML (ref 0–125)
BUN SERPL-MCNC: 10 MG/DL (ref 6–20)
BUN SERPL-MCNC: 11 MG/DL (ref 6–20)
BUN SERPL-MCNC: 12 MG/DL (ref 6–20)
BUN SERPL-MCNC: 12 MG/DL (ref 6–20)
BUN SERPL-MCNC: 13 MG/DL (ref 6–20)
BUN SERPL-MCNC: 16 MG/DL (ref 6–20)
BUN SERPL-MCNC: 5 MG/DL (ref 6–20)
BUN SERPL-MCNC: 6 MG/DL (ref 6–20)
BUN SERPL-MCNC: 7 MG/DL (ref 6–20)
BUN SERPL-MCNC: 9 MG/DL (ref 6–20)
BUN SERPL-MCNC: 9 MG/DL (ref 6–20)
BUN/CREAT SERPL: 10 (ref 12–20)
BUN/CREAT SERPL: 10 (ref 12–20)
BUN/CREAT SERPL: 11 (ref 12–20)
BUN/CREAT SERPL: 11 (ref 12–20)
BUN/CREAT SERPL: 12 (ref 12–20)
BUN/CREAT SERPL: 12 (ref 12–20)
BUN/CREAT SERPL: 13 (ref 12–20)
BUN/CREAT SERPL: 16 (ref 12–20)
BUN/CREAT SERPL: 16 (ref 12–20)
BUN/CREAT SERPL: 6 (ref 12–20)
BUN/CREAT SERPL: 6 (ref 12–20)
BUN/CREAT SERPL: 7 (ref 12–20)
BUN/CREAT SERPL: 8 (ref 12–20)
BUN/CREAT SERPL: 9 (ref 12–20)
BUN/CREAT SERPL: 9 (ref 12–20)
CALCIUM SERPL-MCNC: 10 MG/DL (ref 8.5–10.1)
CALCIUM SERPL-MCNC: 10.2 MG/DL (ref 8.5–10.1)
CALCIUM SERPL-MCNC: 8.2 MG/DL (ref 8.5–10.1)
CALCIUM SERPL-MCNC: 8.7 MG/DL (ref 8.5–10.1)
CALCIUM SERPL-MCNC: 8.8 MG/DL (ref 8.5–10.1)
CALCIUM SERPL-MCNC: 8.9 MG/DL (ref 8.5–10.1)
CALCIUM SERPL-MCNC: 9 MG/DL (ref 8.5–10.1)
CALCIUM SERPL-MCNC: 9.1 MG/DL (ref 8.5–10.1)
CALCIUM SERPL-MCNC: 9.1 MG/DL (ref 8.5–10.1)
CALCIUM SERPL-MCNC: 9.2 MG/DL (ref 8.5–10.1)
CALCIUM SERPL-MCNC: 9.4 MG/DL (ref 8.5–10.1)
CALCIUM SERPL-MCNC: 9.4 MG/DL (ref 8.5–10.1)
CALCIUM SERPL-MCNC: 9.8 MG/DL (ref 8.5–10.1)
CALCIUM SERPL-MCNC: 9.9 MG/DL (ref 8.5–10.1)
CALCULATED P AXIS, ECG09: 63 DEGREES
CALCULATED R AXIS, ECG10: -60 DEGREES
CALCULATED R AXIS, ECG10: -62 DEGREES
CALCULATED R AXIS, ECG10: -62 DEGREES
CALCULATED R AXIS, ECG10: -65 DEGREES
CALCULATED R AXIS, ECG10: -76 DEGREES
CALCULATED T AXIS, ECG11: 104 DEGREES
CALCULATED T AXIS, ECG11: 106 DEGREES
CALCULATED T AXIS, ECG11: 109 DEGREES
CALCULATED T AXIS, ECG11: 80 DEGREES
CALCULATED T AXIS, ECG11: 90 DEGREES
CANNABINOIDS UR QL SCN: NEGATIVE
CC UR VC: NORMAL
CC UR VC: NORMAL
CHLORIDE SERPL-SCNC: 100 MMOL/L (ref 97–108)
CHLORIDE SERPL-SCNC: 100 MMOL/L (ref 97–108)
CHLORIDE SERPL-SCNC: 101 MMOL/L (ref 97–108)
CHLORIDE SERPL-SCNC: 101 MMOL/L (ref 97–108)
CHLORIDE SERPL-SCNC: 102 MMOL/L (ref 97–108)
CHLORIDE SERPL-SCNC: 103 MMOL/L (ref 97–108)
CHLORIDE SERPL-SCNC: 104 MMOL/L (ref 97–108)
CHLORIDE SERPL-SCNC: 105 MMOL/L (ref 97–108)
CHLORIDE SERPL-SCNC: 106 MMOL/L (ref 97–108)
CHLORIDE SERPL-SCNC: 108 MMOL/L (ref 97–108)
CHLORIDE SERPL-SCNC: 113 MMOL/L (ref 97–108)
CHLORIDE SERPL-SCNC: 115 MMOL/L (ref 97–108)
CHLORIDE SERPL-SCNC: 94 MMOL/L (ref 97–108)
CHLORIDE SERPL-SCNC: 95 MMOL/L (ref 97–108)
CHLORIDE SERPL-SCNC: 95 MMOL/L (ref 97–108)
CHLORIDE SERPL-SCNC: 97 MMOL/L (ref 97–108)
CK MB CFR SERPL CALC: 0.9 % (ref 0–2.5)
CK MB SERPL-MCNC: 1.5 NG/ML (ref 5–25)
CK SERPL-CCNC: 172 U/L (ref 39–308)
CO2 SERPL-SCNC: 19 MMOL/L (ref 21–32)
CO2 SERPL-SCNC: 20 MMOL/L (ref 21–32)
CO2 SERPL-SCNC: 20 MMOL/L (ref 21–32)
CO2 SERPL-SCNC: 22 MMOL/L (ref 21–32)
CO2 SERPL-SCNC: 22 MMOL/L (ref 21–32)
CO2 SERPL-SCNC: 24 MMOL/L (ref 21–32)
CO2 SERPL-SCNC: 25 MMOL/L (ref 21–32)
CO2 SERPL-SCNC: 25 MMOL/L (ref 21–32)
CO2 SERPL-SCNC: 26 MMOL/L (ref 21–32)
CO2 SERPL-SCNC: 26 MMOL/L (ref 21–32)
CO2 SERPL-SCNC: 27 MMOL/L (ref 21–32)
CO2 SERPL-SCNC: 28 MMOL/L (ref 21–32)
CO2 SERPL-SCNC: 30 MMOL/L (ref 21–32)
CO2 SERPL-SCNC: 30 MMOL/L (ref 21–32)
CO2 SERPL-SCNC: 31 MMOL/L (ref 21–32)
COCAINE UR QL SCN: NEGATIVE
COLOR UR: ABNORMAL
COLOR UR: ABNORMAL
COMMENT, HOLDF: NORMAL
COMMENT, HOLDF: NORMAL
CREAT SERPL-MCNC: 0.7 MG/DL (ref 0.7–1.3)
CREAT SERPL-MCNC: 0.74 MG/DL (ref 0.7–1.3)
CREAT SERPL-MCNC: 0.75 MG/DL (ref 0.7–1.3)
CREAT SERPL-MCNC: 0.77 MG/DL (ref 0.7–1.3)
CREAT SERPL-MCNC: 0.79 MG/DL (ref 0.7–1.3)
CREAT SERPL-MCNC: 0.79 MG/DL (ref 0.7–1.3)
CREAT SERPL-MCNC: 0.81 MG/DL (ref 0.7–1.3)
CREAT SERPL-MCNC: 0.83 MG/DL (ref 0.7–1.3)
CREAT SERPL-MCNC: 0.84 MG/DL (ref 0.7–1.3)
CREAT SERPL-MCNC: 0.85 MG/DL (ref 0.7–1.3)
CREAT SERPL-MCNC: 0.86 MG/DL (ref 0.7–1.3)
CREAT SERPL-MCNC: 0.86 MG/DL (ref 0.7–1.3)
CREAT SERPL-MCNC: 0.89 MG/DL (ref 0.7–1.3)
CREAT SERPL-MCNC: 0.89 MG/DL (ref 0.7–1.3)
CREAT SERPL-MCNC: 0.94 MG/DL (ref 0.7–1.3)
CREAT SERPL-MCNC: 0.94 MG/DL (ref 0.7–1.3)
CREAT SERPL-MCNC: 0.95 MG/DL (ref 0.7–1.3)
CREAT SERPL-MCNC: 0.96 MG/DL (ref 0.7–1.3)
CREAT SERPL-MCNC: 1.02 MG/DL (ref 0.7–1.3)
CREAT SERPL-MCNC: 1.05 MG/DL (ref 0.7–1.3)
CREAT SERPL-MCNC: 1.18 MG/DL (ref 0.7–1.3)
CREAT SERPL-MCNC: 1.75 MG/DL (ref 0.7–1.3)
DIAGNOSIS, 93000: NORMAL
DIFFERENTIAL METHOD BLD: ABNORMAL
DRUG SCRN COMMENT,DRGCM: NORMAL
EOSINOPHIL # BLD: 0 K/UL (ref 0–0.4)
EOSINOPHIL # BLD: 0.1 K/UL (ref 0–0.4)
EOSINOPHIL # BLD: 0.2 K/UL (ref 0–0.4)
EOSINOPHIL # BLD: 0.2 K/UL (ref 0–0.4)
EOSINOPHIL # BLD: 0.3 K/UL (ref 0–0.4)
EOSINOPHIL # BLD: 0.4 K/UL (ref 0–0.4)
EOSINOPHIL # BLD: 0.4 K/UL (ref 0–0.4)
EOSINOPHIL # BLD: 0.5 K/UL (ref 0–0.4)
EOSINOPHIL # BLD: 0.7 K/UL (ref 0–0.4)
EOSINOPHIL # BLD: 0.8 K/UL (ref 0–0.4)
EOSINOPHIL # BLD: 0.9 K/UL (ref 0–0.4)
EOSINOPHIL # BLD: 0.9 K/UL (ref 0–0.4)
EOSINOPHIL # BLD: 1.5 K/UL (ref 0–0.4)
EOSINOPHIL # BLD: 2.1 K/UL (ref 0–0.4)
EOSINOPHIL NFR BLD: 0 % (ref 0–7)
EOSINOPHIL NFR BLD: 10 % (ref 0–7)
EOSINOPHIL NFR BLD: 10 % (ref 0–7)
EOSINOPHIL NFR BLD: 12 % (ref 0–7)
EOSINOPHIL NFR BLD: 15 % (ref 0–7)
EOSINOPHIL NFR BLD: 2 % (ref 0–7)
EOSINOPHIL NFR BLD: 23 % (ref 0–7)
EOSINOPHIL NFR BLD: 3 % (ref 0–7)
EOSINOPHIL NFR BLD: 4 % (ref 0–7)
EOSINOPHIL NFR BLD: 4 % (ref 0–7)
EOSINOPHIL NFR BLD: 5 % (ref 0–7)
EOSINOPHIL NFR BLD: 6 % (ref 0–7)
EOSINOPHIL NFR BLD: 6 % (ref 0–7)
EOSINOPHIL NFR BLD: 7 % (ref 0–7)
EOSINOPHIL NFR BLD: 9 % (ref 0–7)
EPAP/CPAP/PEEP, PAPEEP: 8
EPITH CASTS URNS QL MICRO: ABNORMAL /LPF
EPITH CASTS URNS QL MICRO: ABNORMAL /LPF
ERYTHROCYTE [DISTWIDTH] IN BLOOD BY AUTOMATED COUNT: 15 % (ref 11.5–14.5)
ERYTHROCYTE [DISTWIDTH] IN BLOOD BY AUTOMATED COUNT: 15.2 % (ref 11.5–14.5)
ERYTHROCYTE [DISTWIDTH] IN BLOOD BY AUTOMATED COUNT: 15.4 % (ref 11.5–14.5)
ERYTHROCYTE [DISTWIDTH] IN BLOOD BY AUTOMATED COUNT: 15.5 % (ref 11.5–14.5)
ERYTHROCYTE [DISTWIDTH] IN BLOOD BY AUTOMATED COUNT: 15.5 % (ref 11.5–14.5)
ERYTHROCYTE [DISTWIDTH] IN BLOOD BY AUTOMATED COUNT: 15.8 % (ref 11.5–14.5)
ERYTHROCYTE [DISTWIDTH] IN BLOOD BY AUTOMATED COUNT: 15.9 % (ref 11.5–14.5)
ERYTHROCYTE [DISTWIDTH] IN BLOOD BY AUTOMATED COUNT: 16 % (ref 11.5–14.5)
ERYTHROCYTE [DISTWIDTH] IN BLOOD BY AUTOMATED COUNT: 16.1 % (ref 11.5–14.5)
ERYTHROCYTE [DISTWIDTH] IN BLOOD BY AUTOMATED COUNT: 16.8 % (ref 11.5–14.5)
ERYTHROCYTE [DISTWIDTH] IN BLOOD BY AUTOMATED COUNT: 16.8 % (ref 11.5–14.5)
ERYTHROCYTE [DISTWIDTH] IN BLOOD BY AUTOMATED COUNT: 16.9 % (ref 11.5–14.5)
ERYTHROCYTE [DISTWIDTH] IN BLOOD BY AUTOMATED COUNT: 17.1 % (ref 11.5–14.5)
ERYTHROCYTE [DISTWIDTH] IN BLOOD BY AUTOMATED COUNT: 17.1 % (ref 11.5–14.5)
ERYTHROCYTE [DISTWIDTH] IN BLOOD BY AUTOMATED COUNT: 17.3 % (ref 11.5–14.5)
ERYTHROCYTE [DISTWIDTH] IN BLOOD BY AUTOMATED COUNT: 17.4 % (ref 11.5–14.5)
ERYTHROCYTE [DISTWIDTH] IN BLOOD BY AUTOMATED COUNT: 17.8 % (ref 11.5–14.5)
ERYTHROCYTE [DISTWIDTH] IN BLOOD BY AUTOMATED COUNT: 18.1 % (ref 11.5–14.5)
ERYTHROCYTE [DISTWIDTH] IN BLOOD BY AUTOMATED COUNT: 18.2 % (ref 11.5–14.5)
ERYTHROCYTE [DISTWIDTH] IN BLOOD BY AUTOMATED COUNT: 18.4 % (ref 11.5–14.5)
ERYTHROCYTE [DISTWIDTH] IN BLOOD BY AUTOMATED COUNT: 19.9 % (ref 11.5–14.5)
ETHANOL SERPL-MCNC: <10 MG/DL
FIBRINOGEN PPP-MCNC: 592 MG/DL (ref 200–475)
FIO2 ON VENT: 100 %
GAS FLOW.O2 SETTING OXYMISER: 14 L/MIN
GGT SERPL-CCNC: 57 U/L (ref 15–85)
GGT SERPL-CCNC: 62 U/L (ref 15–85)
GGT SERPL-CCNC: 71 U/L (ref 15–85)
GLOBULIN SER CALC-MCNC: 3.2 G/DL (ref 2–4)
GLOBULIN SER CALC-MCNC: 3.3 G/DL (ref 2–4)
GLOBULIN SER CALC-MCNC: 3.6 G/DL (ref 2–4)
GLOBULIN SER CALC-MCNC: 3.9 G/DL (ref 2–4)
GLOBULIN SER CALC-MCNC: 4 G/DL (ref 2–4)
GLOBULIN SER CALC-MCNC: 4 G/DL (ref 2–4)
GLOBULIN SER CALC-MCNC: 4.1 G/DL (ref 2–4)
GLOBULIN SER CALC-MCNC: 4.1 G/DL (ref 2–4)
GLOBULIN SER CALC-MCNC: 4.3 G/DL (ref 2–4)
GLOBULIN SER CALC-MCNC: 4.4 G/DL (ref 2–4)
GLOBULIN SER CALC-MCNC: 4.4 G/DL (ref 2–4)
GLOBULIN SER CALC-MCNC: 4.5 G/DL (ref 2–4)
GLOBULIN SER CALC-MCNC: 4.5 G/DL (ref 2–4)
GLOBULIN SER CALC-MCNC: 4.6 G/DL (ref 2–4)
GLOBULIN SER CALC-MCNC: 4.7 G/DL (ref 2–4)
GLUCOSE BLD STRIP.AUTO-MCNC: 120 MG/DL (ref 65–100)
GLUCOSE BLD STRIP.AUTO-MCNC: 158 MG/DL (ref 65–100)
GLUCOSE BLD STRIP.AUTO-MCNC: 164 MG/DL (ref 65–100)
GLUCOSE BLD STRIP.AUTO-MCNC: 238 MG/DL (ref 65–100)
GLUCOSE BLD STRIP.AUTO-MCNC: 57 MG/DL (ref 65–100)
GLUCOSE BLD STRIP.AUTO-MCNC: 60 MG/DL (ref 65–100)
GLUCOSE BLD STRIP.AUTO-MCNC: 65 MG/DL (ref 65–100)
GLUCOSE BLD STRIP.AUTO-MCNC: 67 MG/DL (ref 65–100)
GLUCOSE BLD STRIP.AUTO-MCNC: 79 MG/DL (ref 65–100)
GLUCOSE BLD STRIP.AUTO-MCNC: 88 MG/DL (ref 65–100)
GLUCOSE BLD STRIP.AUTO-MCNC: 90 MG/DL (ref 65–100)
GLUCOSE BLD STRIP.AUTO-MCNC: 95 MG/DL (ref 65–100)
GLUCOSE SERPL-MCNC: 101 MG/DL (ref 65–100)
GLUCOSE SERPL-MCNC: 102 MG/DL (ref 65–100)
GLUCOSE SERPL-MCNC: 103 MG/DL (ref 65–100)
GLUCOSE SERPL-MCNC: 106 MG/DL (ref 65–100)
GLUCOSE SERPL-MCNC: 111 MG/DL (ref 65–100)
GLUCOSE SERPL-MCNC: 113 MG/DL (ref 65–100)
GLUCOSE SERPL-MCNC: 114 MG/DL (ref 65–100)
GLUCOSE SERPL-MCNC: 222 MG/DL (ref 65–100)
GLUCOSE SERPL-MCNC: 62 MG/DL (ref 65–100)
GLUCOSE SERPL-MCNC: 72 MG/DL (ref 65–100)
GLUCOSE SERPL-MCNC: 76 MG/DL (ref 65–100)
GLUCOSE SERPL-MCNC: 76 MG/DL (ref 65–100)
GLUCOSE SERPL-MCNC: 87 MG/DL (ref 65–100)
GLUCOSE SERPL-MCNC: 87 MG/DL (ref 65–100)
GLUCOSE SERPL-MCNC: 88 MG/DL (ref 65–100)
GLUCOSE SERPL-MCNC: 88 MG/DL (ref 65–100)
GLUCOSE SERPL-MCNC: 89 MG/DL (ref 65–100)
GLUCOSE SERPL-MCNC: 90 MG/DL (ref 65–100)
GLUCOSE SERPL-MCNC: 91 MG/DL (ref 65–100)
GLUCOSE SERPL-MCNC: 91 MG/DL (ref 65–100)
GLUCOSE SERPL-MCNC: 96 MG/DL (ref 65–100)
GLUCOSE SERPL-MCNC: 97 MG/DL (ref 65–100)
GLUCOSE UR STRIP.AUTO-MCNC: NEGATIVE MG/DL
GLUCOSE UR STRIP.AUTO-MCNC: NEGATIVE MG/DL
GRAN CASTS URNS QL MICRO: ABNORMAL /LPF
HCO3 BLDA-SCNC: 17 MMOL/L (ref 22–26)
HCT VFR BLD AUTO: 27.3 % (ref 36.6–50.3)
HCT VFR BLD AUTO: 30 % (ref 36.6–50.3)
HCT VFR BLD AUTO: 30.2 % (ref 36.6–50.3)
HCT VFR BLD AUTO: 30.4 % (ref 36.6–50.3)
HCT VFR BLD AUTO: 30.4 % (ref 36.6–50.3)
HCT VFR BLD AUTO: 31.6 % (ref 36.6–50.3)
HCT VFR BLD AUTO: 31.7 % (ref 36.6–50.3)
HCT VFR BLD AUTO: 32.3 % (ref 36.6–50.3)
HCT VFR BLD AUTO: 32.7 % (ref 36.6–50.3)
HCT VFR BLD AUTO: 33.2 % (ref 36.6–50.3)
HCT VFR BLD AUTO: 33.5 % (ref 36.6–50.3)
HCT VFR BLD AUTO: 33.6 % (ref 36.6–50.3)
HCT VFR BLD AUTO: 34.1 % (ref 36.6–50.3)
HCT VFR BLD AUTO: 35.4 % (ref 36.6–50.3)
HCT VFR BLD AUTO: 35.8 % (ref 36.6–50.3)
HCT VFR BLD AUTO: 36.1 % (ref 36.6–50.3)
HCT VFR BLD AUTO: 36.3 % (ref 36.6–50.3)
HCT VFR BLD AUTO: 36.5 % (ref 36.6–50.3)
HCT VFR BLD AUTO: 36.6 % (ref 36.6–50.3)
HCT VFR BLD AUTO: 37.9 % (ref 36.6–50.3)
HCT VFR BLD AUTO: 38.3 % (ref 36.6–50.3)
HGB BLD-MCNC: 10.1 G/DL (ref 12.1–17)
HGB BLD-MCNC: 10.2 G/DL (ref 12.1–17)
HGB BLD-MCNC: 10.4 G/DL (ref 12.1–17)
HGB BLD-MCNC: 10.5 G/DL (ref 12.1–17)
HGB BLD-MCNC: 10.5 G/DL (ref 12.1–17)
HGB BLD-MCNC: 10.6 G/DL (ref 12.1–17)
HGB BLD-MCNC: 10.7 G/DL (ref 12.1–17)
HGB BLD-MCNC: 10.7 G/DL (ref 12.1–17)
HGB BLD-MCNC: 11.4 G/DL (ref 12.1–17)
HGB BLD-MCNC: 11.6 G/DL (ref 12.1–17)
HGB BLD-MCNC: 11.6 G/DL (ref 12.1–17)
HGB BLD-MCNC: 11.8 G/DL (ref 12.1–17)
HGB BLD-MCNC: 12 G/DL (ref 12.1–17)
HGB BLD-MCNC: 12.1 G/DL (ref 12.1–17)
HGB BLD-MCNC: 12.7 G/DL (ref 12.1–17)
HGB BLD-MCNC: 12.8 G/DL (ref 12.1–17)
HGB BLD-MCNC: 8.4 G/DL (ref 12.1–17)
HGB BLD-MCNC: 9.4 G/DL (ref 12.1–17)
HGB BLD-MCNC: 9.5 G/DL (ref 12.1–17)
HGB BLD-MCNC: 9.5 G/DL (ref 12.1–17)
HGB BLD-MCNC: 9.6 G/DL (ref 12.1–17)
HGB UR QL STRIP: ABNORMAL
HGB UR QL STRIP: ABNORMAL
IMM GRANULOCYTES # BLD: 0 K/UL
IMM GRANULOCYTES # BLD: 0 K/UL (ref 0–0.04)
IMM GRANULOCYTES # BLD: 0.1 K/UL (ref 0–0.04)
IMM GRANULOCYTES NFR BLD AUTO: 0 %
IMM GRANULOCYTES NFR BLD AUTO: 0 % (ref 0–0.5)
IMM GRANULOCYTES NFR BLD AUTO: 1 % (ref 0–0.5)
IMM GRANULOCYTES NFR BLD AUTO: 1 % (ref 0–0.5)
INR PPP: 1.1 (ref 0.9–1.1)
INR PPP: 1.2 (ref 0.9–1.1)
KETONES UR QL STRIP.AUTO: 40 MG/DL
KETONES UR QL STRIP.AUTO: 80 MG/DL
LACTATE BLD-SCNC: 0.93 MMOL/L (ref 0.4–2)
LACTATE SERPL-SCNC: 0.5 MMOL/L (ref 0.4–2)
LACTATE SERPL-SCNC: 1.4 MMOL/L (ref 0.4–2)
LACTATE SERPL-SCNC: 7.1 MMOL/L (ref 0.4–2)
LEUKOCYTE ESTERASE UR QL STRIP.AUTO: ABNORMAL
LEUKOCYTE ESTERASE UR QL STRIP.AUTO: ABNORMAL
LIPASE SERPL-CCNC: 78 U/L (ref 73–393)
LYMPHOCYTES # BLD: 1.5 K/UL (ref 0.8–3.5)
LYMPHOCYTES # BLD: 1.5 K/UL (ref 0.8–3.5)
LYMPHOCYTES # BLD: 1.7 K/UL (ref 0.8–3.5)
LYMPHOCYTES # BLD: 1.9 K/UL (ref 0.8–3.5)
LYMPHOCYTES # BLD: 2 K/UL (ref 0.8–3.5)
LYMPHOCYTES # BLD: 2 K/UL (ref 0.8–3.5)
LYMPHOCYTES # BLD: 2.1 K/UL (ref 0.8–3.5)
LYMPHOCYTES # BLD: 2.2 K/UL (ref 0.8–3.5)
LYMPHOCYTES # BLD: 2.2 K/UL (ref 0.8–3.5)
LYMPHOCYTES # BLD: 2.3 K/UL (ref 0.8–3.5)
LYMPHOCYTES # BLD: 2.4 K/UL (ref 0.8–3.5)
LYMPHOCYTES # BLD: 2.4 K/UL (ref 0.8–3.5)
LYMPHOCYTES # BLD: 2.5 K/UL (ref 0.8–3.5)
LYMPHOCYTES # BLD: 2.6 K/UL (ref 0.8–3.5)
LYMPHOCYTES # BLD: 3.5 K/UL (ref 0.8–3.5)
LYMPHOCYTES NFR BLD: 18 % (ref 12–49)
LYMPHOCYTES NFR BLD: 18 % (ref 12–49)
LYMPHOCYTES NFR BLD: 23 % (ref 12–49)
LYMPHOCYTES NFR BLD: 25 % (ref 12–49)
LYMPHOCYTES NFR BLD: 26 % (ref 12–49)
LYMPHOCYTES NFR BLD: 28 % (ref 12–49)
LYMPHOCYTES NFR BLD: 31 % (ref 12–49)
LYMPHOCYTES NFR BLD: 32 % (ref 12–49)
LYMPHOCYTES NFR BLD: 33 % (ref 12–49)
LYMPHOCYTES NFR BLD: 36 % (ref 12–49)
LYMPHOCYTES NFR BLD: 40 % (ref 12–49)
LYMPHOCYTES NFR BLD: 41 % (ref 12–49)
LYMPHOCYTES NFR BLD: 41 % (ref 12–49)
LYMPHOCYTES NFR BLD: 42 % (ref 12–49)
LYMPHOCYTES NFR BLD: 43 % (ref 12–49)
MAGNESIUM SERPL-MCNC: 1.4 MG/DL (ref 1.6–2.4)
MAGNESIUM SERPL-MCNC: 1.5 MG/DL (ref 1.6–2.4)
MAGNESIUM SERPL-MCNC: 1.6 MG/DL (ref 1.6–2.4)
MAGNESIUM SERPL-MCNC: 1.7 MG/DL (ref 1.6–2.4)
MAGNESIUM SERPL-MCNC: 1.7 MG/DL (ref 1.6–2.4)
MAGNESIUM SERPL-MCNC: 1.8 MG/DL (ref 1.6–2.4)
MAGNESIUM SERPL-MCNC: 1.8 MG/DL (ref 1.6–2.4)
MAGNESIUM SERPL-MCNC: 1.9 MG/DL (ref 1.6–2.4)
MCH RBC QN AUTO: 28.2 PG (ref 26–34)
MCH RBC QN AUTO: 28.4 PG (ref 26–34)
MCH RBC QN AUTO: 28.5 PG (ref 26–34)
MCH RBC QN AUTO: 28.7 PG (ref 26–34)
MCH RBC QN AUTO: 28.8 PG (ref 26–34)
MCH RBC QN AUTO: 28.9 PG (ref 26–34)
MCH RBC QN AUTO: 29.1 PG (ref 26–34)
MCH RBC QN AUTO: 29.2 PG (ref 26–34)
MCH RBC QN AUTO: 29.3 PG (ref 26–34)
MCH RBC QN AUTO: 29.4 PG (ref 26–34)
MCH RBC QN AUTO: 29.4 PG (ref 26–34)
MCH RBC QN AUTO: 29.5 PG (ref 26–34)
MCH RBC QN AUTO: 29.5 PG (ref 26–34)
MCH RBC QN AUTO: 29.6 PG (ref 26–34)
MCH RBC QN AUTO: 29.7 PG (ref 26–34)
MCH RBC QN AUTO: 30 PG (ref 26–34)
MCH RBC QN AUTO: 30.2 PG (ref 26–34)
MCH RBC QN AUTO: 30.4 PG (ref 26–34)
MCH RBC QN AUTO: 30.5 PG (ref 26–34)
MCH RBC QN AUTO: 30.6 PG (ref 26–34)
MCHC RBC AUTO-ENTMCNC: 30.8 G/DL (ref 30–36.5)
MCHC RBC AUTO-ENTMCNC: 30.9 G/DL (ref 30–36.5)
MCHC RBC AUTO-ENTMCNC: 31 G/DL (ref 30–36.5)
MCHC RBC AUTO-ENTMCNC: 31.3 G/DL (ref 30–36.5)
MCHC RBC AUTO-ENTMCNC: 31.4 G/DL (ref 30–36.5)
MCHC RBC AUTO-ENTMCNC: 31.5 G/DL (ref 30–36.5)
MCHC RBC AUTO-ENTMCNC: 31.6 G/DL (ref 30–36.5)
MCHC RBC AUTO-ENTMCNC: 31.7 G/DL (ref 30–36.5)
MCHC RBC AUTO-ENTMCNC: 31.8 G/DL (ref 30–36.5)
MCHC RBC AUTO-ENTMCNC: 31.9 G/DL (ref 30–36.5)
MCHC RBC AUTO-ENTMCNC: 31.9 G/DL (ref 30–36.5)
MCHC RBC AUTO-ENTMCNC: 32.1 G/DL (ref 30–36.5)
MCHC RBC AUTO-ENTMCNC: 32.1 G/DL (ref 30–36.5)
MCHC RBC AUTO-ENTMCNC: 32.2 G/DL (ref 30–36.5)
MCHC RBC AUTO-ENTMCNC: 32.4 G/DL (ref 30–36.5)
MCHC RBC AUTO-ENTMCNC: 32.5 G/DL (ref 30–36.5)
MCHC RBC AUTO-ENTMCNC: 32.9 G/DL (ref 30–36.5)
MCHC RBC AUTO-ENTMCNC: 32.9 G/DL (ref 30–36.5)
MCHC RBC AUTO-ENTMCNC: 33.1 G/DL (ref 30–36.5)
MCHC RBC AUTO-ENTMCNC: 33.4 G/DL (ref 30–36.5)
MCHC RBC AUTO-ENTMCNC: 33.5 G/DL (ref 30–36.5)
MCV RBC AUTO: 88 FL (ref 80–99)
MCV RBC AUTO: 88.2 FL (ref 80–99)
MCV RBC AUTO: 88.3 FL (ref 80–99)
MCV RBC AUTO: 89.5 FL (ref 80–99)
MCV RBC AUTO: 89.5 FL (ref 80–99)
MCV RBC AUTO: 90.3 FL (ref 80–99)
MCV RBC AUTO: 90.5 FL (ref 80–99)
MCV RBC AUTO: 90.5 FL (ref 80–99)
MCV RBC AUTO: 91 FL (ref 80–99)
MCV RBC AUTO: 91.2 FL (ref 80–99)
MCV RBC AUTO: 91.5 FL (ref 80–99)
MCV RBC AUTO: 91.9 FL (ref 80–99)
MCV RBC AUTO: 92.1 FL (ref 80–99)
MCV RBC AUTO: 92.5 FL (ref 80–99)
MCV RBC AUTO: 92.6 FL (ref 80–99)
MCV RBC AUTO: 92.8 FL (ref 80–99)
MCV RBC AUTO: 93 FL (ref 80–99)
MCV RBC AUTO: 93.1 FL (ref 80–99)
MCV RBC AUTO: 94.5 FL (ref 80–99)
MCV RBC AUTO: 95 FL (ref 80–99)
MCV RBC AUTO: 96.1 FL (ref 80–99)
MCV RBC AUTO: 96.8 FL (ref 80–99)
MCV RBC AUTO: 98.7 FL (ref 80–99)
METAMYELOCYTES NFR BLD MANUAL: 1 %
METHADONE UR QL: NEGATIVE
MONOCYTES # BLD: 0.9 K/UL (ref 0–1)
MONOCYTES # BLD: 1 K/UL (ref 0–1)
MONOCYTES # BLD: 1.1 K/UL (ref 0–1)
MONOCYTES # BLD: 1.2 K/UL (ref 0–1)
MONOCYTES # BLD: 1.3 K/UL (ref 0–1)
MONOCYTES # BLD: 1.6 K/UL (ref 0–1)
MONOCYTES # BLD: 1.7 K/UL (ref 0–1)
MONOCYTES # BLD: 1.8 K/UL (ref 0–1)
MONOCYTES # BLD: 2 K/UL (ref 0–1)
MONOCYTES NFR BLD: 12 % (ref 5–13)
MONOCYTES NFR BLD: 12 % (ref 5–13)
MONOCYTES NFR BLD: 13 % (ref 5–13)
MONOCYTES NFR BLD: 14 % (ref 5–13)
MONOCYTES NFR BLD: 14 % (ref 5–13)
MONOCYTES NFR BLD: 15 % (ref 5–13)
MONOCYTES NFR BLD: 16 % (ref 5–13)
MONOCYTES NFR BLD: 17 % (ref 5–13)
MONOCYTES NFR BLD: 18 % (ref 5–13)
MONOCYTES NFR BLD: 18 % (ref 5–13)
MONOCYTES NFR BLD: 22 % (ref 5–13)
MONOCYTES NFR BLD: 24 % (ref 5–13)
MONOCYTES NFR BLD: 27 % (ref 5–13)
MONOCYTES NFR BLD: 30 % (ref 5–13)
MYELOCYTES NFR BLD MANUAL: 1 %
NEUTS BAND NFR BLD MANUAL: 1 % (ref 0–6)
NEUTS SEG # BLD: 1.3 K/UL (ref 1.8–8)
NEUTS SEG # BLD: 1.9 K/UL (ref 1.8–8)
NEUTS SEG # BLD: 2.1 K/UL (ref 1.8–8)
NEUTS SEG # BLD: 2.2 K/UL (ref 1.8–8)
NEUTS SEG # BLD: 2.3 K/UL (ref 1.8–8)
NEUTS SEG # BLD: 2.5 K/UL (ref 1.8–8)
NEUTS SEG # BLD: 2.6 K/UL (ref 1.8–8)
NEUTS SEG # BLD: 2.8 K/UL (ref 1.8–8)
NEUTS SEG # BLD: 2.9 K/UL (ref 1.8–8)
NEUTS SEG # BLD: 3.2 K/UL (ref 1.8–8)
NEUTS SEG # BLD: 3.4 K/UL (ref 1.8–8)
NEUTS SEG # BLD: 3.5 K/UL (ref 1.8–8)
NEUTS SEG # BLD: 3.6 K/UL (ref 1.8–8)
NEUTS SEG # BLD: 3.7 K/UL (ref 1.8–8)
NEUTS SEG # BLD: 4 K/UL (ref 1.8–8)
NEUTS SEG # BLD: 4.5 K/UL (ref 1.8–8)
NEUTS SEG # BLD: 5.7 K/UL (ref 1.8–8)
NEUTS SEG NFR BLD: 25 % (ref 32–75)
NEUTS SEG NFR BLD: 34 % (ref 32–75)
NEUTS SEG NFR BLD: 35 % (ref 32–75)
NEUTS SEG NFR BLD: 38 % (ref 32–75)
NEUTS SEG NFR BLD: 39 % (ref 32–75)
NEUTS SEG NFR BLD: 40 % (ref 32–75)
NEUTS SEG NFR BLD: 41 % (ref 32–75)
NEUTS SEG NFR BLD: 43 % (ref 32–75)
NEUTS SEG NFR BLD: 44 % (ref 32–75)
NEUTS SEG NFR BLD: 45 % (ref 32–75)
NEUTS SEG NFR BLD: 47 % (ref 32–75)
NEUTS SEG NFR BLD: 50 % (ref 32–75)
NEUTS SEG NFR BLD: 60 % (ref 32–75)
NITRITE UR QL STRIP.AUTO: NEGATIVE
NITRITE UR QL STRIP.AUTO: NEGATIVE
NRBC # BLD: 0 K/UL (ref 0–0.01)
NRBC BLD-RTO: 0 PER 100 WBC
OPIATES UR QL: NEGATIVE
P-R INTERVAL, ECG05: 192 MS
PCO2 BLDA: 41 MMHG (ref 35–45)
PCP UR QL: NEGATIVE
PH BLDA: 7.24 [PH] (ref 7.35–7.45)
PH UR STRIP: 5.5 [PH] (ref 5–8)
PH UR STRIP: 5.5 [PH] (ref 5–8)
PHOSPHATE SERPL-MCNC: 2.8 MG/DL (ref 2.6–4.7)
PHOSPHATE SERPL-MCNC: 3.2 MG/DL (ref 2.6–4.7)
PHOSPHATE SERPL-MCNC: 3.6 MG/DL (ref 2.6–4.7)
PHOSPHATE SERPL-MCNC: 3.7 MG/DL (ref 2.6–4.7)
PHOSPHATE SERPL-MCNC: 3.8 MG/DL (ref 2.6–4.7)
PHOSPHATE SERPL-MCNC: 4.1 MG/DL (ref 2.6–4.7)
PLATELET # BLD AUTO: 176 K/UL (ref 150–400)
PLATELET # BLD AUTO: 237 K/UL (ref 150–400)
PLATELET # BLD AUTO: 238 K/UL (ref 150–400)
PLATELET # BLD AUTO: 259 K/UL (ref 150–400)
PLATELET # BLD AUTO: 276 K/UL (ref 150–400)
PLATELET # BLD AUTO: 278 K/UL (ref 150–400)
PLATELET # BLD AUTO: 281 K/UL (ref 150–400)
PLATELET # BLD AUTO: 289 K/UL (ref 150–400)
PLATELET # BLD AUTO: 298 K/UL (ref 150–400)
PLATELET # BLD AUTO: 301 K/UL (ref 150–400)
PLATELET # BLD AUTO: 317 K/UL (ref 150–400)
PLATELET # BLD AUTO: 320 K/UL (ref 150–400)
PLATELET # BLD AUTO: 325 K/UL (ref 150–400)
PLATELET # BLD AUTO: 352 K/UL (ref 150–400)
PLATELET # BLD AUTO: 369 K/UL (ref 150–400)
PLATELET # BLD AUTO: 374 K/UL (ref 150–400)
PLATELET # BLD AUTO: 401 K/UL (ref 150–400)
PLATELET # BLD AUTO: 412 K/UL (ref 150–400)
PLATELET # BLD AUTO: 424 K/UL (ref 150–400)
PLATELET # BLD AUTO: 424 K/UL (ref 150–400)
PLATELET # BLD AUTO: 457 K/UL (ref 150–400)
PLATELET # BLD AUTO: 463 K/UL (ref 150–400)
PLATELET # BLD AUTO: 482 K/UL (ref 150–400)
PLATELET COMMENTS,PCOM: ABNORMAL
PMV BLD AUTO: 10.3 FL (ref 8.9–12.9)
PMV BLD AUTO: 10.4 FL (ref 8.9–12.9)
PMV BLD AUTO: 10.7 FL (ref 8.9–12.9)
PMV BLD AUTO: 8.8 FL (ref 8.9–12.9)
PMV BLD AUTO: 8.9 FL (ref 8.9–12.9)
PMV BLD AUTO: 9 FL (ref 8.9–12.9)
PMV BLD AUTO: 9.1 FL (ref 8.9–12.9)
PMV BLD AUTO: 9.1 FL (ref 8.9–12.9)
PMV BLD AUTO: 9.2 FL (ref 8.9–12.9)
PMV BLD AUTO: 9.2 FL (ref 8.9–12.9)
PMV BLD AUTO: 9.3 FL (ref 8.9–12.9)
PMV BLD AUTO: 9.3 FL (ref 8.9–12.9)
PMV BLD AUTO: 9.5 FL (ref 8.9–12.9)
PMV BLD AUTO: 9.5 FL (ref 8.9–12.9)
PMV BLD AUTO: 9.6 FL (ref 8.9–12.9)
PMV BLD AUTO: 9.6 FL (ref 8.9–12.9)
PMV BLD AUTO: 9.7 FL (ref 8.9–12.9)
PMV BLD AUTO: 9.7 FL (ref 8.9–12.9)
PMV BLD AUTO: 9.8 FL (ref 8.9–12.9)
PO2 BLDA: 77 MMHG (ref 80–100)
POTASSIUM SERPL-SCNC: 2.5 MMOL/L (ref 3.5–5.1)
POTASSIUM SERPL-SCNC: 3.5 MMOL/L (ref 3.5–5.1)
POTASSIUM SERPL-SCNC: 3.7 MMOL/L (ref 3.5–5.1)
POTASSIUM SERPL-SCNC: 3.8 MMOL/L (ref 3.5–5.1)
POTASSIUM SERPL-SCNC: 3.8 MMOL/L (ref 3.5–5.1)
POTASSIUM SERPL-SCNC: 3.9 MMOL/L (ref 3.5–5.1)
POTASSIUM SERPL-SCNC: 4 MMOL/L (ref 3.5–5.1)
POTASSIUM SERPL-SCNC: 4 MMOL/L (ref 3.5–5.1)
POTASSIUM SERPL-SCNC: 4.1 MMOL/L (ref 3.5–5.1)
POTASSIUM SERPL-SCNC: 4.1 MMOL/L (ref 3.5–5.1)
POTASSIUM SERPL-SCNC: 4.2 MMOL/L (ref 3.5–5.1)
POTASSIUM SERPL-SCNC: 4.2 MMOL/L (ref 3.5–5.1)
POTASSIUM SERPL-SCNC: 4.3 MMOL/L (ref 3.5–5.1)
POTASSIUM SERPL-SCNC: 4.4 MMOL/L (ref 3.5–5.1)
POTASSIUM SERPL-SCNC: 4.4 MMOL/L (ref 3.5–5.1)
POTASSIUM SERPL-SCNC: 4.7 MMOL/L (ref 3.5–5.1)
PROT SERPL-MCNC: 4.9 G/DL (ref 6.4–8.2)
PROT SERPL-MCNC: 6 G/DL (ref 6.4–8.2)
PROT SERPL-MCNC: 6.8 G/DL (ref 6.4–8.2)
PROT SERPL-MCNC: 7.1 G/DL (ref 6.4–8.2)
PROT SERPL-MCNC: 7.1 G/DL (ref 6.4–8.2)
PROT SERPL-MCNC: 7.2 G/DL (ref 6.4–8.2)
PROT SERPL-MCNC: 7.3 G/DL (ref 6.4–8.2)
PROT SERPL-MCNC: 7.3 G/DL (ref 6.4–8.2)
PROT SERPL-MCNC: 7.4 G/DL (ref 6.4–8.2)
PROT SERPL-MCNC: 7.5 G/DL (ref 6.4–8.2)
PROT SERPL-MCNC: 7.6 G/DL (ref 6.4–8.2)
PROT SERPL-MCNC: 7.6 G/DL (ref 6.4–8.2)
PROT UR STRIP-MCNC: 100 MG/DL
PROT UR STRIP-MCNC: 30 MG/DL
PROTHROMBIN TIME: 11.5 SEC (ref 9–11.1)
PROTHROMBIN TIME: 12.4 SEC (ref 9–11.1)
Q-T INTERVAL, ECG07: 290 MS
Q-T INTERVAL, ECG07: 360 MS
Q-T INTERVAL, ECG07: 390 MS
Q-T INTERVAL, ECG07: 416 MS
Q-T INTERVAL, ECG07: 448 MS
QRS DURATION, ECG06: 138 MS
QRS DURATION, ECG06: 138 MS
QRS DURATION, ECG06: 142 MS
QRS DURATION, ECG06: 152 MS
QRS DURATION, ECG06: 154 MS
QTC CALCULATION (BEZET), ECG08: 465 MS
QTC CALCULATION (BEZET), ECG08: 497 MS
QTC CALCULATION (BEZET), ECG08: 517 MS
QTC CALCULATION (BEZET), ECG08: 528 MS
QTC CALCULATION (BEZET), ECG08: 530 MS
RBC # BLD AUTO: 2.89 M/UL (ref 4.1–5.7)
RBC # BLD AUTO: 3.08 M/UL (ref 4.1–5.7)
RBC # BLD AUTO: 3.1 M/UL (ref 4.1–5.7)
RBC # BLD AUTO: 3.18 M/UL (ref 4.1–5.7)
RBC # BLD AUTO: 3.3 M/UL (ref 4.1–5.7)
RBC # BLD AUTO: 3.36 M/UL (ref 4.1–5.7)
RBC # BLD AUTO: 3.41 M/UL (ref 4.1–5.7)
RBC # BLD AUTO: 3.53 M/UL (ref 4.1–5.7)
RBC # BLD AUTO: 3.53 M/UL (ref 4.1–5.7)
RBC # BLD AUTO: 3.61 M/UL (ref 4.1–5.7)
RBC # BLD AUTO: 3.62 M/UL (ref 4.1–5.7)
RBC # BLD AUTO: 3.65 M/UL (ref 4.1–5.7)
RBC # BLD AUTO: 3.66 M/UL (ref 4.1–5.7)
RBC # BLD AUTO: 3.72 M/UL (ref 4.1–5.7)
RBC # BLD AUTO: 3.77 M/UL (ref 4.1–5.7)
RBC # BLD AUTO: 3.85 M/UL (ref 4.1–5.7)
RBC # BLD AUTO: 3.87 M/UL (ref 4.1–5.7)
RBC # BLD AUTO: 3.96 M/UL (ref 4.1–5.7)
RBC # BLD AUTO: 4.01 M/UL (ref 4.1–5.7)
RBC # BLD AUTO: 4.09 M/UL (ref 4.1–5.7)
RBC # BLD AUTO: 4.15 M/UL (ref 4.1–5.7)
RBC # BLD AUTO: 4.29 M/UL (ref 4.1–5.7)
RBC # BLD AUTO: 4.34 M/UL (ref 4.1–5.7)
RBC #/AREA URNS HPF: ABNORMAL /HPF (ref 0–5)
RBC #/AREA URNS HPF: ABNORMAL /HPF (ref 0–5)
RBC MORPH BLD: ABNORMAL
SAMPLES BEING HELD,HOLD: NORMAL
SAMPLES BEING HELD,HOLD: NORMAL
SAO2 % BLD: 93 % (ref 92–97)
SAO2% DEVICE SAO2% SENSOR NAME: ABNORMAL
SERVICE CMNT-IMP: ABNORMAL
SERVICE CMNT-IMP: NORMAL
SODIUM SERPL-SCNC: 128 MMOL/L (ref 136–145)
SODIUM SERPL-SCNC: 129 MMOL/L (ref 136–145)
SODIUM SERPL-SCNC: 130 MMOL/L (ref 136–145)
SODIUM SERPL-SCNC: 130 MMOL/L (ref 136–145)
SODIUM SERPL-SCNC: 133 MMOL/L (ref 136–145)
SODIUM SERPL-SCNC: 133 MMOL/L (ref 136–145)
SODIUM SERPL-SCNC: 135 MMOL/L (ref 136–145)
SODIUM SERPL-SCNC: 135 MMOL/L (ref 136–145)
SODIUM SERPL-SCNC: 136 MMOL/L (ref 136–145)
SODIUM SERPL-SCNC: 136 MMOL/L (ref 136–145)
SODIUM SERPL-SCNC: 137 MMOL/L (ref 136–145)
SODIUM SERPL-SCNC: 138 MMOL/L (ref 136–145)
SODIUM SERPL-SCNC: 140 MMOL/L (ref 136–145)
SODIUM SERPL-SCNC: 140 MMOL/L (ref 136–145)
SODIUM SERPL-SCNC: 141 MMOL/L (ref 136–145)
SODIUM SERPL-SCNC: 143 MMOL/L (ref 136–145)
SODIUM SERPL-SCNC: 148 MMOL/L (ref 136–145)
SODIUM SERPL-SCNC: 149 MMOL/L (ref 136–145)
SP GR UR REFRACTOMETRY: 1.02 (ref 1–1.03)
SP GR UR REFRACTOMETRY: 1.03 (ref 1–1.03)
SPECIMEN SITE: ABNORMAL
T4 FREE SERPL-MCNC: 0.6 NG/DL (ref 0.8–1.5)
T4 FREE SERPL-MCNC: 1 NG/DL (ref 0.8–1.5)
TESTOST FREE SERPL-MCNC: 3.8 PG/ML (ref 6.6–18.1)
TESTOST SERPL-MCNC: 201 NG/DL (ref 264–916)
TESTOST SERPL-MCNC: 48 NG/DL (ref 264–916)
TESTOST SERPL-MCNC: 63 NG/DL (ref 264–916)
THERAPEUTIC RANGE,PTTT: ABNORMAL SECS (ref 58–77)
THERAPEUTIC RANGE,PTTT: ABNORMAL SECS (ref 58–77)
TROPONIN I SERPL-MCNC: 0.05 NG/ML
TROPONIN I SERPL-MCNC: 0.06 NG/ML
TROPONIN I SERPL-MCNC: 0.24 NG/ML
TROPONIN I SERPL-MCNC: <0.05 NG/ML
TSH SERPL DL<=0.05 MIU/L-ACNC: 0.19 UIU/ML (ref 0.36–3.74)
TSH SERPL DL<=0.05 MIU/L-ACNC: 12.92 UIU/ML (ref 0.36–3.74)
TSH SERPL DL<=0.05 MIU/L-ACNC: 16.32 UIU/ML (ref 0.36–3.74)
TSH SERPL DL<=0.05 MIU/L-ACNC: 3.21 UIU/ML (ref 0.36–3.74)
TSH SERPL DL<=0.05 MIU/L-ACNC: 3.78 UIU/ML (ref 0.36–3.74)
TSH SERPL DL<=0.05 MIU/L-ACNC: 4.66 UIU/ML (ref 0.36–3.74)
UA: UC IF INDICATED,UAUC: ABNORMAL
UA: UC IF INDICATED,UAUC: ABNORMAL
UROBILINOGEN UR QL STRIP.AUTO: 1 EU/DL (ref 0.2–1)
UROBILINOGEN UR QL STRIP.AUTO: 1 EU/DL (ref 0.2–1)
VENTRICULAR RATE, ECG03: 111 BPM
VENTRICULAR RATE, ECG03: 124 BPM
VENTRICULAR RATE, ECG03: 155 BPM
VENTRICULAR RATE, ECG03: 74 BPM
VENTRICULAR RATE, ECG03: 97 BPM
VT SETTING VENT: 450 ML
WBC # BLD AUTO: 10 K/UL (ref 4.1–11.1)
WBC # BLD AUTO: 5.2 K/UL (ref 4.1–11.1)
WBC # BLD AUTO: 5.5 K/UL (ref 4.1–11.1)
WBC # BLD AUTO: 5.5 K/UL (ref 4.1–11.1)
WBC # BLD AUTO: 5.6 K/UL (ref 4.1–11.1)
WBC # BLD AUTO: 5.7 K/UL (ref 4.1–11.1)
WBC # BLD AUTO: 6.1 K/UL (ref 4.1–11.1)
WBC # BLD AUTO: 6.2 K/UL (ref 4.1–11.1)
WBC # BLD AUTO: 6.3 K/UL (ref 4.1–11.1)
WBC # BLD AUTO: 6.4 K/UL (ref 4.1–11.1)
WBC # BLD AUTO: 6.6 K/UL (ref 4.1–11.1)
WBC # BLD AUTO: 6.7 K/UL (ref 4.1–11.1)
WBC # BLD AUTO: 6.7 K/UL (ref 4.1–11.1)
WBC # BLD AUTO: 6.9 K/UL (ref 4.1–11.1)
WBC # BLD AUTO: 7.2 K/UL (ref 4.1–11.1)
WBC # BLD AUTO: 7.5 K/UL (ref 4.1–11.1)
WBC # BLD AUTO: 7.6 K/UL (ref 4.1–11.1)
WBC # BLD AUTO: 7.6 K/UL (ref 4.1–11.1)
WBC # BLD AUTO: 7.7 K/UL (ref 4.1–11.1)
WBC # BLD AUTO: 8.2 K/UL (ref 4.1–11.1)
WBC # BLD AUTO: 8.4 K/UL (ref 4.1–11.1)
WBC # BLD AUTO: 9.2 K/UL (ref 4.1–11.1)
WBC # BLD AUTO: 9.5 K/UL (ref 4.1–11.1)
WBC URNS QL MICRO: ABNORMAL /HPF (ref 0–4)
WBC URNS QL MICRO: ABNORMAL /HPF (ref 0–4)

## 2018-01-01 PROCEDURE — 96365 THER/PROPH/DIAG IV INF INIT: CPT

## 2018-01-01 PROCEDURE — 74011000258 HC RX REV CODE- 258: Performed by: STUDENT IN AN ORGANIZED HEALTH CARE EDUCATION/TRAINING PROGRAM

## 2018-01-01 PROCEDURE — 74011250636 HC RX REV CODE- 250/636: Performed by: INTERNAL MEDICINE

## 2018-01-01 PROCEDURE — 96413 CHEMO IV INFUSION 1 HR: CPT

## 2018-01-01 PROCEDURE — 80053 COMPREHEN METABOLIC PANEL: CPT

## 2018-01-01 PROCEDURE — 70450 CT HEAD/BRAIN W/O DYE: CPT

## 2018-01-01 PROCEDURE — 74011250636 HC RX REV CODE- 250/636: Performed by: NURSE PRACTITIONER

## 2018-01-01 PROCEDURE — 80076 HEPATIC FUNCTION PANEL: CPT | Performed by: INTERNAL MEDICINE

## 2018-01-01 PROCEDURE — 74011000250 HC RX REV CODE- 250

## 2018-01-01 PROCEDURE — 36415 COLL VENOUS BLD VENIPUNCTURE: CPT | Performed by: INTERNAL MEDICINE

## 2018-01-01 PROCEDURE — 5A1935Z RESPIRATORY VENTILATION, LESS THAN 24 CONSECUTIVE HOURS: ICD-10-PCS | Performed by: FAMILY MEDICINE

## 2018-01-01 PROCEDURE — 99152 MOD SED SAME PHYS/QHP 5/>YRS: CPT

## 2018-01-01 PROCEDURE — 80053 COMPREHEN METABOLIC PANEL: CPT | Performed by: EMERGENCY MEDICINE

## 2018-01-01 PROCEDURE — 36600 WITHDRAWAL OF ARTERIAL BLOOD: CPT

## 2018-01-01 PROCEDURE — 74011000250 HC RX REV CODE- 250: Performed by: NURSE PRACTITIONER

## 2018-01-01 PROCEDURE — 85025 COMPLETE CBC W/AUTO DIFF WBC: CPT | Performed by: INTERNAL MEDICINE

## 2018-01-01 PROCEDURE — 84100 ASSAY OF PHOSPHORUS: CPT | Performed by: INTERNAL MEDICINE

## 2018-01-01 PROCEDURE — 84100 ASSAY OF PHOSPHORUS: CPT

## 2018-01-01 PROCEDURE — 74011250636 HC RX REV CODE- 250/636: Performed by: EMERGENCY MEDICINE

## 2018-01-01 PROCEDURE — 74011250637 HC RX REV CODE- 250/637: Performed by: INTERNAL MEDICINE

## 2018-01-01 PROCEDURE — 36591 DRAW BLOOD OFF VENOUS DEVICE: CPT

## 2018-01-01 PROCEDURE — 36415 COLL VENOUS BLD VENIPUNCTURE: CPT | Performed by: RADIOLOGY

## 2018-01-01 PROCEDURE — 74011636320 HC RX REV CODE- 636/320: Performed by: RADIOLOGY

## 2018-01-01 PROCEDURE — 80048 BASIC METABOLIC PNL TOTAL CA: CPT | Performed by: INTERNAL MEDICINE

## 2018-01-01 PROCEDURE — 5A12012 PERFORMANCE OF CARDIAC OUTPUT, SINGLE, MANUAL: ICD-10-PCS | Performed by: RADIOLOGY

## 2018-01-01 PROCEDURE — 74011000258 HC RX REV CODE- 258: Performed by: INTERNAL MEDICINE

## 2018-01-01 PROCEDURE — 80053 COMPREHEN METABOLIC PANEL: CPT | Performed by: INTERNAL MEDICINE

## 2018-01-01 PROCEDURE — 84439 ASSAY OF FREE THYROXINE: CPT

## 2018-01-01 PROCEDURE — 77030012965 HC NDL HUBR BBMI -A

## 2018-01-01 PROCEDURE — 71045 X-RAY EXAM CHEST 1 VIEW: CPT

## 2018-01-01 PROCEDURE — 83880 ASSAY OF NATRIURETIC PEPTIDE: CPT | Performed by: STUDENT IN AN ORGANIZED HEALTH CARE EDUCATION/TRAINING PROGRAM

## 2018-01-01 PROCEDURE — 97116 GAIT TRAINING THERAPY: CPT

## 2018-01-01 PROCEDURE — 84443 ASSAY THYROID STIM HORMONE: CPT | Performed by: INTERNAL MEDICINE

## 2018-01-01 PROCEDURE — 99285 EMERGENCY DEPT VISIT HI MDM: CPT

## 2018-01-01 PROCEDURE — 92950 HEART/LUNG RESUSCITATION CPR: CPT

## 2018-01-01 PROCEDURE — 93005 ELECTROCARDIOGRAM TRACING: CPT

## 2018-01-01 PROCEDURE — 84484 ASSAY OF TROPONIN QUANT: CPT

## 2018-01-01 PROCEDURE — 82962 GLUCOSE BLOOD TEST: CPT

## 2018-01-01 PROCEDURE — 94760 N-INVAS EAR/PLS OXIMETRY 1: CPT

## 2018-01-01 PROCEDURE — 83735 ASSAY OF MAGNESIUM: CPT

## 2018-01-01 PROCEDURE — 74011250637 HC RX REV CODE- 250/637: Performed by: STUDENT IN AN ORGANIZED HEALTH CARE EDUCATION/TRAINING PROGRAM

## 2018-01-01 PROCEDURE — 83735 ASSAY OF MAGNESIUM: CPT | Performed by: INTERNAL MEDICINE

## 2018-01-01 PROCEDURE — 74011250636 HC RX REV CODE- 250/636: Performed by: RADIOLOGY

## 2018-01-01 PROCEDURE — 82977 ASSAY OF GGT: CPT | Performed by: INTERNAL MEDICINE

## 2018-01-01 PROCEDURE — 72125 CT NECK SPINE W/O DYE: CPT

## 2018-01-01 PROCEDURE — 84402 ASSAY OF FREE TESTOSTERONE: CPT | Performed by: INTERNAL MEDICINE

## 2018-01-01 PROCEDURE — 74177 CT ABD & PELVIS W/CONTRAST: CPT

## 2018-01-01 PROCEDURE — 74011000250 HC RX REV CODE- 250: Performed by: INTERNAL MEDICINE

## 2018-01-01 PROCEDURE — 80053 COMPREHEN METABOLIC PANEL: CPT | Performed by: FAMILY MEDICINE

## 2018-01-01 PROCEDURE — 74011000250 HC RX REV CODE- 250: Performed by: STUDENT IN AN ORGANIZED HEALTH CARE EDUCATION/TRAINING PROGRAM

## 2018-01-01 PROCEDURE — 84484 ASSAY OF TROPONIN QUANT: CPT | Performed by: STUDENT IN AN ORGANIZED HEALTH CARE EDUCATION/TRAINING PROGRAM

## 2018-01-01 PROCEDURE — 74011636320 HC RX REV CODE- 636/320

## 2018-01-01 PROCEDURE — G8988 SELF CARE GOAL STATUS: HCPCS | Performed by: OCCUPATIONAL THERAPIST

## 2018-01-01 PROCEDURE — 80048 BASIC METABOLIC PNL TOTAL CA: CPT

## 2018-01-01 PROCEDURE — 36415 COLL VENOUS BLD VENIPUNCTURE: CPT | Performed by: FAMILY MEDICINE

## 2018-01-01 PROCEDURE — 99218 HC RM OBSERVATION: CPT

## 2018-01-01 PROCEDURE — 70553 MRI BRAIN STEM W/O & W/DYE: CPT

## 2018-01-01 PROCEDURE — 80053 COMPREHEN METABOLIC PANEL: CPT | Performed by: STUDENT IN AN ORGANIZED HEALTH CARE EDUCATION/TRAINING PROGRAM

## 2018-01-01 PROCEDURE — 99283 EMERGENCY DEPT VISIT LOW MDM: CPT

## 2018-01-01 PROCEDURE — 92610 EVALUATE SWALLOWING FUNCTION: CPT

## 2018-01-01 PROCEDURE — 95951 EEG 24 HR W/ VIDEO: CPT | Performed by: NURSE PRACTITIONER

## 2018-01-01 PROCEDURE — G8979 MOBILITY GOAL STATUS: HCPCS

## 2018-01-01 PROCEDURE — 74011250636 HC RX REV CODE- 250/636: Performed by: FAMILY MEDICINE

## 2018-01-01 PROCEDURE — 94002 VENT MGMT INPAT INIT DAY: CPT

## 2018-01-01 PROCEDURE — 76450000000

## 2018-01-01 PROCEDURE — 74011250636 HC RX REV CODE- 250/636: Performed by: STUDENT IN AN ORGANIZED HEALTH CARE EDUCATION/TRAINING PROGRAM

## 2018-01-01 PROCEDURE — 77030034848

## 2018-01-01 PROCEDURE — C1758 CATHETER, URETERAL: HCPCS

## 2018-01-01 PROCEDURE — 85027 COMPLETE CBC AUTOMATED: CPT | Performed by: FAMILY MEDICINE

## 2018-01-01 PROCEDURE — 84439 ASSAY OF FREE THYROXINE: CPT | Performed by: INTERNAL MEDICINE

## 2018-01-01 PROCEDURE — 77030003560 HC NDL HUBR BARD -A

## 2018-01-01 PROCEDURE — 88305 TISSUE EXAM BY PATHOLOGIST: CPT | Performed by: NURSE PRACTITIONER

## 2018-01-01 PROCEDURE — 96375 TX/PRO/DX INJ NEW DRUG ADDON: CPT

## 2018-01-01 PROCEDURE — 83605 ASSAY OF LACTIC ACID: CPT

## 2018-01-01 PROCEDURE — 0BH17EZ INSERTION OF ENDOTRACHEAL AIRWAY INTO TRACHEA, VIA NATURAL OR ARTIFICIAL OPENING: ICD-10-PCS | Performed by: FAMILY MEDICINE

## 2018-01-01 PROCEDURE — 70470 CT HEAD/BRAIN W/O & W/DYE: CPT

## 2018-01-01 PROCEDURE — 0DH67UZ INSERTION OF FEEDING DEVICE INTO STOMACH, VIA NATURAL OR ARTIFICIAL OPENING: ICD-10-PCS | Performed by: RADIOLOGY

## 2018-01-01 PROCEDURE — 96360 HYDRATION IV INFUSION INIT: CPT

## 2018-01-01 PROCEDURE — 97165 OT EVAL LOW COMPLEX 30 MIN: CPT | Performed by: OCCUPATIONAL THERAPIST

## 2018-01-01 PROCEDURE — 93306 TTE W/DOPPLER COMPLETE: CPT

## 2018-01-01 PROCEDURE — 74011000258 HC RX REV CODE- 258: Performed by: EMERGENCY MEDICINE

## 2018-01-01 PROCEDURE — 88333 PATH CONSLTJ SURG CYTO XM 1: CPT | Performed by: NURSE PRACTITIONER

## 2018-01-01 PROCEDURE — 85027 COMPLETE CBC AUTOMATED: CPT

## 2018-01-01 PROCEDURE — 77030008771 HC TU NG SALEM SUMP -A

## 2018-01-01 PROCEDURE — 82140 ASSAY OF AMMONIA: CPT

## 2018-01-01 PROCEDURE — 74011250636 HC RX REV CODE- 250/636

## 2018-01-01 PROCEDURE — 85025 COMPLETE CBC W/AUTO DIFF WBC: CPT

## 2018-01-01 PROCEDURE — 65270000029 HC RM PRIVATE

## 2018-01-01 PROCEDURE — G8987 SELF CARE CURRENT STATUS: HCPCS | Performed by: OCCUPATIONAL THERAPIST

## 2018-01-01 PROCEDURE — 36415 COLL VENOUS BLD VENIPUNCTURE: CPT | Performed by: EMERGENCY MEDICINE

## 2018-01-01 PROCEDURE — 85730 THROMBOPLASTIN TIME PARTIAL: CPT | Performed by: RADIOLOGY

## 2018-01-01 PROCEDURE — 84443 ASSAY THYROID STIM HORMONE: CPT

## 2018-01-01 PROCEDURE — 84403 ASSAY OF TOTAL TESTOSTERONE: CPT | Performed by: INTERNAL MEDICINE

## 2018-01-01 PROCEDURE — 82803 BLOOD GASES ANY COMBINATION: CPT

## 2018-01-01 PROCEDURE — 85610 PROTHROMBIN TIME: CPT

## 2018-01-01 PROCEDURE — 70496 CT ANGIOGRAPHY HEAD: CPT

## 2018-01-01 PROCEDURE — 36415 COLL VENOUS BLD VENIPUNCTURE: CPT

## 2018-01-01 PROCEDURE — 74011000250 HC RX REV CODE- 250: Performed by: FAMILY MEDICINE

## 2018-01-01 PROCEDURE — 85025 COMPLETE CBC W/AUTO DIFF WBC: CPT | Performed by: EMERGENCY MEDICINE

## 2018-01-01 PROCEDURE — G8978 MOBILITY CURRENT STATUS: HCPCS

## 2018-01-01 PROCEDURE — 65660000000 HC RM CCU STEPDOWN

## 2018-01-01 PROCEDURE — 80307 DRUG TEST PRSMV CHEM ANLYZR: CPT

## 2018-01-01 PROCEDURE — 77012 CT SCAN FOR NEEDLE BIOPSY: CPT

## 2018-01-01 PROCEDURE — 88173 CYTOPATH EVAL FNA REPORT: CPT | Performed by: NURSE PRACTITIONER

## 2018-01-01 PROCEDURE — A9575 INJ GADOTERATE MEGLUMI 0.1ML: HCPCS | Performed by: FAMILY MEDICINE

## 2018-01-01 PROCEDURE — 81001 URINALYSIS AUTO W/SCOPE: CPT

## 2018-01-01 PROCEDURE — 77030010545

## 2018-01-01 PROCEDURE — 87086 URINE CULTURE/COLONY COUNT: CPT

## 2018-01-01 PROCEDURE — 77030003503 HC NDL BIOP TISS BD -B

## 2018-01-01 PROCEDURE — 96361 HYDRATE IV INFUSION ADD-ON: CPT

## 2018-01-01 PROCEDURE — 88342 IMHCHEM/IMCYTCHM 1ST ANTB: CPT | Performed by: NURSE PRACTITIONER

## 2018-01-01 PROCEDURE — 87040 BLOOD CULTURE FOR BACTERIA: CPT

## 2018-01-01 PROCEDURE — 77010033678 HC OXYGEN DAILY

## 2018-01-01 PROCEDURE — 80076 HEPATIC FUNCTION PANEL: CPT

## 2018-01-01 PROCEDURE — 83690 ASSAY OF LIPASE: CPT | Performed by: STUDENT IN AN ORGANIZED HEALTH CARE EDUCATION/TRAINING PROGRAM

## 2018-01-01 PROCEDURE — 83605 ASSAY OF LACTIC ACID: CPT | Performed by: STUDENT IN AN ORGANIZED HEALTH CARE EDUCATION/TRAINING PROGRAM

## 2018-01-01 PROCEDURE — 74018 RADEX ABDOMEN 1 VIEW: CPT

## 2018-01-01 PROCEDURE — 82550 ASSAY OF CK (CPK): CPT

## 2018-01-01 PROCEDURE — 97161 PT EVAL LOW COMPLEX 20 MIN: CPT

## 2018-01-01 PROCEDURE — 85730 THROMBOPLASTIN TIME PARTIAL: CPT

## 2018-01-01 PROCEDURE — 74011636320 HC RX REV CODE- 636/320: Performed by: STUDENT IN AN ORGANIZED HEALTH CARE EDUCATION/TRAINING PROGRAM

## 2018-01-01 PROCEDURE — 74011250637 HC RX REV CODE- 250/637: Performed by: FAMILY MEDICINE

## 2018-01-01 PROCEDURE — 94761 N-INVAS EAR/PLS OXIMETRY MLT: CPT

## 2018-01-01 PROCEDURE — 74011000258 HC RX REV CODE- 258: Performed by: NURSE PRACTITIONER

## 2018-01-01 PROCEDURE — 90686 IIV4 VACC NO PRSV 0.5 ML IM: CPT | Performed by: NURSE PRACTITIONER

## 2018-01-01 PROCEDURE — 99153 MOD SED SAME PHYS/QHP EA: CPT

## 2018-01-01 PROCEDURE — 90471 IMMUNIZATION ADMIN: CPT

## 2018-01-01 PROCEDURE — G8989 SELF CARE D/C STATUS: HCPCS | Performed by: OCCUPATIONAL THERAPIST

## 2018-01-01 PROCEDURE — 85025 COMPLETE CBC W/AUTO DIFF WBC: CPT | Performed by: STUDENT IN AN ORGANIZED HEALTH CARE EDUCATION/TRAINING PROGRAM

## 2018-01-01 RX ORDER — ALBUTEROL SULFATE 0.83 MG/ML
2.5 SOLUTION RESPIRATORY (INHALATION) AS NEEDED
Status: CANCELLED
Start: 2018-01-01

## 2018-01-01 RX ORDER — EPINEPHRINE 1 MG/ML
0.3 INJECTION, SOLUTION, CONCENTRATE INTRAVENOUS AS NEEDED
Status: CANCELLED | OUTPATIENT
Start: 2018-01-01

## 2018-01-01 RX ORDER — SODIUM CHLORIDE 0.9 % (FLUSH) 0.9 %
10 SYRINGE (ML) INJECTION AS NEEDED
Status: ACTIVE | OUTPATIENT
Start: 2018-01-01 | End: 2018-01-01

## 2018-01-01 RX ORDER — SODIUM CHLORIDE 9 MG/ML
10 INJECTION INTRAMUSCULAR; INTRAVENOUS; SUBCUTANEOUS AS NEEDED
Status: CANCELLED | OUTPATIENT
Start: 2018-01-01

## 2018-01-01 RX ORDER — DIGOXIN 0.25 MG/ML
125 INJECTION INTRAMUSCULAR; INTRAVENOUS ONCE
Status: COMPLETED | OUTPATIENT
Start: 2018-01-01 | End: 2018-01-01

## 2018-01-01 RX ORDER — HEPARIN 100 UNIT/ML
500 SYRINGE INTRAVENOUS
Status: ACTIVE | OUTPATIENT
Start: 2018-01-01 | End: 2018-01-01

## 2018-01-01 RX ORDER — DILTIAZEM HYDROCHLORIDE 120 MG/1
240 CAPSULE, COATED, EXTENDED RELEASE ORAL DAILY
Status: DISCONTINUED | OUTPATIENT
Start: 2018-01-01 | End: 2018-01-01

## 2018-01-01 RX ORDER — FENTANYL CITRATE 50 UG/ML
25 INJECTION, SOLUTION INTRAMUSCULAR; INTRAVENOUS
Status: DISCONTINUED | OUTPATIENT
Start: 2018-01-01 | End: 2018-01-01 | Stop reason: HOSPADM

## 2018-01-01 RX ORDER — DIPHENHYDRAMINE HYDROCHLORIDE 50 MG/ML
50 INJECTION, SOLUTION INTRAMUSCULAR; INTRAVENOUS AS NEEDED
Status: CANCELLED
Start: 2018-01-01

## 2018-01-01 RX ORDER — SODIUM CHLORIDE 9 MG/ML
500 INJECTION, SOLUTION INTRAVENOUS CONTINUOUS
Status: CANCELLED | OUTPATIENT
Start: 2018-01-01

## 2018-01-01 RX ORDER — TAMSULOSIN HYDROCHLORIDE 0.4 MG/1
0.4 CAPSULE ORAL DAILY
Qty: 30 CAP | Refills: 5 | Status: SHIPPED | OUTPATIENT
Start: 2018-01-01 | End: 2018-01-01

## 2018-01-01 RX ORDER — HYDROCORTISONE SODIUM SUCCINATE 100 MG/2ML
100 INJECTION, POWDER, FOR SOLUTION INTRAMUSCULAR; INTRAVENOUS AS NEEDED
Status: CANCELLED | OUTPATIENT
Start: 2018-01-01

## 2018-01-01 RX ORDER — HEPARIN 100 UNIT/ML
500 SYRINGE INTRAVENOUS AS NEEDED
Status: ACTIVE | OUTPATIENT
Start: 2018-01-01 | End: 2018-01-01

## 2018-01-01 RX ORDER — DULOXETIN HYDROCHLORIDE 30 MG/1
60 CAPSULE, DELAYED RELEASE ORAL DAILY
Status: DISCONTINUED | OUTPATIENT
Start: 2018-01-01 | End: 2018-01-01 | Stop reason: HOSPADM

## 2018-01-01 RX ORDER — METOPROLOL TARTRATE 5 MG/5ML
5 INJECTION INTRAVENOUS EVERY 8 HOURS
Status: DISCONTINUED | OUTPATIENT
Start: 2018-01-01 | End: 2018-01-01

## 2018-01-01 RX ORDER — ACETAMINOPHEN 325 MG/1
650 TABLET ORAL AS NEEDED
Status: CANCELLED
Start: 2018-01-01

## 2018-01-01 RX ORDER — SODIUM CHLORIDE 9 MG/ML
25 INJECTION, SOLUTION INTRAVENOUS CONTINUOUS
Status: DISPENSED | OUTPATIENT
Start: 2018-01-01 | End: 2018-01-01

## 2018-01-01 RX ORDER — SODIUM CHLORIDE 0.9 % (FLUSH) 0.9 %
5-10 SYRINGE (ML) INJECTION AS NEEDED
Status: ACTIVE | OUTPATIENT
Start: 2018-01-01 | End: 2018-01-01

## 2018-01-01 RX ORDER — HEPARIN 100 UNIT/ML
300-500 SYRINGE INTRAVENOUS AS NEEDED
Status: CANCELLED
Start: 2018-01-01

## 2018-01-01 RX ORDER — HEPARIN 100 UNIT/ML
500 SYRINGE INTRAVENOUS
Status: COMPLETED | OUTPATIENT
Start: 2018-01-01 | End: 2018-01-01

## 2018-01-01 RX ORDER — METOPROLOL TARTRATE 5 MG/5ML
5 INJECTION INTRAVENOUS
Status: DISPENSED | OUTPATIENT
Start: 2018-01-01 | End: 2018-01-01

## 2018-01-01 RX ORDER — DILTIAZEM HYDROCHLORIDE 240 MG/1
240 CAPSULE, COATED, EXTENDED RELEASE ORAL DAILY
Qty: 90 CAP | Refills: 3 | Status: SHIPPED | OUTPATIENT
Start: 2018-01-01

## 2018-01-01 RX ORDER — ONDANSETRON 2 MG/ML
8 INJECTION INTRAMUSCULAR; INTRAVENOUS AS NEEDED
Status: CANCELLED | OUTPATIENT
Start: 2018-01-01

## 2018-01-01 RX ORDER — SODIUM CHLORIDE 9 MG/ML
500 INJECTION, SOLUTION INTRAVENOUS CONTINUOUS
Status: DISPENSED | OUTPATIENT
Start: 2018-01-01 | End: 2018-01-01

## 2018-01-01 RX ORDER — CHLORHEXIDINE GLUCONATE 1.2 MG/ML
15 RINSE ORAL 2 TIMES DAILY
Status: DISCONTINUED | OUTPATIENT
Start: 2018-01-01 | End: 2018-01-01

## 2018-01-01 RX ORDER — MAGNESIUM SULFATE HEPTAHYDRATE 40 MG/ML
2 INJECTION, SOLUTION INTRAVENOUS ONCE
Status: COMPLETED | OUTPATIENT
Start: 2018-01-01 | End: 2018-01-01

## 2018-01-01 RX ORDER — SODIUM CHLORIDE 0.9 % (FLUSH) 0.9 %
5-10 SYRINGE (ML) INJECTION AS NEEDED
Status: DISCONTINUED | OUTPATIENT
Start: 2018-01-01 | End: 2018-01-01 | Stop reason: HOSPADM

## 2018-01-01 RX ORDER — LIDOCAINE HYDROCHLORIDE 10 MG/ML
10 INJECTION, SOLUTION EPIDURAL; INFILTRATION; INTRACAUDAL; PERINEURAL
Status: DISCONTINUED | OUTPATIENT
Start: 2018-01-01 | End: 2018-01-01 | Stop reason: HOSPADM

## 2018-01-01 RX ORDER — SODIUM CHLORIDE 0.9 % (FLUSH) 0.9 %
10 SYRINGE (ML) INJECTION AS NEEDED
Status: CANCELLED
Start: 2018-01-01

## 2018-01-01 RX ORDER — DEXAMETHASONE SODIUM PHOSPHATE 4 MG/ML
8 INJECTION, SOLUTION INTRA-ARTICULAR; INTRALESIONAL; INTRAMUSCULAR; INTRAVENOUS; SOFT TISSUE ONCE
Status: COMPLETED | OUTPATIENT
Start: 2018-01-01 | End: 2018-01-01

## 2018-01-01 RX ORDER — SODIUM CHLORIDE 9 MG/ML
10 INJECTION INTRAMUSCULAR; INTRAVENOUS; SUBCUTANEOUS AS NEEDED
Status: ACTIVE | OUTPATIENT
Start: 2018-01-01 | End: 2018-01-01

## 2018-01-01 RX ORDER — LORAZEPAM 2 MG/ML
4 INJECTION INTRAMUSCULAR ONCE
Status: DISCONTINUED | OUTPATIENT
Start: 2018-01-01 | End: 2018-01-01

## 2018-01-01 RX ORDER — LEVOTHYROXINE SODIUM 75 UG/1
75 TABLET ORAL
Status: DISCONTINUED | OUTPATIENT
Start: 2018-01-01 | End: 2018-01-01 | Stop reason: HOSPADM

## 2018-01-01 RX ORDER — ACETAMINOPHEN 500 MG
500 TABLET ORAL
Status: DISCONTINUED | OUTPATIENT
Start: 2018-01-01 | End: 2018-01-01

## 2018-01-01 RX ORDER — PROPOFOL 10 MG/ML
0-50 VIAL (ML) INTRAVENOUS
Status: DISCONTINUED | OUTPATIENT
Start: 2018-01-01 | End: 2018-01-01 | Stop reason: HOSPADM

## 2018-01-01 RX ORDER — KETOROLAC TROMETHAMINE 30 MG/ML
30 INJECTION, SOLUTION INTRAMUSCULAR; INTRAVENOUS
Status: DISCONTINUED | OUTPATIENT
Start: 2018-01-01 | End: 2018-01-01 | Stop reason: HOSPADM

## 2018-01-01 RX ORDER — ACETAMINOPHEN 500 MG
500 TABLET ORAL
Status: DISCONTINUED | OUTPATIENT
Start: 2018-01-01 | End: 2018-01-01 | Stop reason: HOSPADM

## 2018-01-01 RX ORDER — DILTIAZEM HYDROCHLORIDE 30 MG/1
60 TABLET, FILM COATED ORAL
Status: DISCONTINUED | OUTPATIENT
Start: 2018-01-01 | End: 2018-01-01

## 2018-01-01 RX ORDER — DICLOFENAC SODIUM 10 MG/G
2 GEL TOPICAL 4 TIMES DAILY
Qty: 100 G | Refills: 0 | Status: SHIPPED | OUTPATIENT
Start: 2018-01-01 | End: 2018-01-01

## 2018-01-01 RX ORDER — DEXTROSE, SODIUM CHLORIDE, AND POTASSIUM CHLORIDE 5; .45; .3 G/100ML; G/100ML; G/100ML
INJECTION INTRAVENOUS CONTINUOUS
Status: DISCONTINUED | OUTPATIENT
Start: 2018-01-01 | End: 2018-01-01

## 2018-01-01 RX ORDER — DICLOFENAC SODIUM 10 MG/G
2 GEL TOPICAL
COMMUNITY

## 2018-01-01 RX ORDER — LIDOCAINE HYDROCHLORIDE 10 MG/ML
INJECTION, SOLUTION EPIDURAL; INFILTRATION; INTRACAUDAL; PERINEURAL
Status: COMPLETED
Start: 2018-01-01 | End: 2018-01-01

## 2018-01-01 RX ORDER — DULOXETIN HYDROCHLORIDE 60 MG/1
60 CAPSULE, DELAYED RELEASE ORAL DAILY
Qty: 30 CAP | Refills: 5 | Status: SHIPPED | OUTPATIENT
Start: 2018-01-01 | End: 2018-01-01

## 2018-01-01 RX ORDER — DULOXETIN HYDROCHLORIDE 30 MG/1
60 CAPSULE, DELAYED RELEASE ORAL
Status: DISCONTINUED | OUTPATIENT
Start: 2018-01-01 | End: 2018-01-01

## 2018-01-01 RX ORDER — SODIUM CHLORIDE 0.9 % (FLUSH) 0.9 %
5-10 SYRINGE (ML) INJECTION EVERY 8 HOURS
Status: DISCONTINUED | OUTPATIENT
Start: 2018-01-01 | End: 2018-01-01 | Stop reason: HOSPADM

## 2018-01-01 RX ORDER — SODIUM CHLORIDE 0.9 % (FLUSH) 0.9 %
10-40 SYRINGE (ML) INJECTION AS NEEDED
Status: ACTIVE | OUTPATIENT
Start: 2018-01-01 | End: 2018-01-01

## 2018-01-01 RX ORDER — DILTIAZEM HYDROCHLORIDE 240 MG/1
240 CAPSULE, COATED, EXTENDED RELEASE ORAL DAILY
Qty: 90 CAP | Refills: 3 | Status: SHIPPED | OUTPATIENT
Start: 2018-01-01 | End: 2018-01-01 | Stop reason: SDUPTHER

## 2018-01-01 RX ORDER — ASPIRIN 81 MG/1
81 TABLET ORAL DAILY
Status: DISCONTINUED | OUTPATIENT
Start: 2018-01-01 | End: 2018-01-01 | Stop reason: HOSPADM

## 2018-01-01 RX ORDER — METOPROLOL SUCCINATE 25 MG/1
25 TABLET, EXTENDED RELEASE ORAL DAILY
Qty: 90 TAB | Refills: 3 | Status: SHIPPED | OUTPATIENT
Start: 2018-01-01 | End: 2018-01-01

## 2018-01-01 RX ORDER — SODIUM BICARBONATE 1 MEQ/ML
SYRINGE (ML) INTRAVENOUS
Status: COMPLETED | OUTPATIENT
Start: 2018-01-01 | End: 2018-01-01

## 2018-01-01 RX ORDER — HYDROCORTISONE SODIUM SUCCINATE 100 MG/2ML
100 INJECTION, POWDER, FOR SOLUTION INTRAMUSCULAR; INTRAVENOUS EVERY 8 HOURS
Status: DISCONTINUED | OUTPATIENT
Start: 2018-01-01 | End: 2018-01-01

## 2018-01-01 RX ORDER — LEVOTHYROXINE SODIUM 75 UG/1
75 TABLET ORAL
Status: DISCONTINUED | OUTPATIENT
Start: 2018-01-01 | End: 2018-01-01

## 2018-01-01 RX ORDER — TRAMADOL HYDROCHLORIDE 50 MG/1
50 TABLET ORAL
Qty: 45 TAB | Refills: 0 | Status: SHIPPED | OUTPATIENT
Start: 2018-01-01

## 2018-01-01 RX ORDER — GADOTERATE MEGLUMINE 376.9 MG/ML
14 INJECTION INTRAVENOUS
Status: COMPLETED | OUTPATIENT
Start: 2018-01-01 | End: 2018-01-01

## 2018-01-01 RX ORDER — EPINEPHRINE 0.1 MG/ML
INJECTION INTRACARDIAC; INTRAVENOUS
Status: COMPLETED | OUTPATIENT
Start: 2018-01-01 | End: 2018-01-01

## 2018-01-01 RX ORDER — SODIUM CHLORIDE 9 MG/ML
110 INJECTION, SOLUTION INTRAVENOUS CONTINUOUS
Status: DISCONTINUED | OUTPATIENT
Start: 2018-01-01 | End: 2018-01-01

## 2018-01-01 RX ORDER — SODIUM CHLORIDE 9 MG/ML
115 INJECTION, SOLUTION INTRAVENOUS CONTINUOUS
Status: DISCONTINUED | OUTPATIENT
Start: 2018-01-01 | End: 2018-01-01

## 2018-01-01 RX ORDER — IBUPROFEN 200 MG
1 TABLET ORAL EVERY 24 HOURS
Qty: 30 PATCH | Refills: 1 | Status: SHIPPED | OUTPATIENT
Start: 2018-01-01 | End: 2018-01-01

## 2018-01-01 RX ORDER — LORAZEPAM 2 MG/ML
INJECTION INTRAMUSCULAR
Status: DISCONTINUED
Start: 2018-01-01 | End: 2018-01-01

## 2018-01-01 RX ORDER — DULOXETIN HYDROCHLORIDE 30 MG/1
30 CAPSULE, DELAYED RELEASE ORAL DAILY
Qty: 30 CAP | Refills: 11 | Status: SHIPPED | OUTPATIENT
Start: 2018-01-01 | End: 2018-01-01 | Stop reason: DRUGHIGH

## 2018-01-01 RX ORDER — DEXTROSE MONOHYDRATE AND SODIUM CHLORIDE 5; .9 G/100ML; G/100ML
115 INJECTION, SOLUTION INTRAVENOUS CONTINUOUS
Status: DISCONTINUED | OUTPATIENT
Start: 2018-01-01 | End: 2018-01-01

## 2018-01-01 RX ORDER — ACETAMINOPHEN 500 MG
TABLET ORAL
COMMUNITY

## 2018-01-01 RX ORDER — FENTANYL CITRATE 50 UG/ML
100 INJECTION, SOLUTION INTRAMUSCULAR; INTRAVENOUS
Status: DISPENSED | OUTPATIENT
Start: 2018-01-01 | End: 2018-01-01

## 2018-01-01 RX ORDER — POTASSIUM CHLORIDE, DEXTROSE MONOHYDRATE AND SODIUM CHLORIDE 300; 5; 900 MG/100ML; G/100ML; MG/100ML
INJECTION, SOLUTION INTRAVENOUS CONTINUOUS
Status: DISCONTINUED | OUTPATIENT
Start: 2018-01-01 | End: 2018-01-01

## 2018-01-01 RX ORDER — HEPARIN 100 UNIT/ML
300-500 SYRINGE INTRAVENOUS AS NEEDED
Status: ACTIVE | OUTPATIENT
Start: 2018-01-01 | End: 2018-01-01

## 2018-01-01 RX ORDER — LIDOCAINE AND PRILOCAINE 25; 25 MG/G; MG/G
CREAM TOPICAL AS NEEDED
Qty: 30 G | Refills: 3 | Status: SHIPPED | OUTPATIENT
Start: 2018-01-01

## 2018-01-01 RX ORDER — POTASSIUM CHLORIDE 7.45 MG/ML
10 INJECTION INTRAVENOUS
Status: DISCONTINUED | OUTPATIENT
Start: 2018-01-01 | End: 2018-01-01

## 2018-01-01 RX ORDER — GLYCOPYRROLATE 0.2 MG/ML
0.2 INJECTION INTRAMUSCULAR; INTRAVENOUS
Status: DISCONTINUED | OUTPATIENT
Start: 2018-01-01 | End: 2018-01-01 | Stop reason: HOSPADM

## 2018-01-01 RX ORDER — ASPIRIN 81 MG/1
81 TABLET ORAL DAILY
Status: DISCONTINUED | OUTPATIENT
Start: 2018-01-01 | End: 2018-01-01

## 2018-01-01 RX ORDER — DEXTROSE 50 % IN WATER (D50W) INTRAVENOUS SYRINGE
12.5-25 AS NEEDED
Status: DISCONTINUED | OUTPATIENT
Start: 2018-01-01 | End: 2018-01-01

## 2018-01-01 RX ORDER — DILTIAZEM HYDROCHLORIDE 60 MG/1
60 TABLET, FILM COATED ORAL
Status: DISCONTINUED | OUTPATIENT
Start: 2018-01-01 | End: 2018-01-01

## 2018-01-01 RX ORDER — TAMSULOSIN HYDROCHLORIDE 0.4 MG/1
0.4 CAPSULE ORAL
COMMUNITY

## 2018-01-01 RX ORDER — SODIUM CHLORIDE 9 MG/ML
10 INJECTION INTRAMUSCULAR; INTRAVENOUS; SUBCUTANEOUS AS NEEDED
Status: DISPENSED | OUTPATIENT
Start: 2018-01-01 | End: 2018-01-01

## 2018-01-01 RX ORDER — LORAZEPAM 2 MG/ML
1 INJECTION INTRAMUSCULAR
Status: DISCONTINUED | OUTPATIENT
Start: 2018-01-01 | End: 2018-01-01 | Stop reason: HOSPADM

## 2018-01-01 RX ORDER — DIGOXIN 0.25 MG/ML
250 INJECTION INTRAMUSCULAR; INTRAVENOUS ONCE
Status: COMPLETED | OUTPATIENT
Start: 2018-01-01 | End: 2018-01-01

## 2018-01-01 RX ORDER — METRONIDAZOLE 500 MG/100ML
500 INJECTION, SOLUTION INTRAVENOUS EVERY 8 HOURS
Status: DISCONTINUED | OUTPATIENT
Start: 2018-01-01 | End: 2018-01-01

## 2018-01-01 RX ORDER — DULOXETIN HYDROCHLORIDE 60 MG/1
60 CAPSULE, DELAYED RELEASE ORAL
COMMUNITY

## 2018-01-01 RX ORDER — PREDNISONE 10 MG/1
TABLET ORAL
Qty: 15 TAB | Refills: 0 | Status: SHIPPED | OUTPATIENT
Start: 2018-01-01 | End: 2018-01-01 | Stop reason: ALTCHOICE

## 2018-01-01 RX ORDER — DEXAMETHASONE SODIUM PHOSPHATE 4 MG/ML
8 INJECTION, SOLUTION INTRA-ARTICULAR; INTRALESIONAL; INTRAMUSCULAR; INTRAVENOUS; SOFT TISSUE ONCE
Status: ACTIVE | OUTPATIENT
Start: 2018-01-01 | End: 2018-01-01

## 2018-01-01 RX ORDER — METOPROLOL TARTRATE 5 MG/5ML
INJECTION INTRAVENOUS
Status: COMPLETED
Start: 2018-01-01 | End: 2018-01-01

## 2018-01-01 RX ORDER — TAMSULOSIN HYDROCHLORIDE 0.4 MG/1
0.4 CAPSULE ORAL DAILY
Status: DISCONTINUED | OUTPATIENT
Start: 2018-01-01 | End: 2018-01-01 | Stop reason: HOSPADM

## 2018-01-01 RX ORDER — MAGNESIUM SULFATE 100 %
4 CRYSTALS MISCELLANEOUS AS NEEDED
Status: DISCONTINUED | OUTPATIENT
Start: 2018-01-01 | End: 2018-01-01

## 2018-01-01 RX ORDER — METOPROLOL SUCCINATE 25 MG/1
25 TABLET, EXTENDED RELEASE ORAL DAILY
Qty: 90 TAB | Refills: 3 | Status: SHIPPED | OUTPATIENT
Start: 2018-01-01 | End: 2018-01-01 | Stop reason: SDUPTHER

## 2018-01-01 RX ORDER — DEXTROSE 50 % IN WATER (D50W) INTRAVENOUS SYRINGE
25
Status: COMPLETED | OUTPATIENT
Start: 2018-01-01 | End: 2018-01-01

## 2018-01-01 RX ORDER — METOPROLOL TARTRATE 5 MG/5ML
5 INJECTION INTRAVENOUS ONCE
Status: COMPLETED | OUTPATIENT
Start: 2018-01-01 | End: 2018-01-01

## 2018-01-01 RX ORDER — SCOLOPAMINE TRANSDERMAL SYSTEM 1 MG/1
1 PATCH, EXTENDED RELEASE TRANSDERMAL
Status: DISCONTINUED | OUTPATIENT
Start: 2018-01-01 | End: 2018-01-01 | Stop reason: HOSPADM

## 2018-01-01 RX ORDER — LEVOTHYROXINE SODIUM 75 UG/1
75 TABLET ORAL
Qty: 30 TAB | Refills: 11 | Status: SHIPPED | OUTPATIENT
Start: 2018-01-01

## 2018-01-01 RX ORDER — MIDAZOLAM HYDROCHLORIDE 1 MG/ML
5 INJECTION, SOLUTION INTRAMUSCULAR; INTRAVENOUS
Status: DISPENSED | OUTPATIENT
Start: 2018-01-01 | End: 2018-01-01

## 2018-01-01 RX ADMIN — MIDAZOLAM 1 MG: 1 INJECTION INTRAMUSCULAR; INTRAVENOUS at 11:18

## 2018-01-01 RX ADMIN — METOPROLOL TARTRATE 5 MG: 1 INJECTION, SOLUTION INTRAVENOUS at 02:09

## 2018-01-01 RX ADMIN — HEPARIN 500 UNITS: 100 SYRINGE at 13:39

## 2018-01-01 RX ADMIN — IOPAMIDOL 96 ML: 755 INJECTION, SOLUTION INTRAVENOUS at 13:59

## 2018-01-01 RX ADMIN — DEXAMETHASONE SODIUM PHOSPHATE 8 MG: 4 INJECTION, SOLUTION INTRA-ARTICULAR; INTRALESIONAL; INTRAMUSCULAR; INTRAVENOUS; SOFT TISSUE at 10:37

## 2018-01-01 RX ADMIN — CHLORHEXIDINE GLUCONATE 15 ML: 1.2 RINSE ORAL at 10:11

## 2018-01-01 RX ADMIN — SODIUM CHLORIDE 115 ML/HR: 900 INJECTION, SOLUTION INTRAVENOUS at 14:40

## 2018-01-01 RX ADMIN — Medication 10 ML: at 12:50

## 2018-01-01 RX ADMIN — DEXAMETHASONE SODIUM PHOSPHATE 8 MG: 4 INJECTION, SOLUTION INTRAMUSCULAR; INTRAVENOUS at 11:44

## 2018-01-01 RX ADMIN — APIXABAN 5 MG: 5 TABLET, FILM COATED ORAL at 08:34

## 2018-01-01 RX ADMIN — IOPAMIDOL 100 ML: 755 INJECTION, SOLUTION INTRAVENOUS at 09:09

## 2018-01-01 RX ADMIN — MAGNESIUM SULFATE HEPTAHYDRATE 2 G: 40 INJECTION, SOLUTION INTRAVENOUS at 09:27

## 2018-01-01 RX ADMIN — SODIUM CHLORIDE 500 ML: 900 INJECTION, SOLUTION INTRAVENOUS at 12:17

## 2018-01-01 RX ADMIN — SODIUM CHLORIDE 200 MG: 900 INJECTION, SOLUTION INTRAVENOUS at 12:15

## 2018-01-01 RX ADMIN — Medication 10 ML: at 10:45

## 2018-01-01 RX ADMIN — CEFEPIME HYDROCHLORIDE 2 G: 2 INJECTION, POWDER, FOR SOLUTION INTRAVENOUS at 18:34

## 2018-01-01 RX ADMIN — SODIUM CHLORIDE 110 ML/HR: 900 INJECTION, SOLUTION INTRAVENOUS at 18:17

## 2018-01-01 RX ADMIN — LIDOCAINE HYDROCHLORIDE 5 ML: 10 INJECTION, SOLUTION EPIDURAL; INFILTRATION; INTRACAUDAL; PERINEURAL at 11:46

## 2018-01-01 RX ADMIN — SODIUM BICARBONATE 100 MEQ: 84 INJECTION INTRAVENOUS at 07:49

## 2018-01-01 RX ADMIN — SODIUM CHLORIDE 1920 MG: 900 INJECTION, SOLUTION INTRAVENOUS at 12:00

## 2018-01-01 RX ADMIN — APIXABAN 5 MG: 5 TABLET, FILM COATED ORAL at 21:49

## 2018-01-01 RX ADMIN — SODIUM CHLORIDE 10 ML: 9 INJECTION INTRAMUSCULAR; INTRAVENOUS; SUBCUTANEOUS at 10:18

## 2018-01-01 RX ADMIN — IOPAMIDOL 100 ML: 612 INJECTION, SOLUTION INTRAVENOUS at 17:13

## 2018-01-01 RX ADMIN — Medication 10 ML: at 13:04

## 2018-01-01 RX ADMIN — INFLUENZA VIRUS VACCINE 0.5 ML: 15; 15; 15; 15 SUSPENSION INTRAMUSCULAR at 12:55

## 2018-01-01 RX ADMIN — DIGOXIN 125 MCG: 0.25 INJECTION INTRAMUSCULAR; INTRAVENOUS at 11:32

## 2018-01-01 RX ADMIN — Medication 5 ML: at 06:00

## 2018-01-01 RX ADMIN — METOPROLOL TARTRATE 5 MG: 5 INJECTION INTRAVENOUS at 19:00

## 2018-01-01 RX ADMIN — SODIUM CHLORIDE 500 ML: 900 INJECTION, SOLUTION INTRAVENOUS at 15:11

## 2018-01-01 RX ADMIN — Medication 500 UNITS: at 13:48

## 2018-01-01 RX ADMIN — Medication 10 ML: at 13:48

## 2018-01-01 RX ADMIN — LEVETIRACETAM 1000 MG: 500 INJECTION, SOLUTION INTRAVENOUS at 09:00

## 2018-01-01 RX ADMIN — SODIUM CHLORIDE 25 ML/HR: 900 INJECTION, SOLUTION INTRAVENOUS at 10:34

## 2018-01-01 RX ADMIN — Medication 10 ML: at 18:17

## 2018-01-01 RX ADMIN — SODIUM CHLORIDE 10 ML: 9 INJECTION, SOLUTION INTRAMUSCULAR; INTRAVENOUS; SUBCUTANEOUS at 10:00

## 2018-01-01 RX ADMIN — SODIUM CHLORIDE, PRESERVATIVE FREE 500 UNITS: 5 INJECTION INTRAVENOUS at 13:45

## 2018-01-01 RX ADMIN — POTASSIUM CHLORIDE 10 MEQ: 7.46 INJECTION, SOLUTION INTRAVENOUS at 09:27

## 2018-01-01 RX ADMIN — EPINEPHRINE 1 MG: 0.1 INJECTION, SOLUTION ENDOTRACHEAL; INTRACARDIAC; INTRAVENOUS at 07:47

## 2018-01-01 RX ADMIN — LEVOTHYROXINE SODIUM 75 MCG: 75 TABLET ORAL at 08:13

## 2018-01-01 RX ADMIN — SODIUM CHLORIDE 1000 ML: 900 INJECTION, SOLUTION INTRAVENOUS at 14:29

## 2018-01-01 RX ADMIN — ASPIRIN 81 MG: 81 TABLET ORAL at 08:34

## 2018-01-01 RX ADMIN — SODIUM CHLORIDE 10 ML: 9 INJECTION INTRAMUSCULAR; INTRAVENOUS; SUBCUTANEOUS at 10:15

## 2018-01-01 RX ADMIN — Medication 10 ML: at 08:59

## 2018-01-01 RX ADMIN — Medication 500 UNITS: at 10:45

## 2018-01-01 RX ADMIN — METOPROLOL TARTRATE 5 MG: 5 INJECTION, SOLUTION INTRAVENOUS at 19:00

## 2018-01-01 RX ADMIN — Medication 500 UNITS: at 12:50

## 2018-01-01 RX ADMIN — IOPAMIDOL 100 ML: 755 INJECTION, SOLUTION INTRAVENOUS at 22:16

## 2018-01-01 RX ADMIN — Medication 10 ML: at 13:40

## 2018-01-01 RX ADMIN — Medication 10 ML: at 05:31

## 2018-01-01 RX ADMIN — IOPAMIDOL 100 ML: 755 INJECTION, SOLUTION INTRAVENOUS at 10:29

## 2018-01-01 RX ADMIN — SODIUM CHLORIDE 25 ML/HR: 900 INJECTION, SOLUTION INTRAVENOUS at 09:15

## 2018-01-01 RX ADMIN — SODIUM CHLORIDE 200 MG: 900 INJECTION, SOLUTION INTRAVENOUS at 13:09

## 2018-01-01 RX ADMIN — SODIUM CHLORIDE 10 ML: 9 INJECTION, SOLUTION INTRAMUSCULAR; INTRAVENOUS; SUBCUTANEOUS at 09:10

## 2018-01-01 RX ADMIN — EPINEPHRINE 1 MG: 0.1 INJECTION, SOLUTION ENDOTRACHEAL; INTRACARDIAC; INTRAVENOUS at 07:51

## 2018-01-01 RX ADMIN — TAMSULOSIN HYDROCHLORIDE 0.4 MG: 0.4 CAPSULE ORAL at 08:13

## 2018-01-01 RX ADMIN — POTASSIUM CHLORIDE 10 MEQ: 7.46 INJECTION, SOLUTION INTRAVENOUS at 10:43

## 2018-01-01 RX ADMIN — DEXTROSE MONOHYDRATE 12.5 G: 25 INJECTION, SOLUTION INTRAVENOUS at 06:14

## 2018-01-01 RX ADMIN — Medication 10 ML: at 13:47

## 2018-01-01 RX ADMIN — SODIUM CHLORIDE 1000 ML: 900 INJECTION, SOLUTION INTRAVENOUS at 14:28

## 2018-01-01 RX ADMIN — POTASSIUM CHLORIDE, DEXTROSE MONOHYDRATE AND SODIUM CHLORIDE: 300; 5; 450 INJECTION, SOLUTION INTRAVENOUS at 09:58

## 2018-01-01 RX ADMIN — MAGNESIUM SULFATE HEPTAHYDRATE 2 G: 40 INJECTION, SOLUTION INTRAVENOUS at 14:40

## 2018-01-01 RX ADMIN — Medication 10 ML: at 08:01

## 2018-01-01 RX ADMIN — DEXTROSE MONOHYDRATE AND SODIUM CHLORIDE 115 ML/HR: 5; .9 INJECTION, SOLUTION INTRAVENOUS at 08:27

## 2018-01-01 RX ADMIN — SODIUM CHLORIDE 200 MG: 900 INJECTION, SOLUTION INTRAVENOUS at 12:17

## 2018-01-01 RX ADMIN — Medication 10 ML: at 12:55

## 2018-01-01 RX ADMIN — APIXABAN 5 MG: 5 TABLET, FILM COATED ORAL at 18:19

## 2018-01-01 RX ADMIN — FENTANYL CITRATE 25 MCG: 50 INJECTION, SOLUTION INTRAMUSCULAR; INTRAVENOUS at 10:56

## 2018-01-01 RX ADMIN — SODIUM CHLORIDE 25 ML/HR: 900 INJECTION, SOLUTION INTRAVENOUS at 14:03

## 2018-01-01 RX ADMIN — CEFEPIME HYDROCHLORIDE 2 G: 2 INJECTION, POWDER, FOR SOLUTION INTRAVENOUS at 06:27

## 2018-01-01 RX ADMIN — Medication 10 ML: at 11:51

## 2018-01-01 RX ADMIN — ASPIRIN 81 MG: 81 TABLET ORAL at 08:13

## 2018-01-01 RX ADMIN — MIDAZOLAM 1 MG: 1 INJECTION INTRAMUSCULAR; INTRAVENOUS at 11:04

## 2018-01-01 RX ADMIN — SODIUM CHLORIDE 115 ML/HR: 900 INJECTION, SOLUTION INTRAVENOUS at 02:39

## 2018-01-01 RX ADMIN — VANCOMYCIN HYDROCHLORIDE 1750 MG: 10 INJECTION, POWDER, LYOPHILIZED, FOR SOLUTION INTRAVENOUS at 10:05

## 2018-01-01 RX ADMIN — SODIUM CHLORIDE 200 MG: 9 INJECTION, SOLUTION INTRAVENOUS at 11:50

## 2018-01-01 RX ADMIN — MIDAZOLAM 1 MG: 1 INJECTION INTRAMUSCULAR; INTRAVENOUS at 11:27

## 2018-01-01 RX ADMIN — DEXAMETHASONE SODIUM PHOSPHATE 8 MG: 4 INJECTION, SOLUTION INTRA-ARTICULAR; INTRALESIONAL; INTRAMUSCULAR; INTRAVENOUS; SOFT TISSUE at 10:34

## 2018-01-01 RX ADMIN — DEXTROSE MONOHYDRATE 25 G: 25 INJECTION, SOLUTION INTRAVENOUS at 11:32

## 2018-01-01 RX ADMIN — Medication 10 ML: at 10:20

## 2018-01-01 RX ADMIN — Medication 10 ML: at 10:00

## 2018-01-01 RX ADMIN — Medication 20 ML: at 09:10

## 2018-01-01 RX ADMIN — SODIUM CHLORIDE 25 ML/HR: 900 INJECTION, SOLUTION INTRAVENOUS at 13:05

## 2018-01-01 RX ADMIN — SODIUM CHLORIDE 200 MG: 9 INJECTION, SOLUTION INTRAVENOUS at 12:52

## 2018-01-01 RX ADMIN — HEPARIN 500 UNITS: 100 SYRINGE at 12:55

## 2018-01-01 RX ADMIN — Medication 10 ML: at 08:00

## 2018-01-01 RX ADMIN — DEXTROSE MONOHYDRATE 25 G: 25 INJECTION, SOLUTION INTRAVENOUS at 05:37

## 2018-01-01 RX ADMIN — MIDAZOLAM 1 MG: 1 INJECTION INTRAMUSCULAR; INTRAVENOUS at 11:33

## 2018-01-01 RX ADMIN — SODIUM CHLORIDE 200 MG: 9 INJECTION, SOLUTION INTRAVENOUS at 12:15

## 2018-01-01 RX ADMIN — Medication 10 ML: at 18:19

## 2018-01-01 RX ADMIN — Medication 10 ML: at 10:53

## 2018-01-01 RX ADMIN — Medication 10 ML: at 09:02

## 2018-01-01 RX ADMIN — SODIUM CHLORIDE 200 MG: 9 INJECTION, SOLUTION INTRAVENOUS at 12:20

## 2018-01-01 RX ADMIN — LORAZEPAM 1 MG: 2 INJECTION INTRAMUSCULAR; INTRAVENOUS at 12:48

## 2018-01-01 RX ADMIN — EPINEPHRINE 1 MG: 0.1 INJECTION, SOLUTION ENDOTRACHEAL; INTRACARDIAC; INTRAVENOUS at 07:56

## 2018-01-01 RX ADMIN — APIXABAN 5 MG: 5 TABLET, FILM COATED ORAL at 08:13

## 2018-01-01 RX ADMIN — Medication 500 UNITS: at 11:30

## 2018-01-01 RX ADMIN — SODIUM CHLORIDE: 900 INJECTION, SOLUTION INTRAVENOUS at 12:39

## 2018-01-01 RX ADMIN — POTASSIUM CHLORIDE 10 MEQ: 7.46 INJECTION, SOLUTION INTRAVENOUS at 11:40

## 2018-01-01 RX ADMIN — SODIUM CHLORIDE 1920 MG: 900 INJECTION, SOLUTION INTRAVENOUS at 11:17

## 2018-01-01 RX ADMIN — Medication 500 UNITS: at 11:04

## 2018-01-01 RX ADMIN — Medication 10 ML: at 10:13

## 2018-01-01 RX ADMIN — IOPAMIDOL 95 ML: 755 INJECTION, SOLUTION INTRAVENOUS at 12:21

## 2018-01-01 RX ADMIN — CEFEPIME HYDROCHLORIDE 2 G: 2 INJECTION, POWDER, FOR SOLUTION INTRAVENOUS at 19:32

## 2018-01-01 RX ADMIN — FENTANYL CITRATE 25 MCG: 50 INJECTION, SOLUTION INTRAMUSCULAR; INTRAVENOUS at 11:18

## 2018-01-01 RX ADMIN — PROPOFOL 10 MCG/KG/MIN: 10 INJECTION, EMULSION INTRAVENOUS at 08:51

## 2018-01-01 RX ADMIN — HEPARIN 500 UNITS: 100 SYRINGE at 13:04

## 2018-01-01 RX ADMIN — FAMOTIDINE 20 MG: 10 INJECTION, SOLUTION INTRAVENOUS at 10:02

## 2018-01-01 RX ADMIN — Medication 10 ML: at 10:12

## 2018-01-01 RX ADMIN — Medication 500 UNITS: at 11:51

## 2018-01-01 RX ADMIN — Medication 50 MCG/HR: at 10:06

## 2018-01-01 RX ADMIN — Medication 500 UNITS: at 12:38

## 2018-01-01 RX ADMIN — GADOTERATE MEGLUMINE 15 ML: 376.9 INJECTION INTRAVENOUS at 12:53

## 2018-01-01 RX ADMIN — Medication 10 ML: at 21:35

## 2018-01-01 RX ADMIN — Medication 10 ML: at 15:49

## 2018-01-01 RX ADMIN — SODIUM CHLORIDE 10 ML: 9 INJECTION, SOLUTION INTRAMUSCULAR; INTRAVENOUS; SUBCUTANEOUS at 09:25

## 2018-01-01 RX ADMIN — DIGOXIN 250 MCG: 0.25 INJECTION INTRAMUSCULAR; INTRAVENOUS at 16:50

## 2018-01-01 RX ADMIN — EPINEPHRINE 1 MG: 0.1 INJECTION, SOLUTION ENDOTRACHEAL; INTRACARDIAC; INTRAVENOUS at 07:46

## 2018-01-01 RX ADMIN — CEFEPIME HYDROCHLORIDE 2 G: 2 INJECTION, POWDER, FOR SOLUTION INTRAVENOUS at 05:30

## 2018-01-01 RX ADMIN — Medication 10 ML: at 11:03

## 2018-01-01 RX ADMIN — SODIUM CHLORIDE 10 ML: 9 INJECTION INTRAMUSCULAR; INTRAVENOUS; SUBCUTANEOUS at 10:34

## 2018-01-01 RX ADMIN — SODIUM CHLORIDE 25 ML/HR: 900 INJECTION, SOLUTION INTRAVENOUS at 11:44

## 2018-01-01 RX ADMIN — SODIUM CHLORIDE 110 ML/HR: 900 INJECTION, SOLUTION INTRAVENOUS at 03:41

## 2018-01-01 RX ADMIN — SODIUM CHLORIDE, PRESERVATIVE FREE 500 UNITS: 5 INJECTION INTRAVENOUS at 11:23

## 2018-01-01 RX ADMIN — Medication 10 ML: at 13:49

## 2018-01-01 RX ADMIN — Medication 10 ML: at 09:00

## 2018-01-01 RX ADMIN — Medication 10 ML: at 09:01

## 2018-01-01 RX ADMIN — LEVOTHYROXINE SODIUM 75 MCG: 75 TABLET ORAL at 07:30

## 2018-01-01 RX ADMIN — MIDAZOLAM 1 MG: 1 INJECTION INTRAMUSCULAR; INTRAVENOUS at 10:56

## 2018-01-01 RX ADMIN — SODIUM CHLORIDE 250 ML: 900 INJECTION, SOLUTION INTRAVENOUS at 12:50

## 2018-01-01 RX ADMIN — Medication 10 ML: at 13:45

## 2018-01-01 RX ADMIN — SODIUM CHLORIDE 10 ML: 9 INJECTION INTRAMUSCULAR; INTRAVENOUS; SUBCUTANEOUS at 10:19

## 2018-01-01 RX ADMIN — SODIUM CHLORIDE 10 ML: 9 INJECTION INTRAMUSCULAR; INTRAVENOUS; SUBCUTANEOUS at 10:43

## 2018-01-01 RX ADMIN — FENTANYL CITRATE 25 MCG: 50 INJECTION, SOLUTION INTRAMUSCULAR; INTRAVENOUS at 11:04

## 2018-01-01 RX ADMIN — NOREPINEPHRINE BITARTRATE 4 MCG/MIN: 1 INJECTION INTRAVENOUS at 08:10

## 2018-01-01 RX ADMIN — Medication 10 ML: at 11:04

## 2018-01-01 RX ADMIN — Medication 10 ML: at 10:51

## 2018-01-01 RX ADMIN — Medication 10 ML: at 12:38

## 2018-01-01 RX ADMIN — SODIUM CHLORIDE 250 ML: 900 INJECTION, SOLUTION INTRAVENOUS at 12:07

## 2018-01-01 RX ADMIN — Medication 10 ML: at 13:59

## 2018-01-01 RX ADMIN — SODIUM CHLORIDE 10 ML: 9 INJECTION INTRAMUSCULAR; INTRAVENOUS; SUBCUTANEOUS at 12:20

## 2018-01-01 RX ADMIN — SODIUM CHLORIDE 115 ML/HR: 900 INJECTION, SOLUTION INTRAVENOUS at 00:25

## 2018-01-01 RX ADMIN — HEPARIN 500 UNITS: 100 SYRINGE at 13:00

## 2018-01-01 RX ADMIN — DULOXETINE HYDROCHLORIDE 60 MG: 30 CAPSULE, DELAYED RELEASE ORAL at 02:56

## 2018-01-01 RX ADMIN — Medication 500 UNITS: at 14:00

## 2018-01-01 RX ADMIN — METRONIDAZOLE 500 MG: 500 INJECTION, SOLUTION INTRAVENOUS at 10:05

## 2018-01-01 RX ADMIN — HYDROCORTISONE SODIUM SUCCINATE 100 MG: 100 INJECTION, POWDER, FOR SOLUTION INTRAMUSCULAR; INTRAVENOUS at 09:11

## 2018-01-01 RX ADMIN — GEMCITABINE 1920 MG: 38 INJECTION, SOLUTION INTRAVENOUS at 12:17

## 2018-01-01 RX ADMIN — EPINEPHRINE 1 MG: 0.1 INJECTION, SOLUTION ENDOTRACHEAL; INTRACARDIAC; INTRAVENOUS at 07:53

## 2018-01-01 RX ADMIN — Medication 500 UNITS: at 12:20

## 2018-01-01 RX ADMIN — SODIUM CHLORIDE 25 ML/HR: 900 INJECTION, SOLUTION INTRAVENOUS at 09:40

## 2018-01-01 RX ADMIN — DULOXETINE HYDROCHLORIDE 60 MG: 30 CAPSULE, DELAYED RELEASE ORAL at 08:13

## 2018-01-01 RX ADMIN — GLYCOPYRROLATE 0.2 MG: 0.2 INJECTION, SOLUTION INTRAMUSCULAR; INTRAVENOUS at 12:48

## 2018-01-01 RX ADMIN — DILTIAZEM HYDROCHLORIDE 240 MG: 120 CAPSULE, COATED, EXTENDED RELEASE ORAL at 05:30

## 2018-01-01 RX ADMIN — FENTANYL CITRATE 25 MCG: 50 INJECTION, SOLUTION INTRAMUSCULAR; INTRAVENOUS at 11:27

## 2018-01-01 RX ADMIN — SODIUM CHLORIDE 1000 ML: 900 INJECTION, SOLUTION INTRAVENOUS at 15:02

## 2018-01-01 RX ADMIN — Medication 10 ML: at 10:14

## 2018-01-01 RX ADMIN — SODIUM CHLORIDE 200 MG: 900 INJECTION, SOLUTION INTRAVENOUS at 14:10

## 2018-01-05 NOTE — PROGRESS NOTES
[facility] - Chemotherapy Progress Note    0900 Pt admit to [facility] for Gemzar C3D8 ambulatory in stable condition. Assessment completed. No new concerns voiced. Port accessed with positive blood return. Labs drawn per order and sent. Line flushed, pt connected to IV fluids at [facility]. Labs reviewed, chemo ordered. Visit Vitals    /72 (BP 1 Location: Right arm, BP Patient Position: Sitting)    Pulse 81    Temp 96.4 °F (35.8 °C)    Resp 18    Ht 5' 8.9\" (1.75 m)    Wt 85.7 kg (188 lb 14.4 oz)    BMI 27.98 kg/m2       Medications:  NS @ KVO  Decadron  Gemzar    1200 Pt tolerated treatment well. Port maintained positive blood return throughout treatment, flushed with positive blood return at conclusion and heparinized and de-accessed. D/c home ambulatory in no distress. Pt aware of next [facility] appointment scheduled for 1/19/18. Recent Results (from the past 12 hour(s))   CBC WITH AUTOMATED DIFF    Collection Time: 01/05/18  9:14 AM   Result Value Ref Range    WBC 5.2 4.1 - 11.1 K/uL    RBC 3.10 (L) 4.10 - 5.70 M/uL    HGB 9.5 (L) 12.1 - 17.0 g/dL    HCT 30.0 (L) 36.6 - 50.3 %    MCV 96.8 80.0 - 99.0 FL    MCH 30.6 26.0 - 34.0 PG    MCHC 31.7 30.0 - 36.5 g/dL    RDW 19.9 (H) 11.5 - 14.5 %    PLATELET 976 (H) 834 - 400 K/uL    NEUTROPHILS 25 (L) 32 - 75 %    LYMPHOCYTES 42 12 - 49 %    MONOCYTES 22 (H) 5 - 13 %    EOSINOPHILS 7 0 - 7 %    BASOPHILS 2 (H) 0 - 1 %    METAMYELOCYTES 1 (H) 0 %    MYELOCYTES 1 (H) 0 %    ABS. NEUTROPHILS 1.3 (L) 1.8 - 8.0 K/UL    ABS. LYMPHOCYTES 2.2 0.8 - 3.5 K/UL    ABS. MONOCYTES 1.1 (H) 0.0 - 1.0 K/UL    ABS. EOSINOPHILS 0.4 0.0 - 0.4 K/UL    ABS.  BASOPHILS 0.1 0.0 - 0.1 K/UL    DF MANUAL      RBC COMMENTS MICROCYTOSIS  1+        RBC COMMENTS ANISOCYTOSIS  1+        RBC COMMENTS POIKILOCYTOSIS  1+        RBC COMMENTS TARGET CELLS  1+

## 2018-01-19 NOTE — PROGRESS NOTES
[facility] at [address], [address] Rain: [phone]  F: [phone]      Reason for Visit:   Fadia Talavera is a 67 y.o. male who is seen for follow up of lung cancer. Treatment History:   · CXR 7/11/2017: Mediastinal lymphadenopathy with left hilar mass. · CT Chest 7/14/2017: Bulky mediastinal and left hilar lymphadenopathy. Bilateral pulmonary masses and nodules  · PET-CT 7/24/2017: Right parietal mass. Hypermetabolic right supraclavicular, right paratracheal, AP window, prevascular, precarinal, subcarinal and left hilar lymphadenopathy. Hypermetabolic pulmonary nodules bilaterally. · MRI Brain 7/24/2017: Junction right posterior temporal lobe and occipital lobe 2.7 cm mass likely metastasis from lung cancer. No additional acute abnormalities  · FNA supraclavicular node 7/28/2017: Metastatic squamous cell carcinoma, PD-L1 5%, BRAF negative, EGFR negative, ALK & ROS-1 pending   · Stage IV Non-small cell lung cancer (Squamous Cell)  · Gamma knife by Dr. Celina Steve 8/15/2017   · Palliative chemotherapy with carboplatin and gemcitabine beginning 8/25/2017 - 11/3/2017  · Maintenance gemcitabine beginning 11/17/2017    History of Present Illness:   Here today for follow up and C4 of gemcitabine. Feeling pretty well. No more coughing. Breathing ok. Energy fair. No bleeding on apixaban. Neuropathy in feet doing ok, not worse. He is accompanied by his daughter today. No fevers, chills, sweats. No new weakness in arms/legs. He smoked 1ppd for 50+ years but quit at diagnosis. He lives alone in [address] [place] [address]. His son lives about 15 minutes from him. His daughter lives about 30 minutes away. PAST HISTORY: The following sections were reviewed and updated in the EMR as appropriate: PMH, SH, FH, Medications, Allergies.       Allergies   Allergen Reactions    Lisinopril Angioedema    Hydrochlorothiazide Hives and Swelling    Sulfa (Sulfonamide Antibiotics) Hives      Review of Systems: A complete review of systems was obtained, reviewed, and scanned into the EMR. Pertinent findings reviewed above. Physical Exam:     Visit Vitals    /57 (BP 1 Location: Left arm, BP Patient Position: Sitting)    Pulse 71    Temp 96.6 °F (35.9 °C) (Oral)    Resp 18    Ht 5' 8.9\" (1.75 m)    Wt 189 lb (85.7 kg)    SpO2 93%    BMI 27.99 kg/m2     ECOG PS: 1  General: No distress  Eyes: PERRLA, anicteric sclerae  HENT: Atraumatic, OP clear  Neck: Supple  Lymphatic: No cervical, supraclavicular, or inguinal adenopathy  Respiratory: CTAB, normal respiratory effort  CV: Normal rate, regular rhythm, no murmurs, no peripheral edema  GI: Soft, nontender, nondistended, no masses, no hepatomegaly, no splenomegaly  MS: Normal gait and station. Digits without clubbing or cyanosis. Skin: No rashes, ecchymoses, or petechiae. Normal temperature, turgor, and texture. Psych: Alert, oriented, appropriate affect, normal judgment/insight    Results:     Lab Results   Component Value Date/Time    WBC 9.5 01/19/2018 10:03 AM    HGB 9.6 01/19/2018 10:03 AM    HCT 30.2 01/19/2018 10:03 AM    PLATELET 753 45/55/2116 10:03 AM    MCV 95.0 01/19/2018 10:03 AM    ABS. NEUTROPHILS PENDING 01/19/2018 10:03 AM     Lab Results   Component Value Date/Time    Sodium 140 12/29/2017 09:33 AM    Potassium 3.9 12/29/2017 09:33 AM    Chloride 104 12/29/2017 09:33 AM    CO2 27 12/29/2017 09:33 AM    Glucose 99 12/29/2017 09:33 AM    BUN 12 12/29/2017 09:33 AM    Creatinine 0.73 12/29/2017 09:33 AM    GFR est AA >60 12/29/2017 09:33 AM    GFR est non-AA >60 12/29/2017 09:33 AM    Calcium 9.2 12/29/2017 09:33 AM     Lab Results   Component Value Date/Time    Bilirubin, total 0.2 12/29/2017 09:33 AM    ALT (SGPT) 22 12/29/2017 09:33 AM    AST (SGOT) 22 12/29/2017 09:33 AM    Alk.  phosphatase 79 12/29/2017 09:33 AM    Protein, total 6.7 12/29/2017 09:33 AM    Albumin 3.1 12/29/2017 09:33 AM Globulin 3.6 12/29/2017 09:33 AM       CTA Chest 9/15/2017:   1. No pulmonary embolus. 2. Bilateral lung masses and mediastinal nodes decreased in size. 3. Atherosclerosis with coronary artery calcifications    Venous doppler 9/15/2017: Bilateral lower extremity venous duplex positive for deep  venous thrombosis in right posterior tibial vein, right peroneal vein, and the left posterior tibial vein. There are superficial venous  thrombosis in bilateral greater saphenous veins    CT C/A/P 11/16/2017: No significant interval change in size of bilateral pulmonary nodules as described above. Slight interval decrease in size of mediastinal lymph nodes. No evidence of metastatic disease in the abdomen or pelvis. Assessment:   1) Metastatic non-small cell lung cancer (squamous cell)  EGFR, ALK, ROS1, BRAF negative  He has disease within his chest and brain. His cancer is not curable and management is with palliative intent. He is s/p gamma knife to CNS disease. He has completed palliative chemotherapy with carboplatin and gemcitabine given every 3 weeks x 4 cycles. He continues feeling well and is tolerating therapy well. We will continue with maintenance therapy and see him back in 3 weeks with next cycle. Repeat CT C/A/P after C#4,  CT ordered    He has consented to Lung-MAP trial, which will direct his next line of therapy when appropriate. 2) Brain metastasis  S/P gamma knife. Follow up with Dr. Bipin Pringle for surveillance MRI brain. Appointment and MRI set for 1/23/18. 3) Atrial fibrillation, CHF  Cardiology following. On apixaban. 4) DVT 9/15/2017  Previously on Lovenox BID but unable to tolerate injections. Continue Apixaban 5mg BID. I recommend he continue this long term in the setting of malignancy. 5) Cough  Resolved. 6) Weight loss  Improving. Likely due to cancer. Monitor. 7) Neuropathy, chemotherapy induced  Secondary to therapy. Continue Cymbalta 30mg daily.  Monitor and increase dose if needed. Plan:     Proceed today with C#4 of maintenance Gemcitabine 1000mg/m2 on days 1, 8 given every 21 days   Labs prior to each treatment: CBC with diff on day 1 and day 8; CMP on day 1   Antiemetic prophylaxis: Dexamethasone prior to therapy  PRN Antiemetics at home: Ondansetron and Prochlorperazine  Continue Apixaban 5mg BID  Continue Cymbalta 30 mg daily  Follow up with Dr. Sonja Casiano set for next week  Follow up with cardiology  CT C/A/P ordered.   Return to clinic in 3 weeks with next cycle of therapy or sooner if needed        Signed By: Best Arellano MD

## 2018-01-19 NOTE — PROGRESS NOTES
[facility] VISIT NOTE    7513  Pt arrived at [facility] ambulatory and in no distress for Gemzar C4D1. Assessment completed, pt w/o complaints. Right chest port accessed with . 75 in kellogg with no difficulty. Positive blood return noted and labs drawn. Medications received:  Decadron IV  NS IV  Gemzar IV    Tolerated treatment well, no adverse reaction noted. Port de-accessed and flushed per protocol. Positive blood return noted. Recent Results (from the past 12 hour(s))   CBC WITH AUTOMATED DIFF    Collection Time: 01/19/18 10:03 AM   Result Value Ref Range    WBC 9.5 4.1 - 11.1 K/uL    RBC 3.18 (L) 4.10 - 5.70 M/uL    HGB 9.6 (L) 12.1 - 17.0 g/dL    HCT 30.2 (L) 36.6 - 50.3 %    MCV 95.0 80.0 - 99.0 FL    MCH 30.2 26.0 - 34.0 PG    MCHC 31.8 30.0 - 36.5 g/dL    RDW 18.4 (H) 11.5 - 14.5 %    PLATELET 665 (H) 152 - 400 K/uL    NEUTROPHILS 60 32 - 75 %    LYMPHOCYTES 18 12 - 49 %    MONOCYTES 13 5 - 13 %    EOSINOPHILS 9 (H) 0 - 7 %    BASOPHILS 0 0 - 1 %    ABS. NEUTROPHILS 5.7 1.8 - 8.0 K/UL    ABS. LYMPHOCYTES 1.7 0.8 - 3.5 K/UL    ABS. MONOCYTES 1.2 (H) 0.0 - 1.0 K/UL    ABS. EOSINOPHILS 0.9 (H) 0.0 - 0.4 K/UL    ABS. BASOPHILS 0.0 0.0 - 0.1 K/UL    RBC COMMENTS ANISOCYTOSIS  1+        RBC COMMENTS MICROCYTOSIS  1+       METABOLIC PANEL, COMPREHENSIVE    Collection Time: 01/19/18 10:03 AM   Result Value Ref Range    Sodium 138 136 - 145 mmol/L    Potassium 4.1 3.5 - 5.1 mmol/L    Chloride 102 97 - 108 mmol/L    CO2 26 21 - 32 mmol/L    Anion gap 10 5 - 15 mmol/L    Glucose 91 65 - 100 mg/dL    BUN 11 6 - 20 MG/DL    Creatinine 0.83 0.70 - 1.30 MG/DL    BUN/Creatinine ratio 13 12 - 20      GFR est AA >60 >60 ml/min/1.73m2    GFR est non-AA >60 >60 ml/min/1.73m2    Calcium 9.2 8.5 - 10.1 MG/DL    Bilirubin, total 0.2 0.2 - 1.0 MG/DL    ALT (SGPT) 28 12 - 78 U/L    AST (SGOT) 22 15 - 37 U/L    Alk.  phosphatase 88 45 - 117 U/L    Protein, total 7.1 6.4 - 8.2 g/dL    Albumin 3.2 (L) 3.5 - 5.0 g/dL    Globulin 3.9 2.0 - 4.0 g/dL    A-G Ratio 0.8 (L) 1.1 - 2.2     NUCLEATED RBC    Collection Time: 01/19/18 10:03 AM   Result Value Ref Range    NRBC 0.0 0  WBC    ABSOLUTE NRBC 0.00 0.00 - 0.01 K/uL     Patient Vitals for the past 12 hrs:   Temp Pulse Resp BP   01/19/18 1255 97.2 °F (36.2 °C) 65 18 108/64   01/19/18 0952 97.2 °F (36.2 °C) 69 18 121/68     1255  D/C'd from [facility] ambulatory and in no distress accompanied by daughter. Next appointment is 1/26/18 at 0900.

## 2018-01-19 NOTE — PROGRESS NOTES
Problem: Chemotherapy Treatment  Goal: *Chemotherapy regimen followed  Outcome: Progressing Towards Goal  Pt receiving Gemzar C4 D1

## 2018-01-26 NOTE — PROGRESS NOTES
[facility] Progress Note    Date: 2018    Name: Sheila Simon    MRN: [id number]         : 1945    Mr. Liv Olson Arrived ambulatory and in no distress for cycle 4 day 8 of Gemzar regimen. Assessment was completed, no acute issues at this time, no new complaints voiced. R chest port accessed without difficulty, labs drawn and in process. Chemotherapy Flowsheet 2018   Cycle C4D8   Date 2018   Drug / Regimen Gemzar   Pre Meds -   Notes -       Mr. Bhardwaj's vitals were reviewed. Patient Vitals for the past 12 hrs:   Temp Pulse Resp BP   18 1233 97.7 °F (36.5 °C) 63 18 108/58   18 0921 97.7 °F (36.5 °C) 74 18 127/71     Lab results were obtained and reviewed. Recent Results (from the past 12 hour(s))   CBC WITH AUTOMATED DIFF    Collection Time: 18  9:23 AM   Result Value Ref Range    WBC 5.7 4.1 - 11.1 K/uL    RBC 3.08 (L) 4.10 - 5.70 M/uL    HGB 9.4 (L) 12.1 - 17.0 g/dL    HCT 30.4 (L) 36.6 - 50.3 %    MCV 98.7 80.0 - 99.0 FL    MCH 30.5 26.0 - 34.0 PG    MCHC 30.9 30.0 - 36.5 g/dL    RDW 17.8 (H) 11.5 - 14.5 %    PLATELET 659 (H) 539 - 400 K/uL    MPV 9.8 8.9 - 12.9 FL    NRBC 0.0 0  WBC    ABSOLUTE NRBC 0.00 0.00 - 0.01 K/uL    NEUTROPHILS 39 32 - 75 %    LYMPHOCYTES 26 12 - 49 %    MONOCYTES 30 (H) 5 - 13 %    EOSINOPHILS 5 0 - 7 %    BASOPHILS 0 0 - 1 %    IMMATURE GRANULOCYTES 0 0.0 - 0.5 %    ABS. NEUTROPHILS 2.2 1.8 - 8.0 K/UL    ABS. LYMPHOCYTES 1.5 0.8 - 3.5 K/UL    ABS. MONOCYTES 1.7 (H) 0.0 - 1.0 K/UL    ABS. EOSINOPHILS 0.3 0.0 - 0.4 K/UL    ABS. BASOPHILS 0.0 0.0 - 0.1 K/UL    ABS. IMM. GRANS. 0.0 0.00 - 0.04 K/UL    DF MANUAL      RBC COMMENTS ANISOCYTOSIS  2+        RBC COMMENTS OVALOCYTES  PRESENT        RBC COMMENTS POLYCHROMASIA  PRESENT        RBC COMMENTS SCHISTOCYTES  PRESENT         Pre-medications  were administered as ordered and chemotherapy was initiated.    Decadron IVP  Gemzar IV      Mr. Liv Olson tolerated treatment well and [facility] at  1240. He is to return on 12/9/18 at 0900 for his next appointment.     Keagan Lynne  January 26, 2018

## 2018-02-09 NOTE — PROGRESS NOTES
Pt has been screened for all eligibility/ineligibility criteria and has been determined eligible for this protocol. Informed consent was reviewed by RN with patient prior to signing. Possible side effects were discussed in detail, with patient being advised to inform RN or Dr. Marita Joy immediately in the event of any side effects once drug is started. All questions were answered adequately by RN or Dr. Marita Joy. Patient signed consent today for study  screening step. A copy of ICF given to patient. No procedures done prior to patient signing consent. No additional questions at this time. Biopsy to be scheduled. Baseline AEs- neuropathy gr 1, fatigue gr 1.

## 2018-02-09 NOTE — PROGRESS NOTES
[facility] at [address] 2329 [facility] 1007 [facility]  [place]: [phone]  F: [phone]      Reason for Visit:   Juan José June is a 67 y.o. male who is seen for follow up of lung cancer. Treatment History:   · CXR 7/11/2017: Mediastinal lymphadenopathy with left hilar mass. · CT Chest 7/14/2017: Bulky mediastinal and left hilar lymphadenopathy. Bilateral pulmonary masses and nodules  · PET-CT 7/24/2017: Right parietal mass. Hypermetabolic right supraclavicular, right paratracheal, AP window, prevascular, precarinal, subcarinal and left hilar lymphadenopathy. Hypermetabolic pulmonary nodules bilaterally. · MRI Brain 7/24/2017: Junction right posterior temporal lobe and occipital lobe 2.7 cm mass likely metastasis from lung cancer. No additional acute abnormalities  · FNA supraclavicular node 7/28/2017: Metastatic squamous cell carcinoma, PD-L1 5%, BRAF negative, EGFR negative, ALK & ROS-1 negative   · Stage IV Non-small cell lung cancer (Squamous Cell)  · Gamma knife by Dr. Jeremy Floyd 8/15/2017   · Palliative chemotherapy with carboplatin and gemcitabine beginning 8/25/2017 - 11/3/2017  · Maintenance gemcitabine x4 cycles from 11/17/2017 to 1/26/2018    History of Present Illness:   Here today for follow up. He states he continues to feel pretty well. A little more fatigue recently but not severe. No coughing or shortness of breath. Reports compliance with apixaban, denies bleeding. Denies  fevers, chills, sweats. He is accompanied by his daughter today. He smoked 1ppd for 50+ years but quit at diagnosis. He lives alone in [address] [place] [address]. His son lives about 15 minutes from him. His daughter lives about 30 minutes away. PAST HISTORY: The following sections were reviewed and updated in the EMR as appropriate: PMH, SH, FH, Medications, Allergies.       Allergies   Allergen Reactions    Lisinopril Angioedema    Hydrochlorothiazide Hives and Swelling    Sulfa (Sulfonamide Antibiotics) Hives      Review of Systems: A complete review of systems was obtained, reviewed, and scanned into the EMR. Pertinent findings reviewed above. Physical Exam:     Visit Vitals    /69 (BP 1 Location: Right arm, BP Patient Position: Sitting)    Pulse 78    Temp 97.5 °F (36.4 °C) (Oral)    Resp 18    Ht 5' 8.9\" (1.75 m)    Wt 185 lb (83.9 kg)    SpO2 97%    BMI 27.4 kg/m2     ECOG PS: 1  General: No distress  Eyes: PERRLA, anicteric sclerae  HENT: Atraumatic, OP clear  Neck: Supple  Lymphatic: No cervical, supraclavicular, or inguinal adenopathy  Respiratory: CTAB, normal respiratory effort  CV: Normal rate, regular rhythm, no murmurs, no peripheral edema  GI: Soft, nontender, nondistended, no masses, no hepatomegaly, no splenomegaly  MS: Normal gait and station. Digits without clubbing or cyanosis. Skin: No rashes, ecchymoses, or petechiae. Normal temperature, turgor, and texture. Psych: Alert, oriented, appropriate affect, normal judgment/insight    Results:     Lab Results   Component Value Date/Time    WBC 8.2 02/09/2018 09:27 AM    HGB 10.2 (L) 02/09/2018 09:27 AM    HCT 32.3 (L) 02/09/2018 09:27 AM    PLATELET 054 (H) 40/11/9223 09:27 AM    MCV 96.1 02/09/2018 09:27 AM    ABS. NEUTROPHILS 4.0 02/09/2018 09:27 AM     Lab Results   Component Value Date/Time    Sodium 130 (L) 02/09/2018 09:27 AM    Potassium 4.7 02/09/2018 09:27 AM    Chloride 97 02/09/2018 09:27 AM    CO2 27 02/09/2018 09:27 AM    Glucose 111 (H) 02/09/2018 09:27 AM    BUN 7 02/09/2018 09:27 AM    Creatinine 0.94 02/09/2018 09:27 AM    GFR est AA >60 02/09/2018 09:27 AM    GFR est non-AA >60 02/09/2018 09:27 AM    Calcium 8.8 02/09/2018 09:27 AM     Lab Results   Component Value Date/Time    Bilirubin, total 0.3 02/09/2018 09:27 AM    ALT (SGPT) 29 02/09/2018 09:27 AM    AST (SGOT) 27 02/09/2018 09:27 AM    Alk.  phosphatase 83 02/09/2018 09:27 AM    Protein, total 7.5 02/09/2018 09:27 AM Albumin 2.9 (L) 02/09/2018 09:27 AM    Globulin 4.6 (H) 02/09/2018 09:27 AM       CTA Chest 9/15/2017:   1. No pulmonary embolus. 2. Bilateral lung masses and mediastinal nodes decreased in size. 3. Atherosclerosis with coronary artery calcifications    Venous doppler 9/15/2017: Bilateral lower extremity venous duplex positive for deep  venous thrombosis in right posterior tibial vein, right peroneal vein, and the left posterior tibial vein. There are superficial venous  thrombosis in bilateral greater saphenous veins    CT C/A/P 11/16/2017: No significant interval change in size of bilateral pulmonary nodules as described above. Slight interval decrease in size of mediastinal lymph nodes. No evidence of metastatic disease in the abdomen or pelvis. CT C/A/P 2/5/2018: Findings are consistent with interval progression of metastatic disease in the chest.  And enlarged pulmonary nodules. 2.7 x 1.8 cm nodule in the right upper lobe on 2-23 previously 1.5 x 3.0 cm. 4.4 x 2.1 cm nodule left upper lobe previously 3.3 x 3.0 cm. 2-22 12 x 10 mm nodule in the right lower lobe previously 4 mm 2-34. 14 x 14 mm nodule-like right lower lobe previously 6 mm 2-40. Right and left upper lobe pulmonary nodules as well. Mediastinal disease burden is not significantly changed. No evidence of metastatic disease in the abdomen or pelvis. Assessment:   1) Metastatic non-small cell lung cancer (squamous cell)  EGFR, ALK, ROS1, BRAF negative  He has disease within his chest and brain. His cancer is not curable and management is with palliative intent. He is s/p gamma knife to CNS disease. He has completed palliative chemotherapy with carboplatin and gemcitabine given every 3 weeks x 4 cycles, and most recently has been on maintenance gemcitabine. He continues feeling well and has been tolerating therapy very well. Unfortunately, his recent CT shows progression of disease within his chest; discussed in detail with patient today. He previously has consented to Lung-MAP trial but unfortunately there was insufficient tissue for the necessary testing. We discussed that in order to proceed with clinical trial we will need to re-biopsy for additional tissue. After this discussion, he is agreeable. We will plan for repeat biopsy and see him back in a few weeks to discuss results of testing. 2) Brain metastasis  S/P gamma knife. Follow up with Dr. Javi Amador for surveillance MRI brain. Recent MRI improved per Dr. Jaiv Amador. 3) Atrial fibrillation, CHF  Cardiology following. On apixaban. This will need to be held for 2 days prior to biopsy. 4) DVT 9/15/2017  Previously on Lovenox BID but unable to tolerate injections. Continue Apixaban 5mg BID. I recommend he continue this long term in the setting of malignancy. This will need to be held for 2 days prior to biopsy. 5) Weight loss  Improving. Likely due to cancer. Monitor. 6) Neuropathy, chemotherapy induced  Secondary to prior cisplatin. Continue Cymbalta 30mg daily. Monitor and increase dose if needed.     Plan:     D/C Gemcitabine   Lung biopsy for clinical trial  Continue Apixaban 5mg BID, holding as above for biopsy  Continue Cymbalta 30 mg daily  Follow up with Dr. Javi Amador as planned  Follow up with cardiology as planned  Return to clinic TBD pending biopsy        Signed By: Hipolito Mark MD

## 2018-02-09 NOTE — PROGRESS NOTES
[facility] Progress Note    0915  Patient arrived to [facility] with cane accompanied by family for scheduled Gemzar  Cycle 5 Day 1. PT denies new complaints. R port  Accessed with  0.75 in. kellogg needle, labs drawn, port flushed, positive blood return noted. Vitals Blood pressure 123/75, pulse 81, temperature 97.1 °F (36.2 °C), resp. rate 12, height 5' 8.9\" (1.75 m), weight 83.6 kg (184 lb 3.2 oz), SpO2 97 %. 0930  PT sent to MD .  1045  PT returned from MD office, [address] [place] [address] D/C    labs   Recent Results (from the past 12 hour(s))   CBC WITH AUTOMATED DIFF    Collection Time: 02/09/18  9:27 AM   Result Value Ref Range    WBC 8.2 4.1 - 11.1 K/uL    RBC 3.36 (L) 4.10 - 5.70 M/uL    HGB 10.2 (L) 12.1 - 17.0 g/dL    HCT 32.3 (L) 36.6 - 50.3 %    MCV 96.1 80.0 - 99.0 FL    MCH 30.4 26.0 - 34.0 PG    MCHC 31.6 30.0 - 36.5 g/dL    RDW 17.3 (H) 11.5 - 14.5 %    PLATELET 132 (H) 393 - 400 K/uL    MPV 9.7 8.9 - 12.9 FL    NRBC 0.0 0  WBC    ABSOLUTE NRBC 0.00 0.00 - 0.01 K/uL    NEUTROPHILS 50 32 - 75 %    LYMPHOCYTES 18 12 - 49 %    MONOCYTES 24 (H) 5 - 13 %    EOSINOPHILS 6 0 - 7 %    BASOPHILS 1 0 - 1 %    IMMATURE GRANULOCYTES 1 (H) 0.0 - 0.5 %    ABS. NEUTROPHILS 4.0 1.8 - 8.0 K/UL    ABS. LYMPHOCYTES 1.5 0.8 - 3.5 K/UL    ABS. MONOCYTES 2.0 (H) 0.0 - 1.0 K/UL    ABS. EOSINOPHILS 0.5 (H) 0.0 - 0.4 K/UL    ABS. BASOPHILS 0.1 0.0 - 0.1 K/UL    ABS. IMM.  GRANS. 0.1 (H) 0.00 - 0.04 K/UL    DF SMEAR SCANNED      RBC COMMENTS ANISOCYTOSIS  1+        RBC COMMENTS OVALOCYTES  PRESENT        RBC COMMENTS TEARDROP CELLS  PRESENT       METABOLIC PANEL, COMPREHENSIVE    Collection Time: 02/09/18  9:27 AM   Result Value Ref Range    Sodium 130 (L) 136 - 145 mmol/L    Potassium 4.7 3.5 - 5.1 mmol/L    Chloride 97 97 - 108 mmol/L    CO2 27 21 - 32 mmol/L    Anion gap 6 5 - 15 mmol/L    Glucose 111 (H) 65 - 100 mg/dL    BUN 7 6 - 20 MG/DL    Creatinine 0.94 0.70 - 1.30 MG/DL BUN/Creatinine ratio 7 (L) 12 - 20      GFR est AA >60 >60 ml/min/1.73m2    GFR est non-AA >60 >60 ml/min/1.73m2    Calcium 8.8 8.5 - 10.1 MG/DL    Bilirubin, total 0.3 0.2 - 1.0 MG/DL    ALT (SGPT) 29 12 - 78 U/L    AST (SGOT) 27 15 - 37 U/L    Alk. phosphatase 83 45 - 117 U/L    Protein, total 7.5 6.4 - 8.2 g/dL    Albumin 2.9 (L) 3.5 - 5.0 g/dL    Globulin 4.6 (H) 2.0 - 4.0 g/dL    A-G Ratio 0.6 (L) 1.1 - 2.2           Next appointment,  2/16/18 @ 0900    1050 VAD maintained +blood return throughout [address] [place] [address] and prior to D/C. PT D/C from [facility] ambulatory in no distress, accompanied by family.

## 2018-02-14 NOTE — IP AVS SNAPSHOT
[address] [id number] Patient: Kelly Yip MRN: [id number] :1945 About your hospitalization You were admitted on:  2018 You last received care in the:  [facility] CT You were discharged on:  2018 Why you were hospitalized Your primary diagnosis was:  Not on File Follow-up Information None Discharge Orders None A check jeff indicates which time of day the medication should be taken. My Medications ASK your doctor about these medications Instructions Each Dose to Equal  
 Morning Noon Evening Bedtime  
 apixaban 5 mg tablet Commonly known as:  Clarita Mace Your last dose was: Your next dose is: Take 1 Tab by mouth two (2) times a day. 5 mg  
    
   
   
   
  
 aspirin delayed-release 81 mg tablet Your last dose was: Your next dose is: Take 81 mg by mouth daily. 81 mg  
    
   
   
   
  
 dilTIAZem  mg ER capsule Commonly known as:  CARDIZEM CD Your last dose was: Your next dose is: Take 1 Cap by mouth daily. Indications: VENTRICULAR RATE CONTROL IN ATRIAL FIBRILLATION  
 240 mg  
    
   
   
   
  
 diphenhydrAMINE 25 mg tablet Commonly known as:  BENADRYL Your last dose was: Your next dose is: Take 25 mg by mouth nightly as needed for Sleep. 25 mg DULoxetine 30 mg capsule Commonly known as:  CYMBALTA Your last dose was: Your next dose is: Take 1 Cap by mouth daily. 30 mg  
    
   
   
   
  
 lidocaine-prilocaine topical cream  
Commonly known as:  EMLA Your last dose was: Your next dose is:    
   
   
 Apply  to affected area as needed for Pain (Apply 30-60 min. prior to having your port accessed). magic mouthwash solution Your last dose was: Your next dose is:    
   
   
 Swish and spit 15-30 mL every 2-4 hours as needed for mouth pain. May swallow for throat pain. Magic mouth wash  Maalox Lidocaine 2% viscous  Diphenhydramine oral solution   Pharmacy to mix equal portions of ingredients to a total volume as indicated in the dispense amount. metoprolol succinate 25 mg XL tablet Commonly known as:  TOPROL-XL Your last dose was: Your next dose is: Take 1 Tab by mouth daily. 25 mg MIRALAX 17 gram packet Generic drug:  polyethylene glycol Your last dose was: Your next dose is: Take 17 g by mouth daily. 17 g  
    
   
   
   
  
 nicotine 14 mg/24 hr patch Commonly known as:  Shelton Roldan Your last dose was: Your next dose is:    
   
   
 1 Patch by TransDERmal route every twenty-four (24) hours. 1 Patch  
    
   
   
   
  
 ondansetron hcl 8 mg tablet Commonly known as:  Cathryne Darke Your last dose was: Your next dose is: Take 1 Tab by mouth every eight (8) hours as needed for Nausea. 8 mg  
    
   
   
   
  
 prochlorperazine 10 mg tablet Commonly known as:  COMPAZINE Your last dose was: Your next dose is: Take 1 Tab by mouth every six (6) hours as needed for Nausea. 10 mg  
    
   
   
   
  
 tamsulosin 0.4 mg capsule Commonly known as:  FLOMAX Your last dose was: Your next dose is: Take 1 Cap by mouth daily. 0.4 mg XYZAL 5 mg tablet Generic drug:  levocetirizine Your last dose was: Your next dose is: Take 5 mg by mouth daily as needed for Allergies. 5 mg Discharge Instructions [address] Sedation for a Medical Procedure: Care Instructions Your Care Instructions For a minor procedure or surgery, you will get a sedative to help you relax. This drug will make you sleepy. It is usually given in a vein (by IV). A shot may also be used to numb the area. If you had local anesthesia, you may feel some pain and discomfort as it wears off. If you have pain, don't be afraid to say so. Pain medicine works better if you take it before the pain gets bad. Common side effects from sedation include: · Feeling sleepy. (Your doctors and nurses will make sure you are not too sleepy to go home.) · Nausea and vomiting. This usually does not last long. · Feeling tired. Follow-up care is a key part of your treatment and safety. Be sure to make and go to all appointments, and call your doctor if you are having problems. It's also a good idea to know your test results and keep a list of the medicines you take. How can you care for yourself at home? Activity ? · Don't do anything for 24 hours that requires attention to detail. It takes time for the medicine effects to completely wear off.  
? · For your safety, you should not drive or operate any machinery that could be dangerous until the medicine wears off and you can think clearly and react easily. ? · Rest when you feel tired. Getting enough sleep will help you recover. Diet ? · You can eat your normal diet, unless your doctor gives you other instructions. If your stomach is upset, try clear liquids and bland, low-fat foods like plain toast or rice. ? · Drink plenty of fluids (unless your doctor tells you not to). ? · Don't drink alcohol for 24 hours. Medicines ? · Be safe with medicines. Read and follow all instructions on the label. ¨ If the doctor gave you a prescription medicine for pain, take it as prescribed. ¨ If you are not taking a prescription pain medicine, ask your doctor if you can take an over-the-counter medicine. ? · If you think your pain medicine is making you sick to your stomach: 
¨ Take your medicine after meals (unless your doctor has told you not to). ¨ Ask your doctor for a different pain medicine. When should you call for help? Call 911 anytime you think you may need emergency care. For example, call if: 
? · You have severe trouble breathing. ? · You passed out (lost consciousness). ?Call your doctor now or seek immediate medical care if: 
? · You have trouble breathing. ? · You have ongoing or worsening nausea or vomiting. ? · You have a fever. ? · You have a new or worse headache. ? · The medicine is not wearing off and you can't think clearly. ? Watch closely for changes in your health, and be sure to contact your doctor if: 
? · You do not get better as expected. Where can you learn more? Go to http://larissa-natalie.info/. Enter N842 in the search box to learn more about \"Sedation for a Medical Procedure: Care Instructions. \" Current as of: August 14, 2016 Content Version: 11.4 © 0423-0981 [employer]. Care instructions adapted under license by [employer] (which disclaims liability or warranty for this information). If you have questions about a medical condition or this instruction, always ask your healthcare professional. [address] [address] any warranty or liability for your use of this information. BIOPSY DISCHARGE INSTRUCTIONS General Instructions: A biopsy is the removal of a small piece of tissue for microscopic examination or testing. Healthy tissue can be obtained for the purpose of tissue-type matching for transplants. Unhealthy tissues are more commonly biopsied to diagnose disease. Lung Biopsy: A needle lung biopsy is performed when there is a mass discovered in the lung area. The most serious risk is infection, bleeding, and pneumothorax (a collapsed lung).  Signs of pneumothorax include shortness of breath, rapid heart rate, and blueness of the skin. If any of these occur, call 911. Liver Biopsy: This test helps detect cancer, infections, and the cause of an enlargement of the liver or elevated liver enzymes. It also helps to diagnose a number of liver diseases. The pain associated with the procedure may be felt in the shoulder. The risks include internal bleeding, pneumothorax, and injury to the surrounding organs. Renal Biopsy: This procedure is sometimes done to evaluate a transplanted kidney. It is also used to evaluate unexplained decrease in kidney function, blood, or protein in the urine. You may see bright red blood in the urine the first 24 hours after the test. If the bleeding lasts longer, you need to call your doctor. There is a risk of infection and bleeding into the muscle, which may cause soreness. Spinal Biopsy: This test is sometimes done in conjunction with other procedures. Your back will be sore, as we are taking a small sample of bone, which is slightly more difficult to sample than tissue. General Biopsy: 
   A mass can grow in any area of the body, and we are taking a specimen as ordered by your doctor. The risks are the same. They include bleeding, pain, and infection. Home Care Instructions: You may resume your regular diet and medication regimen. Do not drink alcohol, drive, or make any important legal decisions in the next 24 hours. Do not lift anything heavier than a gallon of milk until the soreness goes away. You may use over the counter acetaminophen or ibuprofen for the soreness. You may apply an ice pack to the affected area for 20-30 minutes at time for the first 24 hours. After that, you may apply a heat pack. Call If: You should call your Physician and/or the Radiology Nurse if you have any questions or concerns about the biopsy site.  Call if you should have increased pain, fever, redness, drainage, or bleeding more than a small spot on the bandage. Follow-Up Instructions: Please see your ordering doctor as he/she has requested. To Reach Us:   
 
 
Patient Signature: 
Date: 2/14/2018 Discharging Nurse: Rah Neil RN Introducing [facility]! Dear Noel Morris: Thank you for requesting a Sportme[employer] account. Our records indicate that you already have an active Sportmeets account. You can access your account anytime at [email or web address] Did you know that you can access your hospital and ER discharge instructions at any time in Sportmeets? You can also review all of your test results from your hospital stay or ER visit. Additional Information If you have questions, please visit the Frequently Asked Questions section of the Sportmeets website at [email or web address] Remember, Sportmeets is NOT to be used for urgent needs. For medical emergencies, dial 911. Now available from your iPhone and Android! Unresulted Labs-Please follow up with your PCP about these lab tests Order Current Status CT BX LUNG NDL PERC In process Providers Seen During Your Hospitalization Provider Specialty Primary office phone Pablo Hall NP Hematology and Oncology [phone] Your Primary Care Physician (PCP) Primary Care Physician Office Phone Office Fax Ignacio Angel [phone] You are allergic to the following Allergen Reactions Lisinopril Angioedema Hydrochlorothiazide Hives Swelling Sulfa (Sulfonamide Antibiotics) Hives Recent Documentation Height Weight BMI Smoking Status 1.702 m 83 kg 28.66 kg/m2 Former Smoker Emergency Contacts Name Discharge Info Relation Home Work Mobile [place]ie [place] CAREGIVER [3] Daughter [21] [phone] Kmii Smith  Child [2] [phone] Patient Belongings The following personal items are in your possession at time of discharge: 
                             
 
  
  
 Please provide this summary of care documentation to your next provider. Signatures-by signing, you are acknowledging that this After Visit Summary has been reviewed with you and you have received a copy. Patient Signature:  ____________________________________________________________ Date:  ____________________________________________________________  
  
[facility] Provider Signature:  ____________________________________________________________ Date:  ____________________________________________________________

## 2018-02-14 NOTE — DISCHARGE INSTRUCTIONS
[address]     Sedation for a Medical Procedure: Care Instructions  Your Care Instructions    For a minor procedure or surgery, you will get a sedative to help you relax. This drug will make you sleepy. It is usually given in a vein (by IV). A shot may also be used to numb the area. If you had local anesthesia, you may feel some pain and discomfort as it wears off. If you have pain, don't be afraid to say so. Pain medicine works better if you take it before the pain gets bad. Common side effects from sedation include:  · Feeling sleepy. (Your doctors and nurses will make sure you are not too sleepy to go home.)  · Nausea and vomiting. This usually does not last long. · Feeling tired. Follow-up care is a key part of your treatment and safety. Be sure to make and go to all appointments, and call your doctor if you are having problems. It's also a good idea to know your test results and keep a list of the medicines you take. How can you care for yourself at home? Activity  ? · Don't do anything for 24 hours that requires attention to detail. It takes time for the medicine effects to completely wear off.   ? · For your safety, you should not drive or operate any machinery that could be dangerous until the medicine wears off and you can think clearly and react easily. ? · Rest when you feel tired. Getting enough sleep will help you recover. Diet  ? · You can eat your normal diet, unless your doctor gives you other instructions. If your stomach is upset, try clear liquids and bland, low-fat foods like plain toast or rice. ? · Drink plenty of fluids (unless your doctor tells you not to). ? · Don't drink alcohol for 24 hours. Medicines  ? · Be safe with medicines. Read and follow all instructions on the label. ¨ If the doctor gave you a prescription medicine for pain, take it as prescribed.   ¨ If you are not taking a prescription pain medicine, ask your doctor if you can take an over-the-counter medicine. ? · If you think your pain medicine is making you sick to your stomach:  ¨ Take your medicine after meals (unless your doctor has told you not to). ¨ Ask your doctor for a different pain medicine. When should you call for help? Call 911 anytime you think you may need emergency care. For example, call if:  ? · You have severe trouble breathing. ? · You passed out (lost consciousness). ?Call your doctor now or seek immediate medical care if:  ? · You have trouble breathing. ? · You have ongoing or worsening nausea or vomiting. ? · You have a fever. ? · You have a new or worse headache. ? · The medicine is not wearing off and you can't think clearly. ? Watch closely for changes in your health, and be sure to contact your doctor if:  ? · You do not get better as expected. Where can you learn more? Go to http://larissa-natalie.info/. Enter R089 in the search box to learn more about \"Sedation for a Medical Procedure: Care Instructions. \"  Current as of: August 14, 2016  Content Version: 11.4  © 9261-7804 [employer]. Care instructions adapted under license by [employer] (which disclaims liability or warranty for this information). If you have questions about a medical condition or this instruction, always ask your healthcare professional. [address] [address] any warranty or liability for your use of this information. BIOPSY DISCHARGE INSTRUCTIONS    General Instructions:     A biopsy is the removal of a small piece of tissue for microscopic examination or testing. Healthy tissue can be obtained for the purpose of tissue-type matching for transplants. Unhealthy tissues are more commonly biopsied to diagnose disease. Lung Biopsy:     A needle lung biopsy is performed when there is a mass discovered in the lung area. The most serious risk is infection, bleeding, and pneumothorax (a collapsed lung).  Signs of pneumothorax include shortness of breath, rapid heart rate, and blueness of the skin. If any of these occur, call 911. Liver Biopsy: This test helps detect cancer, infections, and the cause of an enlargement of the liver or elevated liver enzymes. It also helps to diagnose a number of liver diseases. The pain associated with the procedure may be felt in the shoulder. The risks include internal bleeding, pneumothorax, and injury to the surrounding organs. Renal Biopsy: This procedure is sometimes done to evaluate a transplanted kidney. It is also used to evaluate unexplained decrease in kidney function, blood, or protein in the urine. You may see bright red blood in the urine the first 24 hours after the test. If the bleeding lasts longer, you need to call your doctor. There is a risk of infection and bleeding into the muscle, which may cause soreness. Spinal Biopsy: This test is sometimes done in conjunction with other procedures. Your back will be sore, as we are taking a small sample of bone, which is slightly more difficult to sample than tissue. General Biopsy:     A mass can grow in any area of the body, and we are taking a specimen as ordered by your doctor. The risks are the same. They include bleeding, pain, and infection. Home Care Instructions: You may resume your regular diet and medication regimen. Do not drink alcohol, drive, or make any important legal decisions in the next 24 hours. Do not lift anything heavier than a gallon of milk until the soreness goes away. You may use over the counter acetaminophen or ibuprofen for the soreness. You may apply an ice pack to the affected area for 20-30 minutes at time for the first 24 hours. After that, you may apply a heat pack. Call If: You should call your Physician and/or the Radiology Nurse if you have any questions or concerns about the biopsy site.  Call if you should have increased pain, fever, redness, drainage, or bleeding more than a small spot on the bandage. Follow-Up Instructions: Please see your ordering doctor as he/she has requested.     To Reach Us:        Patient Signature:  Date: 2/14/2018  Discharging Nurse: Leona Edwards RN

## 2018-02-14 NOTE — PROGRESS NOTES
0825 Patient brought back to [facility] ambulating with cane. Daughter,ride in waiting area. NPO confirmed. Patient placed in gown and port accessed and blood sent to lab. Airway and lungs/heart assessed. S1S2, Lungs with few fine crackles to left base posteriorly. . Baseline VSS. Consent obtained. 1 Dr. Mj Jack over to consent patient with review of risks and benefits. 56 CT notified of readiness, will call us when ready. 1035 Patient taken to CT and placed supine on table and connected to the monitor, IV/O2. Time out 1120 Start time 1130 End Time 1148. Versed 5 mg and Fentanyl 100 mcg given for sedation. Port flushed with Heparin 500 units and de-access. Left arm PIV started. Patient tolerated well. Dressing C/D/I/ to right anterior chest. CxR ordered for 1245 per Dr. Mj Jack. 1155 Patient brought back to [facility] and placed back on the monitor. Fluids and crackers provided. 26 Patient's daughter brought to bedside. 1245 X-ray in to take portable CxR  1300 Dr. Mj Jack read chest x ray and OK'ed discharge to home with daughter.  Patient dressed and IV removed and disconnected from monitor. Discharge instructions reviewed with verbalization of understanding. Paper copy given and signed as received in computer. 1318 Patient taken out via wheelchair to curbside to awatiing ride.

## 2018-02-15 NOTE — H&P
Radiology History and Physical    Patient: Juan José June 67 y.o. male       Chief Complaint: No chief complaint on file. History of Present Illness: lung mass    History:    Past Medical History:   Diagnosis Date    Anemia     Cardiomyopathy ([facility]     mild LV regional/global dysfunction, likely ischemic etiology    Coronary artery calcification seen on CT scan     Dysfunction, erectile     Hyperlipidemia 5/18/2010    Hypertension     Metastatic lung cancer (metastasis from lung to other site) ([facility] [facility]     brain    Paroxysmal atrial flutter ([facility] [facility]      Family History   Problem Relation Age of Onset    Stroke Father     Diabetes Sister     Diabetes Brother     Heart Disease Sister      Social History     Social History    Marital status: SINGLE     Spouse name: N/A    Number of children: N/A    Years of education: N/A     Occupational History    Not on file. Social History Main Topics    Smoking status: Former Smoker     Packs/day: 1.00     Years: 45.00     Types: Cigarettes    Smokeless tobacco: Never Used    Alcohol use 1.5 oz/week     3 Cans of beer per week    Drug use: No    Sexual activity: Not on file     Other Topics Concern    Not on file     Social History Narrative       Allergies: Allergies   Allergen Reactions    Lisinopril Angioedema    Hydrochlorothiazide Hives and Swelling    Sulfa (Sulfonamide Antibiotics) Hives       Current Medications:  Current Outpatient Prescriptions   Medication Sig    apixaban (ELIQUIS) 5 mg tablet Take 1 Tab by mouth two (2) times a day.  metoprolol succinate (TOPROL-XL) 25 mg XL tablet Take 1 Tab by mouth daily.  dilTIAZem CD (CARDIZEM CD) 240 mg ER capsule Take 1 Cap by mouth daily. Indications: VENTRICULAR RATE CONTROL IN ATRIAL FIBRILLATION    tamsulosin (FLOMAX) 0.4 mg capsule Take 1 Cap by mouth daily.  DULoxetine (CYMBALTA) 30 mg capsule Take 1 Cap by mouth daily.     diphenhydrAMINE (BENADRYL) 25 mg tablet Take 25 mg by mouth nightly as needed for Sleep.  levocetirizine (XYZAL) 5 mg tablet Take 5 mg by mouth daily as needed for Allergies.  nicotine (NICODERM CQ) 14 mg/24 hr patch 1 Patch by TransDERmal route every twenty-four (24) hours.  polyethylene glycol (MIRALAX) 17 gram packet Take 17 g by mouth daily.  magic mouthwash solution Swish and spit 15-30 mL every 2-4 hours as needed for mouth pain. May swallow for throat pain. Magic mouth wash   Maalox  Lidocaine 2% viscous   Diphenhydramine oral solution     Pharmacy to mix equal portions of ingredients to a total volume as indicated in the dispense amount.  prochlorperazine (COMPAZINE) 10 mg tablet Take 1 Tab by mouth every six (6) hours as needed for Nausea.  lidocaine-prilocaine (EMLA) topical cream Apply  to affected area as needed for Pain (Apply 30-60 min. prior to having your port accessed).  ondansetron hcl (ZOFRAN) 8 mg tablet Take 1 Tab by mouth every eight (8) hours as needed for Nausea.  aspirin delayed-release 81 mg tablet Take 81 mg by mouth daily. Current Facility-Administered Medications   Medication Dose Route Frequency    lidocaine (PF) (XYLOCAINE) 10 mg/mL (1 %) injection 1 mL  10 mg IntraDERMal RAD PRN        Physical Exam:  Blood pressure 104/61, pulse 91, resp. rate 24, height 5' 7\" (1.702 m), weight 83 kg (183 lb), SpO2 94 %. GENERAL: alert, cooperative, no distress, appears stated age, LUNG: clear to auscultation bilaterally, HEART: regular rate and rhythm, S1, S2 normal, no murmur, click, rub or gallop      Alerts:    Hospital Problems  Date Reviewed: 2/9/2018    None          Laboratory:      Recent Labs      02/14/18   0845   INR  1.1         Plan of Care/Planned Procedure:  Risks, benefits, and alternatives reviewed with patient and he agrees to proceed with the procedure.        Dorcas Bran MD

## 2018-02-15 NOTE — TELEPHONE ENCOUNTER
Patients daughter called returning Rafy Mendosa call. Patients daughter stated that the patient is feeling fine after the biopsy yesterday and just wanted to let Ivette know.  CB with any questions at [phone]

## 2018-03-15 NOTE — PROGRESS NOTES
[facility] [place] [address] [address] [facility] [address] [place] [id number] [facility] [place] Ruelas [place]: [phone]  F: [phone]      Reason for Visit:   Bernardino Holt is a 67 y.o. male who is seen for follow up of lung cancer. Treatment History:   · CXR 7/11/2017: Mediastinal lymphadenopathy with left hilar mass. · CT Chest 7/14/2017: Bulky mediastinal and left hilar lymphadenopathy. Bilateral pulmonary masses and nodules  · PET-CT 7/24/2017: Right parietal mass. Hypermetabolic right supraclavicular, right paratracheal, AP window, prevascular, precarinal, subcarinal and left hilar lymphadenopathy. Hypermetabolic pulmonary nodules bilaterally. · MRI Brain 7/24/2017: Junction right posterior temporal lobe and occipital lobe 2.7 cm mass likely metastasis from lung cancer. No additional acute abnormalities  · FNA supraclavicular node 7/28/2017: Metastatic squamous cell carcinoma, PD-L1 5%, BRAF negative, EGFR negative, ALK & ROS-1 negative   · Stage IV Non-small cell lung cancer (Squamous Cell)  · Gamma knife by Dr. Britta Mohs 8/15/2017   · Palliative chemotherapy with carboplatin and gemcitabine beginning 8/25/2017 - 11/3/2017  · Maintenance gemcitabine x4 cycles from 11/17/2017 to 1/26/2018    History of Present Illness:   Here today for follow up. He states he continues to feel good. Denies cough or shortness of breath. Fatigue stable. Reports compliance with apixaban, denies bleeding. Denies  fevers, chills, sweats. He is accompanied by his daughter today. He smoked 1ppd for 50+ years but quit at diagnosis. He lives alone in [address] [place] [address]. His son lives about 15 minutes from him. His daughter lives about 30 minutes away. PAST HISTORY: The following sections were reviewed and updated in the EMR as appropriate: PMH, SH, FH, Medications, Allergies.       Allergies   Allergen Reactions    Lisinopril Angioedema    Hydrochlorothiazide Hives and Swelling    Sulfa (Sulfonamide Antibiotics) Hives Review of Systems: A complete review of systems was obtained, reviewed, and scanned into the EMR. Pertinent findings reviewed above. Physical Exam:     Visit Vitals    /63 (BP 1 Location: Right arm, BP Patient Position: Sitting)    Pulse 70    Temp 98 °F (36.7 °C) (Oral)    Resp 18    Ht 5' 8\" (1.727 m)    Wt 182 lb (82.6 kg)    SpO2 99%    BMI 27.67 kg/m2     ECOG PS: 1  General: No distress  Eyes: PERRLA, anicteric sclerae  HENT: Atraumatic, OP clear  Neck: Supple  Lymphatic: No cervical, supraclavicular, or inguinal adenopathy  Respiratory: CTAB, normal respiratory effort  CV: Normal rate, regular rhythm, no murmurs, no peripheral edema  GI: Soft, nontender, nondistended, no masses, no hepatomegaly, no splenomegaly  MS: Normal gait and station, walking with cane. Digits without clubbing or cyanosis. Skin: No rashes, ecchymoses, or petechiae. Normal temperature, turgor, and texture. Psych: Alert, oriented, appropriate affect, normal judgment/insight    Results:     Lab Results   Component Value Date/Time    WBC 7.6 03/15/2018 01:47 PM    HGB 10.6 (L) 03/15/2018 01:47 PM    HCT 33.6 (L) 03/15/2018 01:47 PM    PLATELET 667 41/71/2134 01:47 PM    MCV 93.1 03/15/2018 01:47 PM    ABS. NEUTROPHILS 3.4 03/15/2018 01:47 PM     Lab Results   Component Value Date/Time    Sodium 137 03/15/2018 01:47 PM    Potassium 4.0 03/15/2018 01:47 PM    Chloride 102 03/15/2018 01:47 PM    CO2 27 03/15/2018 01:47 PM    Glucose 89 03/15/2018 01:47 PM    BUN 10 03/15/2018 01:47 PM    Creatinine 0.86 03/15/2018 01:47 PM    GFR est AA >60 03/15/2018 01:47 PM    GFR est non-AA >60 03/15/2018 01:47 PM    Calcium 9.1 03/15/2018 01:47 PM     Lab Results   Component Value Date/Time    Bilirubin, total 0.3 03/15/2018 01:47 PM    ALT (SGPT) 29 03/15/2018 01:47 PM    AST (SGOT) 31 03/15/2018 01:47 PM    Alk.  phosphatase 90 03/15/2018 01:47 PM    Protein, total 7.4 03/15/2018 01:47 PM    Albumin 3.0 (L) 03/15/2018 01:47 PM Globulin 4.4 (H) 03/15/2018 01:47 PM       CTA Chest 9/15/2017:   1. No pulmonary embolus. 2. Bilateral lung masses and mediastinal nodes decreased in size. 3. Atherosclerosis with coronary artery calcifications    Venous doppler 9/15/2017: Bilateral lower extremity venous duplex positive for deep  venous thrombosis in right posterior tibial vein, right peroneal vein, and the left posterior tibial vein. There are superficial venous  thrombosis in bilateral greater saphenous veins    CT C/A/P 11/16/2017: No significant interval change in size of bilateral pulmonary nodules as described above. Slight interval decrease in size of mediastinal lymph nodes. No evidence of metastatic disease in the abdomen or pelvis. CT C/A/P 2/5/2018: Findings are consistent with interval progression of metastatic disease in the chest.  And enlarged pulmonary nodules. 2.7 x 1.8 cm nodule in the right upper lobe on 2-23 previously 1.5 x 3.0 cm. 4.4 x 2.1 cm nodule left upper lobe previously 3.3 x 3.0 cm. 2-22 12 x 10 mm nodule in the right lower lobe previously 4 mm 2-34. 14 x 14 mm nodule-like right lower lobe previously 6 mm 2-40. Right and left upper lobe pulmonary nodules as well. Mediastinal disease burden is not significantly changed. No evidence of metastatic disease in the abdomen or pelvis. Assessment:   1) Metastatic non-small cell lung cancer (squamous cell)  EGFR, ALK, ROS1, BRAF negative  He has disease within his chest and brain. His cancer is not curable and management is with palliative intent. He is s/p gamma knife to CNS disease. Unfortunately, his recent CT shows progression of disease within his chest so maintenance therapy has been stopped. He consented to Lung-MAP trial and was re-biopsied as there was insufficient tissue from original. He was found to have a c-MET mutation and has been assigned to receive a c-MET inhibitor on study.     The risks and benefits of therapy were discussed today and the patient has consented to receiving treatment. We will plan to start therapy next week. 2) Brain metastasis  S/P gamma knife. Follow up with Dr. Lucia Harry for surveillance MRI brain. Recent MRI improved per Dr. Lucia Harry. 3) Atrial fibrillation, CHF  Cardiology following. On apixaban. This will need to be held for 2 days prior to biopsy. 4) DVT 9/15/2017  Previously on Lovenox BID but unable to tolerate injections. Continue Apixaban 5mg BID. I recommend he continue this long term in the setting of malignancy. This will need to be held for 2 days prior to biopsy. 5) Weight loss  Improving. Likely due to cancer. Monitor. 6) Neuropathy, chemotherapy induced  Secondary to prior cisplatin. Continue Cymbalta 30mg daily. Monitor and increase dose if needed.     Plan:     Plan to start clinical trial medication next week   Continue Apixaban 5mg BID  Continue Cymbalta 30 mg daily  Follow up with Dr. Lucia Harry as planned  Follow up with cardiology as planned  Return to clinic with start of therapy      Signed By: Maru Ochoa MD

## 2018-03-15 NOTE — PROGRESS NOTES
[facility] VISIT NOTE    1330  Pt arrived at [facility] and in no distress for labs via port. Blood pressure 122/69, pulse 73, temperature 96.8 °F (36 °C), resp. rate 16, SpO2 97 %. Right chest port accessed with 0.75 in kellogg no difficulty. Positive blood return noted and labs drawn. Port de-accessed and flushed per protocol. 1350  D/C'd from [facility] and in no distress accompanied by his family. Dr. Cain Roe nurse, Katy Darling is going to follow up with upcoming port flush appointments.

## 2018-03-16 NOTE — PROGRESS NOTES
Pt has been screened for all eligibility/ineligibility criteria and has been determined eligible for this protocol. Informed consent was reviewed by RN with patient prior to signing. Possible side effects were discussed in detail, with patient being advised to inform RN or Dr. Romi Rosario immediately in the event of any side effects once drug is started. All questions were answered adequately by RN or Dr. Romi Rosario. Patient signed consent today for study [id number]: \"A Phase II Study of ABBV-399 in Patients with C-Met Positive Stage IV or Recurrent Squamous Cell Lung Cancer (LUNG-MAP SUB-STUDY). A copy of ICF given to patient. No procedures done prior to patient signing consent. No additional questions at this time. Next appointment is set for 3/23/18 to start treatment.

## 2018-03-23 NOTE — PROGRESS NOTES
[facility] [place] [facility] [place] [address] [address] [facility] [address] [facility] Schir[place]: [phone]  F: [phone]      Reason for Visit:   Jensen Baltazar is a 67 y.o. male who is seen for follow up of lung cancer. Treatment History:   · CXR 7/11/2017: Mediastinal lymphadenopathy with left hilar mass. · CT Chest 7/14/2017: Bulky mediastinal and left hilar lymphadenopathy. Bilateral pulmonary masses and nodules  · PET-CT 7/24/2017: Right parietal mass. Hypermetabolic right supraclavicular, right paratracheal, AP window, prevascular, precarinal, subcarinal and left hilar lymphadenopathy. Hypermetabolic pulmonary nodules bilaterally. · MRI Brain 7/24/2017: Junction right posterior temporal lobe and occipital lobe 2.7 cm mass likely metastasis from lung cancer. No additional acute abnormalities  · FNA supraclavicular node 7/28/2017: Metastatic squamous cell carcinoma, PD-L1 5%, BRAF negative, EGFR negative, ALK & ROS-1 negative   · Stage IV Non-small cell lung cancer (Squamous Cell)  · Gamma knife by Dr. Javi Amador 8/15/2017   · Palliative chemotherapy with carboplatin and gemcitabine beginning 8/25/2017 - 11/3/2017  · Maintenance gemcitabine x4 cycles from 11/17/2017 to 1/26/2018  · Clinical trial [facility] [id number]:ABBV-399 (PROCESS II) beginning 3/23/2018    History of Present Illness:   Here today for follow up and start of therapy. He reports feeling well. No cough or shortness of breath. Reports compliance with apixaban, denies bleeding. Denies  fevers, chills, sweats. No new complaints. He is accompanied by his daughter today. He smoked 1ppd for 50+ years but quit at diagnosis. He lives alone in [address] [place] [address]. His son lives about 15 minutes from him. His daughter lives about 30 minutes away. PAST HISTORY: The following sections were reviewed and updated in the EMR as appropriate: PMH, SH, FH, Medications, Allergies.       Allergies   Allergen Reactions    Lisinopril Angioedema    Hydrochlorothiazide Hives and Swelling    Sulfa (Sulfonamide Antibiotics) Hives      Review of Systems: A complete review of systems was obtained, reviewed, and scanned into the EMR. Pertinent findings reviewed above. Physical Exam:     Visit Vitals    /65 (BP 1 Location: Left arm, BP Patient Position: Sitting)    Pulse 78    Temp 97.8 °F (36.6 °C) (Temporal)    Resp 20    Ht 5' 7\" (1.702 m)    Wt 180 lb 6.4 oz (81.8 kg)    SpO2 98%    BMI 28.25 kg/m2     ECOG PS: 1  General: No distress  Eyes: PERRLA, anicteric sclerae  HENT: Atraumatic, OP clear  Neck: Supple  Lymphatic: No cervical, supraclavicular, or inguinal adenopathy  Respiratory: CTAB, normal respiratory effort  CV: Normal rate, regular rhythm, no murmurs, no peripheral edema  GI: Soft, nontender, nondistended, no masses, no hepatomegaly, no splenomegaly  MS: Normal gait and station, walking with cane. Digits without clubbing or cyanosis. Skin: No rashes, ecchymoses, or petechiae. Normal temperature, turgor, and texture. Psych: Alert, oriented, appropriate affect, normal judgment/insight    Results:     Lab Results   Component Value Date/Time    WBC 7.7 03/23/2018 08:10 AM    HGB 10.4 (L) 03/23/2018 08:10 AM    HCT 33.6 (L) 03/23/2018 08:10 AM    PLATELET 473 20/65/7533 08:10 AM    MCV 92.8 03/23/2018 08:10 AM    ABS. NEUTROPHILS 3.5 03/23/2018 08:10 AM     Lab Results   Component Value Date/Time    Sodium 137 03/23/2018 08:10 AM    Potassium 3.7 03/23/2018 08:10 AM    Chloride 103 03/23/2018 08:10 AM    CO2 25 03/23/2018 08:10 AM    Glucose 96 03/23/2018 08:10 AM    BUN 12 03/23/2018 08:10 AM    Creatinine 0.75 03/23/2018 08:10 AM    GFR est AA >60 03/23/2018 08:10 AM    GFR est non-AA >60 03/23/2018 08:10 AM    Calcium 9.2 03/23/2018 08:10 AM     Lab Results   Component Value Date/Time    Bilirubin, total 0.3 03/23/2018 08:10 AM    ALT (SGPT) 30 03/23/2018 08:10 AM    AST (SGOT) 31 03/23/2018 08:10 AM    Alk.  phosphatase 81 03/23/2018 08:10 AM    Protein, total 7.5 03/23/2018 08:10 AM    Albumin 3.0 (L) 03/23/2018 08:10 AM    Globulin 4.5 (H) 03/23/2018 08:10 AM       CTA Chest 9/15/2017:   1. No pulmonary embolus. 2. Bilateral lung masses and mediastinal nodes decreased in size. 3. Atherosclerosis with coronary artery calcifications    Venous doppler 9/15/2017: Bilateral lower extremity venous duplex positive for deep  venous thrombosis in right posterior tibial vein, right peroneal vein, and the left posterior tibial vein. There are superficial venous  thrombosis in bilateral greater saphenous veins    CT C/A/P 11/16/2017: No significant interval change in size of bilateral pulmonary nodules as described above. Slight interval decrease in size of mediastinal lymph nodes. No evidence of metastatic disease in the abdomen or pelvis. CT C/A/P 2/5/2018: Findings are consistent with interval progression of metastatic disease in the chest.  And enlarged pulmonary nodules. 2.7 x 1.8 cm nodule in the right upper lobe on 2-23 previously 1.5 x 3.0 cm. 4.4 x 2.1 cm nodule left upper lobe previously 3.3 x 3.0 cm. 2-22 12 x 10 mm nodule in the right lower lobe previously 4 mm 2-34. 14 x 14 mm nodule-like right lower lobe previously 6 mm 2-40. Right and left upper lobe pulmonary nodules as well. Mediastinal disease burden is not significantly changed. No evidence of metastatic disease in the abdomen or pelvis. Assessment:   1) Metastatic non-small cell lung cancer (squamous cell)  EGFR, ALK, ROS1, BRAF negative  He has disease within his chest and brain. His cancer is not curable and management is with palliative intent. He is s/p gamma knife to CNS disease. Unfortunately, his recent CT shows progression of disease within his chest so maintenance therapy has been stopped.       He consented to Lung-MAP trial and was re-biopsied as there was insufficient tissue from original. He was found to have a c-MET mutation and has been assigned to receive a c-MET inhibitor on study. He has consented to therapy and we will start today. We will see him back in 3 weeks with next cycle or sooner if needed. 2) Brain metastasis  S/P gamma knife. Follow up with Dr. Ariel Shi for surveillance MRI brain. Recent MRI improved per Dr. Ariel Shi. 3) Atrial fibrillation, CHF  Cardiology following. On apixaban. 4) DVT 9/15/2017  Previously on Lovenox BID but unable to tolerate injections. Continue Apixaban 5mg BID. I recommend he continue this long term in the setting of malignancy. 5) Weight loss  Improving. Likely due to cancer. Monitor. 6) Neuropathy, chemotherapy induced  Secondary to prior cisplatin. Continue Cymbalta 30mg daily. Monitor and increase dose if needed. Plan:     Proceed today with C#1 clinical trial [facility] [id number]:ABBV-399   Continue Apixaban 5mg BID  Continue Cymbalta 30 mg daily  Follow up with Dr. Ariel Shi as planned  Follow up with cardiology as planned  Return to clinic in 3 weeks with next infusion    Patient was seen in conjunction with Lola Koyanagi, NP.     Signed By: Sheela Cantrell MD

## 2018-03-23 NOTE — PROGRESS NOTES

## 2018-03-23 NOTE — PROGRESS NOTES
Outpatient Infusion Center - Chemotherapy Progress Note    0745 Pt admit to [facility] for Labs/SWOK [id number]: ABBV-399 (Clinical Trial) ambulatory with cane assist in stable condition. Assessment completed. No new concerns voiced. PAC with positive blood return. Chemotherapy Flowsheet 3/23/2018   Cycle C 1   Date 3/23/2018   Drug / Regimen ABBV-399   Pre Meds -   Notes clinical trial       Visit Vitals    /69    Pulse 68    Temp 97.1 °F (36.2 °C)    Resp 16    Ht 5' 7.99\" (1.727 m)    Wt 82.1 kg (181 lb 1.6 oz)    SpO2 97%    BMI 27.54 kg/m2     Labs sent- pt proceeded to MD appt. Research RN to do pt education. Chemo D/C instructions reviewed with patient by RN. 30 mins pre tx labs completed  Blood pressure 95/56, pulse 73, temperature 97.1 °F (36.2 °C), resp. rate 16, height 5' 7.99\" (1.727 m), weight 82.1 kg (181 lb 1.6 oz), SpO2 97 %. Medications:  ABBV-399 IV  Started @ [id number]  Completed @ 1020    Blood pressure 105/61, pulse 65, temperature 97 °F (36.1 °C), resp. rate 16, height 5' 7.99\" (1.727 m), weight 82.1 kg (181 lb 1.6 oz), SpO2 97 %. 45 mins post tx labs completed    1110 Pt tolerated treatment well. PAC maintained positive blood return throughout treatment, flushed with positive blood return at conclusion and throughout treatment. D/c home ambulatory in no distress.  Pt aware of next appointment scheduled for 4/13/18 @ 1000    Recent Results (from the past 12 hour(s))   CBC WITH AUTOMATED DIFF    Collection Time: 03/23/18  8:10 AM   Result Value Ref Range    WBC 7.7 4.1 - 11.1 K/uL    RBC 3.62 (L) 4.10 - 5.70 M/uL    HGB 10.4 (L) 12.1 - 17.0 g/dL    HCT 33.6 (L) 36.6 - 50.3 %    MCV 92.8 80.0 - 99.0 FL    MCH 28.7 26.0 - 34.0 PG    MCHC 31.0 30.0 - 36.5 g/dL    RDW 16.8 (H) 11.5 - 14.5 %    PLATELET 570 355 - 985 K/uL    MPV 9.2 8.9 - 12.9 FL    NRBC 0.0 0  WBC    ABSOLUTE NRBC 0.00 0.00 - 0.01 K/uL    NEUTROPHILS 45 32 - 75 %    LYMPHOCYTES 28 12 - 49 %    MONOCYTES 17 (H) 5 - 13 %    EOSINOPHILS 10 (H) 0 - 7 %    BASOPHILS 1 0 - 1 %    IMMATURE GRANULOCYTES 0 0.0 - 0.5 %    ABS. NEUTROPHILS 3.5 1.8 - 8.0 K/UL    ABS. LYMPHOCYTES 2.1 0.8 - 3.5 K/UL    ABS. MONOCYTES 1.3 (H) 0.0 - 1.0 K/UL    ABS. EOSINOPHILS 0.7 (H) 0.0 - 0.4 K/UL    ABS. BASOPHILS 0.1 0.0 - 0.1 K/UL    ABS. IMM. GRANS. 0.0 0.00 - 0.04 K/UL    DF AUTOMATED     METABOLIC PANEL, COMPREHENSIVE    Collection Time: 03/23/18  8:10 AM   Result Value Ref Range    Sodium 137 136 - 145 mmol/L    Potassium 3.7 3.5 - 5.1 mmol/L    Chloride 103 97 - 108 mmol/L    CO2 25 21 - 32 mmol/L    Anion gap 9 5 - 15 mmol/L    Glucose 96 65 - 100 mg/dL    BUN 12 6 - 20 MG/DL    Creatinine 0.75 0.70 - 1.30 MG/DL    BUN/Creatinine ratio 16 12 - 20      GFR est AA >60 >60 ml/min/1.73m2    GFR est non-AA >60 >60 ml/min/1.73m2    Calcium 9.2 8.5 - 10.1 MG/DL    Bilirubin, total 0.3 0.2 - 1.0 MG/DL    ALT (SGPT) 30 12 - 78 U/L    AST (SGOT) 31 15 - 37 U/L    Alk.  phosphatase 81 45 - 117 U/L    Protein, total 7.5 6.4 - 8.2 g/dL    Albumin 3.0 (L) 3.5 - 5.0 g/dL    Globulin 4.5 (H) 2.0 - 4.0 g/dL    A-G Ratio 0.7 (L) 1.1 - 2.2     PHOSPHORUS    Collection Time: 03/23/18  8:10 AM   Result Value Ref Range    Phosphorus 3.6 2.6 - 4.7 MG/DL   MAGNESIUM    Collection Time: 03/23/18  8:10 AM   Result Value Ref Range    Magnesium 1.6 1.6 - 2.4 mg/dL

## 2018-03-23 NOTE — PROGRESS NOTES
Pt in this morning for labs and follow up visit today per protocol with Dr. Holden Eubanks and RN. This is C1D1 of treatment. Patient reports the following adverse events at this time: fatigue gr 1. Vital signs are stable at this time. Patient to go to infusion center after appointment to receive treatment. Next appointment set for 4/13/18. Pre-dose PK, ADA, nAb labs drawn 919, processed 954, to -20 freezer 1023.   Post-dose PK, ADA, nAb labs drawn 1105, processed 1141, to -20 freezer 1212

## 2018-04-13 NOTE — PROGRESS NOTES
Today is C2D1 of Study [id number]. [facility] and [facility]-stable. Dr. Ana Rosa Maier examined pt. No new complaints. Research labs drawn pre-infusion at 12:10 pm and post-infusion at 2 pm, processed and stored per protocol. Pt tolerated infusion well. Next appts 5/2/18 for CT, 5/4/18 for labs, exam and infusion.

## 2018-04-13 NOTE — PROGRESS NOTES
Outpatient Infusion Center - Chemotherapy Progress Note    1000 Pt admit to [facility] for C 2 Clinical Trial [facility] [id number]: ABBV-399 ambulatory in stable condition. Assessment completed. No new concerns voiced. *** with positive blood return. Chemotherapy Flowsheet 4/13/2018   Cycle C 2   Date 4/13/2018   Drug / Regimen ABBV-399   Pre Meds -   Notes clinical trial     Pt with MD appt @ 9808       Medications:  ***    *** Pt tolerated treatment well. *** maintained positive blood return throughout treatment, flushed with positive blood return at conclusion and ***. D/c home ambulatory in no distress. Pt aware of next appointment scheduled for 5/4/18 @ 1000.     {Labs}

## 2018-04-13 NOTE — PROGRESS NOTES
[facility] [place] [facility] [place] [address] [address] [facility]  Darylene Mems: [phone]  F: [phone]      Reason for Visit:   Mirta Quintero is a 67 y.o. male who is seen for follow up of lung cancer. Treatment History:   · CXR 7/11/2017: Mediastinal lymphadenopathy with left hilar mass. · CT Chest 7/14/2017: Bulky mediastinal and left hilar lymphadenopathy. Bilateral pulmonary masses and nodules  · PET-CT 7/24/2017: Right parietal mass. Hypermetabolic right supraclavicular, right paratracheal, AP window, prevascular, precarinal, subcarinal and left hilar lymphadenopathy. Hypermetabolic pulmonary nodules bilaterally. · MRI Brain 7/24/2017: Junction right posterior temporal lobe and occipital lobe 2.7 cm mass likely metastasis from lung cancer. No additional acute abnormalities  · FNA supraclavicular node 7/28/2017: Metastatic squamous cell carcinoma, PD-L1 5%, BRAF negative, EGFR negative, ALK & ROS-1 negative   · Stage IV Non-small cell lung cancer (Squamous Cell)  · Gamma knife by Dr. Lorenzo Coburn 8/15/2017   · Palliative chemotherapy with carboplatin and gemcitabine beginning 8/25/2017 - 11/3/2017  · Maintenance gemcitabine x4 cycles from 11/17/2017 to 1/26/2018  · Clinical trial OG [id number]:ABBV-399 (PROCESS II) beginning 3/23/2018    History of Present Illness:   Here today for follow up and next cycle of therapy. He states he continues to feel well. Fatigue a little worse but still mild. Denies cough or shortness of breath. Reports compliance with apixaban, no bleeding. Denies  fevers, chills, sweats. No additional complaints. He is accompanied by his daughter today. He smoked 1ppd for 50+ years but quit at diagnosis. He lives alone in [address] [place] [address]. His son lives about 15 minutes from him. His daughter lives about 30 minutes away.     PAST HISTORY: The following sections were reviewed and updated in the EMR as appropriate: PMH, SH, FH, Medications, Allergies. Allergies   Allergen Reactions    Lisinopril Angioedema    Hydrochlorothiazide Hives and Swelling    Sulfa (Sulfonamide Antibiotics) Hives      Review of Systems: A complete review of systems was obtained, reviewed, and scanned into the EMR. Pertinent findings reviewed above. Physical Exam:     Visit Vitals    /64 (BP 1 Location: Left arm, BP Patient Position: Sitting)    Pulse 67    Temp 95.9 °F (35.5 °C) (Oral)    Resp 18    Ht 5' 7.99\" (1.727 m)    Wt 176 lb 9.6 oz (80.1 kg)    SpO2 94%    BMI 26.86 kg/m2     ECOG PS: 1  General: No distress  Eyes: PERRLA, anicteric sclerae  HENT: Atraumatic, OP clear  Neck: Supple  Lymphatic: No cervical, supraclavicular, or inguinal adenopathy  Respiratory: CTAB, normal respiratory effort  CV: Normal rate, regular rhythm, no murmurs, no peripheral edema  GI: Soft, nontender, nondistended, no masses, no hepatomegaly, no splenomegaly  MS: Normal gait and station, walking with cane. Digits without clubbing or cyanosis. Skin: No rashes, ecchymoses, or petechiae. Normal temperature, turgor, and texture. Psych: Alert, oriented, appropriate affect, normal judgment/insight    Results:     Lab Results   Component Value Date/Time    WBC 6.6 04/13/2018 10:26 AM    HGB 10.7 (L) 04/13/2018 10:26 AM    HCT 34.1 (L) 04/13/2018 10:26 AM    PLATELET 039 98/68/5450 10:26 AM    MCV 90.5 04/13/2018 10:26 AM    ABS.  NEUTROPHILS 2.6 04/13/2018 10:26 AM     Lab Results   Component Value Date/Time    Sodium 136 04/13/2018 10:26 AM    Potassium 3.9 04/13/2018 10:26 AM    Chloride 101 04/13/2018 10:26 AM    CO2 28 04/13/2018 10:26 AM    Glucose 113 (H) 04/13/2018 10:26 AM    BUN 10 04/13/2018 10:26 AM    Creatinine 0.89 04/13/2018 10:26 AM    GFR est AA >60 04/13/2018 10:26 AM    GFR est non-AA >60 04/13/2018 10:26 AM    Calcium 8.8 04/13/2018 10:26 AM     Lab Results   Component Value Date/Time    Bilirubin, total 0.3 04/13/2018 10:26 AM    ALT (SGPT) 46 04/13/2018 10:26 AM    AST (SGOT) 37 04/13/2018 10:26 AM    Alk. phosphatase 97 04/13/2018 10:26 AM    Protein, total 7.2 04/13/2018 10:26 AM    Albumin 2.8 (L) 04/13/2018 10:26 AM    Globulin 4.4 (H) 04/13/2018 10:26 AM       CTA Chest 9/15/2017:   1. No pulmonary embolus. 2. Bilateral lung masses and mediastinal nodes decreased in size. 3. Atherosclerosis with coronary artery calcifications    Venous doppler 9/15/2017: Bilateral lower extremity venous duplex positive for deep  venous thrombosis in right posterior tibial vein, right peroneal vein, and the left posterior tibial vein. There are superficial venous  thrombosis in bilateral greater saphenous veins    CT C/A/P 11/16/2017: No significant interval change in size of bilateral pulmonary nodules as described above. Slight interval decrease in size of mediastinal lymph nodes. No evidence of metastatic disease in the abdomen or pelvis. CT C/A/P 2/5/2018: Findings are consistent with interval progression of metastatic disease in the chest.  And enlarged pulmonary nodules. 2.7 x 1.8 cm nodule in the right upper lobe on 2-23 previously 1.5 x 3.0 cm. 4.4 x 2.1 cm nodule left upper lobe previously 3.3 x 3.0 cm. 2-22 12 x 10 mm nodule in the right lower lobe previously 4 mm 2-34. 14 x 14 mm nodule-like right lower lobe previously 6 mm 2-40. Right and left upper lobe pulmonary nodules as well. Mediastinal disease burden is not significantly changed. No evidence of metastatic disease in the abdomen or pelvis. Assessment:   1) Metastatic non-small cell lung cancer (squamous cell)  EGFR, ALK, ROS1, BRAF negative  He has disease within his chest and brain. His cancer is not curable and management is with palliative intent. He is s/p gamma knife to CNS disease. Unfortunately, his recent CT shows progression of disease within his chest so maintenance therapy has been stopped.       He consented to Lung-MAP trial and was re-biopsied as there was insufficient tissue from original. He was found to have a c-MET mutation and has been assigned to receive a c-MET inhibitor on study. He tolerated first cycle of therapy well. We will continue therapy today and plan to see him back in 3 weeks with CT scan prior. 2) Brain metastasis  S/P gamma knife. Follow up with Dr. Price Shown for surveillance MRI brain. Recent MRI improved per Dr. Price Shown. 3) Atrial fibrillation, CHF  Cardiology following. On apixaban. 4) DVT 9/15/2017  Previously on Lovenox BID but unable to tolerate injections. Continue Apixaban 5mg BID. I recommend he continue this long term in the setting of malignancy. 5) Weight loss  Improving. Likely due to cancer. Monitor. 6) Neuropathy, chemotherapy induced  Secondary to prior cisplatin. Continue Cymbalta 30mg daily. Monitor and increase dose if needed. Plan:     Proceed today with C#2 clinical [place] [facility] [place] [id number]:ABBV-399   Continue Apixaban 5mg BID  Continue Cymbalta 30 mg daily  Follow up with Dr. Price Shown as planned  CT C/A/P prior to next visit   Return to clinic in 3 weeks with next infusion    Patient was seen in conjunction with Carlo Hart NP.     Signed By: Jakob Ron MD

## 2018-05-04 NOTE — PROGRESS NOTES
Problem: Chemotherapy Treatment  Goal: *Chemotherapy regimen followed  Outcome: Progressing Towards Goal  Pt receiving C3 ABBV-399

## 2018-05-04 NOTE — PROGRESS NOTES
Met with patient and daughter today for off treatment visit. Patient progressed per last scans approx 49%. Patient came off study per protocol. Requested new sub-study from - no assignment given d/t FMI results. Patient and daughter understood progression and discussed next treatment with NP. Off study effective today.

## 2018-05-04 NOTE — PROGRESS NOTES
[facility] VISIT NOTE    6223  Pt arrived at [address] ambulatory and in no distress for Clinical trial: [facility] [place] [place] [id number]:ABBV-399 ( Process 11)      Assessment completed, pt w/o complaints. Right chest port accessed with . 75 in kellogg with no difficulty. Positive blood return noted and labs drawn. Treatment held today, will  be changing treatment to [facility] de-accessed and flushed per protocol. Positive blood return noted. Patient Vitals for the past 12 hrs:   Temp Pulse BP   05/04/18 1016 97.6 °F (36.4 °C) 80 109/66     1130  D/C'd from [address] ambulatory and in no distress accompanied by daughter. Next appointment is 5/25/18 at 1000.

## 2018-05-04 NOTE — PROGRESS NOTES
Cancer [place] at [place] [address] [address] [facility]  Farnaz Chalk: [phone]  F: [phone]      Reason for Visit:   Jillian Alegria is a 67 y.o. male who is seen for follow up of lung cancer. Treatment History:   · CXR 7/11/2017: Mediastinal lymphadenopathy with left hilar mass. · CT Chest 7/14/2017: Bulky mediastinal and left hilar lymphadenopathy. Bilateral pulmonary masses and nodules  · PET-CT 7/24/2017: Right parietal mass. Hypermetabolic right supraclavicular, right paratracheal, AP window, prevascular, precarinal, subcarinal and left hilar lymphadenopathy. Hypermetabolic pulmonary nodules bilaterally. · MRI Brain 7/24/2017: Junction right posterior temporal lobe and occipital lobe 2.7 cm mass likely metastasis from lung cancer. No additional acute abnormalities  · FNA supraclavicular node 7/28/2017: Metastatic squamous cell carcinoma, PD-L1 5%, BRAF negative, EGFR negative, ALK & ROS-1 negative   · Stage IV Non-small cell lung cancer (Squamous Cell)  · Gamma knife by Dr. Iwona Ballard 8/15/2017   · Palliative chemotherapy with carboplatin and gemcitabine beginning 8/25/2017 - 11/3/2017  · Maintenance gemcitabine x4 cycles from 11/17/2017 to 1/26/2018  · Clinical trial OG [id number]: ABBV-399 (PROCESS II) from 3/23/2018 - 5/4/2018     History of Present Illness:   Here today for follow up and next cycle of therapy. He reports feeling more tired since last visit and states that appetite is worse. Denies cough, shortness of breath or pain. Reports compliance with apixaban, no bleeding. Denies  fevers, chills, sweats. No additional complaints. He is accompanied by his daughter today. He smoked 1ppd for 50+ years but quit at diagnosis. He lives alone in [address] [place] [address]. His son lives about 15 minutes from him. His daughter lives about 30 minutes away.     PAST HISTORY: The following sections were reviewed and updated in the EMR as appropriate: PMH, SH, FH, Medications, Allergies. Allergies   Allergen Reactions    Lisinopril Angioedema    Hydrochlorothiazide Hives and Swelling    Sulfa (Sulfonamide Antibiotics) Hives      Review of Systems: A complete review of systems was obtained, reviewed, and scanned into the EMR. Pertinent findings reviewed above. Physical Exam:     Visit Vitals    /70 (BP 1 Location: Right arm, BP Patient Position: Sitting)    Pulse 80    Temp 95.9 °F (35.5 °C) (Temporal)    Resp 16    Ht 5' 7\" (1.702 m)    Wt 170 lb (77.1 kg)    SpO2 97%    BMI 26.63 kg/m2     ECOG PS: 1  General: No distress  Eyes: PERRLA, anicteric sclerae  HENT: Atraumatic, OP clear  Neck: Supple  Lymphatic: No cervical, supraclavicular, or inguinal adenopathy  Respiratory: CTAB, normal respiratory effort  CV: Normal rate, regular rhythm, no murmurs, no peripheral edema  GI: Soft, nontender, nondistended, no masses, no hepatomegaly, no splenomegaly  MS: Normal gait and station, walking with cane. Digits without clubbing or cyanosis. Skin: No rashes, ecchymoses, or petechiae. Normal temperature, turgor, and texture. Psych: Alert, oriented, appropriate affect, normal judgment/insight    Results:     Lab Results   Component Value Date/Time    WBC 6.3 05/04/2018 10:26 AM    HGB 10.5 (L) 05/04/2018 10:26 AM    HCT 33.6 (L) 05/04/2018 10:26 AM    PLATELET 636 78/92/6109 10:26 AM    MCV 90.3 05/04/2018 10:26 AM    ABS.  NEUTROPHILS 3.2 05/04/2018 10:26 AM     Lab Results   Component Value Date/Time    Sodium 135 (L) 05/04/2018 10:26 AM    Potassium 3.9 05/04/2018 10:26 AM    Chloride 102 05/04/2018 10:26 AM    CO2 30 05/04/2018 10:26 AM    Glucose 102 (H) 05/04/2018 10:26 AM    BUN 9 05/04/2018 10:26 AM    Creatinine 0.89 05/04/2018 10:26 AM    GFR est AA >60 05/04/2018 10:26 AM    GFR est non-AA >60 05/04/2018 10:26 AM    Calcium 8.7 05/04/2018 10:26 AM     Lab Results   Component Value Date/Time    Bilirubin, total 0.3 05/04/2018 10:26 AM    ALT (SGPT) 47 05/04/2018 10:26 AM    AST (SGOT) 39 (H) 05/04/2018 10:26 AM    Alk. phosphatase 105 05/04/2018 10:26 AM    Protein, total 7.2 05/04/2018 10:26 AM    Albumin 2.5 (L) 05/04/2018 10:26 AM    Globulin 4.7 (H) 05/04/2018 10:26 AM       CTA Chest 9/15/2017:   1. No pulmonary embolus. 2. Bilateral lung masses and mediastinal nodes decreased in size. 3. Atherosclerosis with coronary artery calcifications    Venous doppler 9/15/2017: Bilateral lower extremity venous duplex positive for deep  venous thrombosis in right posterior tibial vein, right peroneal vein, and the left posterior tibial vein. There are superficial venous  thrombosis in bilateral greater saphenous veins    CT C/A/P 11/16/2017: No significant interval change in size of bilateral pulmonary nodules as described above. Slight interval decrease in size of mediastinal lymph nodes. No evidence of metastatic disease in the abdomen or pelvis. CT C/A/P 2/5/2018: Findings are consistent with interval progression of metastatic disease in the chest.  And enlarged pulmonary nodules. 2.7 x 1.8 cm nodule in the right upper lobe on 2-23 previously 1.5 x 3.0 cm. 4.4 x 2.1 cm nodule left upper lobe previously 3.3 x 3.0 cm. 2-22 12 x 10 mm nodule in the right lower lobe previously 4 mm 2-34. 14 x 14 mm nodule-like right lower lobe previously 6 mm 2-40. Right and left upper lobe pulmonary nodules as well. Mediastinal disease burden is not significantly changed. No evidence of metastatic disease in the abdomen or pelvis. CT C/A/P 5/2/2018: Findings are consistent with interval progression of metastatic disease in the chest. There are numerous new and/or enlarged pulmonary nodules/masses present. Mediastinal disease burden is not significantly changed. No evidence of metastatic disease in the abdomen or pelvis. RECIST  Please note that this represents the baseline RECIST examination.    TARGET LESIONS:    Lesion (description)         Location (series/slice) Size      1. Right lung mass 2-26 3.1 x 3.8 cm in size  2. Left lung mass 6.7 x 4.0 cm in size 2-27  NONTARGET LESIONS:  1. AP window lymph node difficult to measure. 2. Precarinal lymph node 1.3 cm in short axis dimension. 3. Numerous additional scattered pulmonary masses/nodules. Assessment:   1) Metastatic non-small cell lung cancer (squamous cell)  EGFR, ALK, ROS1, BRAF negative  He has disease within his chest and brain. His cancer is not curable and management is with palliative intent. He is s/p gamma knife to CNS disease. He is receiving a c-MET inhibitor on Lung-MAP trial.    He is tolerating therapy well. Unfortunately scans show progression of disease, discussed with patient today. We will stop the clinic trial and move on to standard second line therapy with pembrolizumab. We discussed the risks and benefits of pembrolizumab therapy today. Potential side effects include, but are not limited to fatigue, rash, auto-immune issues, infertility, allergic reactions, and rarely, death. The patient has consented to beginning therapy. Written consent obtained today, we will plan to start next week. 2) Brain metastasis  S/P gamma knife. Follow up with Dr. Laquita Hughes for surveillance MRI brain. Recent MRI improved per Dr. Laquita Hughes. 3) Atrial fibrillation, CHF  Cardiology following. On apixaban. 4) DVT 9/15/2017  Previously on Lovenox BID but unable to tolerate injections. Continue Apixaban 5mg BID. I recommend he continue this long term in the setting of malignancy. 5) Weight loss  Worse since last visit. Likely due to progressive cancer and worsening appetite. Monitor. 6) Neuropathy, chemotherapy induced  Secondary to prior cisplatin. Continue Cymbalta 30mg daily. Monitor and increase dose if needed.     Plan:     D/C SWOG [id number]:ABBV-399   Plan to proceed next week with Pembrolizumab (200mg) given every 3 weeks  Labs: CBC, CMP prior to each cycle, TSH every 6 weeks  Continue Apixaban 5mg BID  Continue Cymbalta 30 mg daily  Follow up with Dr. Emanuel Matt as planned  Return to clinic next week with therapy    Patient was seen in conjunction with Gretchen Jackson NP.    >50% of this 30 minute visit was spent on counseling/coordination of care regarding the above diagnoses.       Signed By: Vikram Allison MD

## 2018-05-25 NOTE — PROGRESS NOTES
[facility] VISIT NOTE    1000  Pt arrived at [facility] ambulatory with cane and in no distress for C1 of Keytruda. Assessment completed, no acute concerns voiced at this time. Blood pressure 104/58, pulse 63, temperature 97.6 °F (36.4 °C), resp. rate 18. Right chest port accessed with 0.75 in kellogg with no difficulty. Positive blood return noted and labs drawn. Recent Results (from the past 12 hour(s))   CBC WITH AUTOMATED DIFF    Collection Time: 05/25/18 10:16 AM   Result Value Ref Range    WBC 10.0 4.1 - 11.1 K/uL    RBC 3.30 (L) 4.10 - 5.70 M/uL    HGB 9.5 (L) 12.1 - 17.0 g/dL    HCT 30.4 (L) 36.6 - 50.3 %    MCV 92.1 80.0 - 99.0 FL    MCH 28.8 26.0 - 34.0 PG    MCHC 31.3 30.0 - 36.5 g/dL    RDW 18.2 (H) 11.5 - 14.5 %    PLATELET 297 406 - 187 K/uL    MPV 9.6 8.9 - 12.9 FL    NRBC 0.0 0  WBC    ABSOLUTE NRBC 0.00 0.00 - 0.01 K/uL    NEUTROPHILS 45 32 - 75 %    LYMPHOCYTES 23 12 - 49 %    MONOCYTES 16 (H) 5 - 13 %    EOSINOPHILS 15 (H) 0 - 7 %    BASOPHILS 1 0 - 1 %    IMMATURE GRANULOCYTES 0 0.0 - 0.5 %    ABS. NEUTROPHILS 4.5 1.8 - 8.0 K/UL    ABS. LYMPHOCYTES 2.3 0.8 - 3.5 K/UL    ABS. MONOCYTES 1.6 (H) 0.0 - 1.0 K/UL    ABS. EOSINOPHILS 1.5 (H) 0.0 - 0.4 K/UL    ABS. BASOPHILS 0.1 0.0 - 0.1 K/UL    ABS. IMM.  GRANS. 0.0 0.00 - 0.04 K/UL    DF SMEAR SCANNED      RBC COMMENTS OVALOCYTES  PRESENT        RBC COMMENTS ANISOCYTOSIS  1+       METABOLIC PANEL, COMPREHENSIVE    Collection Time: 05/25/18 10:16 AM   Result Value Ref Range    Sodium 136 136 - 145 mmol/L    Potassium 4.1 3.5 - 5.1 mmol/L    Chloride 103 97 - 108 mmol/L    CO2 30 21 - 32 mmol/L    Anion gap 3 (L) 5 - 15 mmol/L    Glucose 101 (H) 65 - 100 mg/dL    BUN 5 (L) 6 - 20 MG/DL    Creatinine 0.74 0.70 - 1.30 MG/DL    BUN/Creatinine ratio 7 (L) 12 - 20      GFR est AA >60 >60 ml/min/1.73m2    GFR est non-AA >60 >60 ml/min/1.73m2    Calcium 8.8 8.5 - 10.1 MG/DL    Bilirubin, total 0.3 0.2 - 1.0 MG/DL    ALT (SGPT) 29 12 - 78 U/L    AST (SGOT) 26 15 - 37 U/L    Alk. phosphatase 105 45 - 117 U/L    Protein, total 7.2 6.4 - 8.2 g/dL    Albumin 2.5 (L) 3.5 - 5.0 g/dL    Globulin 4.7 (H) 2.0 - 4.0 g/dL    A-G Ratio 0.5 (L) 1.1 - 2.2       Medications received:  Keytruda IV    Tolerated treatment well, no adverse reaction noted. Port de-accessed and flushed per protocol. Positive blood return noted post treatment. Blood pressure 109/55, pulse (!) 56, temperature 97.3 °F (36.3 °C), resp. rate 16.      1220  D/C'd from [facility] ambulatory with cane and in no distress accompanied by daughter. Next appointment is 6/15/18 at 1100.

## 2018-05-25 NOTE — PROGRESS NOTES
Problem: Chemotherapy Treatment  Goal: *Chemotherapy regimen followed  Outcome: Progressing Towards Goal  Patient to receive C1 of Keytruda today as ordered.

## 2018-05-25 NOTE — PROGRESS NOTES
Cancer [place] at [address] [facility]  Kizzy Like: [phone]  F: [phone]      Reason for Visit:   Ashley Floyd is a 67 y.o. male who is seen for follow up of lung cancer. Treatment History:   · CXR 7/11/2017: Mediastinal lymphadenopathy with left hilar mass. · CT Chest 7/14/2017: Bulky mediastinal and left hilar lymphadenopathy. Bilateral pulmonary masses and nodules  · PET-CT 7/24/2017: Right parietal mass. Hypermetabolic right supraclavicular, right paratracheal, AP window, prevascular, precarinal, subcarinal and left hilar lymphadenopathy. Hypermetabolic pulmonary nodules bilaterally. · MRI Brain 7/24/2017: Junction right posterior temporal lobe and occipital lobe 2.7 cm mass likely metastasis from lung cancer. No additional acute abnormalities  · FNA supraclavicular node 7/28/2017: Metastatic squamous cell carcinoma, PD-L1 5%, BRAF negative, EGFR negative, ALK & ROS-1 negative   · Stage IV Non-small cell lung cancer (Squamous Cell)  · Gamma knife by Dr. Gracia Appl 8/15/2017   · Palliative chemotherapy with carboplatin and gemcitabine beginning 8/25/2017 - 11/3/2017  · Maintenance gemcitabine x4 cycles from 11/17/2017 to 1/26/2018  · Clinical trial OG [id number]: ABBV-399 (PROCESS II) from 3/23/2018 - 5/4/2018   · Palliative immunotherapy with Pembrolizumab beginning 5/25/2018    History of Present Illness:   Here today for follow up and start of immune therapy. He states he is feeling pretty good today. Reports compliance with apixaban, no bleeding. Denies  fevers, chills, sweats. Some hand discomfort from arthritis, not needing medication. He is accompanied by his daughter today. He smoked 1ppd for 50+ years but quit at diagnosis. He lives alone in [address] [place] [address]. His son lives about 15 minutes from him. His daughter lives about 30 minutes away.     PAST HISTORY: The following sections were reviewed and updated in the EMR as appropriate: PMH, SH, FH, Medications, Allergies. Allergies   Allergen Reactions    Lisinopril Angioedema    Hydrochlorothiazide Hives and Swelling    Sulfa (Sulfonamide Antibiotics) Hives      Review of Systems: A complete review of systems was obtained, reviewed, and scanned into the EMR. Pertinent findings reviewed above. Physical Exam:     Visit Vitals    /62 (BP 1 Location: Left arm, BP Patient Position: Sitting)    Pulse 63    Temp 96.6 °F (35.9 °C) (Oral)    Resp 18    Ht 5' 7\" (1.702 m)    Wt 174 lb 3.2 oz (79 kg)    SpO2 95%    BMI 27.28 kg/m2     ECOG PS: 1  General: No distress  Eyes: PERRLA, anicteric sclerae  HENT: Atraumatic, OP clear  Neck: Supple  Lymphatic: No cervical, supraclavicular, or inguinal adenopathy  Respiratory: CTAB, normal respiratory effort  CV: Normal rate, regular rhythm, no murmurs, no peripheral edema  GI: Soft, nontender, nondistended, no masses, no hepatomegaly, no splenomegaly  MS: Normal gait and station, walking with cane. Digits without clubbing or cyanosis. Skin: No rashes, ecchymoses, or petechiae. Normal temperature, turgor, and texture. Psych: Alert, oriented, appropriate affect, normal judgment/insight    Results:     Lab Results   Component Value Date/Time    WBC 10.0 05/25/2018 10:16 AM    HGB 9.5 (L) 05/25/2018 10:16 AM    HCT 30.4 (L) 05/25/2018 10:16 AM    PLATELET 003 48/68/2322 10:16 AM    MCV 92.1 05/25/2018 10:16 AM    ABS.  NEUTROPHILS 4.5 05/25/2018 10:16 AM     Lab Results   Component Value Date/Time    Sodium 136 05/25/2018 10:16 AM    Potassium 4.1 05/25/2018 10:16 AM    Chloride 103 05/25/2018 10:16 AM    CO2 30 05/25/2018 10:16 AM    Glucose 101 (H) 05/25/2018 10:16 AM    BUN 5 (L) 05/25/2018 10:16 AM    Creatinine 0.74 05/25/2018 10:16 AM    GFR est AA >60 05/25/2018 10:16 AM    GFR est non-AA >60 05/25/2018 10:16 AM    Calcium 8.8 05/25/2018 10:16 AM     Lab Results   Component Value Date/Time    Bilirubin, total 0.3 05/25/2018 10:16 AM ALT (SGPT) 29 05/25/2018 10:16 AM    AST (SGOT) 26 05/25/2018 10:16 AM    Alk. phosphatase 105 05/25/2018 10:16 AM    Protein, total 7.2 05/25/2018 10:16 AM    Albumin 2.5 (L) 05/25/2018 10:16 AM    Globulin 4.7 (H) 05/25/2018 10:16 AM       CTA Chest 9/15/2017:   1. No pulmonary embolus. 2. Bilateral lung masses and mediastinal nodes decreased in size. 3. Atherosclerosis with coronary artery calcifications    Venous doppler 9/15/2017: Bilateral lower extremity venous duplex positive for deep  venous thrombosis in right posterior tibial vein, right peroneal vein, and the left posterior tibial vein. There are superficial venous  thrombosis in bilateral greater saphenous veins    CT C/A/P 11/16/2017: No significant interval change in size of bilateral pulmonary nodules as described above. Slight interval decrease in size of mediastinal lymph nodes. No evidence of metastatic disease in the abdomen or pelvis. CT C/A/P 2/5/2018: Findings are consistent with interval progression of metastatic disease in the chest.  And enlarged pulmonary nodules. 2.7 x 1.8 cm nodule in the right upper lobe on 2-23 previously 1.5 x 3.0 cm. 4.4 x 2.1 cm nodule left upper lobe previously 3.3 x 3.0 cm. 2-22 12 x 10 mm nodule in the right lower lobe previously 4 mm 2-34. 14 x 14 mm nodule-like right lower lobe previously 6 mm 2-40. Right and left upper lobe pulmonary nodules as well. Mediastinal disease burden is not significantly changed. No evidence of metastatic disease in the abdomen or pelvis. CT C/A/P 5/2/2018: Findings are consistent with interval progression of metastatic disease in the chest. There are numerous new and/or enlarged pulmonary nodules/masses present. Mediastinal disease burden is not significantly changed. No evidence of metastatic disease in the abdomen or pelvis. RECIST  Please note that this represents the baseline RECIST examination.    TARGET LESIONS:    Lesion (description)         Location (series/slice)                Size      1. Right lung mass 2-26 3.1 x 3.8 cm in size  2. Left lung mass 6.7 x 4.0 cm in size 2-27  NONTARGET LESIONS:  1. AP window lymph node difficult to measure. 2. Precarinal lymph node 1.3 cm in short axis dimension. 3. Numerous additional scattered pulmonary masses/nodules. Assessment:   1) Metastatic non-small cell lung cancer (squamous cell)  EGFR, ALK, ROS1, BRAF negative  He has disease within his chest and brain. His cancer is not curable and management is with palliative intent. He is s/p gamma knife to CNS disease. He was receiving a c-MET inhibitor on Lung-MAP trial but was recently found to be progressing and this has been stopped. My recommendation is for second line therapy with pembrolizumab. He has consented to therapy and we will start today. 2) Brain metastasis  S/P gamma knife. Follow up with Dr. Callum Shaffer for surveillance MRI brain. Recent MRI improved per Dr. Callum Shaffer. 3) Atrial fibrillation, CHF  Cardiology following. On apixaban. 4) DVT 9/15/2017  Previously on Lovenox BID but unable to tolerate injections. Continue Apixaban 5mg BID. I recommend he continue this long term in the setting of malignancy. 5) Weight loss  Improved since last visit. Monitor. 6) Neuropathy, chemotherapy induced  Secondary to prior cisplatin. Continue Cymbalta 30mg daily. Monitor and increase dose if needed. 7) Arthritis pain  May worsen with therapy. Discussed OTC measures to start. Monitor. Plan:     Proceed today with C#1 Pembrolizumab (200mg) given every 3 weeks  Labs: CBC, CMP prior to each cycle, TSH every 6 weeks  Continue Apixaban 5mg BID  Continue Cymbalta 30 mg daily  Follow up with Dr. Callum Shaffer as planned  Return to clinic in 3 weeks with next cycle of therapy    Patient was seen in conjunction with Norbert Barron NP.         Signed By: Steph Alvarez MD

## 2018-06-15 NOTE — PROGRESS NOTES
[facility] VISIT NOTE    1025. Pt arrived at [facility] ambulatory and in no distress for Keytruda C2. Assessment completed, pt c/o numbness and tingling in hands as well as arthritis in his hands. RCW chest port accessed with . 75 in [place] with no difficulty. Positive blood return noted and labs drawn. Pt to see MD. Labs WL to treat. Medications received:  NS Leanord Lucy IV    Patient Vitals for the past 12 hrs:   Temp Pulse Resp BP   06/15/18 1337 97.1 °F (36.2 °C) 61 16 109/57   06/15/18 1029 97.4 °F (36.3 °C) 79 16 111/63     Recent Results (from the past 12 hour(s))   CBC WITH AUTOMATED DIFF    Collection Time: 06/15/18 10:40 AM   Result Value Ref Range    WBC 9.2 4.1 - 11.1 K/uL    RBC 3.65 (L) 4.10 - 5.70 M/uL    HGB 10.4 (L) 12.1 - 17.0 g/dL    HCT 33.2 (L) 36.6 - 50.3 %    MCV 91.0 80.0 - 99.0 FL    MCH 28.5 26.0 - 34.0 PG    MCHC 31.3 30.0 - 36.5 g/dL    RDW 17.4 (H) 11.5 - 14.5 %    PLATELET 420 597 - 093 K/uL    MPV 9.5 8.9 - 12.9 FL    NRBC 0.0 0  WBC    ABSOLUTE NRBC 0.00 0.00 - 0.01 K/uL    NEUTROPHILS 39 32 - 75 %    LYMPHOCYTES 25 12 - 49 %    MONOCYTES 12 5 - 13 %    EOSINOPHILS 23 (H) 0 - 7 %    BASOPHILS 1 0 - 1 %    IMMATURE GRANULOCYTES 0 0.0 - 0.5 %    ABS. NEUTROPHILS 3.6 1.8 - 8.0 K/UL    ABS. LYMPHOCYTES 2.3 0.8 - 3.5 K/UL    ABS. MONOCYTES 1.1 (H) 0.0 - 1.0 K/UL    ABS. EOSINOPHILS 2.1 (H) 0.0 - 0.4 K/UL    ABS. BASOPHILS 0.1 0.0 - 0.1 K/UL    ABS. IMM.  GRANS. 0.0 0.00 - 0.04 K/UL    DF SMEAR SCANNED      RBC COMMENTS ANISOCYTOSIS  1+        RBC COMMENTS OVALOCYTES  PRESENT       METABOLIC PANEL, COMPREHENSIVE    Collection Time: 06/15/18 10:40 AM   Result Value Ref Range    Sodium 137 136 - 145 mmol/L    Potassium 3.7 3.5 - 5.1 mmol/L    Chloride 102 97 - 108 mmol/L    CO2 27 21 - 32 mmol/L    Anion gap 8 5 - 15 mmol/L    Glucose 90 65 - 100 mg/dL    BUN 10 6 - 20 MG/DL    Creatinine 0.79 0.70 - 1.30 MG/DL    BUN/Creatinine ratio 13 12 - 20      GFR est AA >60 >60 ml/min/1.73m2    GFR est non-AA >60 >60 ml/min/1.73m2    Calcium 9.0 8.5 - 10.1 MG/DL    Bilirubin, total 0.2 0.2 - 1.0 MG/DL    ALT (SGPT) 25 12 - 78 U/L    AST (SGOT) 26 15 - 37 U/L    Alk. phosphatase 99 45 - 117 U/L    Protein, total 7.5 6.4 - 8.2 g/dL    Albumin 2.8 (L) 3.5 - 5.0 g/dL    Globulin 4.7 (H) 2.0 - 4.0 g/dL    A-G Ratio 0.6 (L) 1.1 - 2.2       Tolerated treatment well, no adverse reaction noted. Port de-accessed and flushed per protocol. Positive blood return noted. 1345. D/C'd from [facility] ambulatory and in no distress accompanied by daughter.  Next appointment is 7/6/18 at 10:00 am.

## 2018-06-15 NOTE — PROGRESS NOTES
Cancer [place] at [address], [address] [place] [address]  Tobias [employer]: [phone]  F: [phone]      Reason for Visit:   Johnathan White is a 67 y.o. male who is seen for follow up of lung cancer. Treatment History:   · CXR 7/11/2017: Mediastinal lymphadenopathy with left hilar mass. · CT Chest 7/14/2017: Bulky mediastinal and left hilar lymphadenopathy. Bilateral pulmonary masses and nodules  · PET-CT 7/24/2017: Right parietal mass. Hypermetabolic right supraclavicular, right paratracheal, AP window, prevascular, precarinal, subcarinal and left hilar lymphadenopathy. Hypermetabolic pulmonary nodules bilaterally. · MRI Brain 7/24/2017: Junction right posterior temporal lobe and occipital lobe 2.7 cm mass likely metastasis from lung cancer. No additional acute abnormalities  · FNA supraclavicular node 7/28/2017: Metastatic squamous cell carcinoma, PD-L1 5%, BRAF negative, EGFR negative, ALK & ROS-1 negative   · Stage IV Non-small cell lung cancer (Squamous Cell)  · Gamma knife by Dr. Maikol Singletary 8/15/2017   · Palliative chemotherapy with carboplatin and gemcitabine beginning 8/25/2017 - 11/3/2017  · Maintenance gemcitabine x4 cycles from 11/17/2017 to 1/26/2018  · Clinical trial OG [id number]: ABBV-399 (PROCESS II) from 3/23/2018 - 5/4/2018   · Palliative immunotherapy with Pembrolizumab beginning 5/25/2018    History of Present Illness:   Here today for follow up and next cycle of therapy. He states he continues to feel well. Neuropathy unchanged. Reports compliance with apixaban, denies bleeding. Arthritis pain stable. No new complaints. He is accompanied by his daughter today. He smoked 1ppd for 50+ years but quit at diagnosis. He lives alone in [address] [place] [address]. His son lives about 15 minutes from him. His daughter lives about 30 minutes away.     PAST HISTORY: The following sections were reviewed and updated in the EMR as appropriate: PMH, SH, FH, Medications, Allergies. Allergies   Allergen Reactions    Lisinopril Angioedema    Hydrochlorothiazide Hives and Swelling    Sulfa (Sulfonamide Antibiotics) Hives      Review of Systems: A complete review of systems was obtained, reviewed, and scanned into the EMR. Pertinent findings reviewed above. Physical Exam:     Visit Vitals    /65 (BP 1 Location: Right arm, BP Patient Position: Sitting)    Pulse 75    Temp 97.6 °F (36.4 °C) (Temporal)    Resp 18    Ht 5' 7\" (1.702 m)    Wt 174 lb (78.9 kg)    SpO2 97%    BMI 27.25 kg/m2     ECOG PS: 1  General: No distress  Eyes: PERRLA, anicteric sclerae  HENT: Atraumatic, OP clear  Neck: Supple  Lymphatic: No cervical, supraclavicular, or inguinal adenopathy  Respiratory: CTAB, normal respiratory effort  CV: Normal rate, regular rhythm, no murmurs, no peripheral edema  GI: Soft, nontender, nondistended, no masses, no hepatomegaly, no splenomegaly  MS: Normal gait and station, walking with cane. Digits without clubbing or cyanosis. Skin: No rashes, ecchymoses, or petechiae. Normal temperature, turgor, and texture. Psych: Alert, oriented, appropriate affect, normal judgment/insight    Results:     Lab Results   Component Value Date/Time    WBC 9.2 06/15/2018 10:40 AM    HGB 10.4 (L) 06/15/2018 10:40 AM    HCT 33.2 (L) 06/15/2018 10:40 AM    PLATELET 152 29/58/8282 10:40 AM    MCV 91.0 06/15/2018 10:40 AM    ABS.  NEUTROPHILS 3.6 06/15/2018 10:40 AM     Lab Results   Component Value Date/Time    Sodium 137 06/15/2018 10:40 AM    Potassium 3.7 06/15/2018 10:40 AM    Chloride 102 06/15/2018 10:40 AM    CO2 27 06/15/2018 10:40 AM    Glucose 90 06/15/2018 10:40 AM    BUN 10 06/15/2018 10:40 AM    Creatinine 0.79 06/15/2018 10:40 AM    GFR est AA >60 06/15/2018 10:40 AM    GFR est non-AA >60 06/15/2018 10:40 AM    Calcium 9.0 06/15/2018 10:40 AM     Lab Results   Component Value Date/Time    Bilirubin, total 0.2 06/15/2018 10:40 AM    ALT (SGPT) 25 06/15/2018 10:40 AM    AST (SGOT) 26 06/15/2018 10:40 AM    Alk. phosphatase 99 06/15/2018 10:40 AM    Protein, total 7.5 06/15/2018 10:40 AM    Albumin 2.8 (L) 06/15/2018 10:40 AM    Globulin 4.7 (H) 06/15/2018 10:40 AM       CTA Chest 9/15/2017:   1. No pulmonary embolus. 2. Bilateral lung masses and mediastinal nodes decreased in size. 3. Atherosclerosis with coronary artery calcifications    Venous doppler 9/15/2017: Bilateral lower extremity venous duplex positive for deep  venous thrombosis in right posterior tibial vein, right peroneal vein, and the left posterior tibial vein. There are superficial venous  thrombosis in bilateral greater saphenous veins    CT C/A/P 11/16/2017: No significant interval change in size of bilateral pulmonary nodules as described above. Slight interval decrease in size of mediastinal lymph nodes. No evidence of metastatic disease in the abdomen or pelvis. CT C/A/P 2/5/2018: Findings are consistent with interval progression of metastatic disease in the chest.  And enlarged pulmonary nodules. 2.7 x 1.8 cm nodule in the right upper lobe on 2-23 previously 1.5 x 3.0 cm. 4.4 x 2.1 cm nodule left upper lobe previously 3.3 x 3.0 cm. 2-22 12 x 10 mm nodule in the right lower lobe previously 4 mm 2-34. 14 x 14 mm nodule-like right lower lobe previously 6 mm 2-40. Right and left upper lobe pulmonary nodules as well. Mediastinal disease burden is not significantly changed. No evidence of metastatic disease in the abdomen or pelvis. CT C/A/P 5/2/2018: Findings are consistent with interval progression of metastatic disease in the chest. There are numerous new and/or enlarged pulmonary nodules/masses present. Mediastinal disease burden is not significantly changed. No evidence of metastatic disease in the abdomen or pelvis. RECIST  Please note that this represents the baseline RECIST examination. TARGET LESIONS:    Lesion (description)         Location (series/slice)                Size      1. Right lung mass 2-26 3.1 x 3.8 cm in size  2. Left lung mass 6.7 x 4.0 cm in size 2-27  NONTARGET LESIONS:  1. AP window lymph node difficult to measure. 2. Precarinal lymph node 1.3 cm in short axis dimension. 3. Numerous additional scattered pulmonary masses/nodules. Assessment:   1) Metastatic non-small cell lung cancer (squamous cell)  EGFR, ALK, ROS1, BRAF negative  He has disease within his chest and brain. His cancer is not curable and management is with palliative intent. He is s/p gamma knife to CNS disease. He is now receiving second line therapy with pembrolizumab. He tolerated first cycle of therapy very well. We will continue with treatment today. 2) Brain metastasis  S/P gamma knife. Follow up with Dr. Anisa Shore for surveillance MRI brain. Recent MRI improved per Dr. Anisa Shore. 3) Atrial fibrillation, CHF  Cardiology following. On apixaban. 4) DVT 9/15/2017  Previously on Lovenox BID but unable to tolerate injections. Continue Apixaban 5mg BID. I recommend he continue this long term in the setting of malignancy. 5) Weight loss  Improving. Monitor. 6) Neuropathy, chemotherapy induced  Secondary to prior cisplatin. Continue Cymbalta 30mg daily. Monitor and increase dose if needed. 7) Arthritis pain  Stable. May worsen with immunotherapy. Discussed OTC measures to start. Monitor. Plan:     Proceed today with C#2 Pembrolizumab (200mg) given every 3 weeks  Labs: CBC, CMP prior to each cycle, TSH every 6 weeks  Continue Apixaban 5mg BID  Continue Cymbalta 30 mg daily  Follow up with Dr. Anisa Shore as planned  Return to clinic in 3 weeks with next cycle of therapy    Patient was seen in conjunction with Lupe Keller NP.       Signed By: Kylie Vaz MD

## 2018-07-06 NOTE — PROGRESS NOTES
[facility] [place] at [place] [address] [facility]  Roc Sera: [phone]  F: [phone]      Reason for Visit:   Teresita Hays is a 67 y.o. male who is seen for follow up of lung cancer. Treatment History:   · CXR 7/11/2017: Mediastinal lymphadenopathy with left hilar mass. · CT Chest 7/14/2017: Bulky mediastinal and left hilar lymphadenopathy. Bilateral pulmonary masses and nodules  · PET-CT 7/24/2017: Right parietal mass. Hypermetabolic right supraclavicular, right paratracheal, AP window, prevascular, precarinal, subcarinal and left hilar lymphadenopathy. Hypermetabolic pulmonary nodules bilaterally. · MRI Brain 7/24/2017: Junction right posterior temporal lobe and occipital lobe 2.7 cm mass likely metastasis from lung cancer. No additional acute abnormalities  · FNA supraclavicular node 7/28/2017: Metastatic squamous cell carcinoma, PD-L1 5%, BRAF negative, EGFR negative, ALK & ROS-1 negative   · Stage IV Non-small cell lung cancer (Squamous Cell)  · Gamma knife by Dr. Karolyn Mariee 8/15/2017   · Palliative chemotherapy with carboplatin and gemcitabine beginning 8/25/2017 - 11/3/2017  · Maintenance gemcitabine x4 cycles from 11/17/2017 to 1/26/2018  · Clinical trial OG U6625C: ABBV-399 (PROCESS II) from 3/23/2018 - 5/4/2018   · Palliative immunotherapy with Pembrolizumab beginning 5/25/2018    History of Present Illness:   Here today for follow up and next cycle of therapy. He reports feeling well. Neuropathy unchanged. Reports compliance with apixaban BID, no bleeding. Arthritis pain unchanged. Mild rash to forearms, not itchy or painful. No additional complaints. He is accompanied by his daughter today. He smoked 1ppd for 50+ years but quit at diagnosis. He lives alone in [address] [place] [address]. His son lives about 15 minutes from him. His daughter lives about 30 minutes away.     PAST HISTORY: The following sections were reviewed and updated in the EMR as appropriate: PMH, SH, FH, Medications, Allergies. Allergies   Allergen Reactions    Lisinopril Angioedema    Hydrochlorothiazide Hives and Swelling    Sulfa (Sulfonamide Antibiotics) Hives      Review of Systems: A complete review of systems was obtained, reviewed, and scanned into the EMR. Pertinent findings reviewed above. Physical Exam:     Visit Vitals    /68 (BP 1 Location: Left arm, BP Patient Position: Sitting)    Pulse 71    Temp 95.7 °F (35.4 °C) (Oral)    Resp 18    Ht 5' 7\" (1.702 m)    Wt 176 lb 12.8 oz (80.2 kg)    SpO2 96%    BMI 27.69 kg/m2     ECOG PS: 1  General: No distress  Eyes: PERRLA, anicteric sclerae  HENT: Atraumatic, OP clear  Neck: Supple  Lymphatic: No cervical, supraclavicular, or inguinal adenopathy  Respiratory: CTAB, normal respiratory effort  CV: Normal rate, regular rhythm, no murmurs, no peripheral edema  GI: Soft, nontender, nondistended, no masses, no hepatomegaly, no splenomegaly  MS: Normal gait and station, walking with cane. Digits without clubbing or cyanosis. Skin: No rashes, ecchymoses, or petechiae. Normal temperature, turgor, and texture. Psych: Alert, oriented, appropriate affect, normal judgment/insight    Results:     Lab Results   Component Value Date/Time    WBC 9.2 06/15/2018 10:40 AM    HGB 10.4 (L) 06/15/2018 10:40 AM    HCT 33.2 (L) 06/15/2018 10:40 AM    PLATELET 072 63/21/0798 10:40 AM    MCV 91.0 06/15/2018 10:40 AM    ABS.  NEUTROPHILS 3.6 06/15/2018 10:40 AM     Lab Results   Component Value Date/Time    Sodium 137 06/15/2018 10:40 AM    Potassium 3.7 06/15/2018 10:40 AM    Chloride 102 06/15/2018 10:40 AM    CO2 27 06/15/2018 10:40 AM    Glucose 90 06/15/2018 10:40 AM    BUN 10 06/15/2018 10:40 AM    Creatinine 0.79 06/15/2018 10:40 AM    GFR est AA >60 06/15/2018 10:40 AM    GFR est non-AA >60 06/15/2018 10:40 AM    Calcium 9.0 06/15/2018 10:40 AM     Lab Results   Component Value Date/Time    Bilirubin, total 0.2 06/15/2018 10:40 AM    ALT (SGPT) 25 06/15/2018 10:40 AM    AST (SGOT) 26 06/15/2018 10:40 AM    Alk. phosphatase 99 06/15/2018 10:40 AM    Protein, total 7.5 06/15/2018 10:40 AM    Albumin 2.8 (L) 06/15/2018 10:40 AM    Globulin 4.7 (H) 06/15/2018 10:40 AM       CTA Chest 9/15/2017:   1. No pulmonary embolus. 2. Bilateral lung masses and mediastinal nodes decreased in size. 3. Atherosclerosis with coronary artery calcifications    Venous doppler 9/15/2017: Bilateral lower extremity venous duplex positive for deep  venous thrombosis in right posterior tibial vein, right peroneal vein, and the left posterior tibial vein. There are superficial venous  thrombosis in bilateral greater saphenous veins    CT C/A/P 11/16/2017: No significant interval change in size of bilateral pulmonary nodules as described above. Slight interval decrease in size of mediastinal lymph nodes. No evidence of metastatic disease in the abdomen or pelvis. CT C/A/P 2/5/2018: Findings are consistent with interval progression of metastatic disease in the chest.  And enlarged pulmonary nodules. 2.7 x 1.8 cm nodule in the right upper lobe on 2-23 previously 1.5 x 3.0 cm. 4.4 x 2.1 cm nodule left upper lobe previously 3.3 x 3.0 cm. 2-22 12 x 10 mm nodule in the right lower lobe previously 4 mm 2-34. 14 x 14 mm nodule-like right lower lobe previously 6 mm 2-40. Right and left upper lobe pulmonary nodules as well. Mediastinal disease burden is not significantly changed. No evidence of metastatic disease in the abdomen or pelvis. CT C/A/P 5/2/2018: Findings are consistent with interval progression of metastatic disease in the chest. There are numerous new and/or enlarged pulmonary nodules/masses present. Mediastinal disease burden is not significantly changed. No evidence of metastatic disease in the abdomen or pelvis. RECIST  Please note that this represents the baseline RECIST examination.    TARGET LESIONS:    Lesion (description)         Location (series/slice)                Size      1. Right lung mass 2-26 3.1 x 3.8 cm in size  2. Left lung mass 6.7 x 4.0 cm in size 2-27  NONTARGET LESIONS:  1. AP window lymph node difficult to measure. 2. Precarinal lymph node 1.3 cm in short axis dimension. 3. Numerous additional scattered pulmonary masses/nodules. Assessment:   1) Metastatic non-small cell lung cancer (squamous cell)  EGFR, ALK, ROS1, BRAF negative  He has disease within his chest and brain. His cancer is not curable and management is with palliative intent. He is s/p gamma knife to CNS disease. He is now receiving second line therapy with pembrolizumab. He continues tolerating therapy very well. We will continue with treatment today. 2) Brain metastasis  S/P gamma knife. Follow up with Dr. Kira Cordoba for surveillance MRI brain. Recent MRI improved per Dr. Kira Cordoba. 3) Atrial fibrillation, CHF  Cardiology following. On apixaban. 4) DVT 9/15/2017  Previously on Lovenox BID but unable to tolerate injections. Continue Apixaban 5mg BID. He should continue this long term in the setting of malignancy. 5) Weight loss  Continues improving. Monitor. 6) Neuropathy, chemotherapy induced  Secondary to prior cisplatin. Continue Cymbalta 30mg daily. Monitor and increase dose if needed. 7) Arthritis pain  Stable. May worsen with immunotherapy. Discussed OTC measures to start. Monitor. 8) Rash to forearms  Minimal. No itchy or painful. Monitor for now. Patient to notify the office if this worsens.      Plan:     Proceed today with C#3 Pembrolizumab (200mg) given every 3 weeks  Labs: CBC, CMP prior to each cycle, TSH every 6 weeks  Continue Apixaban 5mg BID  Continue Cymbalta 30 mg daily  Follow up with Dr. Kira Cordoba as planned  Return to clinic in 3 weeks with next cycle of therapy      Signed By: Bienvenido Cortes NP

## 2018-07-06 NOTE — PROGRESS NOTES
Reva Ashley is a 67 y.o. male  Chief Complaint   Patient presents with    Follow-up     lung cancer

## 2018-07-06 NOTE — PROGRESS NOTES
[facility] VISIT NOTE    Pt arrived at [facility] ambulatory and in no distress for C3 Keytruda. Assessment completed, pt c/o his arthritic hands . Patient Vitals for the past 12 hrs:   Temp Pulse Resp BP SpO2   07/06/18 1307 97.8 °F (36.6 °C) 69 18 119/69 -   07/06/18 1008 97.5 °F (36.4 °C) 78 16 117/74 97 %     Right chest port accessed with . 75   in kellogg no difficulty. Positive blood return noted and labs drawn. Recent Results (from the past 12 hour(s))   CBC WITH AUTOMATED DIFF    Collection Time: 07/06/18 10:23 AM   Result Value Ref Range    WBC 7.6 4.1 - 11.1 K/uL    RBC 3.66 (L) 4.10 - 5.70 M/uL    HGB 10.7 (L) 12.1 - 17.0 g/dL    HCT 33.5 (L) 36.6 - 50.3 %    MCV 91.5 80.0 - 99.0 FL    MCH 29.2 26.0 - 34.0 PG    MCHC 31.9 30.0 - 36.5 g/dL    RDW 16.8 (H) 11.5 - 14.5 %    PLATELET 493 140 - 423 K/uL    MPV 9.7 8.9 - 12.9 FL    NRBC 0.0 0  WBC    ABSOLUTE NRBC 0.00 0.00 - 0.01 K/uL    NEUTROPHILS 39 32 - 75 %    LYMPHOCYTES 32 12 - 49 %    MONOCYTES 16 (H) 5 - 13 %    EOSINOPHILS 12 (H) 0 - 7 %    BASOPHILS 1 0 - 1 %    IMMATURE GRANULOCYTES 0 0.0 - 0.5 %    ABS. NEUTROPHILS 2.9 1.8 - 8.0 K/UL    ABS. LYMPHOCYTES 2.4 0.8 - 3.5 K/UL    ABS. MONOCYTES 1.2 (H) 0.0 - 1.0 K/UL    ABS. EOSINOPHILS 0.9 (H) 0.0 - 0.4 K/UL    ABS. BASOPHILS 0.1 0.0 - 0.1 K/UL    ABS. IMM. GRANS. 0.0 0.00 - 0.04 K/UL    DF AUTOMATED     METABOLIC PANEL, COMPREHENSIVE    Collection Time: 07/06/18 10:23 AM   Result Value Ref Range    Sodium 140 136 - 145 mmol/L    Potassium 4.2 3.5 - 5.1 mmol/L    Chloride 105 97 - 108 mmol/L    CO2 31 21 - 32 mmol/L    Anion gap 4 (L) 5 - 15 mmol/L    Glucose 103 (H) 65 - 100 mg/dL    BUN 9 6 - 20 MG/DL    Creatinine 0.70 0.70 - 1.30 MG/DL    BUN/Creatinine ratio 13 12 - 20      GFR est AA >60 >60 ml/min/1.73m2    GFR est non-AA >60 >60 ml/min/1.73m2    Calcium 9.2 8.5 - 10.1 MG/DL    Bilirubin, total 0.3 0.2 - 1.0 MG/DL    ALT (SGPT) 35 12 - 78 U/L    AST (SGOT) 27 15 - 37 U/L    Alk. phosphatase 92 45 - 117 U/L    Protein, total 7.3 6.4 - 8.2 g/dL    Albumin 3.0 (L) 3.5 - 5.0 g/dL    Globulin 4.3 (H) 2.0 - 4.0 g/dL    A-G Ratio 0.7 (L) 1.1 - 2.2     TSH 3RD GENERATION    Collection Time: 07/06/18 11:50 AM   Result Value Ref Range    TSH 0.19 (L) 0.36 - 3.74 uIU/mL     Medications received:  Keytruda IV    Tolerated treatment well, no adverse reaction noted. Port de-accessed and flushed per protocol. Positive blood return noted. D/C'd from [facility] ambulatory and in no distress accompanied by daughter. Next appointment is 7/27/18 at 10:00.

## 2018-07-06 NOTE — PROGRESS NOTES
Problem: Chemotherapy Treatment  Goal: *Chemotherapy regimen followed  Outcome: Progressing Towards Goal  Pt ;here ;for [facility]

## 2018-07-27 NOTE — PROGRESS NOTES
[facility] - Chemotherapy Progress Note 1020 Pt admit to [facility] for [facility] Drought ambulatory in stable condition. Assessment completed. No new concerns voiced. Port accessed with 20 G 0.75 inch kellogg needle with positive blood return. Labs drawn per order and sent for results prior to treatment today. Recent Results (from the past 12 hour(s)) CBC WITH AUTOMATED DIFF Collection Time: 07/27/18 10:33 AM  
Result Value Ref Range WBC 6.4 4.1 - 11.1 K/uL  
 RBC 3.85 (L) 4.10 - 5.70 M/uL  
 HGB 11.4 (L) 12.1 - 17.0 g/dL HCT 35.4 (L) 36.6 - 50.3 % MCV 91.9 80.0 - 99.0 FL  
 MCH 29.6 26.0 - 34.0 PG  
 MCHC 32.2 30.0 - 36.5 g/dL  
 RDW 15.5 (H) 11.5 - 14.5 % PLATELET 999 648 - 922 K/uL MPV 10.7 8.9 - 12.9 FL  
 NRBC 0.0 0  WBC ABSOLUTE NRBC 0.00 0.00 - 0.01 K/uL NEUTROPHILS 43 32 - 75 % LYMPHOCYTES 33 12 - 49 % MONOCYTES 17 (H) 5 - 13 % EOSINOPHILS 6 0 - 7 % BASOPHILS 1 0 - 1 % IMMATURE GRANULOCYTES 0 0.0 - 0.5 % ABS. NEUTROPHILS 2.8 1.8 - 8.0 K/UL  
 ABS. LYMPHOCYTES 2.1 0.8 - 3.5 K/UL  
 ABS. MONOCYTES 1.1 (H) 0.0 - 1.0 K/UL  
 ABS. EOSINOPHILS 0.4 0.0 - 0.4 K/UL  
 ABS. BASOPHILS 0.1 0.0 - 0.1 K/UL  
 ABS. IMM. GRANS. 0.0 0.00 - 0.04 K/UL  
 DF AUTOMATED METABOLIC PANEL, BASIC Collection Time: 07/27/18 10:33 AM  
Result Value Ref Range Sodium 137 136 - 145 mmol/L Potassium 4.2 3.5 - 5.1 mmol/L Chloride 102 97 - 108 mmol/L  
 CO2 28 21 - 32 mmol/L Anion gap 7 5 - 15 mmol/L Glucose 88 65 - 100 mg/dL BUN 10 6 - 20 MG/DL Creatinine 0.81 0.70 - 1.30 MG/DL  
 BUN/Creatinine ratio 12 12 - 20 GFR est AA >60 >60 ml/min/1.73m2 GFR est non-AA >60 >60 ml/min/1.73m2 Calcium 9.0 8.5 - 10.1 MG/DL  
HEPATIC FUNCTION PANEL Collection Time: 07/27/18 10:33 AM  
Result Value Ref Range Protein, total 7.3 6.4 - 8.2 g/dL Albumin 3.2 (L) 3.5 - 5.0 g/dL Globulin 4.1 (H) 2.0 - 4.0 g/dL A-G Ratio 0.8 (L) 1.1 - 2.2  Bilirubin, total 0.3 0.2 - 1.0 MG/DL Bilirubin, direct 0.1 0.0 - 0.2 MG/DL Alk. phosphatase 86 45 - 117 U/L  
 AST (SGOT) 31 15 - 37 U/L  
 ALT (SGPT) 38 12 - 78 U/L  
 
 
1035- patient went to MD office appointment. 1155- patient returned to [facility] for treatment. Medications: 
Normal [facility] Keytruda (pembrolizumab) 200 MG - infused over 30 minutes 1450 Pt tolerated treatment well. Port maintained positive blood return throughout treatment, flushed with positive blood return at conclusion and kellogg needle was then removed, site covered with 2x2 guaze and band aid. D/c home ambulatory in no distress. Pt aware of next [facility] appointment scheduled for 08/17/18. Patient Vitals for the past 12 hrs: 
 Temp Pulse Resp BP SpO2  
07/27/18 1450 - 76 18 122/78 -  
07/27/18 1024 97.8 °F (36.6 °C) 68 18 119/71 97 %

## 2018-07-27 NOTE — PROGRESS NOTES
[facility] [place] at [address], [address] [place] [address]  Matthew Wong: [phone]  F: [phone]      Reason for Visit:   Travis Doan is a 75 y.o. male who is seen for follow up of lung cancer. Treatment History:   · CXR 7/11/2017: Mediastinal lymphadenopathy with left hilar mass. · CT Chest 7/14/2017: Bulky mediastinal and left hilar lymphadenopathy. Bilateral pulmonary masses and nodules  · PET-CT 7/24/2017: Right parietal mass. Hypermetabolic right supraclavicular, right paratracheal, AP window, prevascular, precarinal, subcarinal and left hilar lymphadenopathy. Hypermetabolic pulmonary nodules bilaterally. · MRI Brain 7/24/2017: Junction right posterior temporal lobe and occipital lobe 2.7 cm mass likely metastasis from lung cancer. No additional acute abnormalities  · FNA supraclavicular node 7/28/2017: Metastatic squamous cell carcinoma, PD-L1 5%, BRAF negative, EGFR negative, ALK & ROS-1 negative   · Stage IV Non-small cell lung cancer (Squamous Cell)  · Gamma knife by Dr. Candice Back 8/15/2017   · Palliative chemotherapy with carboplatin and gemcitabine beginning 8/25/2017 - 11/3/2017  · Maintenance gemcitabine x4 cycles from 11/17/2017 to 1/26/2018  · Clinical trial OG [id number]: ABBV-399 (PROCESS II) from 3/23/2018 - 5/4/2018   · Palliative immunotherapy with Pembrolizumab beginning 5/25/2018    History of Present Illness:   Here today for follow up and next cycle of therapy. He states he continues to feel pretty good. Saravanan Cad[birth year] was [birth year]. Reports compliance with apixaban BID, no bleeding. Arthritis pain in hands a little worse, taking Tylenol PRN which helps. Rash stable. No additional complaints. He is accompanied by his daughter today. He smoked 1ppd for 50+ years but quit at diagnosis. He lives alone in [address] [place] [address]. His son lives about 15 minutes from him. His daughter lives about 30 minutes away.     PAST HISTORY: The following sections were reviewed and updated in the EMR as appropriate: PMH, SH, FH, Medications, Allergies. Allergies   Allergen Reactions    Lisinopril Angioedema    Hydrochlorothiazide Hives and Swelling    Sulfa (Sulfonamide Antibiotics) Hives      Review of Systems: A complete review of systems was obtained, reviewed, and scanned into the EMR. Pertinent findings reviewed above. Physical Exam:     Visit Vitals    /81 (BP 1 Location: Left arm, BP Patient Position: Sitting)    Pulse 74    Temp 96.8 °F (36 °C) (Oral)    Resp 20    Ht 5' 7\" (1.702 m)    Wt 169 lb (76.7 kg)    SpO2 97%    BMI 26.47 kg/m2     ECOG PS: 1  General: No distress  Eyes: PERRLA, anicteric sclerae  HENT: Atraumatic, OP clear  Neck: Supple  Lymphatic: No cervical, supraclavicular, or inguinal adenopathy  Respiratory: CTAB, normal respiratory effort  CV: Normal rate, regular rhythm, no murmurs, no peripheral edema  GI: Soft, nontender, nondistended, no masses, no hepatomegaly, no splenomegaly  MS: Normal gait and station, walking with cane. Digits without clubbing or cyanosis. Skin: No rashes, ecchymoses, or petechiae. Normal temperature, turgor, and texture. Psych: Alert, oriented, appropriate affect, normal judgment/insight    Results:     Lab Results   Component Value Date/Time    WBC 6.4 07/27/2018 10:33 AM    HGB 11.4 (L) 07/27/2018 10:33 AM    HCT 35.4 (L) 07/27/2018 10:33 AM    PLATELET 316 17/10/4173 10:33 AM    MCV 91.9 07/27/2018 10:33 AM    ABS.  NEUTROPHILS 2.8 07/27/2018 10:33 AM     Lab Results   Component Value Date/Time    Sodium 137 07/27/2018 10:33 AM    Potassium 4.2 07/27/2018 10:33 AM    Chloride 102 07/27/2018 10:33 AM    CO2 28 07/27/2018 10:33 AM    Glucose 88 07/27/2018 10:33 AM    BUN 10 07/27/2018 10:33 AM    Creatinine 0.81 07/27/2018 10:33 AM    GFR est AA >60 07/27/2018 10:33 AM    GFR est non-AA >60 07/27/2018 10:33 AM    Calcium 9.0 07/27/2018 10:33 AM     Lab Results   Component Value Date/Time Bilirubin, total 0.3 07/27/2018 10:33 AM    ALT (SGPT) 38 07/27/2018 10:33 AM    AST (SGOT) 31 07/27/2018 10:33 AM    Alk. phosphatase 86 07/27/2018 10:33 AM    Protein, total 7.3 07/27/2018 10:33 AM    Albumin 3.2 (L) 07/27/2018 10:33 AM    Globulin 4.1 (H) 07/27/2018 10:33 AM       CTA Chest 9/15/2017:   1. No pulmonary embolus. 2. Bilateral lung masses and mediastinal nodes decreased in size. 3. Atherosclerosis with coronary artery calcifications    Venous doppler 9/15/2017: Bilateral lower extremity venous duplex positive for deep  venous thrombosis in right posterior tibial vein, right peroneal vein, and the left posterior tibial vein. There are superficial venous  thrombosis in bilateral greater saphenous veins    CT C/A/P 11/16/2017: No significant interval change in size of bilateral pulmonary nodules as described above. Slight interval decrease in size of mediastinal lymph nodes. No evidence of metastatic disease in the abdomen or pelvis. CT C/A/P 2/5/2018: Findings are consistent with interval progression of metastatic disease in the chest.  And enlarged pulmonary nodules. 2.7 x 1.8 cm nodule in the right upper lobe on 2-23 previously 1.5 x 3.0 cm. 4.4 x 2.1 cm nodule left upper lobe previously 3.3 x 3.0 cm. 2-22 12 x 10 mm nodule in the right lower lobe previously 4 mm 2-34. 14 x 14 mm nodule-like right lower lobe previously 6 mm 2-40. Right and left upper lobe pulmonary nodules as well. Mediastinal disease burden is not significantly changed. No evidence of metastatic disease in the abdomen or pelvis. CT C/A/P 5/2/2018: Findings are consistent with interval progression of metastatic disease in the chest. There are numerous new and/or enlarged pulmonary nodules/masses present. Mediastinal disease burden is not significantly changed. No evidence of metastatic disease in the abdomen or pelvis. RECIST  Please note that this represents the baseline RECIST examination.    TARGET LESIONS:    Lesion (description)         Location (series/slice)                Size      1. Right lung mass 2-26 3.1 x 3.8 cm in size  2. Left lung mass 6.7 x 4.0 cm in size 2-27  NONTARGET LESIONS:  1. AP window lymph node difficult to measure. 2. Precarinal lymph node 1.3 cm in short axis dimension. 3. Numerous additional scattered pulmonary masses/nodules. Assessment:   1) Metastatic non-small cell lung cancer (squamous cell)  EGFR, ALK, ROS1, BRAF negative  He has disease within his chest and brain. His cancer is not curable and management is with palliative intent. He is s/p gamma knife to CNS disease. He is now receiving second line therapy with pembrolizumab. He continues tolerating therapy well though now having a little worse arthritis pain. We will continue with treatment today and see him back in 3 weeks with reimaging prior. 2) Brain metastasis  S/P gamma knife. Follow up with Dr. Dion Harp for surveillance MRI brain. Recent MRI improved per Dr. Dion Harp. 3) Atrial fibrillation, CHF  Cardiology following. On apixaban. 4) DVT 9/15/2017  Previously on Lovenox BID but unable to tolerate injections. Continue Apixaban 5mg BID. He should continue this long term in the setting of malignancy. 5) Weight loss  Resolved. Monitor. 6) Neuropathy, chemotherapy induced  Secondary to prior cisplatin. Continue Cymbalta 30mg daily. Monitor and increase dose if needed. 7) Arthritis pain  A little worse in hands recently. Discussed continuing Tylenol PRN or starting tramadol. He would like to stay with tylenol for now. Monitor. 8) Rash to forearms  Minimal. No itchy or painful. Monitor for now. Patient to notify the office if this worsens.      Plan:     Proceed today with C#4 Pembrolizumab (200mg) given every 3 weeks  Labs: CBC, CMP prior to each cycle, TSH every 6 weeks  Continue Apixaban 5mg BID  Continue Cymbalta 30 mg daily  Follow up with Dr. Dion Harp as planned  CT C/A/P prior to next visit  Return to clinic in 3 weeks with next cycle of therapy      Signed By: Rustam Alegria MD

## 2018-08-07 NOTE — TELEPHONE ENCOUNTER
[address] at [place]  [phone]    08/07/18- Patient needs CT prior to upcoming visit next week with . Concierges have tried unsuccessful scheduling. GUERRERO left for [place] with concierges contact information to scheduled imaging.

## 2018-08-17 NOTE — MR AVS SNAPSHOT
[address] 
 
 
 [address] [place] [address], [address] [place] [address] 
[phone] Patient: Samaria Tapia MRN: [id number] :1945 Visit Information Date & Time Provider Department Dept. Phone Encounter #  
 2018 10:45 AM Cortez Fang NP [address] at [address] [id number] Your Appointments 2018 10:15 AM  
ESTABLISHED PATIENT with Collette Kelley NP  
[facility] at [address] [place] [address] [facility] Appt Note: labs, crespo, raddin, OPIC  
 [address] [place] [address], [address] [place] [address] [facility] [address]  
[phone]  
  
   
 [address] [place] [address], [address] [place] [address] Upcoming Health Maintenance Date Due DTaP/Tdap/Td series (1 - Tdap) 1966 ZOSTER VACCINE AGE 60> 2004 Pneumococcal 65+ High/Highest Risk (2 of 2 - PCV13) 2014 MEDICARE YEARLY EXAM 3/14/2018 Influenza Age 5 to Adult 2018 GLAUCOMA SCREENING Q2Y 2019 Allergies as of 2018  Review Complete On: 2018 By: Cortez Fang NP Severity Noted Reaction Type Reactions Lisinopril High 10/27/2011   Systemic Angioedema Hydrochlorothiazide  2017    Hives, Swelling Sulfa (Sulfonamide Antibiotics)  10/27/2011    Hives Current Immunizations  Reviewed on 2018 Name Date Influenza High Dose Vaccine PF 2017, 1/10/2013 Influenza Vaccine 2015, 12/10/2014, 10/2/2013 Influenza Vaccine (Quad) PF 10/18/2016 Influenza Vaccine Split 10/12/2011, 5/10/2011, 10/13/2010 Pneumococcal Polysaccharide (PPSV-23) 2013 Reviewed by Danielle Rascon on 2018 at 10:18 AM  
You Were Diagnosed With   
  
 Codes Comments Primary malignant neoplasm of left lung metastatic to other site [facility])    -  Primary ICD-10-CM: C34.92 
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 
ICD-9-CM: 198. 3 Chemotherapy-induced neuropathy (HCC)     ICD-10-CM: G62.0, T45.1X5A 
ICD-9-CM: 357.6, E933.1 Deep vein thrombosis (DVT) of tibial vein of both lower extremities, unspecified chronicity (HCC)     ICD-10-CM: H12.169 ICD-9-CM: 453.42 Atrial fibrillation with rapid ventricular response (HCC)     ICD-10-CM: I48.91 
ICD-9-CM: 427.31 Vitals BP Pulse Temp Resp Height(growth percentile) Weight(growth percentile) 138/78 (BP 1 Location: Right arm, BP Patient Position: Sitting) 69 98 °F (36.7 °C) (Temporal) 20 5' 7\" (1.702 m) 178 lb (80.7 kg) SpO2 BMI Smoking Status 98% 27.88 kg/m2 Former Smoker BMI and BSA Data Body Mass Index Body Surface Area  
 27.88 kg/m 2 1.95 m 2 Preferred Pharmacy Pharmacy Name Phone [address], [place] - [address] [phone] Your Updated Medication List  
  
   
This list is accurate as of 8/17/18 11:39 AM.  Always use your most recent med list.  
  
  
  
  
 apixaban 5 mg tablet Commonly known as:  Lalo Gault Take 1 Tab by mouth two (2) times a day. aspirin delayed-release 81 mg tablet Take 81 mg by mouth daily. dilTIAZem  mg ER capsule Commonly known as:  CARDIZEM CD Take 1 Cap by mouth daily. Indications: VENTRICULAR RATE CONTROL IN ATRIAL FIBRILLATION  
  
 diphenhydrAMINE 25 mg tablet Commonly known as:  BENADRYL Take 25 mg by mouth nightly as needed for Sleep. DULoxetine 30 mg capsule Commonly known as:  CYMBALTA Take 1 Cap by mouth daily. lidocaine-prilocaine topical cream  
Commonly known as:  EMLA Apply  to affected area as needed for Pain (Apply 30-60 min. prior to having your port accessed). magic mouthwash solution Swish and spit 15-30 mL every 2-4 hours as needed for mouth pain. May swallow for throat pain.    Magic mouth wash  Maalox Lidocaine 2% viscous Diphenhydramine oral solution   Pharmacy to mix equal portions of ingredients to a total volume as indicated in the dispense amount. metoprolol succinate 25 mg XL tablet Commonly known as:  TOPROL-XL Take 1 Tab by mouth daily. MIRALAX 17 gram packet Generic drug:  polyethylene glycol Take 17 g by mouth daily. nicotine 14 mg/24 hr patch Commonly known as:  NICODERM CQ  
1 Patch by TransDERmal route every twenty-four (24) hours. ondansetron hcl 8 mg tablet Commonly known as:  Melvena Islam Take 1 Tab by mouth every eight (8) hours as needed for Nausea. prochlorperazine 10 mg tablet Commonly known as:  COMPAZINE Take 1 Tab by mouth every six (6) hours as needed for Nausea. tamsulosin 0.4 mg capsule Commonly known as:  FLOMAX Take 1 Cap by mouth daily. XYZAL 5 mg tablet Generic drug:  levocetirizine Take 5 mg by mouth daily as needed for Allergies. To-Do List   
 09/07/2018 10:00 AM  
  Appointment with Adalid Pedersen <4H at [place] [address] ([phone])  
  
 09/28/2018 10:00 AM  
  Appointment with COLETTE HILL <4H at [place] [address] ([phone]) [facility]! Dear Nadia Villa: Thank you for requesting a HBCS account. Our records indicate that you already have an active HBCS account. You can access your account anytime at [email or web address] Did you know that you can access your hospital and ER discharge instructions at any time in HBCS? You can also review all of your test results from your hospital stay or ER visit. Additional Information If you have questions, please visit the Frequently Asked Questions section of the HBCS website at [email or web address] Remember, HBCS is NOT to be used for urgent needs. For medical emergencies, dial 911. Now available from your iPhone and Android! Please provide this summary of care documentation to your next provider. Your primary care clinician is listed as Kia Yi. If you have any questions after today's visit, please call [phone].

## 2018-08-17 NOTE — PROGRESS NOTES
[facility] [place] at [address], [address] [facility] [address] [facility] Ma: [phone]  F: [phone]      Reason for Visit:   Haritha Mari is a 75 y.o. male who is seen for follow up of lung cancer. Treatment History:   · CXR 7/11/2017: Mediastinal lymphadenopathy with left hilar mass. · CT Chest 7/14/2017: Bulky mediastinal and left hilar lymphadenopathy. Bilateral pulmonary masses and nodules  · PET-CT 7/24/2017: Right parietal mass. Hypermetabolic right supraclavicular, right paratracheal, AP window, prevascular, precarinal, subcarinal and left hilar lymphadenopathy. Hypermetabolic pulmonary nodules bilaterally. · MRI Brain 7/24/2017: Junction right posterior temporal lobe and occipital lobe 2.7 cm mass likely metastasis from lung cancer. No additional acute abnormalities  · FNA supraclavicular node 7/28/2017: Metastatic squamous cell carcinoma, PD-L1 5%, BRAF negative, EGFR negative, ALK & ROS-1 negative   · Stage IV Non-small cell lung cancer (Squamous Cell)  · Gamma knife by Dr. Elaine Valenzuela 8/15/2017   · Palliative chemotherapy with carboplatin and gemcitabine beginning 8/25/2017 - 11/3/2017  · Maintenance gemcitabine x4 cycles from 11/17/2017 to 1/26/2018  · Clinical trial OG [id number]: ABBV-[id number] (PROCESS II) from 3/23/2018 - 5/4/2018   · Palliative immunotherapy with Pembrolizumab beginning 5/25/2018    History of Present Illness:   Here today for follow up and next cycle of therapy. He reports he continues to feel well. Hand stiffness and pain stable since last visit. Reports compliance with apixaban BID, no bleeding. Rash unchanged. No new complaints. He is accompanied by his daughter today. He smoked 1ppd for 50+ years but quit at diagnosis. He lives alone in [address] [place] [address]. His son lives about 15 minutes from him. His daughter lives about 30 minutes away.     PAST HISTORY: The following sections were reviewed and updated in the EMR as appropriate: PMH, SH, FH, Medications, Allergies. Allergies   Allergen Reactions    Lisinopril Angioedema    Hydrochlorothiazide Hives and Swelling    Sulfa (Sulfonamide Antibiotics) Hives      Review of Systems: A complete review of systems was obtained, reviewed, and scanned into the EMR. Pertinent findings reviewed above. Physical Exam:     Visit Vitals    /78 (BP 1 Location: Right arm, BP Patient Position: Sitting)    Pulse 69    Temp 98 °F (36.7 °C) (Temporal)    Resp 20    Ht 5' 7\" (1.702 m)    Wt 178 lb (80.7 kg)    SpO2 98%    BMI 27.88 kg/m2     ECOG PS: 1  General: No distress  Eyes: PERRLA, anicteric sclerae  HENT: Atraumatic, OP clear  Neck: Supple  Lymphatic: No cervical, supraclavicular, or inguinal adenopathy  Respiratory: CTAB, normal respiratory effort  CV: Normal rate, regular rhythm, no murmurs, no peripheral edema  GI: Soft, nontender, nondistended, no masses, no hepatomegaly, no splenomegaly  MS: Normal gait and station, walking with cane. Digits without clubbing or cyanosis. Skin: No rashes, ecchymoses, or petechiae. Normal temperature, turgor, and texture. Psych: Alert, oriented, appropriate affect, normal judgment/insight    Results:     Lab Results   Component Value Date/Time    WBC 7.2 08/17/2018 10:29 AM    HGB 11.6 (L) 08/17/2018 10:29 AM    HCT 36.1 (L) 08/17/2018 10:29 AM    PLATELET 797 20/39/2181 10:29 AM    MCV 91.2 08/17/2018 10:29 AM    ABS.  NEUTROPHILS 3.4 08/17/2018 10:29 AM     Lab Results   Component Value Date/Time    Sodium 137 08/17/2018 10:29 AM    Potassium 3.9 08/17/2018 10:29 AM    Chloride 103 08/17/2018 10:29 AM    CO2 25 08/17/2018 10:29 AM    Glucose 106 (H) 08/17/2018 10:29 AM    BUN 11 08/17/2018 10:29 AM    Creatinine 0.86 08/17/2018 10:29 AM    GFR est AA >60 08/17/2018 10:29 AM    GFR est non-AA >60 08/17/2018 10:29 AM    Calcium 8.7 08/17/2018 10:29 AM     Lab Results   Component Value Date/Time    Bilirubin, total 0.2 08/17/2018 10:29 AM    ALT (SGPT) 36 08/17/2018 10:29 AM    AST (SGOT) 38 (H) 08/17/2018 10:29 AM    Alk. phosphatase 90 08/17/2018 10:29 AM    Protein, total 7.2 08/17/2018 10:29 AM    Albumin 3.2 (L) 08/17/2018 10:29 AM    Globulin 4.0 08/17/2018 10:29 AM       CT C/A/P 2/5/2018: Findings are consistent with interval progression of metastatic disease in the chest.  And enlarged pulmonary nodules. 2.7 x 1.8 cm nodule in the right upper lobe on 2-23 previously 1.5 x 3.0 cm. 4.4 x 2.1 cm nodule left upper lobe previously 3.3 x 3.0 cm. 2-22 12 x 10 mm nodule in the right lower lobe previously 4 mm 2-34. 14 x 14 mm nodule-like right lower lobe previously 6 mm 2-40. Right and left upper lobe pulmonary nodules as well. Mediastinal disease burden is not significantly changed. No evidence of metastatic disease in the abdomen or pelvis. CT C/A/P 5/2/2018: Findings are consistent with interval progression of metastatic disease in the chest. There are numerous new and/or enlarged pulmonary nodules/masses present. Mediastinal disease burden is not significantly changed. No evidence of metastatic disease in the abdomen or pelvis. RECIST  Please note that this represents the baseline RECIST examination. TARGET LESIONS:    Lesion (description)         Location (series/slice)                Size      1. Right lung mass 2-26 3.1 x 3.8 cm in size  2. Left lung mass 6.7 x 4.0 cm in size 2-27  NONTARGET LESIONS:  1. AP window lymph node difficult to measure. 2. Precarinal lymph node 1.3 cm in short axis dimension. 3. Numerous additional scattered pulmonary masses/nodules. CT C/A/P 8/13/2018: Response to therapy by RECIST criteria. There is no evidence for new metastatic disease in the chest, abdomen, or pelvis. RECIST   TARGET LESIONS:         Lesion (description)         Location (series/slice)                Size      1. Right lung mass 3-28 1.7 x 1.7 cm, previously 3.1 x 3.8 cm   2.  Left lung mass 3-27  1.8 x 3.6 cm, previously 4.0 x 6.7 cm  NONTARGET LESIONS:   1. Decrease in size of a prevascular lymph node. 2. Decrease in size of a right paratracheal lymph node. 3. Decrease in size or resolution of additional smaller lung nodules. Assessment:   1) Metastatic non-small cell lung cancer (squamous cell)  EGFR, ALK, ROS1, BRAF negative  He has disease within his chest and brain. His cancer is not curable and management is with palliative intent. He is s/p gamma knife to CNS disease. He is now receiving second line therapy with pembrolizumab. He continues tolerating therapy fairly well, arthritis pain stable. CT shows response to therapy, discussed with patient in detail today. We will proceed with treatment today and see him back in 3 weeks. We will plan to rescan after cycle 8.     2) Brain metastasis  S/P gamma knife. Follow up with Dr. Viktoria Curling for surveillance MRI brain. Recent MRI improved per Dr. Viktoria Curling. 3) Atrial fibrillation, CHF  Cardiology following. On apixaban. 4) DVT 9/15/2017  Previously on Lovenox BID but unable to tolerate injections. Continue Apixaban 5mg BID. He should continue this long term in the setting of malignancy. 5) Weight loss  Resolved. Monitor. 6) Neuropathy, chemotherapy induced  Secondary to prior cisplatin. Continue Cymbalta 30mg daily. Monitor and increase dose if needed. 7) Arthritis pain  A little worse in hands recently, stable today. Continue Tylenol PRN. PReviously discussed Tramadol but he declined. 8) Rash to forearms  Minimal. No itchy or painful. Monitor for now. Patient to notify the office if this worsens. Plan:     Proceed today with C#5 Pembrolizumab (200mg) given every 3 weeks  Labs: CBC, CMP prior to each cycle, TSH every 6 weeks  Continue Apixaban 5mg BID  Continue Cymbalta 30 mg daily  Follow up with Dr. Viktoria Curling as planned  Return to clinic in 3 weeks with next cycle of therapy    Patient was seen in conjunction with Claire Espinal NP.       Signed By: Oc Diana MD

## 2018-08-17 NOTE — PROGRESS NOTES
Problem: Chemotherapy Treatment  Goal: *Chemotherapy regimen followed  Outcome: Progressing Towards Goal  Pt receiving C5 Keytruda

## 2018-08-17 NOTE — PROGRESS NOTES
[facility] VISIT NOTE    5994  Pt arrived at [facility] ambulatory and in no distress for Keytruda D1 C5. Assessment completed, pt w/o complaints. Right chest port accessed with . 75 in kellogg with no difficulty. Positive blood return noted and labs drawn. Medications received:  Keytruda IV    Tolerated treatment well, no adverse reaction noted. Port de-accessed and flushed per protocol. Positive blood return noted. Patient Vitals for the past 12 hrs:   Temp Pulse Resp BP SpO2   08/17/18 1255 97.8 °F (36.6 °C) - 18 113/62 -   08/17/18 1020 98 °F (36.7 °C) 68 18 122/69 -   08/17/18 1019 - - - - 97 %     Recent Results (from the past 12 hour(s))   CBC WITH AUTOMATED DIFF    Collection Time: 08/17/18 10:29 AM   Result Value Ref Range    WBC 7.2 4.1 - 11.1 K/uL    RBC 3.96 (L) 4.10 - 5.70 M/uL    HGB 11.6 (L) 12.1 - 17.0 g/dL    HCT 36.1 (L) 36.6 - 50.3 %    MCV 91.2 80.0 - 99.0 FL    MCH 29.3 26.0 - 34.0 PG    MCHC 32.1 30.0 - 36.5 g/dL    RDW 15.4 (H) 11.5 - 14.5 %    PLATELET 856 641 - 789 K/uL    MPV 9.8 8.9 - 12.9 FL    NRBC 0.0 0  WBC    ABSOLUTE NRBC 0.00 0.00 - 0.01 K/uL    NEUTROPHILS 47 32 - 75 %    LYMPHOCYTES 33 12 - 49 %    MONOCYTES 14 (H) 5 - 13 %    EOSINOPHILS 5 0 - 7 %    BASOPHILS 1 0 - 1 %    IMMATURE GRANULOCYTES 0 0.0 - 0.5 %    ABS. NEUTROPHILS 3.4 1.8 - 8.0 K/UL    ABS. LYMPHOCYTES 2.4 0.8 - 3.5 K/UL    ABS. MONOCYTES 1.0 0.0 - 1.0 K/UL    ABS. EOSINOPHILS 0.3 0.0 - 0.4 K/UL    ABS. BASOPHILS 0.1 0.0 - 0.1 K/UL    ABS. IMM.  GRANS. 0.0 0.00 - 0.04 K/UL    DF AUTOMATED     METABOLIC PANEL, COMPREHENSIVE    Collection Time: 08/17/18 10:29 AM   Result Value Ref Range    Sodium 137 136 - 145 mmol/L    Potassium 3.9 3.5 - 5.1 mmol/L    Chloride 103 97 - 108 mmol/L    CO2 25 21 - 32 mmol/L    Anion gap 9 5 - 15 mmol/L    Glucose 106 (H) 65 - 100 mg/dL    BUN 11 6 - 20 MG/DL    Creatinine 0.86 0.70 - 1.30 MG/DL    BUN/Creatinine ratio 13 12 - 20      GFR est AA >60 >60 ml/min/1.73m2    GFR est non-AA >60 >60 ml/min/1.73m2    Calcium 8.7 8.5 - 10.1 MG/DL    Bilirubin, total 0.2 0.2 - 1.0 MG/DL    ALT (SGPT) 36 12 - 78 U/L    AST (SGOT) 38 (H) 15 - 37 U/L    Alk. phosphatase 90 45 - 117 U/L    Protein, total 7.2 6.4 - 8.2 g/dL    Albumin 3.2 (L) 3.5 - 5.0 g/dL    Globulin 4.0 2.0 - 4.0 g/dL    A-G Ratio 0.8 (L) 1.1 - 2.2     TSH 3RD GENERATION    Collection Time: 08/17/18 10:29 AM   Result Value Ref Range    TSH 3.21 0.36 - 3.74 uIU/mL     1300  D/C'd from [facility] ambulatory and in no distress accompanied by daughter. Next appointment is 9/7/18 at 1000.

## 2018-08-28 NOTE — TELEPHONE ENCOUNTER
[facility] at [facility] [address]  [phone]    Cymbalta refilled to patient pharmacy.

## 2018-09-07 NOTE — PROGRESS NOTES
[facility] VISIT NOTE    1000  Pt arrived at [facility] ambulatory and in no distress for C6 Keytruda. Assessment completed, pt denies any complaints today. Blood pressure 128/76, pulse 77, temperature 97.6 °F (36.4 °C), resp. rate 18, SpO2 96 %. Right chest port accessed with 0.75 in kellogg no difficulty. Positive blood return noted and labs drawn. Patient proceeded to MD clinic visit. 1130 Patient returned to [facility]. Lab results are within treatment parameters. Recent Results (from the past 12 hour(s))   CBC WITH AUTOMATED DIFF    Collection Time: 09/07/18 10:14 AM   Result Value Ref Range    WBC 6.2 4.1 - 11.1 K/uL    RBC 4.09 (L) 4.10 - 5.70 M/uL    HGB 12.1 12.1 - 17.0 g/dL    HCT 36.6 36.6 - 50.3 %    MCV 89.5 80.0 - 99.0 FL    MCH 29.6 26.0 - 34.0 PG    MCHC 33.1 30.0 - 36.5 g/dL    RDW 15.9 (H) 11.5 - 14.5 %    PLATELET 517 271 - 602 K/uL    MPV 10.3 8.9 - 12.9 FL    NRBC 0.0 0  WBC    ABSOLUTE NRBC 0.00 0.00 - 0.01 K/uL    NEUTROPHILS 40 32 - 75 %    LYMPHOCYTES 40 12 - 49 %    MONOCYTES 14 (H) 5 - 13 %    EOSINOPHILS 5 0 - 7 %    BASOPHILS 1 0 - 1 %    IMMATURE GRANULOCYTES 0 0.0 - 0.5 %    ABS. NEUTROPHILS 2.5 1.8 - 8.0 K/UL    ABS. LYMPHOCYTES 2.5 0.8 - 3.5 K/UL    ABS. MONOCYTES 0.9 0.0 - 1.0 K/UL    ABS. EOSINOPHILS 0.3 0.0 - 0.4 K/UL    ABS. BASOPHILS 0.1 0.0 - 0.1 K/UL    ABS. IMM.  GRANS. 0.0 0.00 - 0.04 K/UL    DF AUTOMATED     METABOLIC PANEL, COMPREHENSIVE    Collection Time: 09/07/18 10:14 AM   Result Value Ref Range    Sodium 135 (L) 136 - 145 mmol/L    Potassium 4.4 3.5 - 5.1 mmol/L    Chloride 100 97 - 108 mmol/L    CO2 27 21 - 32 mmol/L    Anion gap 8 5 - 15 mmol/L    Glucose 97 65 - 100 mg/dL    BUN 11 6 - 20 MG/DL    Creatinine 0.85 0.70 - 1.30 MG/DL    BUN/Creatinine ratio 13 12 - 20      GFR est AA >60 >60 ml/min/1.73m2    GFR est non-AA >60 >60 ml/min/1.73m2    Calcium 8.9 8.5 - 10.1 MG/DL    Bilirubin, total 0.2 0.2 - 1.0 MG/DL    ALT (SGPT) 32 12 - 78 U/L    AST (SGOT) 37 15 - 37 U/L    Alk. phosphatase 79 45 - 117 U/L    Protein, total 7.6 6.4 - 8.2 g/dL    Albumin 3.6 3.5 - 5.0 g/dL    Globulin 4.0 2.0 - 4.0 g/dL    A-G Ratio 0.9 (L) 1.1 - 2.2       Medications received:  Keytruda IV    Blood pressure 111/66, pulse 66, temperature 97.1 °F (36.2 °C), resp. rate 18, SpO2 96 %. Tolerated treatment well, no adverse reaction noted. Port de-accessed and flushed per protocol. Positive blood return noted. 1300  D/C'd from [facility] ambulatory and in no distress accompanied by his daughter.  Next appointment is 9/28/18 at 10am.

## 2018-09-07 NOTE — PROGRESS NOTES
1. Have you been to the ER, urgent care clinic since your last visit? Hospitalized since your last visit? No    2. Have you seen or consulted any other health care providers outside of the [address] [place] [address] since your last visit? Include any pap smears or colon screening.  No

## 2018-09-07 NOTE — PROGRESS NOTES
[facility] [place] at [facility] 88  [address] [facility], [address]  Javier Hendrix: [phone]  F: [phone]      Reason for Visit:   Lillian Lamb is a 75 y.o. male who is seen for follow up of lung cancer. Treatment History:   · CXR 7/11/2017: Mediastinal lymphadenopathy with left hilar mass. · CT Chest 7/14/2017: Bulky mediastinal and left hilar lymphadenopathy. Bilateral pulmonary masses and nodules  · PET-CT 7/24/2017: Right parietal mass. Hypermetabolic right supraclavicular, right paratracheal, AP window, prevascular, precarinal, subcarinal and left hilar lymphadenopathy. Hypermetabolic pulmonary nodules bilaterally. · MRI Brain 7/24/2017: Junction right posterior temporal lobe and occipital lobe 2.7 cm mass likely metastasis from lung cancer. No additional acute abnormalities  · FNA supraclavicular node 7/28/2017: Metastatic squamous cell carcinoma, PD-L1 5%, BRAF negative, EGFR negative, ALK & ROS-1 negative   · Stage IV Non-small cell lung cancer (Squamous Cell)  · Gamma knife by Dr. Chay Irby 8/15/2017   · Palliative chemotherapy with carboplatin and gemcitabine beginning 8/25/2017 - 11/3/2017  · Maintenance gemcitabine x4 cycles from 11/17/2017 to 1/26/2018  · Clinical trial OG [id number]: ABBV-[id number] (PROCESS II) from 3/23/2018 - 5/4/2018   · Palliative immunotherapy with Pembrolizumab beginning 5/25/2018    History of Present Illness:   Presents for follow up on Keytruda. Feeling well on therapy. Using cane with walking. No recent falls. No mouth sores. No diarrhea or change in bowels. No nausea or vomiting. Energy is good. Appetite is great. Eating everything he wants. Denies pain today. Denies bleeding on Eliquis. He is accompanied by his daughter today. He smoked 1ppd for 50+ years but quit at diagnosis. He lives alone in [address] [place] [address]. His son lives about 15 minutes from him. His daughter lives about 30 minutes away.     PAST HISTORY: The following sections were reviewed and updated in the EMR as appropriate: PMH, SH, FH, Medications, Allergies. Allergies   Allergen Reactions    Lisinopril Angioedema    Hydrochlorothiazide Hives and Swelling    Sulfa (Sulfonamide Antibiotics) Hives      Review of Systems: A complete review of systems was obtained, reviewed, and scanned into the EMR. Pertinent findings reviewed above. Physical Exam:     Visit Vitals    /68 (BP 1 Location: Left arm, BP Patient Position: Sitting)    Pulse 71    Temp 95.5 °F (35.3 °C) (Temporal)    Resp 19    Ht 5' 7\" (1.702 m)    Wt 182 lb 3.2 oz (82.6 kg)    SpO2 98%    BMI 28.54 kg/m2     ECOG PS: 1  General: No distress  Eyes: PERRLA, anicteric sclerae  HENT: Atraumatic, OP clear  Neck: Supple  Lymphatic: No cervical, supraclavicular, or inguinal adenopathy  Respiratory: CTAB, normal respiratory effort  CV: Normal rate, regular rhythm, no murmurs, no peripheral edema  GI: Soft, nontender, nondistended, no masses, no hepatomegaly, no splenomegaly  MS: Normal gait and station, walking with cane. Digits without clubbing or cyanosis. Skin: No rashes, ecchymoses, or petechiae. Normal temperature, turgor, and texture. Psych: Alert, oriented, appropriate affect, normal judgment/insight    Results:     Lab Results   Component Value Date/Time    WBC 6.2 09/07/2018 10:14 AM    HGB 12.1 09/07/2018 10:14 AM    HCT 36.6 09/07/2018 10:14 AM    PLATELET 754 15/17/9775 10:14 AM    MCV 89.5 09/07/2018 10:14 AM    ABS.  NEUTROPHILS 2.5 09/07/2018 10:14 AM     Lab Results   Component Value Date/Time    Sodium 135 (L) 09/07/2018 10:14 AM    Potassium 4.4 09/07/2018 10:14 AM    Chloride 100 09/07/2018 10:14 AM    CO2 27 09/07/2018 10:14 AM    Glucose 97 09/07/2018 10:14 AM    BUN 11 09/07/2018 10:14 AM    Creatinine 0.85 09/07/2018 10:14 AM    GFR est AA >60 09/07/2018 10:14 AM    GFR est non-AA >60 09/07/2018 10:14 AM    Calcium 8.9 09/07/2018 10:14 AM     Lab Results   Component Value Date/Time    Bilirubin, total 0.2 09/07/2018 10:14 AM    ALT (SGPT) 32 09/07/2018 10:14 AM    AST (SGOT) 37 09/07/2018 10:14 AM    Alk. phosphatase 79 09/07/2018 10:14 AM    Protein, total 7.6 09/07/2018 10:14 AM    Albumin 3.6 09/07/2018 10:14 AM    Globulin 4.0 09/07/2018 10:14 AM       CT C/A/P 2/5/2018: Findings are consistent with interval progression of metastatic disease in the chest.  And enlarged pulmonary nodules. 2.7 x 1.8 cm nodule in the right upper lobe on 2-23 previously 1.5 x 3.0 cm. 4.4 x 2.1 cm nodule left upper lobe previously 3.3 x 3.0 cm. 2-22 12 x 10 mm nodule in the right lower lobe previously 4 mm 2-34. 14 x 14 mm nodule-like right lower lobe previously 6 mm 2-40. Right and left upper lobe pulmonary nodules as well. Mediastinal disease burden is not significantly changed. No evidence of metastatic disease in the abdomen or pelvis. CT C/A/P 5/2/2018: Findings are consistent with interval progression of metastatic disease in the chest. There are numerous new and/or enlarged pulmonary nodules/masses present. Mediastinal disease burden is not significantly changed. No evidence of metastatic disease in the abdomen or pelvis. RECIST  Please note that this represents the baseline RECIST examination. TARGET LESIONS:    Lesion (description)         Location (series/slice)                Size      1. Right lung mass 2-26 3.1 x 3.8 cm in size  2. Left lung mass 6.7 x 4.0 cm in size 2-27  NONTARGET LESIONS:  1. AP window lymph node difficult to measure. 2. Precarinal lymph node 1.3 cm in short axis dimension. 3. Numerous additional scattered pulmonary masses/nodules. CT C/A/P 8/13/2018: Response to therapy by RECIST criteria. There is no evidence for new metastatic disease in the chest, abdomen, or pelvis. RECIST   TARGET LESIONS:         Lesion (description)         Location (series/slice)                Size      1. Right lung mass 3-28 1.7 x 1.7 cm, previously 3.1 x 3.8 cm   2.  Left lung mass 3-27  1.8 x 3.6 cm, previously 4.0 x 6.7 cm  NONTARGET LESIONS:   1. Decrease in size of a prevascular lymph node. 2. Decrease in size of a right paratracheal lymph node. 3. Decrease in size or resolution of additional smaller lung nodules. Assessment:   1) Metastatic non-small cell lung cancer (squamous cell)  EGFR, ALK, ROS1, BRAF negative  He has disease within his chest and brain. His cancer is not curable and management is with palliative intent. He is s/p gamma knife to CNS disease. He is now receiving second line therapy with pembrolizumab. Tolerating therapy very well. CT after 4 cycles of therapy indicative of response. Proceed with therapy today as ordered. CT imaging every 3 months on treatment. 2) Brain metastasis  S/P gamma knife. Follow up with Dr. Candice Back for surveillance MRI brain-scheduled end of November. 3) Atrial fibrillation, CHF  Cardiology following. On apixaban. 4) DVT 9/15/2017  Previously on Lovenox BID but unable to tolerate injections. Continue Apixaban 5mg BID. He should continue this long term in the setting of malignancy. 5) Neuropathy, chemotherapy induced  Secondary to prior cisplatin. Continue Cymbalta 30mg daily. 6) Arthritis pain  Stable. Tylenol PRN. 7) Rash to forearms  Minimal. Due to immunotherapy. Cortisone cream as needed. Plan:     Proceed today with C6 Pembrolizumab (200mg) given every 3 weeks  Labs: CBC, CMP prior to each cycle, TSH every 6 weeks  Continue Apixaban 5mg BID  Continue Cymbalta 30 mg daily  Follow up with Dr. Candice Back as planned  Return to clinic in 3 weeks with next cycle of therapy    Patient was seen in conjunction with Bibi Bledsoe NP.         Signed By: Beata Goldberg MD

## 2018-09-23 NOTE — DISCHARGE INSTRUCTIONS
Arthritis: Care Instructions  Your Care Instructions  Arthritis, also called osteoarthritis, is a breakdown of the cartilage that cushions your joints. When the cartilage wears down, your bones rub against each other. This causes pain and stiffness. Many people have some arthritis as they age. Arthritis most often affects the joints of the spine, hands, hips, knees, or feet. You can take simple measures to protect your joints, ease your pain, and help you stay active. Follow-up care is a key part of your treatment and safety. Be sure to make and go to all appointments, and call your doctor if you are having problems. It's also a good idea to know your test results and keep a list of the medicines you take. How can you care for yourself at home? · Stay at a healthy weight. Being overweight puts extra strain on your joints. · Talk to your doctor or physical therapist about exercises that will help ease joint pain. ¨ Stretch. You may enjoy gentle forms of yoga to help keep your joints and muscles flexible. ¨ Walk instead of jog. Other types of exercise that are less stressful on the joints include riding a bicycle, swimming, nehemiah chi, or water exercise. ¨ Lift weights. Strong muscles help reduce stress on your joints. Stronger thigh muscles, for example, take some of the stress off of the knees and hips. Learn the right way to lift weights so you do not make joint pain worse. · Take your medicines exactly as prescribed. Call your doctor if you think you are having a problem with your medicine. · Take pain medicines exactly as directed. ¨ If the doctor gave you a prescription medicine for pain, take it as prescribed. ¨ If you are not taking a prescription pain medicine, ask your doctor if you can take an over-the-counter medicine. · Use a cane, crutch, walker, or another device if you need help to get around. These can help rest your joints.  You also can use other things to make life easier, such as a higher toilet seat and padded handles on kitchen utensils. · Do not sit in low chairs, which can make it hard to get up. · Put heat or cold on your sore joints as needed. Use whichever helps you most. You also can take turns with hot and cold packs. ¨ Apply heat 2 or 3 times a day for 20 to 30 minutes-using a heating pad, hot shower, or hot pack-to relieve pain and stiffness. ¨ Put ice or a cold pack on your sore joint for 10 to 20 minutes at a time. Put a thin cloth between the ice and your skin. When should you call for help? Call your doctor now or seek immediate medical care if:    · You have sudden swelling, warmth, or pain in any joint.     · You have joint pain and a fever or rash.     · You have such bad pain that you cannot use a joint.    Watch closely for changes in your health, and be sure to contact your doctor if:    · You have mild joint symptoms that continue even with more than 6 weeks of care at home.     · You have stomach pain or other problems with your medicine. Where can you learn more? Go to http://larissaRelmada Therapeuticsnatalie.info/. Enter Y383 in the search box to learn more about \"Arthritis: Care Instructions. \"  Current as of: October 10, 2017  Content Version: 11.7  © 0242-9177 [employer]. Care instructions adapted under license by [employer] (which disclaims liability or warranty for this information). If you have questions about a medical condition or this instruction, always ask your healthcare professional. [place] [address] any warranty or liability for your use of this information. Hyponatremia: Care Instructions  Your Care Instructions    Hyponatremia (say \"zo-ks-rpw-TREE-nando-uh\") means that you don't have enough sodium in your blood. It can cause nausea, vomiting, and headaches. Or you may not feel hungry. In serious cases, it can cause seizures, a coma, or even death. Hyponatremia is not a disease.  It is a problem caused by something else, such as medicines or exercising for a long time in hot weather. You can get hyponatremia if you lose a lot of fluids and then you drink a lot of water or other liquids that don't have much sodium. You can also get it if you have kidney, liver, heart, or other health problems. Treatment is focused on getting your sodium levels back to normal.  Follow-up care is a key part of your treatment and safety. Be sure to make and go to all appointments, and call your doctor if you are having problems. It's also a good idea to know your test results and keep a list of the medicines you take. How can you care for yourself at home? · If your doctor recommends it, drink fluids that have sodium. Sports drinks are a good choice. Or you can eat salty foods. · If your doctor recommends it, limit the amount of water you drink. And limit fluids that are mostly water. These include tea, coffee, and juice. · Take your medicines exactly as prescribed. Call your doctor if you have any problems with your medicine. · Get your sodium levels tested when your doctor tells you to. When should you call for help? Call 911 anytime you think you may need emergency care. For example, call if:    · You have a seizure.     · You passed out (lost consciousness).    Call your doctor now or seek immediate medical care if:    · You are confused or it is hard to focus.     · You have little or no appetite.     · You feel sick to your stomach or you vomit.     · You have a headache.     · You have mood changes.     · You feel more tired than usual.    Watch closely for changes in your health, and be sure to contact your doctor if:    · You do not get better as expected. Where can you learn more? Go to http://larissa-natalie.info/. Enter C081 in the search box to learn more about \"Hyponatremia: Care Instructions. \"  Current as of: October 9, 2017  Content Version: 11.7  © 3504-2074 [employer]. Care instructions adapted under license by [employer] (which disclaims liability or warranty for this information). If you have questions about a medical condition or this instruction, always ask your healthcare professional. [address] any warranty or liability for your use of this information. We hope that we have addressed all of your medical concerns. The examination and treatment you received in the Emergency Department were for an emergent problem and were not intended as complete care. It is important that you follow up with your healthcare provider(s) for ongoing care. If your symptoms worsen or do not improve as expected, and you are unable to reach your usual health care provider(s), you should return to the Emergency Department. Today's healthcare is undergoing tremendous change, and patient satisfaction surveys are one of the many tools to assess the quality of medical care. You may receive a survey from the [employer] organization regarding your experience in the Emergency Department. I hope that your experience has been completely positive, particularly the medical care that I provided. As such, please participate in the survey; anything less than excellent does not meet my expectations or intentions. [address] [facility] and [address] [place] [address] participate in nationally recognized quality of care measures. If your blood pressure is greater than 120/80, as reported below, we urge that you seek medical care to address the potential of high blood pressure, commonly known as hypertension. Hypertension can be hereditary or can be caused by certain medical conditions, pain, stress, or \"white coat syndrome. \"       Please make an appointment with your health care provider(s) for follow up of your Emergency Department visit.        VITALS:   Patient Vitals for the past 8 hrs:   Temp Pulse Resp BP SpO2   09/23/18 1930 - - - 119/55 98 %   09/23/18 1915 - - - 123/63 100 %   09/23/18 1648 98.5 °F (36.9 °C) 77 15 117/68 96 %          Thank you for allowing us to provide you with medical care today. We realize that you have many choices for your emergency care needs. Please choose us in the future for any continued health care needs. Monika Edwards, [address] [place] [address].   Office: [phone]            Recent Results (from the past 24 hour(s))   CBC WITH AUTOMATED DIFF    Collection Time: 09/23/18  6:28 PM   Result Value Ref Range    WBC 5.6 4.1 - 11.1 K/uL    RBC 4.29 4. 10 - 5.70 M/uL    HGB 12.7 12.1 - 17.0 g/dL    HCT 37.9 36.6 - 50.3 %    MCV 88.3 80.0 - 99.0 FL    MCH 29.6 26.0 - 34.0 PG    MCHC 33.5 30.0 - 36.5 g/dL    RDW 16.0 (H) 11.5 - 14.5 %    PLATELET 688 279 - 061 K/uL    MPV 9.1 8.9 - 12.9 FL    NRBC 0.0 0  WBC    ABSOLUTE NRBC 0.00 0.00 - 0.01 K/uL    NEUTROPHILS 41 32 - 75 %    LYMPHOCYTES 36 12 - 49 %    MONOCYTES 18 (H) 5 - 13 %    EOSINOPHILS 4 0 - 7 %    BASOPHILS 1 0 - 1 %    IMMATURE GRANULOCYTES 0 0.0 - 0.5 %    ABS. NEUTROPHILS 2.3 1.8 - 8.0 K/UL    ABS. LYMPHOCYTES 2.0 0.8 - 3.5 K/UL    ABS. MONOCYTES 1.0 0.0 - 1.0 K/UL    ABS. EOSINOPHILS 0.2 0.0 - 0.4 K/UL    ABS. BASOPHILS 0.1 0.0 - 0.1 K/UL    ABS. IMM. GRANS. 0.0 0.00 - 0.04 K/UL    DF AUTOMATED     METABOLIC PANEL, COMPREHENSIVE    Collection Time: 09/23/18  6:28 PM   Result Value Ref Range    Sodium 128 (L) 136 - 145 mmol/L    Potassium 3.9 3.5 - 5.1 mmol/L    Chloride 95 (L) 97 - 108 mmol/L    CO2 26 21 - 32 mmol/L    Anion gap 7 5 - 15 mmol/L    Glucose 87 65 - 100 mg/dL    BUN 7 6 - 20 MG/DL    Creatinine 0.77 0.70 - 1.30 MG/DL    BUN/Creatinine ratio 9 (L) 12 - 20      GFR est AA >60 >60 ml/min/1.73m2    GFR est non-AA >60 >60 ml/min/1.73m2    Calcium 9.4 8.5 - 10.1 MG/DL    Bilirubin, total 0.3 0.2 - 1.0 MG/DL    ALT (SGPT) 26 12 - 78 U/L    AST (SGOT) 30 15 - 37 U/L    Alk.  phosphatase 82 45 - 117 U/L    Protein, total 7.6 6.4 - 8.2 g/dL    Albumin 3.5 3.5 - 5.0 g/dL    Globulin 4.1 (H) 2.0 - 4.0 g/dL    A-G Ratio 0.9 (L) 1.1 - 2.2     SAMPLES BEING HELD    Collection Time: 09/23/18  6:28 PM   Result Value Ref Range    SAMPLES BEING HELD 1BLU     COMMENT        Add-on orders for these samples will be processed based on acceptable specimen integrity and analyte stability, which may vary by analyte. Ct Head W Wo Cont    Result Date: 9/23/2018  EXAM:  CT HEAD W WO CONT INDICATION:   brain mass s/p radiation b/l arms weakness COMPARISON: Brain MR from July 24, 2017. CONTRAST:   100 mL of Isovue-300. TECHNIQUE: CT of the head was performed prior to and following uneventful rapid bolus intravenous administration of contrast.  Brain and bone windows were generated. CT dose reduction was achieved through use of a standardized protocol tailored for this examination and automatic exposure control for dose modulation. FINDINGS: The ventricles and sulci are normal in size, shape and configuration and symmetrical and midline. There is no significant white matter disease. There is no intracranial hemorrhage, extra-axial collection, mass, mass effect or midline shift. The basilar cisterns are open. No acute infarct is identified. Following intravenous contrast administration, there is normal enhancement. No enhancing masses or other abnormal areas of enhancement are identified. The bone windows demonstrate no abnormalities. There is opacification of the left sphenoid sinus. .     IMPRESSION: No acute intracranial process. No residual enhancing mass in the right temporo-occipital lobe. Left sphenoid sinus disease, unchanged from the prior study. Ct Spine Cerv Wo Cont    Result Date: 9/23/2018  EXAM:  CT CERVICAL SPINE WITHOUT CONTRAST INDICATION:   b/l hand pain and numbness. COMPARISON: None. CONTRAST:  None.  TECHNIQUE: Multislice helical CT of the cervical spine was performed without intravenous contrast administration. Sagittal and coronal reconstructions were generated. CT dose reduction was achieved through use of a standardized protocol tailored for this examination and automatic exposure control for dose modulation. FINDINGS: There is slight reversal of usual cervical spine lordosis. There is no fracture or subluxation. The odontoid process is intact. The craniocervical junction is within normal limits. There is multilevel spondylosis, with significant disc space narrowing at C4-5, C5-6, C6-7, C7-T1, and T1 to. The prevertebral soft tissues are within normal limits. There is scarring at both lung apices. C2-C3:  There is no spinal canal or neural foraminal stenosis. C3-C4:  There is no spinal canal or neural foraminal stenosis. C4-C5:  Mild spinal stenosis and moderately severe bilateral neural foraminal narrowing. C5-C6:  Mild to moderate spinal stenosis. Moderate to severe bilateral neural foraminal narrowing. C6-C7:  Moderately severe left-sided neural foraminal narrowing. C7-T1:  There is no spinal canal or neural foraminal stenosis. T1-2: Moderately severe right-sided and mild left-sided neural foraminal narrowing. IMPRESSION: No acute fracture or subluxation. Multilevel degenerative changes as detailed above.

## 2018-09-23 NOTE — ED NOTES
Pt arrived to the ED AAOX4, with c/o bilateral hand pain associated with numbness and swelling onset five days ago. Pt verbalizes no other complaint at this time. Pt is now in ED room with family at bedside, side rail up, bed to lowest position and call light within reach. VS in stable condition, will continue to monitor.

## 2018-09-23 NOTE — ED PROVIDER NOTES
HPI Comments: 4:47 PM 
I have evaluated the patient as the Provider in Triage. I have reviewed His vital signs and the triage nurse assessment. I have talked with the patient and any available family and advised that I am the provider in triage and have ordered the appropriate study to initiate their work up based on the clinical presentation during my assessment. I have advised that the patient will be accommodated in the Main ED as soon as possible. I have also requested to contact the triage nurse or myself immediately if the patient experiences any changes in their condition during this brief waiting period. Hand weakness hard to hold a cup for some time but worsened Tuesday. Clay Castillo MD 
 
75 y.o. male with past medical history significant for HTN, hyperlipidemia, lung cancer, a-flutter, anemia, and cardiomyopathy who presents with chief complaint of hand pain. Pt complains of bilateral hand pain for the past 5 days but has progressively worsened with associated intermittent numbness. Pt states it hurts to make a fist and it is hard to pick things up. Pt states he has taken tylenol with no relief. Pt denies headache, or neck pain. There are no other acute medical concerns at this time. Social hx: Former Smoker. EtOH Use. PCP: Justine Kenney MD 
 
Note written by Telly Garcia, as dictated by MATHEW Meza 5:23 PM 
 
 
Patient is a 75 y.o. male presenting with hand pain and numbness. The history is provided by the patient. Hand Pain This is a new problem. The current episode started more than 2 days ago. The problem occurs constantly. The problem has not changed since onset. The pain is present in the right hand and left hand. The quality of the pain is described as aching. The pain is at a severity of 8/10. The pain is severe. Associated symptoms include numbness, limited range of motion, stiffness and tingling.  Pertinent negatives include no itching, no back pain and no neck pain. The symptoms are aggravated by movement and palpation. He has tried nothing for the symptoms. There has been no history of extremity trauma. Numbness Pertinent negatives include no shortness of breath, no chest pain, no vomiting, no headaches and no nausea. Past Medical History:  
Diagnosis Date  Anemia  Cardiomyopathy ([facility] [address] mild LV regional/global dysfunction, likely ischemic etiology  Coronary artery calcification seen on CT scan  Dysfunction, erectile  Hyperlipidemia 5/18/2010  Hypertension  Metastatic lung cancer (metastasis from lung to other site) [facility]) brain  Paroxysmal atrial flutter ([facility] [address] No past surgical history on file. Family History:  
Problem Relation Age of Onset  Stroke Father  Diabetes Sister  Diabetes Brother  Heart Disease Sister Social History Social History  Marital status: SINGLE Spouse name: N/A  
 Number of children: N/A  
 Years of education: N/A Occupational History  Not on file. Social History Main Topics  Smoking status: Former Smoker Packs/day: 1.00 Years: 45.00 Types: Cigarettes  Smokeless tobacco: Never Used  Alcohol use 1.5 oz/week 3 Cans of beer per week  Drug use: No  
 Sexual activity: Not on file Other Topics Concern  Not on file Social History Narrative ALLERGIES: Lisinopril; Hydrochlorothiazide; and Sulfa (sulfonamide antibiotics) Review of Systems Constitutional: Negative for chills and fever. Respiratory: Negative for shortness of breath. Cardiovascular: Negative for chest pain. Gastrointestinal: Negative for diarrhea, nausea and vomiting. Musculoskeletal: Positive for arthralgias and stiffness. Negative for back pain and neck pain. Skin: Negative for itching. Neurological: Positive for tingling and numbness. Negative for headaches. All other systems reviewed and are negative. There were no vitals filed for this visit. Physical Exam  
Constitutional: He is oriented to person, place, and time. He appears well-developed and well-nourished. HENT:  
Head: Normocephalic and atraumatic. Right Ear: External ear normal.  
Left Ear: External ear normal.  
Mouth/Throat: Oropharynx is clear and moist. No oropharyngeal exudate. Eyes: Conjunctivae and EOM are normal. Pupils are equal, round, and reactive to light. Right eye exhibits no discharge. Left eye exhibits no discharge. No scleral icterus. Neck: Normal range of motion. Neck supple. No tracheal deviation present. No thyromegaly present. Cardiovascular: Normal rate, regular rhythm, normal heart sounds and intact distal pulses. No murmur heard. Pulmonary/Chest: Effort normal and breath sounds normal. No respiratory distress. He has no wheezes. He has no rales. Abdominal: Soft. Bowel sounds are normal. He exhibits no distension. There is no tenderness. There is no rebound and no guarding. Musculoskeletal: He exhibits no edema or tenderness. Right hand: He exhibits bony tenderness and swelling. Decreased strength noted. Left hand: He exhibits bony tenderness and swelling. Decreased strength noted. Spine palpated with out step off or crepitus. Non-TTP over spine. Full ROM to all extremities. All extremities are NVI. Patient ambulatory to exam room with out difficulty. Bilateral hands with mild swelling and TTP noted. Decreased bilateral  strength however equal.  
Lymphadenopathy:  
  He has no cervical adenopathy. Neurological: He is alert and oriented to person, place, and time. No cranial nerve deficit. Coordination normal.  
Skin: Skin is warm. No rash noted. No erythema. Psychiatric: He has a normal mood and affect. His behavior is normal. Judgment and thought content normal.  
Nursing note and vitals reviewed. MDM Number of Diagnoses or Management Options Arthritis: Hyponatremia:  
Diagnosis management comments: Assesment/Plan- 75 y.o. Patient presents with: 
Hand Pain Numbness 
differential includes: arthritis, DDD, brain metastasis. Labs and imaging reviewed with hyponatremia. Recommend oncology to recheck sodium levels follow up. Patient educated on reasons to return to the ED. Amount and/or Complexity of Data Reviewed Clinical lab tests: ordered and reviewed Tests in the radiology section of CPT®: ordered and reviewed Tests in the medicine section of CPT®: ordered and reviewed ED Course Procedures

## 2018-09-23 NOTE — ED TRIAGE NOTES
Bilateral hand pain/numbness since Tuesday. Reports trouble \"picking things up. \" Hx of arthritis.

## 2018-09-24 NOTE — ED NOTES
MATHEW Portillo at bedside discharging patient; instructions reviewed by provider. Patient exited ED prior to RN reassessment.

## 2018-09-24 NOTE — PROGRESS NOTES
ED Discharge Follow-Up Date/Time:     2018 2:06 PM 
 
Patient presented to [facility]  ED on 2018 and was diagnosed with hyponatremia, arthritis. Top Challenges reviewed with the provider · None (pt only seeing Onc at this time) Method of communication with provider :none Nurse Navigator(NN) contacted the  family  by telephone to perform post ED discharge assessment. Verified name and  with family as identifiers. Provided introduction to self, and explanation of the Nurse Navigator role. Family reported assessment: \"He's doing okay\" Medication(s):  
New Medications at Discharge: Voltaren gel Changed Medications at Discharge: none Discontinued Medications at Discharge: none There were no barriers to obtaining medications identified at this time. Reviewed discharge instructions and red flags with  family who voiced understanding. Family given an opportunity to ask questions and does not have any further questions or concerns at this time. The family agrees to contact the PCP office for questions related to their healthcare. Patient reminded that there are physicians on call 24 hours a day / 7 days a week (- 5pm to 8am and from Friday 5pm until Monday 8am for the weekend) should the patient have questions or concerns. NN provided contact information for future reference. Offered follow up appointment with PCP: yes [facility] follow up appointment(s): Future Appointments Date Time Provider Suzy Jara 2018 10:00 AM [facility]  
2018 10:15 AM ERLIN Crowell  
10/19/2018 10:00 AM SS [facility]5 [facility] Zhang Non-[facility] follow up appointment(s): n/a [facility]:  n/a  
 [email or web address] 9 AM- 9 PM.   Phone [phone] Goals None

## 2018-09-28 NOTE — TELEPHONE ENCOUNTER
[address] at [facility]  [phone]    09/28/18- Tramadol prescription called into [facility] in [place].

## 2018-09-28 NOTE — PROGRESS NOTES
Cancer [facility]tho at [facility]  [address] [facility], [address]  [place]: [phone]  F: [phone]      Reason for Visit:   Odilia Jaimes is a 75 y.o. male who is seen for follow up of lung cancer. Treatment History:   · CXR 7/11/2017: Mediastinal lymphadenopathy with left hilar mass. · CT Chest 7/14/2017: Bulky mediastinal and left hilar lymphadenopathy. Bilateral pulmonary masses and nodules  · PET-CT 7/24/2017: Right parietal mass. Hypermetabolic right supraclavicular, right paratracheal, AP window, prevascular, precarinal, subcarinal and left hilar lymphadenopathy. Hypermetabolic pulmonary nodules bilaterally. · MRI Brain 7/24/2017: Junction right posterior temporal lobe and occipital lobe 2.7 cm mass likely metastasis from lung cancer. No additional acute abnormalities  · FNA supraclavicular node 7/28/2017: Metastatic squamous cell carcinoma, PD-L1 5%, BRAF negative, EGFR negative, ALK & ROS-1 negative   · Stage IV Non-small cell lung cancer (Squamous Cell)  · Gamma knife by Dr. Ger Callaway 8/15/2017   · Palliative chemotherapy with carboplatin and gemcitabine beginning 8/25/2017 - 11/3/2017  · Maintenance gemcitabine x4 cycles from 11/17/2017 to 1/26/2018  · Clinical trial [facility] [id number]: [id number] (PROCESS II) from 3/23/2018 - 5/4/2018   · Palliative immunotherapy with Pembrolizumab beginning 5/25/2018    History of Present Illness:   Presents for follow up on Keytruda. Arthritis pain has been bothering him. Swelling of both hands, difficulty making a fist. Went to ED on 9/23/18. Using cane with walking, but feels that he can't  cane at times. This has been present for about 3 months, but worsened on Sunday prompting ED visit. Provided with Voltaren gel with little relief. Taking extra strength Tylenol PRN with some relief. No pain in shoulders, hips, knees, elbows. Denies other acute change. No diarrhea, nausea, vomiting, SOB/MCLEAN or chest pain.  No fever or chills in the home. No recent falls. He is accompanied by his daughter today. He smoked 1ppd for 50+ years but quit at diagnosis. He lives alone in [address] [place] [address]. His son lives about 15 minutes from him. His daughter lives about 30 minutes away. PAST HISTORY: The following sections were reviewed and updated in the EMR as appropriate: PMH, SH, FH, Medications, Allergies. Allergies   Allergen Reactions    Lisinopril Angioedema    Hydrochlorothiazide Hives and Swelling    Sulfa (Sulfonamide Antibiotics) Hives      Review of Systems: A complete review of systems was obtained, reviewed, and scanned into the EMR. Pertinent findings reviewed above. Physical Exam:     Visit Vitals    BP 95/64 (BP 1 Location: Left arm, BP Patient Position: Sitting)    Pulse 92    Temp 95.5 °F (35.3 °C) (Oral)    Resp 18    Ht 5' 6\" (1.676 m)    Wt 182 lb (82.6 kg)    SpO2 96%    BMI 29.38 kg/m2     ECOG PS: 1  General: No distress  Eyes: PERRLA, anicteric sclerae  HENT: Atraumatic, OP clear  Neck: Supple  Lymphatic: No cervical, supraclavicular, or inguinal adenopathy  Respiratory: CTAB, normal respiratory effort  CV: Normal rate, regular rhythm, no murmurs, no peripheral edema  GI: Soft, nontender, nondistended, no masses, no hepatomegaly, no splenomegaly  MS: Normal gait and station, walking with cane. Both hands with swollen joints in each finger and inability to make a fist  Skin: No rashes, ecchymoses, or petechiae. Normal temperature, turgor, and texture. Psych: Alert, oriented, appropriate affect, normal judgment/insight    Results:     Lab Results   Component Value Date/Time    WBC 5.5 09/28/2018 10:39 AM    HGB 12.8 09/28/2018 10:39 AM    HCT 38.3 09/28/2018 10:39 AM    PLATELET 923 47/57/1325 10:39 AM    MCV 88.2 09/28/2018 10:39 AM    ABS.  NEUTROPHILS 1.9 09/28/2018 10:39 AM     Lab Results   Component Value Date/Time    Sodium 130 (L) 09/28/2018 10:39 AM    Potassium 4.0 09/28/2018 10:39 AM Chloride 95 (L) 09/28/2018 10:39 AM    CO2 27 09/28/2018 10:39 AM    Glucose 91 09/28/2018 10:39 AM    BUN 6 09/28/2018 10:39 AM    Creatinine 0.96 09/28/2018 10:39 AM    GFR est AA >60 09/28/2018 10:39 AM    GFR est non-AA >60 09/28/2018 10:39 AM    Calcium 10.0 09/28/2018 10:39 AM     Lab Results   Component Value Date/Time    Bilirubin, total 0.3 09/28/2018 10:39 AM    ALT (SGPT) 18 09/28/2018 10:39 AM    AST (SGOT) 24 09/28/2018 10:39 AM    Alk. phosphatase 74 09/28/2018 10:39 AM    Protein, total 7.2 09/28/2018 10:39 AM    Albumin 3.3 (L) 09/28/2018 10:39 AM    Globulin 3.9 09/28/2018 10:39 AM       CT C/A/P 2/5/2018: Findings are consistent with interval progression of metastatic disease in the chest.  And enlarged pulmonary nodules. 2.7 x 1.8 cm nodule in the right upper lobe on 2-23 previously 1.5 x 3.0 cm. 4.4 x 2.1 cm nodule left upper lobe previously 3.3 x 3.0 cm. 2-22 12 x 10 mm nodule in the right lower lobe previously 4 mm 2-34. 14 x 14 mm nodule-like right lower lobe previously 6 mm 2-40. Right and left upper lobe pulmonary nodules as well. Mediastinal disease burden is not significantly changed. No evidence of metastatic disease in the abdomen or pelvis. CT C/A/P 5/2/2018: Findings are consistent with interval progression of metastatic disease in the chest. There are numerous new and/or enlarged pulmonary nodules/masses present. Mediastinal disease burden is not significantly changed. No evidence of metastatic disease in the abdomen or pelvis. RECIST  Please note that this represents the baseline RECIST examination. TARGET LESIONS:    Lesion (description)         Location (series/slice)                Size      1. Right lung mass 2-26 3.1 x 3.8 cm in size  2. Left lung mass 6.7 x 4.0 cm in size 2-27  NONTARGET LESIONS:  1. AP window lymph node difficult to measure. 2. Precarinal lymph node 1.3 cm in short axis dimension. 3. Numerous additional scattered pulmonary masses/nodules.     CT C/A/P 8/13/2018: Response to therapy by RECIST criteria. There is no evidence for new metastatic disease in the chest, abdomen, or pelvis. RECIST   TARGET LESIONS:         Lesion (description)         Location (series/slice)                Size      1. Right lung mass 3-28 1.7 x 1.7 cm, previously 3.1 x 3.8 cm   2. Left lung mass 3-27  1.8 x 3.6 cm, previously 4.0 x 6.7 cm  NONTARGET LESIONS:   1. Decrease in size of a prevascular lymph node. 2. Decrease in size of a right paratracheal lymph node. 3. Decrease in size or resolution of additional smaller lung nodules. Assessment:   1) Metastatic non-small cell lung cancer (squamous cell)  EGFR, ALK, ROS1, BRAF negative  He has disease within his chest and brain. His cancer is not curable and management is with palliative intent. He is s/p gamma knife to CNS disease. He is now receiving second line therapy with pembrolizumab. Tolerating therapy very well, though his worsening arthritis may be related to therapy. CT after 4 cycles of therapy indicative of response. Proceed with therapy today as ordered. CT imaging every 3 months on treatment. 2) Brain metastasis  S/P gamma knife. Follow up with Dr. Jazmyn Rivera for surveillance MRI brain-scheduled end of November. 3) Atrial fibrillation, CHF  Cardiology following. On apixaban. 4) DVT 9/15/2017  Previously on Lovenox BID but unable to tolerate injections. Continue Apixaban 5mg BID. He should continue this long term in the setting of malignancy. Refilled. 5) Neuropathy, chemotherapy induced  Secondary to prior cisplatin. Continue Cymbalta 30mg daily. 6) Arthritis pain  Worsening symptoms since last visit prompting ED visit. ED notes reviewed. Posibly due to [facility]. Will try PRN Tramadol. If no improvement he will call to start steroid taper. Proceed with therapy today. He is not interested in referral to rheumatology at this point. 7) Rash to forearms  Minimal. Due to immunotherapy. Cortisone cream as needed. 8) Hyponatremia  May be component of SIADH with Keytruda. He is asymptomatic. Improved on today's labs. He also reports significant intake of water daily which is likely contributory. Plan:     Proceed today with C6 Pembrolizumab (200mg) given every 3 weeks  Labs: CBC, CMP prior to each cycle, TSH every 6 weeks  Continue Apixaban 5mg BID  Continue Cymbalta 30 mg daily  Follow up with Dr. Ulises Dean as planned  Return to clinic in 3 weeks with next cycle of therapy    Patient was seen in conjunction with Yohana Saucedo NP.       Signed By: Karl Goldberg MD

## 2018-09-28 NOTE — PROGRESS NOTES
Outpatient [facility]fusion Center - Chemotherapy Progress Note    1020 Pt admit to [facility] ambulatory in stable condition. Assessment completed. No new concerns voiced. Right chest port accessed with positive blood return. Labs drawn. 1040 Pt to scheduled MD appt  1200 Pt returned from MD appt  1210 Labs verified and meds ordered from pharmacy  Chemotherapy Flowsheet 9/7/2018   Cycle C6   Date 9/7/2018   Drug / Regimen Keytruda   Pre Meds -   Notes -       Patient Vitals for the past 8 hrs:   Temp Pulse Resp BP SpO2   09/28/18 1345 - 79 18 (!) 85/56 -   09/28/18 1030 - - - - 96 %   09/28/18 1026 98.3 °F (36.8 °C) 72 18 102/62 -       Medications:  IV NS  IV Heparin  IV Keytruda    1345 Pt tolerated treatment well. BP low during MD appt and following treatment today. Pt denied feeling dizzy, SOB or any other change in physical symptoms. Daughter present and aware of readings at MD office and current reading. States he tends to run low at times. Rt chest port maintained positive blood return throughout treatment, flushed with positive blood return at conclusion and kellogg removed. D/c home ambulatory in no distress. Pt aware of next appointment scheduled for 10/19 @1000. Recent Results (from the past 8 hour(s))   CBC WITH AUTOMATED DIFF    Collection Time: 09/28/18 10:39 AM   Result Value Ref Range    WBC 5.5 4.1 - 11.1 K/uL    RBC 4.34 4.10 - 5.70 M/uL    HGB 12.8 12.1 - 17.0 g/dL    HCT 38.3 36.6 - 50.3 %    MCV 88.2 80.0 - 99.0 FL    MCH 29.5 26.0 - 34.0 PG    MCHC 33.4 30.0 - 36.5 g/dL    RDW 16.1 (H) 11.5 - 14.5 %    PLATELET 976 030 - 312 K/uL    MPV 9.6 8.9 - 12.9 FL    NRBC 0.0 0  WBC    ABSOLUTE NRBC 0.00 0.00 - 0.01 K/uL    NEUTROPHILS 34 32 - 75 %    LYMPHOCYTES 41 12 - 49 %    MONOCYTES 18 (H) 5 - 13 %    EOSINOPHILS 5 0 - 7 %    BASOPHILS 1 0 - 1 %    IMMATURE GRANULOCYTES 0 0.0 - 0.5 %    ABS. NEUTROPHILS 1.9 1.8 - 8.0 K/UL    ABS. LYMPHOCYTES 2.3 0.8 - 3.5 K/UL    ABS.  MONOCYTES 1.0 0.0 - 1.0 K/UL    ABS. EOSINOPHILS 0.3 0.0 - 0.4 K/UL    ABS. BASOPHILS 0.1 0.0 - 0.1 K/UL    ABS. IMM. GRANS. 0.0 0.00 - 0.04 K/UL    DF AUTOMATED     METABOLIC PANEL, COMPREHENSIVE    Collection Time: 09/28/18 10:39 AM   Result Value Ref Range    Sodium 130 (L) 136 - 145 mmol/L    Potassium 4.0 3.5 - 5.1 mmol/L    Chloride 95 (L) 97 - 108 mmol/L    CO2 27 21 - 32 mmol/L    Anion gap 8 5 - 15 mmol/L    Glucose 91 65 - 100 mg/dL    BUN 6 6 - 20 MG/DL    Creatinine 0.96 0.70 - 1.30 MG/DL    BUN/Creatinine ratio 6 (L) 12 - 20      GFR est AA >60 >60 ml/min/1.73m2    GFR est non-AA >60 >60 ml/min/1.73m2    Calcium 10.0 8.5 - 10.1 MG/DL    Bilirubin, total 0.3 0.2 - 1.0 MG/DL    ALT (SGPT) 18 12 - 78 U/L    AST (SGOT) 24 15 - 37 U/L    Alk.  phosphatase 74 45 - 117 U/L    Protein, total 7.2 6.4 - 8.2 g/dL    Albumin 3.3 (L) 3.5 - 5.0 g/dL    Globulin 3.9 2.0 - 4.0 g/dL    A-G Ratio 0.8 (L) 1.1 - 2.2

## 2018-10-05 NOTE — TELEPHONE ENCOUNTER
Patients daughter left a voicemail and stated that patient is now having hand and knee pain and would like to know if something can be called in.  # [phone]

## 2018-10-05 NOTE — TELEPHONE ENCOUNTER
[address] Smyth at [facility]  [phone]    10/05/18- Patient's daughter reports pain and stiffness in his hands, but in his knees now too. Stated the pain is making it difficult for him to stand. Tramadol is giving him minimal relief. Discussed with Adenike Vela, she will prescribe a steroid taper. Informed patient's daughter that his next treatment may need to be held, but he should keep his follow up as scheduled. She verbalized understanding.

## 2018-10-09 NOTE — TELEPHONE ENCOUNTER
Patients daughter called and stated that patient is starting to seem very confused. Daughter is having to call and remind him to take his medication. Daughter also stated that patient stated he is sweating a lot.  # [phone]

## 2018-10-19 NOTE — PROGRESS NOTES
Outpatient Infusion Center - Chemotherapy Progress Note    1000 Pt admit to [facility] for C 7 Keytruda ambulatory in stable condition. Assessment completed. No new concerns voiced. PAC with positive blood return. Chemotherapy Flowsheet 10/19/2018   Cycle C 7   Date 10/19/2018   Drug / Regimen Keytruda   Pre Meds -   Notes -       Visit Vitals  BP 99/62   Pulse 69   Temp 97.4 °F (36.3 °C)   Resp 18   Ht 5' 6\" (1.676 m)   Wt 79.7 kg (175 lb 9.6 oz)   SpO2 96%   BMI 28.34 kg/m²       Medications:  Keytruda IV  Flu Vaccine    1300Pt tolerated treatment well. PAC maintained positive blood return throughout treatment, flushed with positive blood return at conclusion and throughout treatment. D/c home ambulatory in no distress. Pt aware of next appointment scheduled for 11/9/18 @ 1000    Recent Results (from the past 12 hour(s))   CBC WITH AUTOMATED DIFF    Collection Time: 10/19/18 10:32 AM   Result Value Ref Range    WBC 8.4 4.1 - 11.1 K/uL    RBC 3.87 (L) 4.10 - 5.70 M/uL    HGB 11.6 (L) 12.1 - 17.0 g/dL    HCT 35.8 (L) 36.6 - 50.3 %    MCV 92.5 80.0 - 99.0 FL    MCH 30.0 26.0 - 34.0 PG    MCHC 32.4 30.0 - 36.5 g/dL    RDW 16.9 (H) 11.5 - 14.5 %    PLATELET 442 546 - 044 K/uL    MPV 9.5 8.9 - 12.9 FL    NRBC 0.0 0  WBC    ABSOLUTE NRBC 0.00 0.00 - 0.01 K/uL    NEUTROPHILS 44 32 - 75 %    LYMPHOCYTES 41 12 - 49 %    MONOCYTES 12 5 - 13 %    EOSINOPHILS 3 0 - 7 %    BASOPHILS 1 0 - 1 %    IMMATURE GRANULOCYTES 1 (H) 0.0 - 0.5 %    ABS. NEUTROPHILS 3.7 1.8 - 8.0 K/UL    ABS. LYMPHOCYTES 3.5 0.8 - 3.5 K/UL    ABS. MONOCYTES 1.0 0.0 - 1.0 K/UL    ABS. EOSINOPHILS 0.2 0.0 - 0.4 K/UL    ABS. BASOPHILS 0.1 0.0 - 0.1 K/UL    ABS. IMM.  GRANS. 0.0 0.00 - 0.04 K/UL    DF AUTOMATED     METABOLIC PANEL, COMPREHENSIVE    Collection Time: 10/19/18 10:32 AM   Result Value Ref Range    Sodium 140 136 - 145 mmol/L    Potassium 3.5 3.5 - 5.1 mmol/L    Chloride 106 97 - 108 mmol/L    CO2 28 21 - 32 mmol/L    Anion gap 6 5 - 15 mmol/L Glucose 114 (H) 65 - 100 mg/dL    BUN 16 6 - 20 MG/DL    Creatinine 1.02 0.70 - 1.30 MG/DL    BUN/Creatinine ratio 16 12 - 20      GFR est AA >60 >60 ml/min/1.73m2    GFR est non-AA >60 >60 ml/min/1.73m2    Calcium 9.0 8.5 - 10.1 MG/DL    Bilirubin, total 0.3 0.2 - 1.0 MG/DL    ALT (SGPT) 22 12 - 78 U/L    AST (SGOT) 16 15 - 37 U/L    Alk.  phosphatase 54 45 - 117 U/L    Protein, total 6.8 6.4 - 8.2 g/dL    Albumin 3.2 (L) 3.5 - 5.0 g/dL    Globulin 3.6 2.0 - 4.0 g/dL    A-G Ratio 0.9 (L) 1.1 - 2.2     TSH 3RD GENERATION    Collection Time: 10/19/18 10:32 AM   Result Value Ref Range    TSH 16.32 (H) 0.36 - 3.74 uIU/mL   T4, FREE    Collection Time: 10/19/18 10:32 AM   Result Value Ref Range    T4, Free 0.6 (L) 0.8 - 1.5 NG/DL

## 2018-10-19 NOTE — PROGRESS NOTES
[facility] [place] [facility]  [address] [facility], [address] [facility] [address] [facility]  Devon Duke: [phone]  F: [phone]      Reason for Visit:   Daryl Beckman is a 75 y.o. male who is seen for follow up of lung cancer. Treatment History:   · CXR 7/11/2017: Mediastinal lymphadenopathy with left hilar mass. · CT Chest 7/14/2017: Bulky mediastinal and left hilar lymphadenopathy. Bilateral pulmonary masses and nodules  · PET-CT 7/24/2017: Right parietal mass. Hypermetabolic right supraclavicular, right paratracheal, AP window, prevascular, precarinal, subcarinal and left hilar lymphadenopathy. Hypermetabolic pulmonary nodules bilaterally. · MRI Brain 7/24/2017: Junction right posterior temporal lobe and occipital lobe 2.7 cm mass likely metastasis from lung cancer. No additional acute abnormalities  · FNA supraclavicular node 7/28/2017: Metastatic squamous cell carcinoma, PD-L1 5%, BRAF negative, EGFR negative, ALK & ROS-1 negative   · Stage IV Non-small cell lung cancer (Squamous Cell)  · Gamma knife by Dr. Jose Manuel Mcintyre 8/15/2017   · Palliative chemotherapy with carboplatin and gemcitabine beginning 8/25/2017 - 11/3/2017  · Maintenance gemcitabine x4 cycles from 11/17/2017 to 1/26/2018  · Clinical trial OG [id number]: [id number] (PROCESS II) from 3/23/2018 - 5/4/2018   · Palliative immunotherapy with Pembrolizumab beginning 5/25/2018    History of Present Illness:   Presents for follow up on Keytruda. Continues with pain in bilateral hands. Numbness/tingling in hands is biggest issue. Tingling in fingertips. This has been present since beginning of the year. Seems to be worsening now. Cramping in both hands as well, pain in joints of hands. Right worse than left. Taking Tramadol twice daily now. Continues on Cymbalta. No mouth sores. Denies other acute change. No diarrhea, nausea, vomiting, SOB/MCLEAN or chest pain. No fever or chills in the home. No recent falls.      He is accompanied by his daughter today. He smoked 1ppd for 50+ years but quit at diagnosis. He lives alone in [address] [place] [address]. His son lives about 15 minutes from him. His daughter lives about 30 minutes away. PAST HISTORY: The following sections were reviewed and updated in the EMR as appropriate: PMH, SH, FH, Medications, Allergies. Allergies   Allergen Reactions    Lisinopril Angioedema    Hydrochlorothiazide Hives and Swelling    Sulfa (Sulfonamide Antibiotics) Hives      Review of Systems: A complete review of systems was obtained, reviewed, and scanned into the EMR. Pertinent findings reviewed above. Physical Exam:     Visit Vitals  BP (!) 87/59 (BP 1 Location: Right arm, BP Patient Position: Sitting) Comment (BP 1 Location): left   Pulse 71   Temp 97.6 °F (36.4 °C) (Temporal)   Resp 20   Ht 5' 6\" (1.676 m)   Wt 175 lb (79.4 kg)   SpO2 98%   BMI 28.25 kg/m²     ECOG PS: 1  General: No distress  Eyes: PERRLA, anicteric sclerae  HENT: Atraumatic, OP clear  Neck: Supple  Lymphatic: No cervical, supraclavicular, or inguinal adenopathy  Respiratory: CTAB, normal respiratory effort  CV: Normal rate, regular rhythm, no murmurs, no peripheral edema  GI: Soft, nontender, nondistended, no masses, no hepatomegaly, no splenomegaly  MS: Normal gait and station, walking with cane. Both hands with swollen joints in each finger and inability to make a fist  Skin: No rashes, ecchymoses, or petechiae. Normal temperature, turgor, and texture. Psych: Alert, oriented, appropriate affect, normal judgment/insight    Results:     Lab Results   Component Value Date/Time    WBC 8.4 10/19/2018 10:32 AM    HGB 11.6 (L) 10/19/2018 10:32 AM    HCT 35.8 (L) 10/19/2018 10:32 AM    PLATELET 316 68/68/4260 10:32 AM    MCV 92.5 10/19/2018 10:32 AM    ABS.  NEUTROPHILS 3.7 10/19/2018 10:32 AM     Lab Results   Component Value Date/Time    Sodium 140 10/19/2018 10:32 AM    Potassium 3.5 10/19/2018 10:32 AM    Chloride 106 10/19/2018 10:32 AM    CO2 28 10/19/2018 10:32 AM    Glucose 114 (H) 10/19/2018 10:32 AM    BUN 16 10/19/2018 10:32 AM    Creatinine 1.02 10/19/2018 10:32 AM    GFR est AA >60 10/19/2018 10:32 AM    GFR est non-AA >60 10/19/2018 10:32 AM    Calcium 9.0 10/19/2018 10:32 AM     Lab Results   Component Value Date/Time    Bilirubin, total 0.3 10/19/2018 10:32 AM    ALT (SGPT) 22 10/19/2018 10:32 AM    AST (SGOT) 16 10/19/2018 10:32 AM    Alk. phosphatase 54 10/19/2018 10:32 AM    Protein, total 6.8 10/19/2018 10:32 AM    Albumin 3.2 (L) 10/19/2018 10:32 AM    Globulin 3.6 10/19/2018 10:32 AM       CT C/A/P 2/5/2018: Findings are consistent with interval progression of metastatic disease in the chest.  And enlarged pulmonary nodules. 2.7 x 1.8 cm nodule in the right upper lobe on 2-23 previously 1.5 x 3.0 cm. 4.4 x 2.1 cm nodule left upper lobe previously 3.3 x 3.0 cm. 2-22 12 x 10 mm nodule in the right lower lobe previously 4 mm 2-34. 14 x 14 mm nodule-like right lower lobe previously 6 mm 2-40. Right and left upper lobe pulmonary nodules as well. Mediastinal disease burden is not significantly changed. No evidence of metastatic disease in the abdomen or pelvis. CT C/A/P 5/2/2018: Findings are consistent with interval progression of metastatic disease in the chest. There are numerous new and/or enlarged pulmonary nodules/masses present. Mediastinal disease burden is not significantly changed. No evidence of metastatic disease in the abdomen or pelvis. RECIST  Please note that this represents the baseline RECIST examination. TARGET LESIONS:    Lesion (description)         Location (series/slice)                Size      1. Right lung mass 2-26 3.1 x 3.8 cm in size  2. Left lung mass 6.7 x 4.0 cm in size 2-27  NONTARGET LESIONS:  1. AP window lymph node difficult to measure. 2. Precarinal lymph node 1.3 cm in short axis dimension. 3. Numerous additional scattered pulmonary masses/nodules.     CT C/A/P 8/13/2018: Response to therapy by RECIST criteria. There is no evidence for new metastatic disease in the chest, abdomen, or pelvis. RECIST   TARGET LESIONS:         Lesion (description)         Location (series/slice)                Size      1. Right lung mass 3-28 1.7 x 1.7 cm, previously 3.1 x 3.8 cm   2. Left lung mass 3-27  1.8 x 3.6 cm, previously 4.0 x 6.7 cm  NONTARGET LESIONS:   1. Decrease in size of a prevascular lymph node. 2. Decrease in size of a right paratracheal lymph node. 3. Decrease in size or resolution of additional smaller lung nodules. Assessment:   1) Metastatic non-small cell lung cancer (squamous cell)  EGFR, ALK, ROS1, BRAF negative  He has disease within his chest and brain. His cancer is not curable and management is with palliative intent. He is s/p gamma knife to CNS disease. He is now receiving second line therapy with pembrolizumab. Tolerating therapy very well, grade 1 fatigue and pain in hands. CT after 4 cycles of therapy indicative of response. Proceed with therapy today as ordered. CT imaging every 3 months on treatment, ordered to be done prior to next cycle. 2) Brain metastasis  S/P gamma knife. Follow up with Dr. Kirk Martin for surveillance MRI brain-scheduled end of November. 3) Atrial fibrillation, CHF  Cardiology following. On apixaban. 4) DVT 9/15/2017  Previously on Lovenox BID but unable to tolerate injections. Continue Apixaban 5mg BID. He should continue this long term in the setting of malignancy. 5) Neuropathy, chemotherapy induced  Secondary to prior cisplatin. Grade 2. Increase Cymbalta to 60mg daily. I recommend further evaluation with MRI cervical spine, but he declines. 6) Arthritis pain  ED visit on 9/23/18 due to pain. Started Tramadol BID on 9/28/18. Initiated steroid taper on 10/5/18 as symptoms thought to be associated with Keytruda. No improvement with steroids suggest immunotherapy is not implicated.      He is not interested in referral to rheumatology at this point. 7) Hyponatremia  May be component of SIADH with Keytruda. He is asymptomatic. Improved on today's labs. He also reports significant intake of water daily which is likely contributory. Plan:     Proceed today with C8 Pembrolizumab (200mg) given every 3 weeks  Labs: CBC, CMP prior to each cycle, TSH every 6 weeks  Continue Apixaban 5mg BID  Continue Cymbalta 30 mg daily  Follow up with Dr. Vonzell Rubinstein as planned  CT C/A/P before next visit  Flu vaccine today  Return to clinic in 3 weeks with next cycle of therapy    Patient was seen in conjunction with Royer Restrepo NP.       Signed By: Diaz Henao MD

## 2018-10-19 NOTE — PROGRESS NOTES
10/19/18- Informed patient's daughter of labs concerning for hypothyroidism, per Maryellen Dorsey. Prescription for Synthroid sent to the pharmacy. She verbalized understanding, no further questions or concerns.

## 2018-11-06 PROBLEM — A08.4 VIRAL ENTERITIS: Status: ACTIVE | Noted: 2018-01-01

## 2018-11-06 PROBLEM — F32.A DEPRESSION: Status: ACTIVE | Noted: 2018-01-01

## 2018-11-06 PROBLEM — I95.9 HYPOTENSION: Status: ACTIVE | Noted: 2018-01-01

## 2018-11-06 PROBLEM — R57.1 HYPOVOLEMIC SHOCK (HCC): Status: ACTIVE | Noted: 2018-01-01

## 2018-11-06 PROBLEM — E87.1 HYPONATREMIA: Status: ACTIVE | Noted: 2018-01-01

## 2018-11-06 PROBLEM — E03.9 ACQUIRED HYPOTHYROIDISM: Status: ACTIVE | Noted: 2018-01-01

## 2018-11-06 NOTE — ED NOTES
.. TRANSFER - OUT REPORT: 
 
Verbal report given to anneliese(name) on Julius Christianson  being transferred to [facility](unit) for routine progression of care Report consisted of patients Situation, Background, Assessment and  
Recommendations(SBAR). Information from the following report(s) SBAR, Kardex and ED Summary was reviewed with the receiving nurse. Lines:  
Venous Access Device right chest portacath  (Active) Peripheral IV 11/06/18 Left Antecubital (Active) Opportunity for questions and clarification was provided. Patient transported with: 
 Monitor Registered Nurse

## 2018-11-06 NOTE — ED PROVIDER NOTES
The history is provided by the patient and a relative. Diarrhea This is a new problem. Episode onset: 3 days ago. The problem occurs constantly. The problem has not changed since onset. The pain is associated with an unknown factor. The patient is experiencing no pain. Associated symptoms include diarrhea. Pertinent negatives include no fever, no melena and no constipation. Associated symptoms comments: Light-headedness, near-syncope. Nothing worsens the pain. The pain is relieved by nothing. Past medical history comments: metastatic lung cancer, cardiomyopathy on BB and CCB. Past Medical History:  
Diagnosis Date  Anemia  Cardiomyopathy ([facility] [address] mild LV regional/global dysfunction, likely ischemic etiology  Coronary artery calcification seen on CT scan  Dysfunction, erectile  Hyperlipidemia 5/18/2010  Hypertension  Metastatic lung cancer (metastasis from lung to other site) [facility]) brain  Paroxysmal atrial flutter ([facility] No past surgical history on file. Family History:  
Problem Relation Age of Onset  Stroke Father  Diabetes Sister  Diabetes Brother  Heart Disease Sister Social History Socioeconomic History  Marital status: SINGLE Spouse name: Not on file  Number of children: Not on file  Years of education: Not on file  Highest education level: Not on file Social Needs  Financial resource strain: Not on file  Food insecurity - worry: Not on file  Food insecurity - inability: Not on file  Transportation needs - medical: Not on file  Transportation needs - non-medical: Not on file Occupational History  Not on file Tobacco Use  Smoking status: Former Smoker Packs/day: 1.00 Years: 45.00 Pack years: 45.00 Types: Cigarettes  Smokeless tobacco: Never Used Substance and Sexual Activity  Alcohol use: Yes Alcohol/week: 1.5 oz Types: 3 Cans of beer per week  Drug use: No  
 Sexual activity: Not on file Other Topics Concern  Not on file Social History Narrative  Not on file ALLERGIES: Lisinopril; Hydrochlorothiazide; and Sulfa (sulfonamide antibiotics) Review of Systems Constitutional: Negative for fever. Gastrointestinal: Positive for diarrhea. Negative for constipation and melena. Neurological: Positive for dizziness and light-headedness. All other systems reviewed and are negative. Vitals:  
 11/06/18 1411 11/06/18 1413 11/06/18 1416 11/06/18 1430 BP: (!) 75/45  90/52 95/49 Pulse: 72  70 73 Resp: 25  18 17 Temp:      
SpO2: 97% 93% 97% Weight:      
Height:      
      
 
Physical Exam  
Constitutional: He appears well-developed and well-nourished. No distress. HENT:  
Head: Normocephalic and atraumatic. Mouth/Throat: Oropharynx is clear and moist.  
Eyes: Conjunctivae are normal. Pupils are equal, round, and reactive to light. Neck: Neck supple. No tracheal deviation present. Cardiovascular: Normal rate, regular rhythm and normal heart sounds. Pulmonary/Chest: Effort normal and breath sounds normal. No respiratory distress. Abdominal: He exhibits no distension. There is no tenderness. There is no rebound and no guarding. Musculoskeletal: Normal range of motion. He exhibits no deformity. Neurological: He is alert. No cranial nerve deficit. Skin: Skin is warm and dry. Capillary refill takes less than 2 seconds. Psychiatric: His behavior is normal.  
Nursing note and vitals reviewed. MDM 
  
75 y.o. male presents with hypotension after repeated episodes of diarrhea. He is in NAD on arrival but is orthostatic and actively symptomatic. He is dual AV ed blocked and is not tachycardic suggesting hypovolemia and decompensation. No SIRS or other evidence of developing sepsis. Fluid resuscitation initiated and BP is responsive. Family medicine was consulted for admission and will see the patient in the emergency department. Procedures EKG 14:07: Rate 74, normal sinus rhythm, RBBB, LAFB, LVH with repolarization abnormality.

## 2018-11-06 NOTE — TELEPHONE ENCOUNTER
[facility]  [phone]    11/06/18- Returned patient's daughter's call, no answer, voicemail left requesting a return call when available.

## 2018-11-06 NOTE — TELEPHONE ENCOUNTER
[address] at [place]  [phone]    11/06/18- Spoke to Jessica, patient is being admitted with hypotension. She stated patient is doing okay, receiving hydration. Will inform Dr. Rene Nance of patient's admission.

## 2018-11-06 NOTE — ED NOTES
Admission Time Out Check signed & held orders:  [x] Yes or  [] No 
 
Complete Full set of Vitals to include MEWS score Assess Vital Signs over the last 2 hours:  [x] Stable or  [] Unstable Patient Vitals for the past 4 hrs: 
 Temp Pulse Resp BP SpO2  
11/06/18 1600  75 17 108/50 92 % 11/06/18 1545  80 19 118/64 93 % 11/06/18 1530  75 20 111/57 95 % 11/06/18 1515  77 13 103/61 94 % 11/06/18 1500  77 21 106/55 94 % 11/06/18 1446  73 23 110/56 96 % 11/06/18 1435     100 % 11/06/18 1430  73 17 95/49   
11/06/18 1416  70 18 90/52 97 % 11/06/18 1413     93 % 11/06/18 1411  72 25 (!) 75/45 97 % 11/06/18 1358 97.3 °F (36.3 °C) 87 18 (!) 68/49 96 % Does this patient meet Code Purple criteria or other Core Measure protocol? [] Yes or  [] No or  [] Already being treated Unit patient assigned to: [facility] Does the patient need any special isolation? []  Yes or  [x] No 
 
Is the assigned unit appropriate? [x] Yes or  [] No 
 
Does the patient need to be transported on a monitor and/or has critical drips? [x] Yes or  [] No or  [] Order obtained to travel without Monitor/nurse Any needs/concerns that need to be addressed prior to admission? (i.e. ED/Admit provider or Nursing Supervisor) 
    [] Yes or  [x] No 
 
Does patient require IV fluid continued on admission? [] Yes or  [x] No 
 
Charge RN advised of patients room assignment Tao Jaquez RN

## 2018-11-06 NOTE — H&P
[facility] [address]  Office [phone] Fax [phone] Admission H&P Name: Patricia Carroll MRN: [id number]  Sex: Male YOB: 1945  Age: 75 y.o. PCP: Rickey Frausto MD  
 
Source of Information: patient, medical records Chief complaint: dizziness, diarrhea History of Present Illness Patricia Carroll is a 75 y.o. male with known metastatic Stage IV non-small cell carcinoma, A.fib, CHF, DVT, HTN, hypothryoidism, who presents to the ER complaining of diarrhea and dizziness. Pt reports waking up 3 days ago (11/04) with dizziness. He fell down but denies any loss of consciousness or head trauma. He was able to pick himself back up after a minute but continues to feel weak and dizzy since then. He denies any more falls but has felt unsteady when walking around with his cane. He developed several episodes of loose stools later that day (11/03). He denies any blood in his stool. He has had several episodes of diarrhea a day since then. They have been 'too many to count'. He denies any nausea, vomiting, fevers or chills. His appetite has also decreased over the past week and he has not been eating or drinking much. He denies any travel, sick contacts or change in his medication. He denies any changes in his vision, headaches, chest pain, shortness of breath, In the ED: - Vitals - Temp 97.3F BP 68/49 MAP 55 RR 18 96% on RA   
- Labs - remarkable for Na 129 Hb 12 BNP 1,198  
- Imaging - None - Treatment - 1L NS bolus x 2 EKG: wide QRS rhythm, RBBB, Left anterior fascicular block Patient Vitals for the past 12 hrs: 
 Temp Pulse Resp BP SpO2  
11/06/18 1515  77 13 103/61 94 % 11/06/18 1500  77 21 106/55 94 % 11/06/18 1446  73 23 110/56 96 % 11/06/18 1435     100 % 11/06/18 1430  73 17 95/49   
11/06/18 1416  70 18 90/52 97 % 11/06/18 1413     93 % 11/06/18 1411  72 25 (!) 75/45 97 % 11/06/18 1358 97.3 °F (36.3 °C) 87 18 (!) 68/49 96 % Review of Systems Review of Systems Constitutional: Positive for activity change, appetite change, chills and fatigue. Negative for fever. HENT: Negative for hearing loss. Respiratory: Negative for cough, shortness of breath and wheezing. Cardiovascular: Negative for chest pain, palpitations and leg swelling. Gastrointestinal: Positive for diarrhea. Negative for abdominal distention, abdominal pain, blood in stool, nausea and vomiting. Endocrine: Negative for polydipsia, polyphagia and polyuria. Genitourinary: Negative for difficulty urinating and dysuria. Musculoskeletal: Positive for arthralgias. Negative for joint swelling and myalgias. Skin: Negative for pallor. Neurological: Positive for dizziness and numbness. Negative for syncope, weakness and headaches. Psychiatric/Behavioral: Negative for agitation and confusion. The patient is not nervous/anxious. Home Medications Prior to Admission medications Medication Sig Start Date End Date Taking? Authorizing Provider  
apixaban (ELIQUIS) 5 mg tablet TAKE ONE TABLET BY MOUTH TWICE DAILY 10/19/18   Sadie MAGAÑA NP  
nicotine (NICODERM CQ) 14 mg/24 hr patch 1 Patch by TransDERmal route every twenty-four (24) hours. 10/19/18   Sadie MAGAÑA NP  
metoprolol succinate (TOPROL-XL) 25 mg XL tablet Take 1 Tab by mouth daily. 10/19/18   Sadie MAGAÑA NP  
dilTIAZem CD (CARDIZEM CD) 240 mg ER capsule Take 1 Cap by mouth daily. 10/19/18   Sadie MAGAÑA NP  
DULoxetine (CYMBALTA) 60 mg capsule Take 1 Cap by mouth daily. 10/19/18   Mateo See NP  
levothyroxine (SYNTHROID) 75 mcg tablet Take 1 Tab by mouth Daily (before breakfast). 10/19/18   Sadie MAGAÑA NP  
traMADol (ULTRAM) 50 mg tablet Take 1 Tab by mouth every six (6) hours as needed for Pain.  Max Daily Amount: 200 mg. 9/28/18   Mateo See NP  
 diclofenac (VOLTAREN) 1 % gel Apply 2 g to affected area four (4) times daily. 9/23/18   Harvey Edwards PA  
acetaminophen (TYLENOL EXTRA STRENGTH) 500 mg tablet Take  by mouth every six (6) hours as needed for Pain. Provider, Historical  
tamsulosin (FLOMAX) 0.4 mg capsule Take 1 Cap by mouth daily. 2/23/18   Kell Reyes NP  
lidocaine-prilocaine (EMLA) topical cream Apply  to affected area as needed for Pain (Apply 30-60 min. prior to having your port accessed). 2/23/18   Kell Reyes, ERLIN  
diphenhydrAMINE (BENADRYL) 25 mg tablet Take 25 mg by mouth nightly as needed for Sleep. Provider, Historical  
levocetirizine (XYZAL) 5 mg tablet Take 5 mg by mouth daily as needed for Allergies. Provider, Historical  
polyethylene glycol (MIRALAX) 17 gram packet Take 17 g by mouth daily. Provider, Historical  
magic mouthwash solution Swish and spit 15-30 mL every 2-4 hours as needed for mouth pain. May swallow for throat pain. Magic mouth wash Maalox Lidocaine 2% viscous Diphenhydramine oral solution Pharmacy to mix equal portions of ingredients to a total volume as indicated in the dispense amount. 9/12/17   Timo Ng MD  
prochlorperazine (COMPAZINE) 10 mg tablet Take 1 Tab by mouth every six (6) hours as needed for Nausea. 8/8/17   Kell Reyes NP  
ondansetron hcl (ZOFRAN) 8 mg tablet Take 1 Tab by mouth every eight (8) hours as needed for Nausea. 8/8/17   Kell Reyes NP  
aspirin delayed-release 81 mg tablet Take 81 mg by mouth daily. Provider, Historical  
 
Allergies Allergies Allergen Reactions  Lisinopril Angioedema  Hydrochlorothiazide Hives and Swelling  Sulfa (Sulfonamide Antibiotics) Hives Past Medical History Past Medical History:  
Diagnosis Date  Anemia  Cardiomyopathy ([facility] [address] mild LV regional/global dysfunction, likely ischemic etiology  Coronary artery calcification seen on CT scan  Dysfunction, erectile  Hyperlipidemia 5/18/2010  Hypertension  Metastatic lung cancer (metastasis from lung to other site) [facility]) brain  Paroxysmal atrial flutter ([address] Previous Hospitalization(s) No past surgical history on file. Family History Family History Problem Relation Age of Onset  Stroke Father  Diabetes Sister  Diabetes Brother  Heart Disease Sister Social History Social History Socioeconomic History  Marital status: SINGLE Spouse name: Not on file  Number of children: Not on file  Years of education: Not on file  Highest education level: Not on file Social Needs  Financial resource strain: Not on file  Food insecurity - worry: Not on file  Food insecurity - inability: Not on file  Transportation needs - medical: Not on file  Transportation needs - non-medical: Not on file Occupational History  Not on file Tobacco Use  Smoking status: Former Smoker Packs/day: 1.00 Years: 45.00 Pack years: 45.00 Types: Cigarettes  Smokeless tobacco: Never Used Substance and Sexual Activity  Alcohol use: Yes Alcohol/week: 1.5 oz Types: 3 Cans of beer per week  Drug use: No  
 Sexual activity: Not on file Other Topics Concern  Not on file Social History Narrative  Not on file Alcohol history: Not at all Smoking history: Former smoker, smoked 1.5 ppd x 24 years, quit 1 years ago Illicit drug history: Not at all Living arrangement: patient lives alone. Ambulates: With cane Physical Exam 
Visit Vitals /61 Pulse 77 Temp 97.3 °F (36.3 °C) Resp 13 Ht 5' 6\" (1.676 m) Wt 170 lb (77.1 kg) SpO2 94% BMI 27.44 kg/m² General: No acute distress. Alert. Cooperative. Head: Normocephalic. Atraumatic. Eyes:              Conjunctiva pink. Sclera white. PERRL. Ears:              Ear canals patent. TM non-erythematous. Nose:             Septum midline. Mucosa pink. No drainage. Throat: Mucosa pink. Moist mucous membranes. No tonsillar exudates or erythema. Palate movement equal bilaterally. Neck: Supple. Normal ROM. No stiffness. Respiratory: Bilateral expiratory wheezes. Symmetric breath sounds. No r/r/c.  
Cardiovascular: RRR. Normal S1,S2. No m/r/g. Pulses 2+ throughout. GI: + bowel sounds. Nontender. No rebound tenderness or guarding. Nondistended. Extremities: LE edema. Distal pulses intact. Musculoskeletal: Full ROM in all extremities. Skin: Warm, dry. No rashes. Neuro: CN II-XII grossly intact. Strength 5/5 in upper extremities and 4/5 in lower extremities. Laboratory Data Recent Results (from the past 8 hour(s)) EKG, 12 LEAD, INITIAL Collection Time: 11/06/18  2:07 PM  
Result Value Ref Range Ventricular Rate 74 BPM  
 Atrial Rate 72 BPM  
 QRS Duration 142 ms  
 Q-T Interval 448 ms QTC Calculation (Bezet) 497 ms Calculated R Axis -65 degrees Calculated T Axis 80 degrees Diagnosis Wide QRS rhythm Right bundle branch block Left anterior fascicular block ** Bifascicular block ** Left ventricular hypertrophy with repolarization abnormality Abnormal ECG When compared with ECG of 06-OCT-2017 14:44, Wide QRS rhythm has replaced Sinus rhythm CBC WITH AUTOMATED DIFF Collection Time: 11/06/18  2:16 PM  
Result Value Ref Range WBC 6.1 4.1 - 11.1 K/uL  
 RBC 4.15 4.10 - 5.70 M/uL  
 HGB 12.0 (L) 12.1 - 17.0 g/dL HCT 36.5 (L) 36.6 - 50.3 % MCV 88.0 80.0 - 99.0 FL  
 MCH 28.9 26.0 - 34.0 PG  
 MCHC 32.9 30.0 - 36.5 g/dL  
 RDW 15.0 (H) 11.5 - 14.5 % PLATELET 742 041 - 212 K/uL MPV 8.8 (L) 8.9 - 12.9 FL  
 NRBC 0.0 0  WBC ABSOLUTE NRBC 0.00 0.00 - 0.01 K/uL NEUTROPHILS 35 32 - 75 % LYMPHOCYTES 43 12 - 49 % MONOCYTES 17 (H) 5 - 13 % EOSINOPHILS 5 0 - 7 % BASOPHILS 1 0 - 1 % IMMATURE GRANULOCYTES 0 0.0 - 0.5 % ABS. NEUTROPHILS 2.1 1.8 - 8.0 K/UL  
 ABS. LYMPHOCYTES 2.6 0.8 - 3.5 K/UL ABS. MONOCYTES 1.0 0.0 - 1.0 K/UL  
 ABS. EOSINOPHILS 0.3 0.0 - 0.4 K/UL  
 ABS. BASOPHILS 0.0 0.0 - 0.1 K/UL  
 ABS. IMM. GRANS. 0.0 0.00 - 0.04 K/UL  
 DF AUTOMATED METABOLIC PANEL, COMPREHENSIVE Collection Time: 11/06/18  2:16 PM  
Result Value Ref Range Sodium 129 (L) 136 - 145 mmol/L Potassium 4.4 3.5 - 5.1 mmol/L Chloride 94 (L) 97 - 108 mmol/L  
 CO2 27 21 - 32 mmol/L Anion gap 8 5 - 15 mmol/L Glucose 88 65 - 100 mg/dL BUN 7 6 - 20 MG/DL Creatinine 1.18 0.70 - 1.30 MG/DL  
 BUN/Creatinine ratio 6 (L) 12 - 20 GFR est AA >60 >60 ml/min/1.73m2 GFR est non-AA >60 >60 ml/min/1.73m2 Calcium 9.4 8.5 - 10.1 MG/DL Bilirubin, total 0.7 0.2 - 1.0 MG/DL  
 ALT (SGPT) 20 12 - 78 U/L  
 AST (SGOT) 25 15 - 37 U/L Alk. phosphatase 64 45 - 117 U/L Protein, total 7.1 6.4 - 8.2 g/dL Albumin 3.2 (L) 3.5 - 5.0 g/dL Globulin 3.9 2.0 - 4.0 g/dL A-G Ratio 0.8 (L) 1.1 - 2.2 LIPASE Collection Time: 11/06/18  2:16 PM  
Result Value Ref Range Lipase 78 73 - 393 U/L  
TROPONIN I Collection Time: 11/06/18  2:16 PM  
Result Value Ref Range Troponin-I, Qt. <0.05 <0.05 ng/mL NT-PRO BNP Collection Time: 11/06/18  2:16 PM  
Result Value Ref Range NT pro-BNP 1,198 (H) 0 - 125 PG/ML  
LACTIC ACID Collection Time: 11/06/18  2:17 PM  
Result Value Ref Range Lactic acid 1.4 0.4 - 2.0 MMOL/L  
SAMPLES BEING HELD Collection Time: 11/06/18  2:26 PM  
Result Value Ref Range SAMPLES BEING HELD 1SST,1BL   
 COMMENT Add-on orders for these samples will be processed based on acceptable specimen integrity and analyte stability, which may vary by analyte. Imaging CXR Results  (Last 48 hours) None CT Results  (Last 48 hours) None Assessment and Plan Mendoza Borrero is a 75 y.o. male who is admitted for hypovolemic shock secondary to viral enteritis.   
 
Hypovolemic shock secondary to viral enteritis: Patient with 3 days of weakness, dizziness, diarrhea and reduced PO intake admitted for hypovolemic shock, improved on 1L NS bolus x2. - Admit to telemetry - BP on admission 68/49. At this time, 118/64.  
- Daily CBC, CMP 
- MIVF @ 100ml/hr  
- Hold home medications of Cardizem 240mg daily, Metoprolol 25mg daily - Encourage PO intake - Will continue to monitor at this time and readjust as BP's trend. Metastatic Stage 4 Non-Small Cell Carcinoma: on palliative chemo, s/p gamma knife for brain mets. Being followed by Dr. Raissa Sanchez, last seen 10/19/2018. Diagnosis made 07/2017. Patient's cancer is non-curative. Receives chemo every 3 weeks. Next infusion appointment on 11/09. 
- Bilateral wheezing on PE likely 2/2 lung cancer, will continue to monitor. Pt asymptomatic. 
- No formal consult placed to Oncology. Dr. Josh Zhu office made aware of patient in hospital.  
 
Hyponatremia: Na  
- Re-check today at 8pm  
- Will continue to monitor CHF (stable): Last Echo 09/15/2017 EF 45-50% -Holding home Cardizem and Metoprolol History of DVT secondary to malignancy: Bilateral tibial vein DVTs on 09/16/2017. Patient cannot tolerate Lovenox injections. No lower leg swelling or pain on admission today.  
-Continue home Apixiban 5mg BID History of A.fib (stable): Patient diagnosed with A.fib w/RVR on 09/16/2017. Follows with Dr. Johnney Dancer (cardiology). - EKG on admission: wide QRS rhythm, RBBB, left anterior fascicular block  
- Holding home Cardizem and Metoprolol  
- Continue Apixaban 5mg daily  
- Continue ASA 81mg daily - Will monitor heart rate Hypothyroidism: last TSH 10/19/2018: 16.32 
-Continue home Levothyroxine 75mcg daily  
-Will need re-check in 6 weeks (end of 11/2018) Arthritic pain (stable): Patient diagnosed with arthiritis, on Tramadol and Tylenol prn for pain, has declined to see a Rheumatologist 
-Continue Tylenol 500mg q6hrs prn for pain Neuropathy secondary to chemotherapy (Stable) -Continue home Cymbalta 60mg daily FEN/GI - Regular diet. Activity - Ambulate with assistance DVT prophylaxis - Apixaban GI prophylaxis - Not indicated at this time Fall prophylaxis - Fall precautions ordered. Disposition - Admit to Telemetry. Plan to d/c to Home. Code Status - Full, discussed with patient / caregivers. Next of Kin Name and Contact Daughter Kirsten Jacobs [phone] Patient Samara Noel will be discussed with Dr. Kimberlyn Solis. 3:47 PM, 11/06/18 Laurie Chan MD 
Family Medicine Resident For Billing Chief Complaint Patient presents with  Dizziness  Diarrhea Hospital Problems  Date Reviewed: 10/19/2018 Codes Class Noted POA Hypotension ICD-10-CM: I95.9 ICD-9-CM: 458.9  11/6/2018 Unknown

## 2018-11-06 NOTE — TELEPHONE ENCOUNTER
Pts daughter called and left a voicemail stating pt is \"feeling very weak\" and she is taking him to the ER.  Call back number if needed for Hunter is [phone]

## 2018-11-06 NOTE — PROGRESS NOTES
Admission Medication Reconciliation: 
 
Comments/Recommendations: 
-Medication history obtained in the emergency department (room 07) -Confirmed allergy history and list of medications with family member present in room 
-Last dose of Eliquis was last night (11/5/18) -Patient receives pembrolizumab 200 mg IV every 3 weeks. Last infusion was on 10/19/18 with next infusion scheduled on 11/9/18 Medications added: None Medications removed: Nicotine patch Medications changed: None Information obtained from: Patient and family member in room, also review of RxQuery Significant PMH/Disease States:  
Past Medical History:  
Diagnosis Date  Anemia  Cardiomyopathy ([facility] [address] mild LV regional/global dysfunction, likely ischemic etiology  Coronary artery calcification seen on CT scan  Dysfunction, erectile  Hyperlipidemia 5/18/2010  Hypertension  Metastatic lung cancer (metastasis from lung to other site) [facility]) brain  Paroxysmal atrial flutter ([facility] [address] Chief Complaint for this Admission: Chief Complaint Patient presents with  Dizziness  Diarrhea Allergies:  Lisinopril; Hydrochlorothiazide; and Sulfa (sulfonamide antibiotics) Prior to Admission Medications:  
Prior to Admission Medications Prescriptions Last Dose Informant Patient Reported? Taking? DULoxetine (CYMBALTA) 60 mg capsule 11/5/2018 at Unknown time  No Yes Sig: Take 1 Cap by mouth daily. acetaminophen (TYLENOL EXTRA STRENGTH) 500 mg tablet 10/30/2018 at Unknown time  Yes Yes Sig: Take  by mouth every six (6) hours as needed for Pain. apixaban (ELIQUIS) 5 mg tablet 11/5/2018 at PM  No Yes Sig: TAKE ONE TABLET BY MOUTH TWICE DAILY  
aspirin delayed-release 81 mg tablet 11/5/2018 at Unknown time Self Yes Yes Sig: Take 81 mg by mouth daily. diclofenac (VOLTAREN) 1 % gel 11/5/2018 at Unknown time  No Yes Sig: Apply 2 g to affected area four (4) times daily. dilTIAZem CD (CARDIZEM CD) 240 mg ER capsule 11/5/2018 at Unknown time  No Yes Sig: Take 1 Cap by mouth daily. diphenhydrAMINE (BENADRYL) 25 mg tablet 10/30/2018 at Unknown time  Yes Yes Sig: Take 25 mg by mouth nightly as needed for Sleep.  
levocetirizine (XYZAL) 5 mg tablet 10/30/2018 at Unknown time  Yes Yes Sig: Take 5 mg by mouth daily as needed for Allergies. levothyroxine (SYNTHROID) 75 mcg tablet 11/5/2018 at Unknown time  No Yes Sig: Take 1 Tab by mouth Daily (before breakfast). lidocaine-prilocaine (EMLA) topical cream 10/6/2018 at Unknown time  No Yes Sig: Apply  to affected area as needed for Pain (Apply 30-60 min. prior to having your port accessed). magic mouthwash solution 10/30/2018 at Unknown time Self No Yes Sig: Swish and spit 15-30 mL every 2-4 hours as needed for mouth pain. May swallow for throat pain. Magic mouth wash Maalox Lidocaine 2% viscous Diphenhydramine oral solution Pharmacy to mix equal portions of ingredients to a total volume as indicated in the dispense amount. metoprolol succinate (TOPROL-XL) 25 mg XL tablet 11/5/2018 at Unknown time  No Yes Sig: Take 1 Tab by mouth daily. ondansetron hcl (ZOFRAN) 8 mg tablet 10/6/2018 at Unknown time Self No Yes Sig: Take 1 Tab by mouth every eight (8) hours as needed for Nausea. polyethylene glycol (MIRALAX) 17 gram packet 10/30/2018 at Unknown time Self Yes Yes Sig: Take 17 g by mouth daily. prochlorperazine (COMPAZINE) 10 mg tablet 10/30/2018 at Unknown time Self No Yes Sig: Take 1 Tab by mouth every six (6) hours as needed for Nausea. tamsulosin (FLOMAX) 0.4 mg capsule 11/5/2018 at Unknown time  No Yes Sig: Take 1 Cap by mouth daily. traMADol (ULTRAM) 50 mg tablet 10/30/2018 at Unknown time  No Yes Sig: Take 1 Tab by mouth every six (6) hours as needed for Pain. Max Daily Amount: 200 mg. Facility-Administered Medications: None Thank you, 
Jo-Ann Harden, PHARMD

## 2018-11-06 NOTE — ED TRIAGE NOTES
Pt presents to triage with family for dizziness and diarrhea since Saturday. Pt currently being treated for lung CA with last chemo on 10/19.

## 2018-11-06 NOTE — TELEPHONE ENCOUNTER
Bo Downey called back for Shayne Bello. She stated they are at the [facility] right now.  Call back number [phone]

## 2018-11-07 NOTE — DISCHARGE SUMMARY
Luis Leijaa Amina Winslow Jesus [address]  Office [phone] Fax [phone] Discharge / Transfer / Off-Service Note Name: Bonnie Strickland MRN: [id number]  Sex: Male YOB: 1945  Age: 75 y.o. PCP: Mine To MD  
 
Date of admission: 11/6/2018 Date of discharge/transfer: 11/7/2018 Attending physician at admission: Dr. Monique Armendariz Attending physician at discharge/transfer: Dr. Monique Armendariz Resident physician at discharge/transfer: Emaline Bosworth, MD, PGY1 Consultants during hospitalization None Admission diagnoses Hypotension Recommended follow-up after discharge 1. PCP Things to follow up on with PCP: 
Hypotension Resolution of diarrhea Hyponatremia TSH (end of 11/2018) Medication Changes: 1. New Medications: None 2. Modified Medications: None 3. Discontinued Medications: Stop taking Miralax packets for constipation to avoid further loose stools History of Present Illness Per admitting provider: Bonnie Strickland is a 75 y.o. male with known metastatic Stage IV non-small cell carcinoma, A.fib, CHF, DVT, HTN, hypothryoidism, who presents to the ER complaining of diarrhea and dizziness.    
  
Pt reports waking up 3 days ago (11/04) with dizziness. He fell down but denies any loss of consciousness or head trauma. He was able to pick himself back up after a minute but continues to feel weak and dizzy since then. He denies any more falls but has felt unsteady when walking around with his cane. He developed several episodes of loose stools later that day (11/03). He denies any blood in his stool. He has had several episodes of diarrhea a day since then. They have been 'too many to count'. He denies any nausea, vomiting, fevers or chills. His appetite has also decreased over the past week and he has not been eating or drinking much. He denies any travel, sick contacts or change in his medication.  He denies any changes in his vision, headaches, chest pain, shortness of breath,  
  
In the ED, vitals were remarkable for BP 68/49 and MAP 55. Labs were remarkable for Na 129, Hb 12, BNP 1,198. The patient received 1L NS bolus x 2. HOSPITAL COURSE Hypovolemic shock secondary to viral enteritis: Patient with 3 days of weakness, dizziness, diarrhea and reduced PO intake admitted for hypovolemic shock, improved on 1L NS bolus x2.  Patient's home blood pressure medications of Cardizem 240mg daily and Metoprolol 25mg daily were held. He was started on maintenance IV fluids and encouraged to eat and drink. The patient's blood pressures improved and his symptoms resolved over night. He was stable for discharge on 11/07 with Home Health and PCP follow up.  
  
Metastatic Stage 4 Non-Small Cell Carcinoma: Patient is on palliative chemo, s/p gamma knife for brain mets. He is being followed by Dr. Luca Sanchez, last seen 10/19/2018. Malignancy diagnosis was made on 07/2017. Patient's cancer is non-curative. He receives chemo every 3 weeks and the next infusion appointment is on 11/09. No formal consult placed to Oncology. Dr. Scot Rodriguez office made aware of patient in hospital.  
  
Hyponatremia: Na . Sodium improved and remained stable.  
  
CHF (stable): Last Echo 09/15/2017 EF 45-50%. Patient's home Cardizem and Metoprolol  were held during this admission and restarted upon discharge.  
  
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 Patient cannot tolerate Lovenox injections. No lower leg swelling or pain during this hospital admission today. He continued to receive his home Apixiban 5mg BID.  
  
History of A.fib (stable): Patient diagnosed with A.fib w/RVR on 09/16/2017. He follows with Dr. Elidia Do (cardiology). EKG on admission showed wide QRS rhythm, RBBB, left anterior fascicular block.  He continued to receive his home Apixaban and Aspirin, but his Cardizem and Metoprolol were held until the hypotension resolved. Heart rate was stable during this hospital admission. Hypothyroidism: Last TSH 10/19/2018: 16.32. Patient continued to receive his home Levothyroxine 75mcg daily. Patient will need TSH re-check in 6 weeks (end of 11/2018)  
  
Arthritic pain (stable): Patient diagnosed with arthiritis, on Tramadol and Tylenol prn for pain. He has previously declined to see a Rheumatologist. He continued to receive Tylenol 500mg q6hrs prn for pain  
  
Neuropathy secondary to chemotherapy (Stable): Patient continued to receive his home Cymbalta 60mg during this hospital stay. Physical exam at discharge: 
 
Vitals Reviewed. General Oriented to person, place, and time and well-developed. Appears well-nourished, no distress. Not diaphoretic. Neck No thyromegaly present. No cervical adenopathy. Cardio Normal rate, regular rhythm. Exam reveals no gallop and no friction rub. No murmur heard. No chest wall tenderness. Pulmonary Effort normal and breath sounds normal. No respiratory distress. No wheezes, no rales. Abdominal Soft. Bowel sounds normal. No distension. No tenderness.  Deferred. Extremities No edema of lower extremities. No tenderness. Distal pulses intact. Neurological Alert and oriented to person, place, and time. Dermatology Skin is warm and dry. No rash noted. No erythema or pallor. Psychiatric Affect and judgment normal.   
 
Condition at discharge: Stable. Labs Recent Labs 11/07/18 0124 11/06/18 
1416 WBC 5.5 6.1 HGB 10.4* 12.0*  
HCT 31.6* 36.5*  
 298 Recent Labs 11/07/18 
0124 11/06/18 2001 11/06/18 
1416 * 133* 129*  
K 3.7 3.7 4.4  101 94* CO2 22 24 27 BUN 7 7 7 CREA 0.79 0.84 1.18  
GLU 76 87 88  
CA 8.7 8.2* 9.4 Recent Labs 11/07/18 
0124 11/06/18 
1416 SGOT 22 25 ALT 15 20 AP 55 64  
 TBILI 0.7 0.7 TP 6.0* 7.1 ALB 2.7* 3.2*  
GLOB 3.3 3.9 LPSE  --  78 Recent Labs 11/07/18 
0407 11/06/18 
1416 TROIQ  --  <0.05 [address]  --   
 
Cultures · None Procedures / Diagnostic Studies · None Imaging CT Chest Abdomen Pelvis w/contrast        
Results from Hospital Encounter encounter on 11/02/18 CT CHEST ABD PELV W CONT Narrative CT CHEST, ABDOMEN, PELVIS WITH CONTRAST. 11/2/2018 10:34 AM  
 
INDICATION: Metastatic lung carcinoma. COMPARISON: 8/13/2018, 5/2/2018, 7/14/2017. TECHNIQUE: CT of the chest, abdomen, and pelvis was performed after the 
administration 100 cc of IV Isovue-370 and oral contrast. CT dose reduction was 
achieved through use of a standardized protocol tailored for this examination 
and automatic exposure control for dose modulation. FINDINGS: 
Chest: Treated metastases in the bilateral upper lobes and right lower lobe are 
stable. The margins are irregular, and these are difficult to discretely measure 
apart from associated scarring. As such, they are not RECIST target lesions. As 
they have previously been reported as RECIST target lesions, the following 
measurements are approximate: The right upper lobe metastasis measures 19 x 17 
mm on image 3-22, visually stable despite slightly different measurements (17 x 
17 mm on image 3-28 on 8/13/2018). The left upper lobe metastasis measures 12 x 
47 mm on image 3-23, visually stable despite different measurements on prior 
study (18 x 36 mm on image 3-27 on 8/13/2018). Bronchiolectasis is associated 
with the anterior segment right upper lobe metastasis. No new metastases. A 
treated ed metastasis in the AP window is stable. It is low density, and 
difficult to discretely measure. No pathologically enlarged thoracic lymph 
nodes. Centrilobular emphysema is mild. The central airways are patent. Trace bilateral 
pleural effusions are new. The heart is mildly enlarged. Left anterior descending coronary calcifications are extensive and right coronary artery 
calcifications are mild. Aortic valve calcifications are mild. A right IJ ported 
catheter terminates in the right atrium. There is residual or refluxed oral 
contrast in the esophagus, extending to the level of the aortic arch. Abdomen: The stomach, duodenum, liver, gallbladder, pancreas, spleen, adrenals, 
and kidneys are normal. 
 
Pelvis: The small bowel, ileocecal junction, appendix, colon, and bladder are 
normal. No free air or fluid, and no abdominopelvic lymphadenopathy. A left L5 
sacralization assimilation joint is a normal variant. Impression IMPRESSION: 
1. Stable treated pulmonary and mediastinal ed metastases. 2. No new metastases in the chest, abdomen, and pelvis. 3. Mild emphysema. 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 
ICD-9-CM: 357.6, E933.1  12/29/2017 - Present Dyslipidemia ICD-10-CM: E78.5 ICD-9-CM: 272.4  11/13/2017 - Present Anemia ICD-10-CM: D64.9 ICD-9-CM: 663. 9  Unknown - Present Atrial fibrillation [facility]) ICD-10-CM: I48.91 
ICD-9-CM: 427.31  9/16/2017 - Present Deep vein thrombosis (DVT) of tibial vein of both lower extremities (HCC) ICD-10-CM: R88.487 ICD-9-CM: 453.42  9/15/2017 - Present Primary malignant neoplasm of left lung metastatic to other site [facility]) ICD-10-CM: C34.92 
ICD-9-CM: 162.9  7/28/2017 - Present Metastasis to brain [facility]) ICD-10-CM: C79.31 
ICD-9-CM: 198.3  7/28/2017 - Present Non-compliance ICD-10-CM: Z91.19 ICD-9-CM: V15.81  5/27/2016 - Present Groin fullness ICD-10-CM: R19.00 ICD-9-CM: 789.30  3/5/2014 - Present Benign hypertensive heart disease with HF (heart failure) (HCC) ICD-10-CM: I11.0 ICD-9-CM: 402.11, 428.9  10/11/2011 - Present History of tobacco abuse ICD-10-CM: Z87.891 ICD-9-CM: V15.82  10/11/2011 - Present Alcohol abuse ICD-10-CM: F10.10 ICD-9-CM: 305.00  10/11/2011 - Present Cough ICD-10-CM: R05 ICD-9-CM: 786.2  4/4/2011 - Present ED (erectile dysfunction) ICD-10-CM: N52.9 ICD-9-CM: 607.84  5/18/2010 - Present Hyperlipidemia ICD-10-CM: E78.5 ICD-9-CM: 272.4  5/18/2010 - Present RESOLVED: Paroxysmal atrial flutter ([facility] ICD-10-CM: I48.92 
ICD-9-CM: 427.32  Unknown - 12/29/2017 RESOLVED: Atrial flutter with rapid ventricular response ([facility] ICD-10-CM: I48.92 
ICD-9-CM: 427.32  9/27/2017 - 12/29/2017 RESOLVED: Pulmonary mass ICD-10-CM: R91.8 ICD-9-CM: 786.6  7/14/2017 - 7/28/2017 RESOLVED: Hypertension ICD-10-CM: I10 
ICD-9-CM: 401.9  Unknown - 10/11/2011 Discharge/Transfer Medications Discharge Medication List as of 11/7/2018  4:31 PM  
  
CONTINUE these medications which have NOT CHANGED Details  
apixaban (ELIQUIS) 5 mg tablet TAKE ONE TABLET BY MOUTH TWICE DAILY, Normal, Disp-60 Tab, R-11  
  
metoprolol succinate (TOPROL-XL) 25 mg XL tablet Take 1 Tab by mouth daily. , Normal, Disp-90 Tab, R-3  
  
dilTIAZem CD (CARDIZEM CD) 240 mg ER capsule Take 1 Cap by mouth daily. , Normal, Disp-90 Cap, R-3  
  
 DULoxetine (CYMBALTA) 60 mg capsule Take 1 Cap by mouth daily. , Normal, Disp-30 Cap, R-5  
  
levothyroxine (SYNTHROID) 75 mcg tablet Take 1 Tab by mouth Daily (before breakfast). , Normal, Disp-30 Tab, R-11  
  
traMADol (ULTRAM) 50 mg tablet Take 1 Tab by mouth every six (6) hours as needed for Pain. Max Daily Amount: 200 mg., Print, Disp-45 Tab, R-0  
  
diclofenac (VOLTAREN) 1 % gel Apply 2 g to affected area four (4) times daily. , Print, Disp-100 g, R-0  
  
acetaminophen (TYLENOL EXTRA STRENGTH) 500 mg tablet Take  by mouth every six (6) hours as needed for Pain., Historical Med  
  
tamsulosin (FLOMAX) 0.4 mg capsule Take 1 Cap by mouth daily. , Normal, Disp-30 Cap, R-5  
  
lidocaine-prilocaine (EMLA) topical cream Apply  to affected area as needed for Pain (Apply 30-60 min. prior to having your port accessed). , Normal, Disp-30 g, R-3  
  
diphenhydrAMINE (BENADRYL) 25 mg tablet Take 25 mg by mouth nightly as needed for Sleep., Historical Med  
  
levocetirizine (XYZAL) 5 mg tablet Take 5 mg by mouth daily as needed for Allergies. , Historical Med  
  
magic mouthwash solution Swish and spit 15-30 mL every 2-4 hours as needed for mouth pain. May swallow for throat pain. Magic mouth wash Maalox Lidocaine 2% viscous Diphenhydramine oral solution Pharmacy to mix equal portions of ingredients to a total volume as ind icated in the dispense amount. , Print, Disp-480 mL, R-2  
  
prochlorperazine (COMPAZINE) 10 mg tablet Take 1 Tab by mouth every six (6) hours as needed for Nausea., Normal, Disp-50 Tab, R-5  
  
ondansetron hcl (ZOFRAN) 8 mg tablet Take 1 Tab by mouth every eight (8) hours as needed for Nausea., Normal, Disp-45 Tab, R-5  
  
aspirin delayed-release 81 mg tablet Take 81 mg by mouth daily. , Historical Med  
  
  
STOP taking these medications  
  
 polyethylene glycol (MIRALAX) 17 gram packet Comments:  
Reason for Stopping:   
   
  
 
Diet:  Soft pureed diet. Activity:  As tolerated Disposition: Home Health Care Svc Discharge instructions to patient/family Please seek medical attention for any new or worsening symptoms particularly fever, chest pain, shortness of breath, abdominal pain, nausea, vomiting Follow up plans/appointments Follow-up Information Follow up With Specialties Details Why Contact Info Meg White MD Family Practice Go on 11/8/2018 Please go to your PCP appointment on Thursdsay 11/08 at [address] [facility] [address] [facility] 
[phone] [address] [place] [address] Adela Fast [address] [facility] 
[phone] Manjit Tirado MD 
Family Medicine Resident For Billing Chief Complaint Patient presents with  Dizziness  Diarrhea Hospital Problems  Date Reviewed: 10/19/2018 Codes Class Noted POA * (Principal) Hypovolemic shock ([facility] ICD-10-CM: R57.1 ICD-9-CM: 785.59  11/6/2018 Viral enteritis ICD-10-CM: A08.4 ICD-9-CM: 008.8  11/6/2018 Unknown Acquired hypothyroidism ICD-10-CM: E03.9 ICD-9-CM: 244.9  11/6/2018 Unknown Hyponatremia ICD-10-CM: E87.1 ICD-9-CM: 276.1  11/6/2018 Unknown Depression ICD-10-CM: F32.9 ICD-9-CM: 899  11/6/2018 Unknown Atrial fibrillation [facility]) ICD-10-CM: I48.91 
ICD-9-CM: 427.31  9/16/2017 Unknown Deep vein thrombosis (DVT) of tibial vein of both lower extremities (HCC) ICD-10-CM: Q83.044 ICD-9-CM: 453.42  9/15/2017 Yes Primary malignant neoplasm of left lung metastatic to other site [facility]) ICD-10-CM: C34.92 
ICD-9-CM: 162.9  7/28/2017 Yes Metastasis to brain [facility]) ICD-10-CM: C79.31 
ICD-9-CM: 198.3  7/28/2017 Yes Benign hypertensive heart disease with HF (heart failure) (HCC) ICD-10-CM: I11.0 ICD-9-CM: 402.11, 428.9  10/11/2011 Yes

## 2018-11-07 NOTE — DISCHARGE INSTRUCTIONS
HOME DISCHARGE INSTRUCTIONS    Evelina Ogden / [id number] : 1945    Admission date: 2018 Discharge date: 2018     Please bring this form with you to show your care provider at your follow-up appointment. Primary care provider:  Jose Rafael Lau MD    Discharging provider:  Iris eMna MD  - Family Medicine Resident  Dr. Cory Briones - Attending, Family Medicine     You have been admitted to the hospital with the following diagnoses:    ACUTE DIAGNOSES:  Hypotension  . . . . . . . . . . . . . . . . . . . . . . . . . . . . . . . . . . . . . . . . . . . . . . . . . . . . . . . . . . . . . . . . . . . . . . . Soumya Smith FOLLOW-UP CARE RECOMMENDATIONS:    Appointments  Follow-up Information     Follow up With Specialties Details Why Contact Info    Tavares Quiroga MD Family Practice Go on 2018 Please go to your PCP appointment on  at [address] [place] [address]  [phone]           Please follow up with your PCP regarding:  Low blood pressure and diarrhea     MEDICATION CHANGES:  Hold taking Miralax packets and follow up with your PCP regarding taking Miralax     Follow-up tests needed: None    Pending test results: At the time of your discharge the following test results are still pending: None     Specific symptoms to watch for: chest pain, shortness of breath, fever, chills, nausea, vomiting, diarrhea, change in mentation, falling, weakness, bleeding. DIET/what to eat:  Pureed diet    ACTIVITY:  Activity as tolerated    Wound care: n/a    Equipment needed:  N/a     What to do if new or unexpected symptoms occur? If you experience any of the above symptoms (or should other concerns or questions arise after discharge) please call your primary care physician. Return to the emergency room if you cannot get hold of your doctor. · It is very important that you keep your follow-up appointment(s).   · Please bring discharge papers, medication list (and/or medication bottles) to your follow-up appointments for review by your outpatient provider(s). · Please check the list of medications and be sure it includes every medication (even non-prescription medications) that your provider wants you to take. · It is important that you take the medication exactly as they are prescribed. · Keep your medication in the bottles provided by the pharmacist and keep a list of the medication names, dosages, and times to be taken in your wallet. · Do not take other medications without consulting your doctor. · If you have any questions about your medications or other instructions, please talk to your nurse or care provider before you leave the hospital.     Information obtained by:     I understand that if any problems occur once I am at home I am to contact my physician. These instructions were explained to me and I had the opportunity to ask questions. I understand and acknowledge receipt of the instructions indicated above.                                                                                                                                                Physician's or R.N.'s Signature                                                                  Date/Time                                                                                                                                              Patient or Representative Signature                                                          Date/Time

## 2018-11-07 NOTE — PROGRESS NOTES
TRANSFER - IN REPORT: 
 
Verbal report received from Eulalia Little RN(name) on Kathleen Membreno  being received from ED(unit) for routine progression of care Report consisted of patients Situation, Background, Assessment and  
Recommendations(SBAR). Information from the following report(s) SBAR, Kardex, Accordion, Recent Results and Cardiac Rhythm NSR was reviewed with the receiving nurse. Opportunity for questions and clarification was provided. Assessment completed upon patients arrival to unit and care assumed. Call to [facility] practice for pureed diet as pt does not have any teeth. Order palced by MD. 
 
Court Rodriguez:  MD on floor to see pt. Clarified blood draw orders. Sodium lab cancelled. BMP due at 2000 hrs Bedside and Verbal shift change report given to Renu Figueredo RN (oncoming nurse) by Clive Perkins RN (offgoing nurse). Report included the following information SBAR, Kardex, Accordion, Recent Results and Cardiac Rhythm NSR.

## 2018-11-07 NOTE — PROGRESS NOTES
Discharge instructions were reviewed with patient and his daughter. All questions were answered. IV sites and heart monitor were removed. Patient received a copy of his discharge papers and will be discharged home with his daughter. Case Management set up home health through [facility]

## 2018-11-07 NOTE — PROGRESS NOTES
Luis Winslow Jesus [address] Office [phone] Fax [phone] Assessment and Plan Alberto Duarte is a 75 y.o. male admitted for hypovolemic shock secondary to viral enteritis. He has spent 0 night(s) in the hospital. 
 
24 Hour Events: No acute events. Hypovolemic shock secondary to viral enteritis: Patient with 3 days of weakness, dizziness, diarrhea and reduced PO intake admitted for hypovolemic shock, improved on 1L NS bolus x2.  
- Continue MIVF @ 100ml/hr  
- Hold home medications of Cardizem 240mg daily, Metoprolol 25mg daily - Encourage PO intake - Will continue to monitor at this time and readjust as BP's trend. 
  
Metastatic Stage 4 Non-Small Cell Carcinoma: on palliative chemo, s/p gamma knife for brain mets. Being followed by Dr. Vignesh Alvarenga, last seen 10/19/2018. Diagnosis made 07/2017. Patient's cancer is non-curative. Receives chemo every 3 weeks. Next infusion appointment on 11/09. 
- Bilateral wheezing on PE likely 2/2 lung cancer, will continue to monitor. Pt asymptomatic. 
- No formal consult placed to Oncology. Dr. Haley Hickey office made aware of patient in hospital.  
  
Hyponatremia: Na  
-This morning 133,stable - Will continue to monitor 
  
CHF (stable): Last Echo 09/15/2017 EF 45-50% -Holding home Cardizem and Metoprolol  
  
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 Patient cannot tolerate Lovenox injections. No lower leg swelling or pain on admission today.  
-Continue home Apixiban 5mg BID  
  
History of A.fib (stable): Patient diagnosed with A.fib w/RVR on 09/16/2017. Follows with Dr. Meir Carroll (cardiology). - EKG on admission: wide QRS rhythm, RBBB, left anterior fascicular block  
- Holding home Cardizem and Metoprolol  
- Continue Apixaban 5mg daily  
- Continue ASA 81mg daily - Will monitor heart rate  
  
Hypothyroidism: last TSH 10/19/2018: 16.32 
-Continue home Levothyroxine 75mcg daily -Will need re-check in 6 weeks (end of 2018)  
  Arthritic pain (stable): Patient diagnosed with arthiritis, on Tramadol and Tylenol prn for pain, has declined to see a Rheumatologist 
-Continue Tylenol 500mg q6hrs prn for pain  
  
Neuropathy secondary to chemotherapy (Stable) 
-Continue home Cymbalta 60mg daily  
  
FEN/GI - Regular diet. Activity - Ambulate with assistance DVT prophylaxis - Apixaban GI prophylaxis - Not indicated at this time Fall prophylaxis - Fall precautions ordered. Disposition - Admit to Telemetry. Plan to d/c to Home. Code Status - Full, discussed with patient / caregivers. Next of Shabbir Brooks Name and Contact Daughter Gerber Melvin [phone] 
  
Patient Yolanda Banks will be discussed with Dr. Rashad Workman. López Dumas MD 
Family Medicine Resident Subjective / Objective Subjective Patient resting comfortably in bed this morning. He denies any fevers, chills, dizziness, headaches, vision changes or chest pain. He has had 1 loose bowel movement overnight. He is tolerating his pureed diet but does not like it very much. Temp (24hrs), Av °F (36.7 °C), Min:97.3 °F (36.3 °C), Max:98.5 °F (36.9 °C) Objective Respiratory:   O2 Device: Room air Visit Vitals /60 (BP 1 Location: Right arm, BP Patient Position: At rest) Pulse 81 Temp 98.5 °F (36.9 °C) Resp 18 Ht 5' 6\" (1.676 m) Wt 170 lb (77.1 kg) SpO2 96% BMI 27.44 kg/m² General: No acute distress. Alert. Cooperative. Head: Normocephalic. Atraumatic. Eyes:              Conjunctiva pink. Sclera white. PERRL. Ears:              Ear canals patent. TM non-erythematous. Nose:             Septum midline. Mucosa pink. No drainage. Throat: Mucosa pink. Moist mucous membranes. No tonsillar exudates or erythema. Palate movement equal bilaterally. Neck: Supple. Normal ROM. No stiffness. Respiratory: Bilateral expiratory wheezes. Symmetric breath sounds. No r/r/c. Cardiovascular: RRR. Normal S1,S2. No m/r/g. Pulses 2+ throughout. GI: + bowel sounds. Nontender. No rebound tenderness or guarding. Nondistended. Extremities: LE edema. Distal pulses intact. Musculoskeletal: Full ROM in all extremities. Skin: Warm, dry. No rashes. Neuro: CN II-XII grossly intact. Strength 5/5 in upper extremities and 4/5 in lower extremities I/O: 
Date 11/06/18 0700 - 11/07/18 8154 11/07/18 0700 - 11/08/18 1704 Shift [id number] 1900-0659 24 Hour Total 6195-5650 2680-1821 24 Hour Total  
INTAKE  
P.O.  250 250     
  P. O.  250 250     
I. V.(mL/kg/hr) 9035(0.9)  2000 Volume (sodium chloride 0.9 % bolus infusion 1,000 mL) 1000  1000 Volume (sodium chloride 0.9 % bolus infusion 1,000 mL) 1000  1000 Shift Total(mL/kg) 2319(55.6) 250(3.2) 5785(55.1) OUTPUT Shift Total(mL/kg) NET 2000 250 2250 Weight (kg) 77.1 77.1 77.1 77.1 77.1 77.1 CBC: 
Recent Labs 11/07/18 
0124 11/06/18 
1416 WBC 5.5 6.1 HGB 10.4* 12.0*  
HCT 31.6* 36.5*  
 298 Metabolic Panel: 
Recent Labs 11/07/18 0124 11/06/18 2001 11/06/18 
1416 * 133* 129*  
K 3.7 3.7 4.4  101 94* CO2 22 24 27 BUN 7 7 7 CREA 0.79 0.84 1.18  
GLU 76 87 88  
CA 8.7 8.2* 9.4 ALB 2.7*  --  3.2* SGOT 22  --  25 ALT 15  --  20 For Billing Chief Complaint Patient presents with  Dizziness  Diarrhea Hospital Problems  Date Reviewed: 10/19/2018 Codes Class Noted POA * (Principal) Hypovolemic shock ([facility] [address] ICD-10-CM: R57.1 ICD-9-CM: 785.59  11/6/2018 Viral enteritis ICD-10-CM: A08.4 ICD-9-CM: 008.8  11/6/2018 Unknown Acquired hypothyroidism ICD-10-CM: E03.9 ICD-9-CM: 244.9  11/6/2018 Unknown Hyponatremia ICD-10-CM: E87.1 ICD-9-CM: 276.1  11/6/2018 Unknown Depression ICD-10-CM: F32.9 ICD-9-CM: 686  11/6/2018 Unknown  [facility] ICD-10-CM: I48.91 
 ICD-9-CM: 427.31  9/16/2017 Unknown Deep vein thrombosis (DVT) of tibial vein of both lower extremities (HCC) ICD-10-CM: U38.910 ICD-9-CM: 453.42  9/15/2017 Yes Primary malignant neoplasm of left lung metastatic to other site [facility]) ICD-10-CM: C34.92 
ICD-9-CM: 162.9  7/28/2017 Yes Metastasis to brain [facility]) ICD-10-CM: C79.31 
ICD-9-CM: 198.3  7/28/2017 Yes Benign hypertensive heart disease with HF (heart failure) (HCC) ICD-10-CM: I11.0 ICD-9-CM: 402.11, 428.9  10/11/2011 Yes

## 2018-11-07 NOTE — PROGRESS NOTES
0130- pt AxO in bed- follows commands. Per daughter, pt baseline is garbled, mumbling speech. Up to bathroom with quad cane and one assist- small amount of clear, watery diarrhea. Pt back to bed.

## 2018-11-07 NOTE — PROGRESS NOTES
[address] [facility], [address] with [address] [facility] Resident Progress Note in Brief S: Called to patient room for tachycardia. Patient while up to bathroom and after a BM developed tachycardia up to 140-150 range. Patient denies dizziness/lightheadedness, Chest pain, SOB, MCLEAN. Telemetry reports sinus tachycardia up to 152 maximum, returning to normal rate. Patient returned to bed without difficulty. Patient seen and examined at bedside, in no distress O: 
Visit Vitals /71 Pulse 98 Temp 97.4 °F (36.3 °C) Resp 18 Ht 5' 6\" (1.676 m) Wt 170 lb (77.1 kg) SpO2 96% BMI 27.44 kg/m² Physical Examination:  
General appearance - alert, well appearing, and in no distress Chest - clear to auscultation, no wheezes, rales or rhonchi, symmetric air entry Heart - Tachycardic ('s on evaluation), regular rhythm, normal S1/S2, no murmurs, rubs, clicks or gallops, Abdomen - soft, nontender, nondistended, no masses or organomegaly Neurological - alert, oriented, normal speech, no focal findings Extremities - peripheral pulses normal, no pedal edema, no clubbing or cyanosis EKG: Sinus rhythm with Right bundle branch block and Left anterior fascicular block, Rate 90's. No change from previous EKG. A/P:  
Tachycardia: Patient with Recent Hypotension related to decreased PO and possible gastroenteritis. BP normal in bed, NSR without changes. Likely related to recent hypotension and vaso-vagal component, asymptomatic. Will get Orthostatic BP's now as well. - Orthostatics - Continue to monitor Maya López MD 
Family Medicine Resident Addendum:  Blood pressure tested laying down, Sitting, and standing, no orthostatic hypotension. Patient asymptomatic with testing.

## 2018-11-07 NOTE — PROGRESS NOTES
Problem: Mobility Impaired (Adult and Pediatric) Goal: *Acute Goals and Plan of Care (Insert Text) Physical Therapy Goals Initiated 11/7/2018 1. Patient will move from supine to sit and sit to supine , scoot up and down and roll side to side in bed with independence within 7 day(s). 2.  Patient will transfer from bed to chair and chair to bed with independence using the least restrictive device within 7 day(s). 3.  Patient will perform sit to stand with modified independence within 7 day(s). 4.  Patient will ambulate with modified independence for 150 feet with the least restrictive device within 7 day(s). physical Therapy EVALUATION Patient: Sharon Jim (76 y.o. male) Date: 11/7/2018 Primary Diagnosis: Hypotension Hypotension Precautions: universal    
 
ASSESSMENT : 
Based on the objective data described below, the patient presents with generalized weakness and debility. Supine to sit supervision, sit to stand supervision, ambulate with quad cane supervision, assisted to and from the bathroom supervision. Nurse came in the room and stated Telemetry called patient heart rate in the 150's. No loss of balance sitting and standing balance good. Assisted back to bed supine and activated bed alarm and notified nurse who agreed to monitor patient. Patient cleared for discharge per Physical Therapy perspective once medically stable to go home. Patient will benefit from skilled intervention to address the above impairments. Patients rehabilitation potential is considered to be Excellent Factors which may influence rehabilitation potential include:  
[]         None noted 
[]         Mental ability/status [x]         Medical condition 
[]         Home/family situation and support systems 
[]         Safety awareness 
[]         Pain tolerance/management 
[]         Other: PLAN : 
Recommendations and Planned Interventions: [x]           Bed Mobility Training             []    Neuromuscular Re-Education 
[x]           Transfer Training                   []    Orthotic/Prosthetic Training 
[x]           Gait Training                         []    Modalities [x]           Therapeutic Exercises           []    Edema Management/Control 
[x]           Therapeutic Activities            []    Patient and Family Training/Education 
[]           Other (comment): Frequency/Duration: Patient will be followed by physical therapy  5 times a week to address goals. Discharge Recommendations: Home Health Further Equipment Recommendations for Discharge: already has DME's SUBJECTIVE:  
Patient stated ok .  OBJECTIVE DATA SUMMARY:  
HISTORY:   
Past Medical History:  
Diagnosis Date  Anemia  Cardiomyopathy ([facility] [address] mild LV regional/global dysfunction, likely ischemic etiology  Coronary artery calcification seen on CT scan  Dysfunction, erectile  Hyperlipidemia 5/18/2010  Hypertension  Metastatic lung cancer (metastasis from lung to other site) [facility]) brain  Paroxysmal atrial flutter ([facility] [address] No past surgical history on file. Prior Level of Function/Home Situation: modified independent with quad cane Personal factors and/or comorbidities impacting plan of care: EXAMINATION/PRESENTATION/DECISION MAKING: Critical Behavior: 
  
  
  
  
Hearing: Auditory Auditory Impairment: Hard of hearing, bilateral 
Range Of Motion: 
AROM: Within functional limits PROM: Within functional limits Strength:   
Strength: Within functional limits Tone & Sensation:  
  
  
  
  
  
  
  
  
  
   
Coordination: 
Coordination: Within functional limits Vision:  
  
Functional Mobility: 
Bed Mobility: 
Rolling: Supervision Supine to Sit: Supervision Sit to Supine: Supervision Scooting: Supervision Transfers: 
Sit to Stand: Supervision Stand to Sit: Supervision Stand Pivot Transfers: Supervision Bed to Chair: (assisted back to bed) Balance:  
Sitting: Intact Standing: With support; IntactAmbulation/Gait Training:Distance (ft): 30 Feet (ft) Assistive Device: Cane, quad;Gait belt Ambulation - Level of Assistance: Supervision;Modified independent Gait Description (WDL): Exceptions to [facility] Gait Abnormalities: Path deviations Base of Support: Widened Speed/Allie: Fluctuations Therapeutic Exercises:  
 Instructed patient to continue active range of motion exercise on both legs while up on chair or on bed. Functional Measure: 
Barthel Index: 
 
Bathin Bladder: 5 Bowels: 5 Groomin Dressing: 10 Feeding: 10 Mobility: 10 Stairs: 0 Toilet Use: 10 Transfer (Bed to Chair and Back): 15 Total: 75 Barthel and G-code impairment scale: 
Percentage of impairment CH 
0% CI 
1-19% CJ 
20-39% CK 
40-59% CL 
60-79% CM 
80-99% CN 
100% Barthel Score 0-100 100 99-80 79-60 59-40 20-39 1-19 
 0 Barthel Score 0-20 20 17-19 13-16 9-12 5-8 1-4 0 The Barthel ADL Index: Guidelines 1. The index should be used as a record of what a patient does, not as a record of what a patient could do. 2. The main aim is to establish degree of independence from any help, physical or verbal, however minor and for whatever reason. 3. The need for supervision renders the patient not independent. 4. A patient's performance should be established using the best available evidence. Asking the patient, friends/relatives and nurses are the usual sources, but direct observation and common sense are also important. However direct testing is not needed. 5. Usually the patient's performance over the preceding 24-48 hours is important, but occasionally longer periods will be relevant. 6. Middle categories imply that the patient supplies over 50 per cent of the effort. 7. Use of aids to be independent is allowed. Miri Shipman, Barthel, D.W. ([id number]). Functional evaluation: the Barthel Index. [address] [facility] [address]. SHORTY Asencio, Morelia Loyd, Gisel, [address] (1999). Measuring the change indisability after inpatient rehabilitation; comparison of the responsiveness of the Barthel Index and Functional Star Measure. Journal of Neurology, Neurosurgery, and Psychiatry, 66(4), 700-018. KM Llanos, JULIA Price, & Ameya Wu M.A. (2004.) Assessment of post-stroke quality of life in cost-effectiveness studies: The usefulness of the Barthel Index and the EuroQoL-5D. [facility], 13, 286-29 G codes: In compliance with CMSs Claims Based Outcome Reporting, the following G-code set was chosen for this patient based on their primary functional limitation being treated: The outcome measure chosen to determine the severity of the functional limitation was the kelley balance with a score of 75/100 which was correlated with the impairment scale. ? Mobility - Walking and Moving Around:  
  - CURRENT STATUS: CJ - 20%-39% impaired, limited or restricted  - GOAL STATUS: CI - 1%-19% impaired, limited or restricted  - D/C STATUS:  ---------------To be determined--------------- Physical Therapy Evaluation Charge Determination History Examination Presentation Decision-Making LOW Complexity : Zero comorbidities / personal factors that will impact the outcome / POC LOW Complexity : 1-2 Standardized tests and measures addressing body structure, function, activity limitation and / or participation in recreation  LOW Complexity : Stable, uncomplicated  Other outcome measures patient  LOW Based on the above components, the patient evaluation is determined to be of the following complexity level: LOW Pain: 
Pain Scale 1: Numeric (0 - 10) Pain Intensity 1: 0 Activity Tolerance:  
Good. Please refer to the flowsheet for vital signs taken during this treatment. After treatment:  
[x]         Patient left in no apparent distress sitting up in chair 
[]         Patient left in no apparent distress in bed 
[x]         Call bell left within reach [x]         Nursing notified 
[]         Caregiver present [x]         Bed alarm activated COMMUNICATION/EDUCATION:  
The patients plan of care was discussed with: Physical Therapy Assistant, Occupational Therapist, Registered Nurse and patient. [x]         Fall prevention education was provided and the patient/caregiver indicated understanding. [x]         Patient/family have participated as able in goal setting and plan of care. [x]         Patient/family agree to work toward stated goals and plan of care. []         Patient understands intent and goals of therapy, but is neutral about his/her participation. []         Patient is unable to participate in goal setting and plan of care. Thank you for this referral. 
Desire Owens, PT,C. Time Calculation: 27 mins

## 2018-11-07 NOTE — PROGRESS NOTES
Reason for Admission: Hypotension RRAT Score:   25 Resources/supports as identified by patient/family:  Patient lives alone but has a daughter, Joanie Bailey [phone] that transports him. Top Challenges facing patient (as identified by patient/family and CM): Patient unable to identify any concersn Finances/Medication cost?  Has Medicare and Medicaid and a Prescription plan. He uses [facility] in [facility]. Daughter does weekly pill box for meds. Transportation? Daughter transports but patient takes the bus to grocery store Support system or lack thereof? Daughter and son Living arrangements? Lives alone in downstairs apartment Self-care/ADLs/Cognition? Alert and oriented but difficult to hear as patient talks low and speech is not clear Current Advanced Directive/Advance Care Plan: None Plan for utilizing home health:  Has had Naeem Voss in the past. Waiting for Pt/OT recommendation Likelihood of readmission: high/red Transition of Care Plan: MSW met with the patient to complete history and discuss discharge plans. Also conversed with doctor. Patient reports that he has been doing fine, independent, not using any medical equipment up until recently. He goes to the [place] [id number] every  2weeks to get chemo. He has an appt this Friday. He has a cane, walker, stool in the shower, elevated toilet seat. TC. TC to dtr, She confirmed above. Dtr and son live about 20 minutes away. They do check on him. Informed her of his PCP appt tomorrow at 4:00pm She is agreeable to take him. Will await to see if patient would benefit from [facility]. OBS letters given, explained, signed and in chart. Care Management Interventions PCP Verified by CM: Yes(Dr. Jax Liang) Transition of Care Consult (CM Consult): Discharge Planning Physical Therapy Consult: Yes Occupational Therapy Consult: Yes Current Support Network: Lives Alone Confirm Follow Up Transport: Family Plan discussed with Pt/Family/Caregiver: Yes Discharge Location Discharge Placement: Unable to determine at this time Stephanie Luna MSW

## 2018-11-07 NOTE — PROGRESS NOTES
Bedside and Verbal shift change report given to Hari (oncoming nurse) by Barby Muniz (offgoing nurse). Report included the following information SBAR, Kardex, Procedure Summary, Intake/Output and MAR.

## 2018-11-07 NOTE — PROGRESS NOTES
HH order received and discussed with pt and his daughter. Choice letter has been signed and referral placed to [facility]. Nolberto Ortega LCSW

## 2018-11-07 NOTE — PROGRESS NOTES
Problem: Falls - Risk of 
Goal: *Absence of Falls Document Erik Connor Fall Risk and appropriate interventions in the flowsheet. Outcome: Progressing Towards Goal 
Fall Risk Interventions: 
Mobility Interventions: Bed/chair exit alarm, Patient to call before getting OOB Medication Interventions: Bed/chair exit alarm, Patient to call before getting OOB, Teach patient to arise slowly Elimination Interventions: Bed/chair exit alarm, Call light in reach, Patient to call for help with toileting needs History of Falls Interventions: Bed/chair exit alarm, Door open when patient unattended

## 2018-11-07 NOTE — PROGRESS NOTES
Occupational Therapy EVALUATION/discharge Patient: Angelica Torres (88 y.o. male) Date: 11/7/2018 Primary Diagnosis: Hypotension Hypotension Precautions:  Fall ASSESSMENT:  
Based on the objective data described below, the patient presents at an overall supervision level with LE ADLs, toileting and functional mobility using his cane to amb. He requires increased time for mobility transitions, but no LOB noted. Per pt and his daughter pt lives alone in a senior apartment complex, amb with a cane, takes public transportion and had 1 fall last week. Further skilled acute occupational therapy is not indicated at this time. [facility] at discharge.  with ADL activity. Discharge Recommendations: Home Health Further Equipment Recommendations for Discharge: TBD SUBJECTIVE:  
Patient stated I feel ok.  OBJECTIVE DATA SUMMARY:  
HISTORY:  
Past Medical History:  
Diagnosis Date  Anemia  Cardiomyopathy ([facility] [address] mild LV regional/global dysfunction, likely ischemic etiology  Coronary artery calcification seen on CT scan  Dysfunction, erectile  Hyperlipidemia 5/18/2010  Hypertension  Metastatic lung cancer (metastasis from lung to other site) [facility]) brain  Paroxysmal atrial flutter ([facility] [address] No past surgical history on file. Prior Level of Function/Environment/Context: Per pt and his daughter pt lives alone in a senior apartment complex, amb with a cane, takes public transportion and had 1 fall last week. Home Situation Home Environment: Apartment([facility]) # Steps to Enter: 0 One/Two Story Residence: One story Living Alone: Yes Support Systems: Child(malena), Family member(s) Patient Expects to be Discharged to[de-identified] [facility] Current DME Used/Available at Home: Cane, straight, Grab bars Tub or Shower Type: Shower Hand dominance: Right EXAMINATION OF PERFORMANCE DEFICITS: 
Cognitive/Behavioral Status: Neurologic State: Alert; Appropriate for age Orientation Level: Oriented X4 Cognition: Appropriate for age attention/concentration; Follows commands Perception: Appears intact Perseveration: No perseveration noted Safety/Judgement: Awareness of environment; Fall prevention;Good awareness of safety precautions; Home safety; Insight into deficits Hearing: Auditory Auditory Impairment: Hard of hearing, bilateral 
Vision/Perceptual:   
Acuity: Impaired near vision Corrective Lenses: Reading glasses Range of Motion: 
AROM: Generally decreased, functional(imp B shoulders) PROM: Within functional limits Strength: 
Strength: Within functional limits Coordination: 
Coordination: Generally decreased, functional 
Fine Motor Skills-Upper: Left Impaired;Right Impaired Gross Motor Skills-Upper: Left Intact; Right Intact Balance: 
Sitting: Intact Standing: With support; Intact Functional Mobility and Transfers for ADLs:Bed Mobility: 
Rolling: Supervision Supine to Sit: Supervision Sit to Supine: Supervision Scooting: Supervision Transfers: 
Sit to Stand: Supervision Stand to Sit: Supervision Bed to Chair: (assisted back to bed) Bathroom Mobility: Supervision/set up Toilet Transfer : Supervision ADL Assessment: 
Feeding: Modified independent Oral Facial Hygiene/Grooming: Supervision(standing at sink) Bathing: Supervision; Additional time Upper Body Dressing: Setup Lower Body Dressing: Supervision;Setup; Additional time Toileting: Supervision; Additional time ADL Intervention and task modifications: 
Patient was educated on the benefits of maintaining activity tolerance, functional mobility, and independence with self care tasks during acute stay.  Encouraged patient to be out of bed for all meals, perform daily ADLs (as approved by RN/MD regarding bathing etc), performing functional mobility to/from bathroom, and increasing time OOB daily. Patient educated about the importance of maintaining activity tolerance to ensure safe return home and to baseline. Patient verbalized understanding of education. Cognitive Retraining Safety/Judgement: Awareness of environment; Fall prevention;Good awareness of safety precautions; Home safety; Insight into deficits Functional Measure: 
Barthel Index: 
 
Bathin Bladder: 10 Bowels: 10 
Groomin Dressin Feeding: 10 Mobility: 10 Stairs: 5 Toilet Use: 5 Transfer (Bed to Chair and Back): 10 Total: 70 Barthel and G-code impairment scale: 
Percentage of impairment CH 
0% CI 
1-19% CJ 
20-39% CK 
40-59% CL 
60-79% CM 
80-99% CN 
100% Barthel Score 0-100 100 99-80 79-60 59-40 20-39 1-19 
 0 Barthel Score 0-20 20 17-19 13-16 9-12 5-8 1-4 0 The Barthel ADL Index: Guidelines 1. The index should be used as a record of what a patient does, not as a record of what a patient could do. 2. The main aim is to establish degree of independence from any help, physical or verbal, however minor and for whatever reason. 3. The need for supervision renders the patient not independent. 4. A patient's performance should be established using the best available evidence. Asking the patient, friends/relatives and nurses are the usual sources, but direct observation and common sense are also important. However direct testing is not needed. 5. Usually the patient's performance over the preceding 24-48 hours is important, but occasionally longer periods will be relevant. 6. Middle categories imply that the patient supplies over 50 per cent of the effort. 7. Use of aids to be independent is allowed. Didi Ward., Barthel, D.W. (0008). Functional evaluation: the Barthel Index. [facility] (14)2. SHORTY Gallegos, Nela Gardner., Yesika Grant., [place], [address] ().  Measuring the change indisability after inpatient rehabilitation; comparison of the responsiveness of the Barthel Index and Functional Model Measure. Journal of Neurology, Neurosurgery, and Psychiatry, 66(4), 142-651. KM Holguin, JULIA Price, & Kyree Velázquez M.A. (2004.) Assessment of post-stroke quality of life in cost-effectiveness studies: The usefulness of the Barthel Index and the EuroQoL-5D. [facility], 13, 651-46 G codes: In compliance with CMSs Claims Based Outcome Reporting, the following G-code set was chosen for this patient based on their primary functional limitation being treated: The outcome measure chosen to determine the severity of the functional limitation was the Barthel Index with a score of 70/100 which was correlated with the impairment scale. ? Self Care:  
  - CURRENT STATUS: CJ - 20%-39% impaired, limited or restricted  - GOAL STATUS: CJ - 20%-39% impaired, limited or restricted  - D/C STATUS:  CJ - 20%-39% impaired, limited or restricted Occupational Therapy Evaluation Charge Determination History Examination Decision-Making LOW Complexity : Brief history review  MEDIUM Complexity : 3-5 performance deficits relating to physical, cognitive , or psychosocial skils that result in activity limitations and / or participation restrictions MEDIUM Complexity : Patient may present with comorbidities that affect occupational performnce. Miniml to moderate modification of tasks or assistance (eg, physical or verbal ) with assesment(s) is necessary to enable patient to complete evaluation Based on the above components, the patient evaluation is determined to be of the following complexity level: LOW Pain: 
Pain Scale 1: Numeric (0 - 10) Pain Intensity 1: 0 Activity Tolerance:  
Fair Please refer to the flowsheet for vital signs taken during this treatment. After treatment:  
[]  Patient left in no apparent distress sitting up in chair [x]  Patient left in no apparent distress in bed 
[x]  Call bell left within reach [x]  Nursing notified 
[x]  Caregiver present- pt's daughter 
[]  Bed alarm activated COMMUNICATION/EDUCATION:  
Communication/Collaboration: 
[x]      Home safety education was provided and the patient/caregiver indicated understanding. [x]      Patient/family have participated as able and agree with findings and recommendations. []      Patient is unable to participate in plan of care at this time. Findings and recommendations were discussed with: Physical Therapist, Occupational Therapist and Registered Nurse Olvin Fishman OT Time Calculation: 15 mins

## 2018-11-08 NOTE — TELEPHONE ENCOUNTER
Patients daughter, Liban Lewis, left a voicemail and stated that patient stated he does not want to go to his appointments or anything anymore. Daughter would like a call back.  # [phone]

## 2018-11-08 NOTE — TELEPHONE ENCOUNTER
[facility]  [phone]    11/08/18- Patient's daughter stated patient refused to go to his PCP appointment today and she's not sure if he'll come to our appointment tomorrow. Home health PT/OT is going out to work with patient. Encouraged [place] to have Mr. La Ortega keep his appointment for tomorrow even if he doesn't want treatment, that way he can talk to Dr. Aj Vasquez about goals of care. She was agreeable, but will let us know if the patient refuses to come. Dr. Aj Vasquez notified.

## 2018-11-08 NOTE — TELEPHONE ENCOUNTER
Spoke with daughter of patient, patient refused to come to appointment today. Daughter reporting patient still having diarrhea at this time and not drinking fluids as well as in hospital.  Patient drinking more fluids with family urging/support and will continue tonight. Home health nurse noted patient with low blood pressure, but patient did not want to go to hospital and still acting and answering questions appropriately at the time. Repeat blood pressure check tomorrow and family will follow up by phone. Discussed with daughter patient decline, palliative focus of chemotherapy and general goals of care and she would like to pursue palliative care evaluation. Referral placed for palliative care to see patient and discuss goals of care planning and assist with current condition.      Jasmine Archuleta MD  Resident [facility]  11/08/18

## 2018-11-08 NOTE — PROGRESS NOTES
Hospital Discharge Follow-Up Date/Time:  2018 4:14 PM 
 
Patient was admitted to [facility] on 2018 and discharged on 2018 for hypotension, diarrhea. The physician discharge summary was available at the time of outreach. Patient was contacted within 1 business days of discharge. Top Challenges reviewed with the provider · See goals Method of communication with provider :face to face Inpatient RRAT score: 25 Was this a readmission? no  
Patient stated reason for the readmission: n/a Nurse Navigator (NN) contacted the family by telephone to perform post hospital discharge assessment. Verified name and  with family as identifiers. Provided introduction to self, and explanation of the Nurse Navigator role. Reviewed discharge instructions and red flags with family who verbalized understanding. Family given an opportunity to ask questions and does not have any further questions or concerns at this time. The family agrees to contact the PCP office for questions related to their healthcare. NN provided contact information for future reference. Disease Specific:   N/A Summary of patient's top problems: 1. Hypotension 2. Diarrhea 3. Metastatic CA Home Health orders at discharge: PT, OT, SN Home Health company: [employer] Date of initial visit: 2018 Durable Medical Equipment ordered/company: n/a Durable Medical Equipment received: n/a Barriers to care? ineffective coping, stages of grief Advance Care Planning:  
Does patient have an Advance Directive:  reviewed and current Medication(s):  
New Medications at Discharge: none Changed Medications at Discharge: none Discontinued Medications at Discharge: Miralax Medication reconciliation was performed with family, who verbalizes understanding of administration of home medications. There were no barriers to obtaining medications identified at this time. Referral to Pharm D needed: no  
 
Current Outpatient Medications Medication Sig  
 apixaban (ELIQUIS) 5 mg tablet TAKE ONE TABLET BY MOUTH TWICE DAILY  metoprolol succinate (TOPROL-XL) 25 mg XL tablet Take 1 Tab by mouth daily.  dilTIAZem CD (CARDIZEM CD) 240 mg ER capsule Take 1 Cap by mouth daily.  DULoxetine (CYMBALTA) 60 mg capsule Take 1 Cap by mouth daily.  levothyroxine (SYNTHROID) 75 mcg tablet Take 1 Tab by mouth Daily (before breakfast).  traMADol (ULTRAM) 50 mg tablet Take 1 Tab by mouth every six (6) hours as needed for Pain. Max Daily Amount: 200 mg.  
 diclofenac (VOLTAREN) 1 % gel Apply 2 g to affected area four (4) times daily.  acetaminophen (TYLENOL EXTRA STRENGTH) 500 mg tablet Take  by mouth every six (6) hours as needed for Pain.  tamsulosin (FLOMAX) 0.4 mg capsule Take 1 Cap by mouth daily.  lidocaine-prilocaine (EMLA) topical cream Apply  to affected area as needed for Pain (Apply 30-60 min. prior to having your port accessed).  diphenhydrAMINE (BENADRYL) 25 mg tablet Take 25 mg by mouth nightly as needed for Sleep.  levocetirizine (XYZAL) 5 mg tablet Take 5 mg by mouth daily as needed for Allergies.  magic mouthwash solution Swish and spit 15-30 mL every 2-4 hours as needed for mouth pain. May swallow for throat pain. Magic mouth wash Maalox Lidocaine 2% viscous Diphenhydramine oral solution Pharmacy to mix equal portions of ingredients to a total volume as indicated in the dispense amount.  prochlorperazine (COMPAZINE) 10 mg tablet Take 1 Tab by mouth every six (6) hours as needed for Nausea.  ondansetron hcl (ZOFRAN) 8 mg tablet Take 1 Tab by mouth every eight (8) hours as needed for Nausea.  aspirin delayed-release 81 mg tablet Take 81 mg by mouth daily. No current facility-administered medications for this visit. There are no discontinued medications. [facility] follow up appointment(s):  
Future Appointments Date Time Provider Suzy Jara 11/9/2018 10:00 AM [facility]  
11/9/2018 10:15 AM Nathalia Lee NP [facility]  
11/30/2018 10:00 AM SS INF5 CH2 <4H [facility]  
12/21/2018 10:00 AM [facility] follow up appointment(s): n/a [facility]:  n/a  
 
 
Goals  Supportive resources in place to maintain patient in the community (ie. Home Health, DME equipment, refer to, medication assistant plan, etc.)   
  11/8/2018: 
· Met with provider and daughter, Susan Cheng. Patient refused to come to appt today. · Daughter states pt sometimes will refuse, but later changes his mind. · Oncology appt tomorrow. Unsure if patient will attend. · Provider added SN to [place] referral today. · Referral with hospice/palliative today. · Discussion about possibly adding appetite enhancer to regimen. Will discuss further with Dr. Boo Hernandez. · NN contact information given to daughter.

## 2018-11-09 NOTE — TELEPHONE ENCOUNTER
Patients daughter called and stated that the patient is still refusing to come see Dr. Reece Fuller.  # [phone]

## 2018-11-09 NOTE — TELEPHONE ENCOUNTER
[address] called stating patient's BP today is 100/60 with a pulse of 120.  (patient held his metoprolol)  Please advise [facility] nurse regarding holding or resuming patient's medication.   Sheila's phone number is [phone]

## 2018-11-09 NOTE — TELEPHONE ENCOUNTER
[facility]  [phone]    11/09/18- Spoke to [place], she stated Mr. Kaye Asher doesn't want to leave the house today. We will reschedule his office visit and treatment to next week. She verbalized understanding.

## 2018-11-12 NOTE — TELEPHONE ENCOUNTER
Called back Patient, discussed with Daughter of patient. May hold BP medications at this time in order to maintain adequate blood pressures. Patient continuing to drink, but not eating well and refusing to go to appointments or hospital.  Has not heard from Palliative care today. Daughter will call office to find out about referral status, will follow up tomorrow.     Jocelyn Brooks MD  Resident [facility]  11/12/18

## 2018-11-13 PROBLEM — N39.0 UTI (URINARY TRACT INFECTION): Status: ACTIVE | Noted: 2018-01-01

## 2018-11-13 PROBLEM — R41.82 ALTERED MENTAL STATUS: Status: ACTIVE | Noted: 2018-01-01

## 2018-11-13 NOTE — PROGRESS NOTES
[facility]: MED RECONCILIATION Comments/Recommendations:  
 
Keytruda 200mg every 3 weeks 
- next due 11/16/18 
- last received 10/19/18 Medications removed: · Metoprolol XL 25mg daily- stopped due to low BP and possibly HR Medications adjusted: · Diclofenac 1% gel- previously QID Information obtained from: Family member, recent medical records Allergies: Lisinopril; Hydrochlorothiazide; and Sulfa (sulfonamide antibiotics) Prior to Admission Medications:  
 
Medication Documentation Review Audit Reviewed by Yuniel Schuler, PHARMD (Pharmacist) on 11/13/18 at 7211 Medication Sig Documenting Provider Last Dose Status Taking?  
acetaminophen (TYLENOL EXTRA STRENGTH) 500 mg tablet Take  by mouth every six (6) hours as needed for Pain. Provider, Historical  Active   
apixaban (ELIQUIS) 5 mg tablet TAKE ONE TABLET BY MOUTH TWICE DAILY Delton Osler T, NP 11/13/2018 AM Active Yes  
aspirin delayed-release 81 mg tablet Take 81 mg by mouth daily. Provider, Historical 11/13/2018 AM Active Yes  
diclofenac (VOLTAREN) 1 % gel Apply 2 g to affected area four (4) times daily as needed. Provider, Historical  Active Yes  
dilTIAZem CD (CARDIZEM CD) 240 mg ER capsule Take 1 Cap by mouth daily. Alisha Fletcher NP 11/13/2018 AM Active Yes  
diphenhydrAMINE (BENADRYL) 25 mg tablet Take 25 mg by mouth nightly as needed for Sleep. Provider, Historical  Active Yes DULoxetine (CYMBALTA) 60 mg capsule Take 60 mg by mouth nightly. Provider, Historical 11/12/2018 PM Active Yes  
levocetirizine (XYZAL) 5 mg tablet Take 5 mg by mouth daily as needed for Allergies. Provider, Historical  Active Yes  
levothyroxine (SYNTHROID) 75 mcg tablet Take 1 Tab by mouth Daily (before breakfast). Alisha Fletcher NP  Active   
lidocaine-prilocaine (EMLA) topical cream Apply  to affected area as needed for Pain (Apply 30-60 min. prior to having your port accessed). David Flowers NP 11/13/2018 AM Active Yes magic mouthwash solution Swish and spit 15-30 mL every 2-4 hours as needed for mouth pain. May swallow for throat pain. Magic mouth wash Maalox Lidocaine 2% viscous Diphenhydramine oral solution Pharmacy to mix equal portions of ingredients to a total volume as indicated in the dispense amount. Jono Ng MD  Active Yes  
ondansetron hcl (ZOFRAN) 8 mg tablet Take 1 Tab by mouth every eight (8) hours as needed for Nausea. Spencer James NP  Active Yes  
prochlorperazine (COMPAZINE) 10 mg tablet Take 1 Tab by mouth every six (6) hours as needed for Nausea. Spencer James NP  Active Yes  
tamsulosin [facility]) 0.4 mg capsule Take 0.4 mg by mouth nightly. Provider, Historical 11/12/2018 PM Active Yes  
traMADol (ULTRAM) 50 mg tablet Take 1 Tab by mouth every six (6) hours as needed for Pain. Max Daily Amount: 200 mg. Keiko Botello NP  Active Yes Thank you, 
Xiomara Velasquez Pharm. D.

## 2018-11-13 NOTE — ED TRIAGE NOTES
Patient was seen last week for hypotension. He arrives with family member today for increased confusion and lethargy. BP is 91/56.

## 2018-11-13 NOTE — ED PROVIDER NOTES
75 y.o. male with past medical history significant for hypertension, hyperlipidemia, metastatic lung cancer to brain, paroxysmal atrial flutter, anemia, coronary artery calcification, and cardiomyopathy who presents ambulatory from home with chief complaint of generalized weakness. Patient reports that he started falling more often today but denies hitting his head. He also complains of generalized weakness, slight shortness of breath, lightheadedness, and palpitations. Of note, patient denies wearing oxygen at home. Pertinent negatives include chest pain. There are no other acute medical concerns at this time. Full history, physical exam, and ROS unable to be obtained due to:  Patient difficult to understand. Social hx: Previous tobacco use (1 pack/day for 45 years); current alcohol use (1.5 oz/week); denies illicit drug use PCP: Melanie Carrillo MD 
 
Note written by Telly Styles, as dictated by Lonnie Springer MD 3:28 PM 
 
 
 
 
  
 
Past Medical History:  
Diagnosis Date  Anemia  Cardiomyopathy ([facility] [address] mild LV regional/global dysfunction, likely ischemic etiology  Coronary artery calcification seen on CT scan  Dysfunction, erectile  Hyperlipidemia 5/18/2010  Hypertension  Metastatic lung cancer (metastasis from lung to other site) [facility]) brain  Paroxysmal atrial flutter ([facility] [address] No past surgical history on file. Family History:  
Problem Relation Age of Onset  Stroke Father  Diabetes Sister  Diabetes Brother  Heart Disease Sister Social History Socioeconomic History  Marital status: SINGLE Spouse name: Not on file  Number of children: Not on file  Years of education: Not on file  Highest education level: Not on file Social Needs  Financial resource strain: Not on file  Food insecurity - worry: Not on file  Food insecurity - inability: Not on file  Transportation needs - medical: Not on file  Transportation needs - non-medical: Not on file Occupational History  Not on file Tobacco Use  Smoking status: Former Smoker Packs/day: 1.00 Years: 45.00 Pack years: 45.00 Types: Cigarettes  Smokeless tobacco: Never Used Substance and Sexual Activity  Alcohol use: Yes Alcohol/week: 1.5 oz Types: 3 Cans of beer per week  Drug use: No  
 Sexual activity: Not on file Other Topics Concern  Not on file Social History Narrative  Not on file ALLERGIES: Lisinopril; Hydrochlorothiazide; and Sulfa (sulfonamide antibiotics) Review of Systems Constitutional: Negative for chills and fever. Respiratory: Positive for shortness of breath. Cardiovascular: Positive for palpitations. Negative for chest pain. Gastrointestinal: Negative for abdominal pain, constipation, diarrhea and vomiting. Neurological: Positive for weakness (generalized) and light-headedness. Negative for dizziness. All other systems reviewed and are negative. Vitals:  
 11/13/18 1446 11/13/18 1536 11/13/18 1545 BP: 91/56 120/68 110/68 Pulse: (!) 129 (!) 121 (!) 121 Resp: 16 21 27 Temp: 98.5 °F (36.9 °C) SpO2: 98% 100% 95% Weight: 74.8 kg (165 lb) Height: 5' 6\" (1.676 m) Physical Exam  
Constitutional: He is oriented to person, place, and time. He appears well-developed. No distress. HENT:  
Head: Normocephalic and atraumatic. Eyes: Pupils are equal, round, and reactive to light. No scleral icterus. Neck: Normal range of motion. Neck supple. Cardiovascular: Regular rhythm. Tachycardia present. Pulmonary/Chest: Effort normal and breath sounds normal.  
Abdominal: Soft. He exhibits no distension. There is no tenderness. There is no rebound and no guarding. Musculoskeletal: Normal range of motion. Right lower leg: He exhibits no edema (pitting). Left lower leg: He exhibits no edema (pitting). Neurological: He is alert and oriented to person, place, and time. Skin: Skin is warm and dry. He is not diaphoretic. Psychiatric: He has a normal mood and affect. His behavior is normal. Thought content normal.  
Nursing note and vitals reviewed. Note written by Telly Carbajal, as dictated by Morro Garcia MD 3:28 PM 
 
 
MDM Number of Diagnoses or Management Options Diagnosis management comments: Pt presents with AMS, UTI, and AFib w RVR likely all secondary to a UTI. BP improved with IV fluids and patient admitted for continued management of his symptoms. The history, exam, diagnostic testing (if any) and the patient's current condition do not suggest STEMI, seizure, meningitis, stroke, sepsis, subarachnoid hemorrhage, or intracranial bleeding. Procedures PROGRESS NOTE: 
5:09 PM 
Patient's heart rate decreased from mid-120's to mid-110's. ED EKG interpretation: 
Rhythm: tachycardia; and regular . Rate (approx.): 124; Wide QRS with RBBB; Left ventricular hypertrophy with repolarization abnormality; No acute ST or T wave changes. Morphology unchanged from previous EKGs. ED EKG interpretation: 
Rhythm: atrial fib; Rate (approx.): 111; RBBB; No ST or T wave changes; No ectopy. Note written by Telly Carbajal, as dictated by Morro Garcia MD 6:52 PM 
 
CONSULT NOTE: 
7:15 PM Morro Garcia MD spoke with Consult for family practice. Discussed available diagnostic tests and clinical findings. Family practice will admit patient.

## 2018-11-14 NOTE — PROGRESS NOTES
Spiritual Care Assessment/Progress Note [address] [place] 
 
 
NAME: Julius Christianson      MRN: [id number] AGE: 75 y.o. SEX: male Nondenominational Affiliation: Spiritism  
Language: English  
 
11/14/2018     Total Time (in minutes): 5 Spiritual Assessment begun in [facility] EMERGENCY DEPT through conversation with: 
  
    [x]Patient        [] Family    [] Friend(s) Reason for Consult: Initial/Spiritual assessment, patient floor Spiritual beliefs: (Please include comment if needed) [x] Identifies with a wendy tradition: [address] [place] [address] 
   [] Supported by a wendy community:        
   [] Claims no spiritual orientation:       
   [] Seeking spiritual identity:            
   [] Adheres to an individual form of spirituality:       
   [] Not able to assess:                   
 
    
Identified resources for coping:  
   [] Prayer                           
   [] Music                  [] Guided Imagery 
   [] Family/friends                 [] Pet visits [] Devotional reading                         [x] Unknown 
   [] Other:                                         
 
 
Interventions offered during this visit: (See comments for more details) Patient Interventions: Initial/Spiritual assessment, patient floor Plan of Care: 
 
 [x] Support spiritual and/or cultural needs  
 [] Support AMD and/or advance care planning process    
 [] Support grieving process 
 [] Coordinate Rites and/or Rituals  
 [] Coordination with community clergy [] No spiritual needs identified at this time 
 [] Detailed Plan of Care below (See Comments)  [] Make referral to Music Therapy 
[] Make referral to Pet Therapy    
[] Make referral to Addiction services 
[] Make referral to [facility] 
[] Make referral to Spiritual Care Partner 
[] No future visits requested       
[] Follow up visits as needed Comments: 
 
Physician with patient providing care.   Patient to be admitted to [facility].  Will continue to follow up as needed and upon request. 
 
Visited by Rev. Akbar Armenta, [facility]  paging service: [phone])

## 2018-11-14 NOTE — CONSULTS
[facility]: [facility] Neurology [address] [place] [address] [phone] Name:   Maia Shetty Medical record #: [id number] Admission Date: 11/13/2018 Who Consulted: Dr. Greta Mendoaz Reason for Consult: Eval and treat for stroke HISTORY OF PRESENT ILLNESS This is a 75 y.o. male with  has a past medical history of Anemia, Cardiomyopathy ([address] Coronary artery calcification seen on CT scan, Dysfunction, erectile, Hyperlipidemia, Hypertension, Metastatic lung cancer (metastasis from lung to other site) [facility]), and Paroxysmal atrial flutter ([facility] [address] who is admitted for  AMS. The Neurology Service is asked to evaluate for stroke. Mr. Nia Caballero presented to the ED with generalized weakness. Patient reports that he started falling more often today but denies hitting his head. Per chart review upon admission he was \"difficult to comprehend as he is mumbling, and so most hx is obtained from the pt's daughter Ms. Ash Clay (via telephone). Per pt's daughter: he has been having a 3 day hx of mumbling/slurred speech, lethargy, and generalized weakness. \"  On the day of admission he was found on the floor after a fall. While in the ED he was found to be tachycardic and +UTI. Over night he was found to be hypoglycemic. There is currently no family present. Clinical Data Imaging review:  
 
CT Head:  No acute process Current rhythm:  Atrial fibrillation with rapid ventricular response Assessment/Plan: 1. AMS rule out Acute Ischemic Stroke verus seizure:   
· Continue Eliquis, we do not recommend ASA at this time · Neurochecks:  Every 4 hour · Blood Sugar Goal:  140-180 · BP Goal: Less than 160 
· cEEG to rule out seizure after MRI Stroke work up in progress · Last A1C: 
· LDL:     
· Last TTE:   
· Follow up MRI: 
· Carotid vascular imaging:  Mild stenosis (less than 50%) by NASCET criteria of the bilateral proximal 
· internal carotid arteries Risk factors for stroke include:  Afib, metastatic cancer, DVT, DM, HTN, HLD, physical inactivity · Will discussed with patient when appropriate: 3. Mobility:  
· Has not been OOB. · PT/OT to eval for rehab 4. Diet:   
· Passed SLP, but nurse reports that he was choking on diet 5. VTE Prophylaxes: · Lovenox 40 mg, SQ daily Thank you for allowing the Neurology Service the pleasure of participating in the care of your patient. This patient will be discussed with my collaborating care team physician Dr. Marcial Herman and she may have further recommendations regarding this patient's care. Attending Attestation:  
============================================================== Attending Addendum I have reviewed the documentation provided by the nurse practitioner, Jaquan Juan, discussed her findings, clinical impression, and the proposed management plans with regards to this patient's encounter. I have personally evaluated the patient, verify the history and confirm physical findings. Below are my additional findings: HPI 
76 y/o admitted for falls. He has a history of metastatic lung ca. He denies history of seizures. No family is with him. History is mostly from medical record as he has significant dysarthria. Pt denies weakness today. CLINICAL DATA REVIEW IMAGING: CT head neg (I personally reviewed these images in PACS and this is my impression) PHYSICAL EXAM (additional findings) Patient Vitals for the past 8 hrs: 
 Temp Pulse Resp BP SpO2  
11/14/18 1120 98.1 °F (36.7 °C) (!) 117 20 110/66 99 % 11/14/18 1100  (!) 120 (!) 32 104/65 97 % 11/14/18 0831 98.3 °F (36.8 °C) (!) 122 26 101/71 99 % 11/14/18 0414 98.8 °F (37.1 °C) (!) 121 22 107/64 96 % Neurologic:  Gen: Attention normal 
           Language: moderate dysarthria Memory: not assessed Cranial Nerves: 2- 12 intact Motor: normal bulk and tone, no tremor Strength: moves all four ext at least 3/5 strength Sensory: intact to LT x 4 Coordination/Gait: deferred ADDITIONAL ASSESSMENT AND RECOMMENDATIONS: 
76 y/o with metastatic lung cancer admitted after multiple falls. He does have hypoglycemia and a UTI. He also has stroke risk factors as listed above. Will do MRI to eval for mets/stroke and EEG to exclude seizures. 1. MRI brain ordered 2. Cont abx per primary 3. cEEG ordered 4. Seizure precautions 5. PRN sitter ordered to be utilized when EEG is in place 6. Cont Eliquis for afib and stroke prevention Will follow Cory Bowen MD 
2018 Review of Systems: unable to preform due to AMS PHYSICAL EXAM  
 
Visit Vitals /64 Pulse (!) 121 Temp 98.8 °F (37.1 °C) Resp 22 Ht 5' 6\" (1.676 m) Wt 74.8 kg (165 lb) SpO2 96% BMI 26.63 kg/m² O2 Flow Rate (L/min): 2 l/min O2 Device: Nasal cannula Temp (24hrs), Av.2 °F (36.8 °C), Min:97.6 °F (36.4 °C), Max:98.8 °F (37.1 °C) No intake/output data recorded.  1901 -  0700 In: 500 [I.V.:500] Out: - General:  Alert, cooperative, no acute distress. Lungs:   Course Heart: 
Abdominal:  Regular rate and rhythm, No murmur, click, rub or gallop. Soft and nondistended Skin: Skin color, texture, turgor normal.   
NEUROLOGICAL EXAM 
 
Appearance:  Cachetic,  no acute distress. Mental Status: Oriented to place and person. Inattentive. No aphasia. Full fund of knowledge. Garbled speech, does not follow commands. Cranial Nerves:   Intact visual fields. PERRL, EOM's full, no nystagmus, no ptosis. Facial movement is symmetric. Palate is midline. Normal sternocleidomastoid strength. Tongue is midline. Reflexes:   Deep tendon reflexes 2+/4 and symmetrical.  
Sensory:   TAO Gait:  TAO Tremor:   No tremor noted. Cerebellar:  TAO. Neurovascular:  Normal heart sounds and regular rhythm. No carotid bruits. Motor: TAO History Past Medical History:  
Diagnosis Date  Anemia  Cardiomyopathy ([facility] [address] mild LV regional/global dysfunction, likely ischemic etiology  Coronary artery calcification seen on CT scan  Dysfunction, erectile  Hyperlipidemia 5/18/2010  Hypertension  Metastatic lung cancer (metastasis from lung to other site) [facility]) brain  Paroxysmal atrial flutter ([facility] [address] History reviewed. No pertinent surgical history. Family History Problem Relation Age of Onset  Stroke Father  Diabetes Sister  Diabetes Brother  Heart Disease Sister Social History Socioeconomic History  Marital status: SINGLE Spouse name: Not on file  Number of children: Not on file  Years of education: Not on file  Highest education level: Not on file Social Needs  Financial resource strain: Not on file  Food insecurity - worry: Not on file  Food insecurity - inability: Not on file  Transportation needs - medical: Not on file  Transportation needs - non-medical: Not on file Occupational History  Not on file Tobacco Use  Smoking status: Former Smoker Packs/day: 1.00 Years: 45.00 Pack years: 45.00 Types: Cigarettes  Smokeless tobacco: Never Used Substance and Sexual Activity  Alcohol use: Yes Alcohol/week: 1.5 oz Types: 3 Cans of beer per week  Drug use: No  
 Sexual activity: Not on file Other Topics Concern  Not on file Social History Narrative  Not on file Allergies Allergies Allergen Reactions  Lisinopril Angioedema  Hydrochlorothiazide Hives and Swelling  Sulfa (Sulfonamide Antibiotics) Hives Outpatient Meds No current facility-administered medications on file prior to encounter. Current Outpatient Medications on File Prior to Encounter Medication Sig Dispense Refill  tamsulosin (FLOMAX) 0.4 mg capsule Take 0.4 mg by mouth nightly.  diclofenac (VOLTAREN) 1 % gel Apply 2 g to affected area four (4) times daily as needed.  DULoxetine (CYMBALTA) 60 mg capsule Take 60 mg by mouth nightly.  apixaban (ELIQUIS) 5 mg tablet TAKE ONE TABLET BY MOUTH TWICE DAILY 60 Tab 11  
 dilTIAZem CD (CARDIZEM CD) 240 mg ER capsule Take 1 Cap by mouth daily. 90 Cap 3  
 traMADol (ULTRAM) 50 mg tablet Take 1 Tab by mouth every six (6) hours as needed for Pain. Max Daily Amount: 200 mg. 45 Tab 0  
 lidocaine-prilocaine (EMLA) topical cream Apply  to affected area as needed for Pain (Apply 30-60 min. prior to having your port accessed). 30 g 3  
 diphenhydrAMINE (BENADRYL) 25 mg tablet Take 25 mg by mouth nightly as needed for Sleep.  levocetirizine (XYZAL) 5 mg tablet Take 5 mg by mouth daily as needed for Allergies.  magic mouthwash solution Swish and spit 15-30 mL every 2-4 hours as needed for mouth pain. May swallow for throat pain. Magic mouth wash Maalox Lidocaine 2% viscous Diphenhydramine oral solution Pharmacy to mix equal portions of ingredients to a total volume as indicated in the dispense amount. 480 mL 2  
 prochlorperazine (COMPAZINE) 10 mg tablet Take 1 Tab by mouth every six (6) hours as needed for Nausea. 50 Tab 5  
 ondansetron hcl (ZOFRAN) 8 mg tablet Take 1 Tab by mouth every eight (8) hours as needed for Nausea. 45 Tab 5  
 aspirin delayed-release 81 mg tablet Take 81 mg by mouth daily.  levothyroxine (SYNTHROID) 75 mcg tablet Take 1 Tab by mouth Daily (before breakfast). 30 Tab 11  
 acetaminophen (TYLENOL EXTRA STRENGTH) 500 mg tablet Take  by mouth every six (6) hours as needed for Pain. Inpatient Meds Current Facility-Administered Medications:  
  0.9% sodium chloride infusion, 115 mL/hr, IntraVENous, CONTINUOUS, Keily Mac MD, Last Rate: 115 mL/hr at 11/14/18 0239, 115 mL/hr at 11/14/18 0239   acetaminophen (TYLENOL) tablet 500 mg, 500 mg, Oral, Q6H PRN, Keily Mac MD 
  DULoxetine (CYMBALTA) capsule 60 mg, 60 mg, Oral, QHS, Keily Mac MD, 60 mg at 11/14/18 0256   dilTIAZem CD (CARDIZEM CD) capsule 240 mg, 240 mg, Oral, DAILY, Keily Mac MD, 240 mg at 11/14/18 0530 
  glucose chewable tablet 16 g, 4 Tab, Oral, PRN, Keily Mac MD 
  dextrose (D50W) injection syrg 12.5-25 g, 12.5-25 g, IntraVENous, PRN, Keiyl Mac MD 
  glucagon (GLUCAGEN) injection 1 mg, 1 mg, IntraMUSCular, PRN, Keily Mac MD 
  sodium chloride (NS) flush 5-10 mL, 5-10 mL, IntraVENous, Q8H, Keily Mac MD, 10 mL at 11/14/18 0531 
  sodium chloride (NS) flush 5-10 mL, 5-10 mL, IntraVENous, PRN, Keily Mac MD 
  apixaban (ELIQUIS) tablet 5 mg, 5 mg, Oral, BID, Keily Mac MD, 5 mg at 11/13/18 2149   aspirin delayed-release tablet 81 mg, 81 mg, Oral, DAILY, Keily Mac MD 
  levothyroxine (SYNTHROID) tablet 75 mcg, 75 mcg, Oral, ACB, Keily Mac MD, 75 mcg at 11/14/18 0730   cefepime (MAXIPIME) 2 g in 0.9% sodium chloride (MBP/ADV) 100 mL, 2 g, IntraVENous, Q12H, Keily Mac MD, Last Rate: 200 mL/hr at 11/14/18 0530, 2 g at 11/14/18 0530 Current Outpatient Medications:  
  tamsulosin (FLOMAX) 0.4 mg capsule, Take 0.4 mg by mouth nightly., Disp: , Rfl:  
  diclofenac (VOLTAREN) 1 % gel, Apply 2 g to affected area four (4) times daily as needed. , Disp: , Rfl:  
  DULoxetine (CYMBALTA) 60 mg capsule, Take 60 mg by mouth nightly., Disp: , Rfl:  
  apixaban (ELIQUIS) 5 mg tablet, TAKE ONE TABLET BY MOUTH TWICE DAILY, Disp: 60 Tab, Rfl: 11 
  dilTIAZem CD (CARDIZEM CD) 240 mg ER capsule, Take 1 Cap by mouth daily. , Disp: 90 Cap, Rfl: 3 
  traMADol (ULTRAM) 50 mg tablet, Take 1 Tab by mouth every six (6) hours as needed for Pain.  Max Daily Amount: 200 mg., Disp: 45 Tab, Rfl: 0 
  lidocaine-prilocaine (EMLA) topical cream, Apply  to affected area as needed for Pain (Apply 30-60 min. prior to having your port accessed). , Disp: 30 g, Rfl: 3 
  diphenhydrAMINE (BENADRYL) 25 mg tablet, Take 25 mg by mouth nightly as needed for Sleep., Disp: , Rfl:  
  levocetirizine (XYZAL) 5 mg tablet, Take 5 mg by mouth daily as needed for Allergies. , Disp: , Rfl:  
  magic mouthwash solution, Swish and spit 15-30 mL every 2-4 hours as needed for mouth pain. May swallow for throat pain. Magic mouth wash  Maalox Lidocaine 2% viscous  Diphenhydramine oral solution   Pharmacy to mix equal portions of ingredients to a total volume as indicated in the dispense amount., Disp: 480 mL, Rfl: 2 
  prochlorperazine (COMPAZINE) 10 mg tablet, Take 1 Tab by mouth every six (6) hours as needed for Nausea., Disp: 50 Tab, Rfl: 5 
  ondansetron hcl (ZOFRAN) 8 mg tablet, Take 1 Tab by mouth every eight (8) hours as needed for Nausea., Disp: 45 Tab, Rfl: 5 
  aspirin delayed-release 81 mg tablet, Take 81 mg by mouth daily. , Disp: , Rfl:  
  levothyroxine (SYNTHROID) 75 mcg tablet, Take 1 Tab by mouth Daily (before breakfast). , Disp: 30 Tab, Rfl: 11 
  acetaminophen (TYLENOL EXTRA STRENGTH) 500 mg tablet, Take  by mouth every six (6) hours as needed for Pain., Disp: , Rfl:  
 
Recent Results (from the past 24 hour(s)) CBC WITH AUTOMATED DIFF Collection Time: 11/13/18  2:54 PM  
Result Value Ref Range WBC 6.9 4.1 - 11.1 K/uL  
 RBC 4.01 (L) 4.10 - 5.70 M/uL  
 HGB 11.8 (L) 12.1 - 17.0 g/dL HCT 36.3 (L) 36.6 - 50.3 % MCV 90.5 80.0 - 99.0 FL  
 MCH 29.4 26.0 - 34.0 PG  
 MCHC 32.5 30.0 - 36.5 g/dL  
 RDW 15.5 (H) 11.5 - 14.5 % PLATELET 429 (H) 785 - 400 K/uL MPV 9.2 8.9 - 12.9 FL  
 NRBC 0.0 0  WBC ABSOLUTE NRBC 0.00 0.00 - 0.01 K/uL NEUTROPHILS 50 32 - 75 % LYMPHOCYTES 28 12 - 49 % MONOCYTES 17 (H) 5 - 13 % EOSINOPHILS 4 0 - 7 % BASOPHILS 1 0 - 1 % IMMATURE GRANULOCYTES 0 0.0 - 0.5 % ABS. NEUTROPHILS 3.4 1.8 - 8.0 K/UL ABS. LYMPHOCYTES 1.9 0.8 - 3.5 K/UL  
 ABS. MONOCYTES 1.2 (H) 0.0 - 1.0 K/UL  
 ABS. EOSINOPHILS 0.3 0.0 - 0.4 K/UL  
 ABS. BASOPHILS 0.1 0.0 - 0.1 K/UL  
 ABS. IMM. GRANS. 0.0 0.00 - 0.04 K/UL  
 DF AUTOMATED METABOLIC PANEL, COMPREHENSIVE Collection Time: 11/13/18  2:54 PM  
Result Value Ref Range Sodium 141 136 - 145 mmol/L Potassium 4.3 3.5 - 5.1 mmol/L Chloride 104 97 - 108 mmol/L  
 CO2 22 21 - 32 mmol/L Anion gap 15 5 - 15 mmol/L Glucose 72 65 - 100 mg/dL BUN 11 6 - 20 MG/DL Creatinine 1.05 0.70 - 1.30 MG/DL  
 BUN/Creatinine ratio 10 (L) 12 - 20 GFR est AA >60 >60 ml/min/1.73m2 GFR est non-AA >60 >60 ml/min/1.73m2 Calcium 10.2 (H) 8.5 - 10.1 MG/DL Bilirubin, total 0.9 0.2 - 1.0 MG/DL  
 ALT (SGPT) 21 12 - 78 U/L  
 AST (SGOT) 44 (H) 15 - 37 U/L Alk. phosphatase 67 45 - 117 U/L Protein, total 7.5 6.4 - 8.2 g/dL Albumin 3.0 (L) 3.5 - 5.0 g/dL Globulin 4.5 (H) 2.0 - 4.0 g/dL A-G Ratio 0.7 (L) 1.1 - 2.2 PTT Collection Time: 11/13/18  2:54 PM  
Result Value Ref Range aPTT 40.2 (H) 22.1 - 32.0 sec  
 aPTT, therapeutic range     58.0 - 77.0 SECS  
PROTHROMBIN TIME + INR Collection Time: 11/13/18  2:54 PM  
Result Value Ref Range INR 1.2 (H) 0.9 - 1.1 Prothrombin time 12.4 (H) 9.0 - 11.1 sec MAGNESIUM Collection Time: 11/13/18  2:54 PM  
Result Value Ref Range Magnesium 1.5 (L) 1.6 - 2.4 mg/dL TSH 3RD GENERATION Collection Time: 11/13/18  2:54 PM  
Result Value Ref Range TSH 4.66 (H) 0.36 - 3.74 uIU/mL EKG, 12 LEAD, INITIAL Collection Time: 11/13/18  3:02 PM  
Result Value Ref Range Ventricular Rate 124 BPM  
 Atrial Rate 120 BPM  
 QRS Duration 138 ms Q-T Interval 360 ms QTC Calculation (Bezet) 517 ms Calculated R Axis -62 degrees Calculated T Axis 104 degrees Diagnosis Wide QRS tachycardia Right bundle branch block Left anterior fascicular block ** Bifascicular block ** 
 Left ventricular hypertrophy with repolarization abnormality Abnormal ECG When compared with ECG of 07-NOV-2018 12:35, Wide QRS tachycardia has replaced Sinus rhythm Confirmed by Reji Hernandez MD. ([id number]) on 11/13/2018 7:50:55 PM 
  
CULTURE, BLOOD Collection Time: 11/13/18  3:46 PM  
Result Value Ref Range Special Requests: NO SPECIAL REQUESTS Culture result: NO GROWTH AFTER 15 HOURS    
POC LACTIC ACID Collection Time: 11/13/18  3:48 PM  
Result Value Ref Range Lactic Acid (POC) 0.93 0.40 - 2.00 mmol/L  
CULTURE, BLOOD Collection Time: 11/13/18  4:34 PM  
Result Value Ref Range Special Requests: NO SPECIAL REQUESTS Culture result: NO GROWTH AFTER 14 HOURS    
URINALYSIS W/ REFLEX CULTURE Collection Time: 11/13/18  6:27 PM  
Result Value Ref Range Color DARK YELLOW Appearance CLOUDY (A) CLEAR Specific gravity 1.016 1.003 - 1.030    
 pH (UA) 5.5 5.0 - 8.0 Protein 30 (A) NEG mg/dL Glucose NEGATIVE  NEG mg/dL Ketone 80 (A) NEG mg/dL Blood SMALL (A) NEG Urobilinogen 1.0 0.2 - 1.0 EU/dL Nitrites NEGATIVE  NEG Leukocyte Esterase MODERATE (A) NEG    
 WBC  0 - 4 /hpf  
 RBC 5-10 0 - 5 /hpf Epithelial cells MODERATE (A) FEW /lpf Bacteria NEGATIVE  NEG /hpf  
 UA:UC IF INDICATED URINE CULTURE ORDERED (A) CNI    
BILIRUBIN, CONFIRM Collection Time: 11/13/18  6:27 PM  
Result Value Ref Range Bilirubin UA, confirm NEGATIVE  NEG    
EKG, 12 LEAD, INITIAL Collection Time: 11/13/18  6:52 PM  
Result Value Ref Range Ventricular Rate 111 BPM  
 Atrial Rate 119 BPM  
 QRS Duration 152 ms Q-T Interval 390 ms QTC Calculation (Bezet) 530 ms Calculated R Axis -60 degrees Calculated T Axis 109 degrees Diagnosis Atrial fibrillation with rapid ventricular response Right bundle branch block Left anterior fascicular block ** Bifascicular block ** 
T wave abnormality, consider lateral ischemia Abnormal ECG 
 When compared with ECG of 13-NOV-2018 15:02, 
MANUAL COMPARISON REQUIRED, DATA IS UNCONFIRMED 
  
LACTIC ACID Collection Time: 11/13/18 10:56 PM  
Result Value Ref Range Lactic acid 0.5 0.4 - 2.0 MMOL/L  
PHOSPHORUS Collection Time: 11/13/18 10:56 PM  
Result Value Ref Range Phosphorus 4.1 2.6 - 4.7 MG/DL  
AMMONIA Collection Time: 11/13/18 10:56 PM  
Result Value Ref Range Ammonia 21 <32 UMOL/L  
DRUG SCREEN, URINE Collection Time: 11/13/18 10:56 PM  
Result Value Ref Range AMPHETAMINES NEGATIVE  NEG    
 BARBITURATES NEGATIVE  NEG BENZODIAZEPINES NEGATIVE  NEG    
 COCAINE NEGATIVE  NEG METHADONE NEGATIVE  NEG    
 OPIATES NEGATIVE  NEG    
 PCP(PHENCYCLIDINE) NEGATIVE  NEG    
 THC (TH-CANNABINOL) NEGATIVE  NEG Drug screen comment (NOTE) ETHYL ALCOHOL Collection Time: 11/13/18 10:56 PM  
Result Value Ref Range ALCOHOL(ETHYL),SERUM <10 <80 MG/DL  
METABOLIC PANEL, BASIC Collection Time: 11/14/18  3:20 AM  
Result Value Ref Range Sodium 143 136 - 145 mmol/L Potassium 3.8 3.5 - 5.1 mmol/L Chloride 108 97 - 108 mmol/L  
 CO2 20 (L) 21 - 32 mmol/L Anion gap 15 5 - 15 mmol/L Glucose 62 (L) 65 - 100 mg/dL BUN 10 6 - 20 MG/DL Creatinine 0.94 0.70 - 1.30 MG/DL  
 BUN/Creatinine ratio 11 (L) 12 - 20 GFR est AA >60 >60 ml/min/1.73m2 GFR est non-AA >60 >60 ml/min/1.73m2 Calcium 9.8 8.5 - 10.1 MG/DL  
CBC W/O DIFF Collection Time: 11/14/18  3:20 AM  
Result Value Ref Range WBC 6.7 4.1 - 11.1 K/uL  
 RBC 3.53 (L) 4.10 - 5.70 M/uL  
 HGB 10.5 (L) 12.1 - 17.0 g/dL HCT 32.7 (L) 36.6 - 50.3 % MCV 92.6 80.0 - 99.0 FL  
 MCH 29.7 26.0 - 34.0 PG  
 MCHC 32.1 30.0 - 36.5 g/dL  
 RDW 15.9 (H) 11.5 - 14.5 % PLATELET 442 (H) 986 - 400 K/uL MPV 9.3 8.9 - 12.9 FL  
 NRBC 0.0 0  WBC ABSOLUTE NRBC 0.00 0.00 - 0.01 K/uL  
TROPONIN I Collection Time: 11/14/18  3:20 AM  
Result Value Ref Range Troponin-I, Qt. 0.06 (H) <0.05 ng/mL GLUCOSE, POC Collection Time: 11/14/18  6:03 AM  
Result Value Ref Range Glucose (POC) 67 65 - 100 mg/dL Performed by Puneet Watson GLUCOSE, POC Collection Time: 11/14/18  6:30 AM  
Result Value Ref Range Glucose (POC) 120 (H) 65 - 100 mg/dL Performed by Puneet Watson NIH Stroke Score Care Plan discussed with: 
Patient x Family RN Care Manager Consultant/Specialist:    
 
Myriam Alexandre Mail, ACNP-BC

## 2018-11-14 NOTE — PROGRESS NOTES
Occupational Therapy Note:  Orders received and appreciated. Chart reviewed. Attempted to see pt this pm for OT freddy.  EEG technician present and getting started to place leads for 24 hour EEG. In addition, pt is confused and mumbling non-stop with periods of lucide words. RN notified and aware. Will follow up tomorrow with OT freddy as pt is able to actively participate.  
Alysia Nicholson, OTR/L, CBIS

## 2018-11-14 NOTE — CONSULTS
[facility] [place] [id number] [facility], [address] [place] [address] W: [phone]  F: [phone] Reason for Visit:  
Daryl Beckman is a 75 y.o. male who is seen in consultation at the request of Dr. Liza Duenas  for evaluation of pt on palliative chemo. Hematology / Oncology Treatment History: · CXR 7/11/2017: Mediastinal lymphadenopathy with left hilar mass. · CT Chest 7/14/2017: Bulky mediastinal and left hilar lymphadenopathy. Bilateral pulmonary masses and nodules · PET-CT 7/24/2017: Right parietal mass. Hypermetabolic right supraclavicular, right paratracheal, AP window, prevascular, precarinal, subcarinal and left hilar lymphadenopathy. Hypermetabolic pulmonary nodules bilaterally. · MRI Brain 7/24/2017: Junction right posterior temporal lobe and occipital lobe 2.7 cm mass likely metastasis from lung cancer. No additional acute abnormalities · FNA supraclavicular node 7/28/2017: Metastatic squamous cell carcinoma, PD-L1 5%, BRAF negative, EGFR negative, ALK & ROS-1 negative · Stage IV Non-small cell lung cancer (Squamous Cell) · Gamma knife by Dr. Jose Manuel Mcintyre 8/15/2017 · Palliative chemotherapy with carboplatin and gemcitabine beginning 8/25/2017 - 11/3/2017 · Maintenance gemcitabine x4 cycles from 11/17/2017 to 1/26/2018 · Clinical trial OG [id number]: [id number] (PROCESS II) from 3/23/2018 - 5/4/2018 · Palliative immunotherapy with Pembrolizumab beginning 5/25/2018 History of Present Illness:  
 
Mr Candace Koch was admitted on 11/13/2018 from the ED when he presented per family with c/o increased confusion and reported falls x 2 at home. ED work up revealed 's; cloudy UA, CT head, cervical spine and CXR unrevealing. Admitted for further eval and management. Recent hospitalization [facility] (11/06-11/07) for hypotension,  
Diarrhea.  
  
Mr Candace Koch is difficult to understand 2/2 to speaking softly and appears to be mumbling; but is able to state that he is in [address] [place] [address]; the yr is 2018; thought the month was Oct and it was Tues but admitted that he he sometimes gets confused. Reports he fell at home; did not hit his head and landed on carpet. Aware he was confused but thinks he is getting better. 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 Denies any additional pain or SOB. No family at bedside. Pt reports son will be back later. Past Medical History:  
Diagnosis Date  Anemia  Cardiomyopathy ([facility] [address] mild LV regional/global dysfunction, likely ischemic etiology  Coronary artery calcification seen on CT scan  Dysfunction, erectile  Hyperlipidemia 5/18/2010  Hypertension  Metastatic lung cancer (metastasis from lung to other site) [facility]) brain  Paroxysmal atrial flutter ([facility] [address] [facility] History reviewed. No pertinent surgical history. Social History Tobacco Use  Smoking status: Former Smoker Packs/day: 1.00 Years: 45.00 Pack years: 45.00 Types: Cigarettes  Smokeless tobacco: Never Used Substance Use Topics  Alcohol use: Yes Alcohol/week: 1.5 oz Types: 3 Cans of beer per week Family History Problem Relation Age of Onset  Stroke Father  Diabetes Sister  Diabetes Brother  Heart Disease Sister Current Facility-Administered Medications Medication Dose Route Frequency  0.9% sodium chloride infusion  115 mL/hr IntraVENous CONTINUOUS  
 acetaminophen (TYLENOL) tablet 500 mg  500 mg Oral Q6H PRN  
 DULoxetine (CYMBALTA) capsule 60 mg  60 mg Oral QHS  glucose chewable tablet 16 g  4 Tab Oral PRN  
 dextrose (D50W) injection syrg 12.5-25 g  12.5-25 g IntraVENous PRN  
 glucagon (GLUCAGEN) injection 1 mg  1 mg IntraMUSCular PRN  
 dilTIAZem (CARDIZEM) IR tablet 60 mg  60 mg Oral AC&HS  sodium chloride (NS) flush 5-10 mL  5-10 mL IntraVENous Q8H  
  sodium chloride (NS) flush 5-10 mL  5-10 mL IntraVENous PRN  
 apixaban (ELIQUIS) tablet 5 mg  5 mg Oral BID  levothyroxine (SYNTHROID) tablet 75 mcg  75 mcg Oral ACB  cefepime (MAXIPIME) 2 g in 0.9% sodium chloride (MBP/ADV) 100 mL  2 g IntraVENous Q12H Allergies Allergen Reactions  Lisinopril Angioedema  Hydrochlorothiazide Hives and Swelling  Sulfa (Sulfonamide Antibiotics) Hives Review of Systems: A complete review of systems was obtained, negative except as described above. Physical Exam:  
 
Visit Vitals /66 (BP 1 Location: Left arm, BP Patient Position: At rest) Pulse (!) 117 Temp 98.1 °F (36.7 °C) Resp 20 Ht 5' 6\" (1.676 m) Wt 165 lb (74.8 kg) SpO2 99% BMI 26.63 kg/m² ECOG PS: 2-3 General: elderly, No distress Eyes: PERRLA, anicteric sclerae HENT: Atraumatic with normal appearance of ears and nose; OP clear Neck: Supple; no thyromegaly Lymphatic: No cervical, supraclavicular, or axillary adenopathy Respiratory: anteriorly CTAB, normal respiratory effort; O2 in use. CV: Port noted to chest area; tachy rate, regular rhythm, no murmurs, no peripheral edema GI: Soft, nontender, nondistended, no masses, no hepatomegaly, no splenomegaly MS: Digits without clubbing or cyanosis. Skin: No rashes, ecchymoses, or petechiae. Normal temperature, turgor, and texture. Neuro/Psych: Alert, oriented to self, location and yr, appropriate affect, fair judgment/insight;  to rt hand weaker than Left/ ? To arthritis. Results:  
 
Lab Results Component Value Date/Time WBC 6.7 11/14/2018 03:20 AM  
 HGB 10.5 (L) 11/14/2018 03:20 AM  
 HCT 32.7 (L) 11/14/2018 03:20 AM  
 PLATELET 916 (H) 36/31/5651 03:20 AM  
 MCV 92.6 11/14/2018 03:20 AM  
 ABS. NEUTROPHILS 3.4 11/13/2018 02:54 PM  
 
Lab Results Component Value Date/Time  Sodium 143 11/14/2018 03:20 AM  
 Potassium 3.8 11/14/2018 03:20 AM  
 Chloride 108 11/14/2018 03:20 AM  
 CO2 20 (L) 11/14/2018 03:20 AM  
 Glucose 62 (L) 11/14/2018 03:20 AM  
 BUN 10 11/14/2018 03:20 AM  
 Creatinine 0.94 11/14/2018 03:20 AM  
 GFR est AA >60 11/14/2018 03:20 AM  
 GFR est non-AA >60 11/14/2018 03:20 AM  
 Calcium 9.8 11/14/2018 03:20 AM  
 Glucose (POC) 164 (H) 11/14/2018 11:46 AM  
 
Lab Results Component Value Date/Time Bilirubin, total 0.9 11/13/2018 02:54 PM  
 ALT (SGPT) 21 11/13/2018 02:54 PM  
 AST (SGOT) 44 (H) 11/13/2018 02:54 PM  
 Alk. phosphatase 67 11/13/2018 02:54 PM  
 Protein, total 7.5 11/13/2018 02:54 PM  
 Albumin 3.0 (L) 11/13/2018 02:54 PM  
 Globulin 4.5 (H) 11/13/2018 02:54 PM  
 
Lab Results Component Value Date/Time Iron % saturation 22 05/27/2016 12:01 PM  
 TIBC 255 05/27/2016 12:01 PM  
 Ferritin 780 (H) 05/27/2016 12:01 PM  
 Vitamin B12 694 05/27/2016 12:01 PM  
 TSH 4.66 (H) 11/13/2018 02:54 PM  
 Lipase 78 11/06/2018 02:16 PM  
 Hep C Virus Ab <0.1 03/21/2012 10:00 AM  
 HIV 1/O/2 Abs <1.00 03/21/2012 10:00 AM  
 
Lab Results Component Value Date/Time INR 1.2 (H) 11/13/2018 02:54 PM  
 aPTT 40.2 (H) 11/13/2018 02:54 PM  
 Fibrinogen 592 (H) 02/14/2018 08:45 AM  
 
Lab Results Component Value Date/Time  
 Prostate Specific Ag 7.5 (H) 10/18/2016 01:36 PM  
 Prostate Specific Ag 1.7 10/13/2010 11:04 AM  
 
11/13/2018 CT HEAD WO CONT IMPRESSION:  
No acute intracranial abnormality. Stable left sphenoid sinus opacification. 11/13/2018 CT SPINE CERV WO  
IMPRESSION: 
No acute abnormality. Stable diffuse cervical degenerative changes most advanced 
at C5-6 where there is moderate spinal canal stenosis and moderate to severe 
bilateral foraminal stenosis. 11/13/2018 XR CHEST IMPRESSION: 
No acute process. Stable exam. 
 
11/14/2018 MRI BRAIN W WO CONT Pending 11/13/2018 CTA HEAD NECK W CONT IMPRESSION:  
  
CTA Head: 1. No evidence of significant stenosis or aneurysm. 
  
CTA Neck: 1. No evidence of flow-limiting stenosis. 2. Mild stenosis (less than 50%) by NASCET criteria of the bilateral proximal 
internal carotid arteries. Assessment and Recommendations: 1. Metastatic non-small cell lung cancer (squamous cell) EGFR, ALK, ROS1, BRAF negative He has disease within his chest and brain. His cancer is not curable and management is with palliative intent. He is s/p gamma knife to CNS disease. He is now receiving second line therapy with pembrolizumab, an immunotherapy agent.  
  
He has been tolerating therapy well, and recent CT scan from 11/2 shows excellent disease control. Received C8 on 10/19/2018 was due to next cycle on 11/09 but missed this appt. Therapy on hold now due to his acute illness. 2. AMS Unclear etiology. He is soft spoken at baseline, but generally quite alert and outspoken, witty, cracking jokes at his office visits. This morning his mentation was definitely not at his usual baseline. Imaging so far unrevealing. MRI pending. Possibly from UTI, on abx. Neurology consulted 3. Brain metastasis S/P gamma knife. Follows with Dr. Adenike Medina; was due to  for surveillance MRI brain-scheduled end of November. 4. Atrial fibrillation, CHF Cardiology following as out patient. On apixaban. 5. DVT 9/15/2017 Previously on Lovenox BID but unable to tolerate injections. Continue Apixaban 5mg BID. He should continue this long term in the setting of malignancy. 6. 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 Increase Cymbalta to 60mg daily. 7. Goals of care Palliative team consulted 8. Debility PT/OT eval prior to discharge. Patient seen in conjunction with Latrice Johnson NP.  
 
 
Signed By: Nena Hallman MD

## 2018-11-14 NOTE — PROGRESS NOTES
[facility] Dosing Services: 11/13/18 Pharmacist Renal Dosing Progress Note for Dr Leida Romero The following medication: cefepime was automatically dose-adjusted per [facility] P&T Committee Protocol, with respect to renal function. Pt Weight:  
Wt Readings from Last 1 Encounters:  
11/13/18 74.8 kg (165 lb) Previous Regimen  1gm ivpb q24h Serum Creatinine Lab Results Component Value Date/Time Creatinine 1.05 11/13/2018 02:54 PM  
   
Creatinine Clearance Estimated Creatinine Clearance: 55.7 mL/min (based on SCr of 1.05 mg/dL). Using Clinical Calculator: 65ml/min BUN Lab Results Component Value Date/Time BUN 11 11/13/2018 02:54 PM  
   
Dosage changed to:  1gm ivpb q12h Pharmacy to continue to monitor patient daily. Will make dosage adjustments based upon changing renal function. Shanthi Jacome, PHARMD. Contact information:  [phone]

## 2018-11-14 NOTE — ACP (ADVANCE CARE PLANNING)
Primary Decision Maker (Health Care Agent): Dinora Nails (equal authority with Dakotah Michael, either may act) Relationship to patient: Dtr Phone number: [phone] [x] Named in a scanned document  
[] Legal Next of Kin 
[] Guardian Secondary Decision Maker (First Alternate Health Care Agent): Tayler Parra (equal authority with Amelia, either may act) Relationship to patient: Son Phone number:  [phone] [x] Named in a scanned document  
[] Legal Next of Kin 
[] Guardian ACP documents you current have include: 
[x] Advance Directive or Living Will 
[] Durable Do Not Resuscitate 
[] Physician Orders for Scope of Treatment (POST) [x] Medical Power of  
[] Other

## 2018-11-14 NOTE — PROGRESS NOTES
0066- Bedside and Verbal shift change report given to Jayla Richardson and Nikkie Rapp (oncoming nurse) by Geraldine Porras (offgoing nurse). Report included the following information SBAR, Kardex, ED Summary and Recent Results. Pt awake in bed, urinal and call bell within reach. Side rails x3. 
 
0930- Pt's speech difficult to understand, dysphagia screening repeated this morning and pt passed without difficulty, ordered medications administered. Attempted to assist pt to eat meal tray, mechanical soft diet, pt slow to eat, chews for long time and unable to eat sausage on tray. Echo completed this morning. 1000- Discussed pt's difficulty eating meal tray with neurology, will keep pt NPO at this time. [address] pt's daughter Stephanie Mari. Pt last known normal was Sunday, pt was ambulatory, with normal cognitive function and speech. On Monday family checked on the pt and his strength had declined, unable to use his telephone and slurred speech. MRI checklist completed with Marilee Bowens, pt has MRI done routinely at [facility] following his gamma knife surgery. 1045- TRANSFER - OUT REPORT: 
 
Verbal report given to Senait(name) on [facility] Hearing  being transferred to [facility](unit) for routine progression of care Report consisted of patients Situation, Background, Assessment and  
Recommendations(SBAR). Information from the following report(s) SBAR, Kardex, ED Summary, MAR, Recent Results and Cardiac Rhythm Afib, BBB, tachycardia was reviewed with the receiving nurse. Lines:  
Venous Access Device right chest portacath  (Active) Central Line Being Utilized Yes 11/13/2018 11:00 PM  
Site Assessment Clean, dry, & intact 11/13/2018 11:00 PM  
   
Peripheral IV 11/13/18 Right Antecubital (Active) Site Assessment Clean, dry, & intact 11/13/2018 11:00 PM  
Phlebitis Assessment 0 11/13/2018 11:00 PM  
Infiltration Assessment 0 11/13/2018 11:00 PM  
Dressing Status Clean, dry, & intact 11/13/2018 11:00 PM  
 Dressing Type Transparent 11/13/2018 11:00 PM  
Hub Color/Line Status Pink 11/13/2018 11:00 PM  
Action Taken Blood drawn 11/13/2018  3:01 PM  
Alcohol Cap Used Yes 11/13/2018 11:00 PM  
  
 
Opportunity for questions and clarification was provided. Patient transported with: 
 Monitor O2 @ 2 liters Registered Nurse

## 2018-11-14 NOTE — PROGRESS NOTES
[facility] Dosing Services: 11/13/18 The following medication: Cefepime was automatically dose-adjusted per [facility] P&T Committee Protocol, with respect to renal function. Consult provided for this   75 y.o. , male , for the indication of UTI (hx of metastatic lung cancer). Dosage changed to:  Cefepime 2gm every 12 hrs Previous Regimen Cefepime 1 gm every 12 hrs Serum Creatinine Lab Results Component Value Date/Time Creatinine 1.05 11/13/2018 02:54 PM  
   
Creatinine Clearance Estimated Creatinine Clearance: 55.7 mL/min (based on SCr of 1.05 mg/dL). BUN Lab Results Component Value Date/Time BUN 11 11/13/2018 02:54 PM  
   
 
Additional notes: 
 
 
Pharmacy to continue to monitor patient daily. Will make dosage adjustments based upon changing renal function. Signed Jagdish ARNOLD [address] [place] [address] information:  [phone]

## 2018-11-14 NOTE — CONSULTS
Bishop Muñoz MD [facility], operated by [facility] [address] Office [phone] Date of  Admission: 11/13/2018  2:47 PM 
  
 
Assessment/Plan: · A fib RVR: cont IV fluids cautiously. Will give one dose IV dig for HR. Change dilt to short acting given low BP.  OAC: eliquis · Cardiomyopathy: Previous EF 45-50% Repeat ECHO today. · Anemia:  
· Metastatic lung ca: Palliative medicine team following pt. MRI brain pending. · HLD:  
· DVT: on eliquis Pt personally seen and examined. Chart reviewed. Agree with advanced NP's history, exam and  A/P with changes/additons. Neck-no JVD CVS-S1-S2 present, ZET,0/4 systolic murmur present RS-  Bilateral scattered rhonchi present Abdomen- soft/NT 
LE-   No edema Thank you for allowing us to participate in Samara Noel care. We will be happy to follow along. Please call for any questions. Dr Karol Douglas to see from tomorrow. Tan Guajardo MD, [facility] [place] requested by Rosanne Mar DO for Altered mental status UTI (urinary tract infection) Subjective: 
Samara Noel is a 75 y.o. AA  male  with PMH significant for Stage IV non small cell lung ca with bran mets, a fib, coronary calcification on CT, cardiomyopathy, DVT, HTN, hypothryoidism who presented to the ER for evaluation of AMS in the setting of fall . The pt is poor historian with garbled speech. Per chart review he was found on the floor after a fall. Pt denies hitting head/LOC, but states he lost balance. Pt normally uses a cane for support. Pt was recently hospitalized 11/06/18 for hypovolemic shock 2/2 diarrhea; diarrhea resolved by Sunday. Pt has been refusing to seek medical care over the past 3 days and refused to follow-up with heme/on for palliative chemo (missed appointment 11/09/18).   
In the ER, vital signs were remarkable for .  Labs were remarkable for UA: cloudy, mod LE, nitrites neg,  WBC, 5-10 RBC. CT head, CT cervical spine, and CXR showed no acute processes. Pt was treated with 1x IV cefepime. Cardiology was consulted for a fib RVR: PTA meds dilt 240 mg every day.  
  
The patient denies chest pain or shortness of breath. LABS:   Troponin:   0.06      CXR: no acute dse Cardiographics:   Telemetry:  A fib RVR  ECG: a fib RVR Cardiac Testing/ Procedures:  
ECHO 9/25/17: EF 45-50% mild HK basal-mid inferior wall(s). Mild LVH, G3DD, mild-mod LAE, mild HARSHIL, mild- mod MAC, mild MR Patient Active Problem List  
 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 ([address] 12/29/2017  Chemotherapy-induced neuropathy ([address] 12/29/2017  Dyslipidemia 11/13/2017  Anemia  Atrial fibrillation ([address] 09/16/2017  Deep vein thrombosis (DVT) of tibial vein of both lower extremities ([address] 09/15/2017  Primary malignant neoplasm of left lung metastatic to other site [facility]) 07/28/2017  Metastasis to brain ([address] 07/28/2017  Non-compliance 05/27/2016  Groin fullness 03/05/2014  Benign hypertensive heart disease with HF (heart failure) ([address] 10/11/2011  History of tobacco abuse 10/11/2011  Alcohol abuse 10/11/2011  Cough 04/04/2011  ED (erectile dysfunction) 05/18/2010  Hyperlipidemia 05/18/2010 Past Medical History:  
Diagnosis Date  Anemia  Cardiomyopathy ([address] mild LV regional/global dysfunction, likely ischemic etiology  Coronary artery calcification seen on CT scan  Dysfunction, erectile  Hyperlipidemia 5/18/2010  Hypertension  Metastatic lung cancer (metastasis from lung to other site) [facility]) brain  Paroxysmal atrial flutter ([address] History reviewed. No pertinent surgical history. Allergies Allergen Reactions  Lisinopril Angioedema  Hydrochlorothiazide Hives and Swelling  Sulfa (Sulfonamide Antibiotics) Hives Current Facility-Administered Medications Medication Dose Route Frequency  0.9% sodium chloride infusion  115 mL/hr IntraVENous CONTINUOUS  
 acetaminophen (TYLENOL) tablet 500 mg  500 mg Oral Q6H PRN  
 DULoxetine (CYMBALTA) capsule 60 mg  60 mg Oral QHS  dilTIAZem CD (CARDIZEM CD) capsule 240 mg  240 mg Oral DAILY  glucose chewable tablet 16 g  4 Tab Oral PRN  
 dextrose (D50W) injection syrg 12.5-25 g  12.5-25 g IntraVENous PRN  
 glucagon (GLUCAGEN) injection 1 mg  1 mg IntraMUSCular PRN  
 sodium chloride (NS) flush 5-10 mL  5-10 mL IntraVENous Q8H  
 sodium chloride (NS) flush 5-10 mL  5-10 mL IntraVENous PRN  
 apixaban (ELIQUIS) tablet 5 mg  5 mg Oral BID  levothyroxine (SYNTHROID) tablet 75 mcg  75 mcg Oral ACB  cefepime (MAXIPIME) 2 g in 0.9% sodium chloride (MBP/ADV) 100 mL  2 g IntraVENous Q12H Current Outpatient Medications Medication Sig  tamsulosin (FLOMAX) 0.4 mg capsule Take 0.4 mg by mouth nightly.  diclofenac (VOLTAREN) 1 % gel Apply 2 g to affected area four (4) times daily as needed.  DULoxetine (CYMBALTA) 60 mg capsule Take 60 mg by mouth nightly.  apixaban (ELIQUIS) 5 mg tablet TAKE ONE TABLET BY MOUTH TWICE DAILY  dilTIAZem CD (CARDIZEM CD) 240 mg ER capsule Take 1 Cap by mouth daily.  traMADol (ULTRAM) 50 mg tablet Take 1 Tab by mouth every six (6) hours as needed for Pain. Max Daily Amount: 200 mg.  
 lidocaine-prilocaine (EMLA) topical cream Apply  to affected area as needed for Pain (Apply 30-60 min. prior to having your port accessed).  diphenhydrAMINE (BENADRYL) 25 mg tablet Take 25 mg by mouth nightly as needed for Sleep.  levocetirizine (XYZAL) 5 mg tablet Take 5 mg by mouth daily as needed for Allergies.  magic mouthwash solution Swish and spit 15-30 mL every 2-4 hours as needed for mouth pain. May swallow for throat pain. Magic mouth wash Maalox Lidocaine 2% viscous Diphenhydramine oral solution Pharmacy to mix equal portions of ingredients to a total volume as indicated in the dispense amount.  prochlorperazine (COMPAZINE) 10 mg tablet Take 1 Tab by mouth every six (6) hours as needed for Nausea.  ondansetron hcl (ZOFRAN) 8 mg tablet Take 1 Tab by mouth every eight (8) hours as needed for Nausea.  aspirin delayed-release 81 mg tablet Take 81 mg by mouth daily.  levothyroxine (SYNTHROID) 75 mcg tablet Take 1 Tab by mouth Daily (before breakfast).  acetaminophen (TYLENOL EXTRA STRENGTH) 500 mg tablet Take  by mouth every six (6) hours as needed for Pain. Review of Symptoms: 
Constitutional: positive for malaise and anorexia ENT: AGUILAR [facility] Respiratory: negative for wheezing Gastrointestinal: negative for vomiting Genitourinary: No dysuria, hematuria, frequency Musculoskeletal:negative for back pain Neurological: positive for memory problems Other systems reviewed and negative except as above. Social History Socioeconomic History  Marital status: SINGLE Spouse name: Not on file  Number of children: Not on file  Years of education: Not on file  Highest education level: Not on file Social Needs  Financial resource strain: Not on file  Food insecurity - worry: Not on file  Food insecurity - inability: Not on file  Transportation needs - medical: Not on file  Transportation needs - non-medical: Not on file Occupational History  Not on file Tobacco Use  Smoking status: Former Smoker Packs/day: 1.00 Years: 45.00 Pack years: 45.00 Types: Cigarettes  Smokeless tobacco: Never Used Substance and Sexual Activity  Alcohol use: Yes Alcohol/week: 1.5 oz Types: 3 Cans of beer per week  Drug use: No  
 Sexual activity: Not on file Other Topics Concern  Not on file Social History Narrative  Not on file Family History Problem Relation Age of Onset  Stroke Father  Diabetes Sister  Diabetes Brother  Heart Disease Sister Physical Exam 
 
Visit Vitals /71 (BP 1 Location: Left arm, BP Patient Position: At rest) Pulse (!) 122 Temp 98.3 °F (36.8 °C) Resp 26 Ht 5' 6\" (1.676 m) Wt 165 lb (74.8 kg) SpO2 99% BMI 26.63 kg/m² General: awake, alert, and oriented. MAEx4. No acute distress. HEENT Exam:   
 Normocephalic, atraumatic. Sclera anicteric . Neck: supple, sfot tissue mass R clavicular area. Lung Exam:   
 Tachypneic,  Respirations unlabored. O2 @ 99% 2 liters  Sat:  . Lungs: Scattered rhonchi. Heart Exam: PMI laterally displaced, Rate: Rhythm: tachycardic, irregular,   2/6 systolic murmur. No JVD, Abdomen Exam:   
  soft, nontender. + Bowel sounds. No palpable masses. : voiding Extremities Exam:   
  Atraumatic. No clubbing, cyanosis, edema, ulcers, varicose veins, rash, swelling, erythema. Vascular Exam:   
  radial,  dorsalis pedis, posterior tibial, are equal and strong bilaterally Neuro exam:  No focal deficits Psy Exam: Normal affect, does not appear anxious or agitated Recent Results (from the past 12 hour(s)) LACTIC ACID Collection Time: 11/13/18 10:56 PM  
Result Value Ref Range Lactic acid 0.5 0.4 - 2.0 MMOL/L  
PHOSPHORUS Collection Time: 11/13/18 10:56 PM  
Result Value Ref Range Phosphorus 4.1 2.6 - 4.7 MG/DL  
AMMONIA Collection Time: 11/13/18 10:56 PM  
Result Value Ref Range Ammonia 21 <32 UMOL/L  
DRUG SCREEN, URINE Collection Time: 11/13/18 10:56 PM  
Result Value Ref Range  AMPHETAMINES NEGATIVE  NEG    
 BARBITURATES NEGATIVE  NEG    
 BENZODIAZEPINES NEGATIVE  NEG    
 COCAINE NEGATIVE  NEG METHADONE NEGATIVE  NEG    
 OPIATES NEGATIVE  NEG    
 PCP(PHENCYCLIDINE) NEGATIVE  NEG    
 THC (TH-CANNABINOL) NEGATIVE  NEG Drug screen comment (NOTE) ETHYL ALCOHOL Collection Time: 11/13/18 10:56 PM  
Result Value Ref Range ALCOHOL(ETHYL),SERUM <10 <85 MG/DL  
METABOLIC PANEL, BASIC Collection Time: 11/14/18  3:20 AM  
Result Value Ref Range Sodium 143 136 - 145 mmol/L Potassium 3.8 3.5 - 5.1 mmol/L Chloride 108 97 - 108 mmol/L  
 CO2 20 (L) 21 - 32 mmol/L Anion gap 15 5 - 15 mmol/L Glucose 62 (L) 65 - 100 mg/dL BUN 10 6 - 20 MG/DL Creatinine 0.94 0.70 - 1.30 MG/DL  
 BUN/Creatinine ratio 11 (L) 12 - 20 GFR est AA >60 >60 ml/min/1.73m2 GFR est non-AA >60 >60 ml/min/1.73m2 Calcium 9.8 8.5 - 10.1 MG/DL  
CBC W/O DIFF Collection Time: 11/14/18  3:20 AM  
Result Value Ref Range WBC 6.7 4.1 - 11.1 K/uL  
 RBC 3.53 (L) 4.10 - 5.70 M/uL  
 HGB 10.5 (L) 12.1 - 17.0 g/dL HCT 32.7 (L) 36.6 - 50.3 % MCV 92.6 80.0 - 99.0 FL  
 MCH 29.7 26.0 - 34.0 PG  
 MCHC 32.1 30.0 - 36.5 g/dL  
 RDW 15.9 (H) 11.5 - 14.5 % PLATELET 311 (H) 039 - 400 K/uL MPV 9.3 8.9 - 12.9 FL  
 NRBC 0.0 0  WBC ABSOLUTE NRBC 0.00 0.00 - 0.01 K/uL  
TROPONIN I Collection Time: 11/14/18  3:20 AM  
Result Value Ref Range Troponin-I, Qt. 0.06 (H) <0.05 ng/mL GLUCOSE, POC Collection Time: 11/14/18  6:03 AM  
Result Value Ref Range Glucose (POC) 67 65 - 100 mg/dL Performed by [employer] Handler GLUCOSE, POC Collection Time: 11/14/18  6:30 AM  
Result Value Ref Range Glucose (POC) 120 (H) 65 - 100 mg/dL Performed by [employer] Handler Kelton Quiroz [facility]

## 2018-11-14 NOTE — PROGRESS NOTES
Lab blood glucose 62. Recheck POC is 67. Patient refuses juice. Called family practice for iv dextrose. Receiced 12.5 dextrose. Blood Glucose 120.

## 2018-11-14 NOTE — PROGRESS NOTES
Palliative Medicine Code Status: Full Code Advance Care Planning: 
Advance Care Planning 11/14/2018 Patient's Healthcare Decision Maker is: Named in scanned ACP document Primary Decision Maker Name Katy Hale (dtr) [phone] and/or Maegan Jackson (son) [phone] Confirm Advance Directive Yes, on file Patient Would Like to Complete Advance Directive Already in place Patient / Family Encounter Documentation Participants (names): Pt, Palliative Medicine (NP Joe Ruiz). Cardiology NP Johnaa Ramos present for portion of visit. Narrative: Pt was awake in bed, no family currently present. Pt was very difficult to understand, was able to correctly state year and current location. Per report, pt lives alone in a senior [facility] in [place], son Gerda Schwarz and dtr Amelia both live within 30 mins. Pt has diagnosis of metastatic non-small cell lung cancer, is followed by Dr. Vignesh Alvarenga, has been receiving chemo as well as gamma knife. Pt was reportedly doing well over the weekend, was able to walk, speech was normal.  Family noted decline from Sunday to Monday, with decreased strength and slurred speech. Pt has reported several falls at home. Pt has AMD on file which appoints dtr Katy Hale and/or son Maegan Jackson as Medical POAs, either may act. Document specifies pt's wishes in the event of terminal condition or irreversible coma (would not want life prolonged). Psychosocial Issues Identified/ Resilience Factors: Uncertain if pt will be able to return to prior living arrangements, pt lives alone. Dtr and son appear to be supportive. Goals of Care / Plan:  Discussed with Oncology, Neurology, and Cardiology NPs, as well as with RN. Palliative team will attempt contact with family to further discuss pt's care; son and/or dtr expected in later. Thank you for including Palliative Medicine in the care of Mr. Gerardo Velazquez. Juan Francisco Castellano LCSW, [phone])

## 2018-11-14 NOTE — PROGRESS NOTES
Problem: Dysphagia (Adult) Goal: *Acute Goals and Plan of Care (Insert Text) Swallowing goals initaited 11-14-18: (weekly re-eval 11-21-18) 1) re-eval in am for dysphagia-may need MBS Speech LAnguage Pathology bedside swallow evaluation Patient: Efren Alejandro (86 y.o. male) Date: 11/14/2018 Primary Diagnosis: Altered mental status UTI (urinary tract infection) Precautions: aspiration ASSESSMENT : 
Based on the objective data described below, the patient presents with moderate to severe oral-pharyngeal dysphagia. Currently not safe for PO. Moderate to severe dysarthria with poor speech intelligibility. Admitted with hypotension, AMS< confusion, lethargy, frequent falls, dysarthria. This was new onset in the last few days. PMH:  Lung CA with mets to brain (R parietal, R posterior temporal and occipital masses)  With h/o gamma knife. Also : HTN, XOL, afib, anemia, CM, CHF, DVT, hypothyroid. +h/o tobacco and ETOH. . 
 
Patient will benefit from skilled intervention to address the above impairments. Patients rehabilitation potential is considered to be Guarded Factors which may influence rehabilitation potential include:  
[]            None noted [x]            Mental ability/status [x]            Medical condition []            Home/family situation and support systems 
[]            Safety awareness 
[]            Pain tolerance/management []            Other: PLAN : 
Recommendations and Planned Interventions: 
NPO including meds. SLP to re-eval in am.  
Needs massive oral care/hygience with moist swabs gently. Frequency/Duration: Patient will be followed by speech-language pathology 5 times a week to address goals. Discharge Recommendations: To Be Determined SUBJECTIVE:  
Patient trying to talk, but mostly unintelligible. . 
 
OBJECTIVE:  
 
Past Medical History:  
Diagnosis Date  Anemia  Cardiomyopathy ([facility] mild LV regional/global dysfunction, likely ischemic etiology  Coronary artery calcification seen on CT scan  Dysfunction, erectile  Hyperlipidemia 5/18/2010  Hypertension  Metastatic lung cancer (metastasis from lung to other site) [facility]) brain  Paroxysmal atrial flutter ([facility] [address] [facility] History reviewed. No pertinent surgical history. Prior Level of Function/Home Situation: l Diet prior to admission: ? 
Current Diet:  mech soft, thins, after passing STAND, then choked, now NPO Cognitive and Communication Status: 
Neurologic State: Alert Orientation Level: Oriented to person(moderate to severe dysarthria) Cognition: Follows commands(with cues/encouragement) Perception: Appears intact Perseveration: No perseveration noted Oral Assessment: 
Oral Assessment Labial: No impairment Dentition: Natural;Limited Oral Hygiene: (dry mucosa that was very textured and red-looked painful from tongue to uvula) Lingual: (mildly decreased general ROM issues) Mandible: Restricted(reduced opening) P.O. Trials: 
Patient Position: upright in bed 45 degrees Vocal quality prior to P.O.: (low volume. mod-severe dysarthria with poor speech intelligiblity) Consistency Presented: Nectar thick liquid;Puree How Presented: SLP-fed/presented;Spoon ORAL PHASE:  
Bolus Acceptance: No impairment Bolus Formation/Control: Impaired(oral holding-severe? vs delay in swallow) Propulsion: Delayed (# of seconds) Oral Residue: None PHARYNGEAL PHASE:  
Initiation of Swallow: Delayed (# of seconds)(vs oral holding)- UP TO 30 SECONDS Laryngeal Elevation: Weak(moderate to severe)when he finally swallowed. Weaker with nectars. Aspiration Signs/Symptoms: (choking on bfast this am in ER) SPEECH:  
Severe speech intelligiblity issues with moderate to severe dysarthria. Low volume, severe consonant spirantization, nasal emissions noted. Could be partially impacted by dry oral mucosa. NOMS:  
The NOMS functional outcome measure was used to quantify this patient's level of swallowing impairment. Based on the NOMS, the patient was determined to be at level 1 for swallow function G Codes: In compliance with CMSs Claims Based Outcome Reporting, the following G-code set was chosen for this patient based the use of the NOMS functional outcome to quantify this patient's level of swallowing impairment. Using the NOMS, the patient was determined to be at level 1 for swallow function which correlates with the CN= 100% level of severity. Based on the objective assessment provided within this note, the current, goal, and discharge g-codes are as follows: 
 
Swallow  Swallowing: 
 Swallow Current Status CN= 100%  Swallow Goal Status CM= 80-99% NOMS Swallowing Levels: 
Level 1 (CN): NPO Level 2 (CM): NPO but takes consistency in therapy Level 3 (CL): Takes less than 50% of nutrition p.o. and continues with nonoral feedings; and/or safe with mod cues; and/or max diet restriction Level 4 (CK): Safe swallow but needs mod cues; and/or mod diet restriction; and/or still requires some nonoral feeding/supplements Level 5 (CJ): Safe swallow with min diet restriction; and/or needs min cues Level 6 (CI): Independent with p.o.; rare cues; usually self cues; may need to avoid some foods or needs extra time Level 7 (CH): Independent for all p.o. JIMMY. (2003). National Outcomes Measurement System (NOMS): Adult Speech-Language Pathology User's Guide. Pain: 
Pain Scale 1: Visual 
Pain Intensity 1: 0 After treatment:  
[]            Patient left in no apparent distress sitting up in chair 
[]            Patient left in no apparent distress in bed 
[]            Call bell left within reach 
[]            Nursing notified 
[]            Caregiver present 
[]            Bed alarm activated COMMUNICATION/EDUCATION:  
The patients plan of care including recommendations, planned interventions, and recommended diet changes were discussed with: Registered Nurse. Patient was educated regarding His deficit(s) of dysphagia  as this relates to His diagnosis of brain mets from lung CA, AMS. .  He demonstrated Guarded understanding as evidenced by discussion. . 
[]            Posted safety precautions in patient's room. []            Patient/family have participated as able in goal setting and plan of care. [x]            Patient/family agree to work toward stated goals and plan of care. []            Patient understands intent and goals of therapy, but is neutral about his/her participation. []            Patient is unable to participate in goal setting and plan of care. Thank you for this referral. 
Willard Mac, SLP Time Calculation: 15 mins

## 2018-11-14 NOTE — H&P
Luis Lyn Keisha Valdes [address]  Office [phone] Fax [phone] Admission H&P Name: Kathleen Membreno MRN: [id number]  Sex: Male YOB: 1945  Age: 75 y.o. PCP: Maci Bridges MD  
 
Source of Information: patient, patient's daughter, medical records Chief complaint: AMS, recent fall History of Present Illness Kathleen Membreno is a 75 y.o. male with known metastatic Stage IV non-small cell carcinoma, A.fib, CHF, DVT, HTN, hypothryoidism who presents to the ER for evaluation of AMS in the setting of fall today. The pt is difficult to comprehend as he is mumbling, and so most hx is obtained from the pt's daughter Ms. Marilee Ortiz (via telephone). Per pt's daughter: he has been having a 3 day hx of mumbling/slurred speech, lethargy, and generalized weakness. Today, he was found on the floor after a fall. Pt denies hitting head/LOC, but states he lost balance. Pt normally uses a cane for support. Pt also endorses falling last night - he lives alone and no one had witnessed the 2 recent falls. Pt was recently hospitalized 11/06/18 for hypovolemic shock 2/2 diarrhea; diarrhea resolved by Sunday. No constipation/blood in stool/ nausea/vomiting. Pt's daughter describes subjective fevers but no chills. No recent travel. Pt has been refusing to seek medical care over the past 3 days and refused to follow-up with heme/on for palliative chemo (missed appointment 11/09/18). In the ER, vital signs were remarkable for . Labs were remarkable for UA: cloudy, mod LE, nitrites neg,  WBC, 5-10 RBC. CT head, CT cervical spine, and CXR showed no acute processes. Pt was treated with 1x IV cefepime. Home Medications Prior to Admission medications Medication Sig Start Date End Date Taking? Authorizing Provider  
tamsulosin (FLOMAX) 0.4 mg capsule Take 0.4 mg by mouth nightly.    Yes Provider, Historical  
 diclofenac (VOLTAREN) 1 % gel Apply 2 g to affected area four (4) times daily as needed. Yes Provider, Historical  
DULoxetine (CYMBALTA) 60 mg capsule Take 60 mg by mouth nightly. Yes Provider, Historical  
apixaban (ELIQUIS) 5 mg tablet TAKE ONE TABLET BY MOUTH TWICE DAILY 10/19/18  Yes Stormy MAGAÑA NP  
dilTIAZem CD (CARDIZEM CD) 240 mg ER capsule Take 1 Cap by mouth daily. 10/19/18  Yes Vijaya Edwards NP  
traMADol (ULTRAM) 50 mg tablet Take 1 Tab by mouth every six (6) hours as needed for Pain. Max Daily Amount: 200 mg. 9/28/18  Yes Vijaya Edwards NP  
lidocaine-prilocaine (EMLA) topical cream Apply  to affected area as needed for Pain (Apply 30-60 min. prior to having your port accessed). 2/23/18  Yes Vladimir Kahn NP  
diphenhydrAMINE (BENADRYL) 25 mg tablet Take 25 mg by mouth nightly as needed for Sleep. Yes Provider, Historical  
levocetirizine (XYZAL) 5 mg tablet Take 5 mg by mouth daily as needed for Allergies. Yes Provider, Historical  
magic mouthwash solution Swish and spit 15-30 mL every 2-4 hours as needed for mouth pain. May swallow for throat pain. Magic mouth wash Maalox Lidocaine 2% viscous Diphenhydramine oral solution Pharmacy to mix equal portions of ingredients to a total volume as indicated in the dispense amount. 9/12/17  Yes Cassandra Ng MD  
prochlorperazine (COMPAZINE) 10 mg tablet Take 1 Tab by mouth every six (6) hours as needed for Nausea. 8/8/17  Yes Vladimir Kahn NP  
ondansetron hcl (ZOFRAN) 8 mg tablet Take 1 Tab by mouth every eight (8) hours as needed for Nausea. 8/8/17  Yes Vladimir Kahn NP  
aspirin delayed-release 81 mg tablet Take 81 mg by mouth daily. Yes Provider, Historical  
levothyroxine (SYNTHROID) 75 mcg tablet Take 1 Tab by mouth Daily (before breakfast). 10/19/18   Vijaya Edwards NP  
acetaminophen (TYLENOL EXTRA STRENGTH) 500 mg tablet Take  by mouth every six (6) hours as needed for Pain.     Provider, Historical  
 
 
 Allergies Allergies Allergen Reactions  Lisinopril Angioedema  Hydrochlorothiazide Hives and Swelling  Sulfa (Sulfonamide Antibiotics) Hives Past Medical History:  
Diagnosis Date  Anemia  Cardiomyopathy ([facility] [address] mild LV regional/global dysfunction, likely ischemic etiology  Coronary artery calcification seen on CT scan  Dysfunction, erectile  Hyperlipidemia 5/18/2010  Hypertension  Metastatic lung cancer (metastasis from lung to other site) [facility]) brain  Paroxysmal atrial flutter ([facility] [address] Previous Hospitalization(s) 
11/06/18 - Hypovolemic shock No past surgical history on file. Family History Problem Relation Age of Onset  Stroke Father  Diabetes Sister  Diabetes Brother  Heart Disease Sister Social History Alcohol history: Not at all Smoking history: Non-smoker Illicit drug history: Not at all Living arrangement: patient lives alone. Ambulates: With cane Review of Systems Review of Systems Constitutional: Positive for activity change and fatigue. Negative for chills, diaphoresis and fever. HENT: Negative for congestion and rhinorrhea. Eyes: Negative for photophobia and visual disturbance. Respiratory: Negative for apnea, cough, choking, chest tightness and shortness of breath. Cardiovascular: Negative for chest pain and leg swelling. Gastrointestinal: Negative for abdominal distention, abdominal pain, blood in stool, constipation, diarrhea, nausea and vomiting. Genitourinary: Negative for difficulty urinating, dysuria and hematuria. Musculoskeletal: Positive for arthralgias. Negative for neck stiffness. Neurological: Positive for speech difficulty and weakness. Negative for dizziness, tremors, seizures, syncope, facial asymmetry, light-headedness, numbness and headaches. Psychiatric/Behavioral: Negative for confusion. Physical Exam 
Objective: 
General Appearance: In no acute distress. Vital signs: (most recent): Blood pressure 93/66, pulse (!) 118, temperature 98.5 °F (36.9 °C), resp. rate 24, height 5' 6\" (1.676 m), weight 165 lb (74.8 kg), SpO2 96 %. No fever. Output: Producing urine. Lungs:  Normal effort and normal respiratory rate. Breath sounds clear to auscultation. Heart: Normal rate. Regular rhythm. S1 normal and S2 normal.  No murmur, gallop or friction rub. Chest: Symmetric chest wall expansion. No chest wall tenderness. Abdomen: Abdomen is soft and non-distended. Bowel sounds are normal.   There is no abdominal tenderness. Extremities: There is no dependent edema. Pulses: Distal pulses are intact. Neurological: Patient is alert and oriented to person, place and time. Skin:  Warm and dry. No rash. O2 Flow Rate (L/min): 2 l/min O2 Device: Room air Laboratory Data As reviewed in HPI Imaging CXR Results  (Last 48 hours)  
          
 11/13/18 1610  XR CHEST PORT Final result Impression:  IMPRESSION:  
   
No acute process. Stable exam.  
   
  
 Narrative:  EXAM:  XR CHEST PORT INDICATION:  Chest pain. Altered mental status. COMPARISON: CT 11/2/2018. Chest radiograph 2/14/2018. TECHNIQUE: Portable AP upright chest view at 1601 hours FINDINGS: A right chest Port-A-Cath is stable. Cardiac monitoring wires overlie  
the thorax. The cardiomediastinal contours are stable. The pulmonary vasculature  
is within normal limits. There are stable bilateral suprahilar opacities. There is no new lung opacity,  
pleural effusion, or pneumothorax. The bones and upper abdomen are stable. CT Results  (Last 48 hours)  
          
 11/13/18 1543  CT HEAD WO CONT Final result Impression:  IMPRESSION:   
No acute intracranial abnormality. Stable left sphenoid sinus opacification. Narrative:  EXAM:  CT HEAD WO CONT INDICATION: Altered mental status COMPARISON: CT 9/23/2018 TECHNIQUE: Axial noncontrast head CT from foramen magnum to vertex. Coronal and  
sagittal reformatted images were obtained. CT dose reduction was achieved  
through use of a standardized protocol tailored for this examination and  
automatic exposure control for dose modulation. FINDINGS:  There is diffuse age-related parenchymal volume loss. The ventricles  
and sulci are age-appropriate without hydrocephalus. There is no mass effect or  
midline shift. There is no intracranial hemorrhage or extra-axial fluid  
collection. There is no significant white matter disease. The gray-white matter  
differentiation is maintained. The basal cisterns are patent. The osseous structures are intact. There is stable chronic opacification of the  
left sphenoid sinus. The remaining visualized paranasal sinuses and mastoid air  
cells are clear. 11/13/18 1543  CT SPINE CERV WO CONT Final result Impression:  IMPRESSION:  
No acute abnormality. Stable diffuse cervical degenerative changes most advanced  
at C5-6 where there is moderate spinal canal stenosis and moderate to severe  
bilateral foraminal stenosis. Narrative:  EXAM:  CT CERVICAL SPINE WITHOUT CONTRAST INDICATION:   Fall. Altered mental status. COMPARISON: CT 9/23/2018 CONTRAST:  None. TECHNIQUE: Multislice helical CT of the cervical spine was performed without  
intravenous contrast administration. Sagittal and coronal reconstructions were  
generated. CT dose reduction was achieved through use of a standardized  
protocol tailored for this examination and automatic exposure control for dose  
modulation. FINDINGS:  
   
There is no acute fracture or subluxation. Vertebral body heights are  
maintained. There is straightening of the normal cervical lordosis. There are  
stable diffuse degenerative changes with intervertebral disc space narrowing most advanced at C5-6. There is stable moderate spinal canal stenosis and  
moderate to severe bilateral foraminal stenosis at this level. The paraspinal  
soft tissues are unremarkable. The visualized lung apices are stable. EKG: Atrial fibrillation with rapid ventricular response Right bundle branch block and Left anterior fascicular block (present on previous EKG 07/11/18) Assessment and Plan Sharon Jim is a 75 y.o. male who is admitted for AMS 2/2 UTI vs CVA/TIA. AMS 2/2 UTI vs CVA/TIA In the setting of recent fall today. CT head, CT cerv spine, and CXR showed no acute processes. UA: cloudy, mod LE, nitrites neg,  WBC, 5-10 RBC. - Admit to remote telemetry - Stroke workup: Neuro checks q4hrs, NIH stroke scale assessment, Neuro consult. CTA head+neck. MRI brain. Echo 
- UCx, BCx 
- Trend Trops - Ammonia level, UDS, ethyl alcohol levels - IV Cefepime - MIVF 
- Bedside swallow, if passed encourage PO intake - Daily CBC, CMP 
  
Metastatic Stage 4 Non-Small Cell Carcinoma Diagnosed 07/2017. On palliative chemo, s/p gamma knife for brain mets. Followed by Dr. Rachel Robbins, last seen 10/19/2018. Patient's cancer is non-curative. Receives chemo every 3 weeks. Refused to go to infusion appointment on 11/09. 
- Consult Heme/Onc 
  
Hx CHF Last Echo 09/15/2017 EF 45-50% - Resume home cardizem - Trend Trops 
- Echo 
  
Hx DVT 2/2  Malignancy Bilateral tibial vein DVTs on 09/16/2017. No lower leg swelling/ calf pain today.  
-Continue home Apixiban 5mg BID  
  
History of A.fib Patient diagnosed with A.fib w/RVR on 09/16/2017. Followed by cardiology (Dr. Magdaleno Harris). EKG on admission: A fib w/ RVR, RBBB, left anterior fascicular block. PT  
- Remote tele - Resume home Cardizem - Resume home Apixaban 5mg daily - Resume home ASA 81mg daily - Will monitor heart rate/rhythm 
  
Hypothyroidism 
last TSH 10/19/2018: 16.32 
- Resume home Levothyroxine 75mcg daily - TSH, Free T4 
  
Arthritic pain Patient diagnosed with arthiritis, on Tramadol and Tylenol prn for pain. - Continue Tylenol 500mg q6hrs PRN for pain  
  
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 NS at 110 mL/hr. Activity - Ambulate with assistance DVT prophylaxis - None indicated at this time GI prophylaxis - Not indicated at this time Fall prophylaxis - Fall precautions ordered. Disposition - Admit to Remote Telemetry. Plan to d/c to TBD. Consulting PT/OT/CM Code Status - Full, discussed with patient's daughter Next of Kin Name and Contact Daughter: Ms. Amber Lee ([phone]) Patient Alon Gregory will be discussed Dr. Juana Su. 
 
7:57 PM, 11/13/18 Neil Zamora MD 
Family Medicine Resident

## 2018-11-14 NOTE — DIABETES MGMT
DTC Progress Note Recommendations/ Comments: Pt hypoglycemic this am BG 67mg/dL @ 0630. Noted pt refusing PO intakes. If appropriate, please consider adding D5% to support BG. Please continue to encourage PO intakes. Current hospital DM medication: none Chart reviewed on [address]. Patient is a 75 y.o. male with no known history of Type 2 Diabetes. A1c:  
No results found for: HBA1C, HGBE8, [id number] Recent Glucose Results:  
Lab Results Component Value Date/Time GLU 62 (L) 11/14/2018 03:20 AM  
 GLU 72 11/13/2018 02:54 PM  
 GLUCPOC 164 (H) 11/14/2018 11:46 AM  
 GLUCPOC 65 11/14/2018 11:27 AM  
 GLUCPOC 120 (H) 11/14/2018 06:30 AM  
  
 
Lab Results Component Value Date/Time Creatinine 0.94 11/14/2018 03:20 AM  
 
Estimated Creatinine Clearance: 62.2 mL/min (based on SCr of 0.94 mg/dL). Active Orders Diet DIET NPO  
  
 
PO intake: No data found. Will continue to follow as needed. Thank you Alex Soto RD, CDE [facility] Office: [phone] Pager: [phone]

## 2018-11-14 NOTE — PROGRESS NOTES
[id number]  
Senior Resident Admission Note CC: altered mental status HPI: 
Alberto Duarte is a 75 y.o. male who presents to the ER by family complaining of altered mental status. Per family, patient began mumbling his words about 2 days ago and was difficult to understand. Per family about 1 week ago patient was at his baseline which included being able to cook for himself, ambulate with assistance of a cane, and lived alone. When patient fell prior to coming into the ED patient's family brought him in for evaluation. Chart reviewed. Patient seen, examined, and discussed with Dr. Shira James (PGY-1). See H&P note for more details. A/P: Symptoms likely d/t infection vs TIA/CVA vs complication from metastatic disease 1. AMS - patient unable to provide relevant history. Will need to keep broad differential especially in the setting of immunocompromised state. 1. UDS, EtOH, TSH, trend trops, ammonia 2. CTA head and neck, MRI brain 3. Echo 4. Onc consult 5. Neuro consult 6. Bedside swallow 2. Concern for UTI - will extend antibiotic coverage to include pseudomonas 1. Continue cefepime, f/u Urine Cx 3. Paroxsymal afib - HR into 110s on admission 1. Continue Cardizem CD 2. Continue eliquis 4. Hypothyroidism - continue synthroid 5. Metastatic lung disease - Onc consult (followed by [facility] for palliative chemo). I agree with remaining assessment and plan as documented in Dr. Eleno Galeazzi note. Pt discussed with  (on-call attending physician) Ghada Moreno MD 
Family Medicine Resident

## 2018-11-14 NOTE — PROGRESS NOTES
Reason for Admission:   Altered mental status; UTI 
            
RRAT Score:   29 Resources/supports as identified by patient/family:   Pt's daughter, Adi March (Y:[phone]) and son, Maryfrances Peabody Top Challenges facing patient (as identified by patient/family and CM): Finances/Medication cost?  Pt uses [address] in [facility] Transportation? Pt relies on family for transportation; he has used the bus in the past to go to the grocery store; pt's daughter reported that pt has not left the house this past week. Support system or lack thereof? See above Living arrangements? Pt lives alone in a ground level senior apartment. Pt's daughter and son live approximately 20 miles away Self-care/ADLs/Cognition? Pt was admitted to [facility] for observation from 11/6-11/7/18 for hypotension. Pt's daughter reported that his strength and appetite have declined since returning home. Daughter said pt refused to attend his f/u PCP appointment. Home DME includes a cane, walker, stool in shower and elevated toilet seat. Current Advanced Directive/Advance Care Plan: On file Plan for utilizing home health:    Pt is currently open with [facility] for PT/OT and nursing for wound care Likelihood of readmission: High/red Transition of Care Plan:   SNF  Pt is open with home health but lives alone and his family lives 30 minutes away. Pt's daughter was provided a SNF list.         
 
Care Management Interventions PCP Verified by CM: Yes(Dr. Jose Pineda; nurse navigator notified) Transition of Care Consult (CM Consult): Discharge Planning Discharge Durable Medical Equipment: No 
Physical Therapy Consult: Yes Occupational Therapy Consult: Yes Speech Therapy Consult: Yes Current Support Network: Lives Alone Plan discussed with Pt/Family/Caregiver: Yes Discharge Location Discharge Placement: Skilled nursing facility

## 2018-11-14 NOTE — CONSULTS
Palliative Medicine Consult Naveen: [phone]) Patient Name: Miguel A Lakhani YOB: 1945 Date of Initial Consult: 11/14/2018 Reason for Consult: Care decisions Requesting Provider: Donato Porras Primary Care Physician: Tony Yee MD 
 
 SUMMARY:  
Miguel A Lakhani is a 75 y. o. 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 Patient presented to ED with complaints of lethargy, confusion, and recent fall. Was admitted overnight last week on 11/6 for hypotension. ED eval revealed chest xray with no acute process, CT head with no acute intracranial abnormality, CT head/neck with no evidence of significant stenosis or aneurysm, EKG with afib with RVR and urinalysis consistent with UTI. Urine culture and blood cultures pending. Neurology consulted for altered mental status and MRI and EEG pending. Cardiology consulted for afib with RVR and recommended IV dig for heart rate, and changed diltiazem regimen to short acting given low BP. Repeat echo pending. Hx of metastatic non-small cell lung cancer, diagnosed in 7/2017. Underwent gamma knife treatment for brain mets in 8/2017. S/P chemotherapy 8/2017-1/2018, clinical trial 3/2018-5/2018, and has been on immunotherapy Kvng Simmer) since 5/25/2018. Missed scheduled dose on 11/9 due to feeling poorly. Last CT indicative of response to treatment. Current medical issues leading to Palliative Medicine involvement include: Care decisions. SH: Single, lives alone. Has daughter and son that live locally. PALLIATIVE DIAGNOSES:  
 
1. Physical debility 2. Goals of care 3. ACP 4. Altered mental status 5. Metastatic NSCLC PLAN:  
1. Met with patient with Yimi Lynch and introduced the role of Palliative Medicine. 2. Patient sitting up in bed, no family at bedside.  Denies any pain or shortness of breath currently. Patient very soft spoken, at times difficult to understand. Oriented to person, place and time. 3. Goals of care--> Discussed current hospitalization and work-up in process. Patient reports feeling poorly prior to admission, suffering with diarrhea up until a few days ago and with decreased oral intake. Lives alone and states he called his daughter and she brought him to the ED. He reports that his \"treatment for cancer\" is going well. Reports that his daughter will be coming back to the hospital this afternoon. Will plan to meet with family when they arrive. 4. ACP--> Pt has AMD on file which appoints dtr Adi March and/or son Jj Kaplan as Medical POAs, either may act. Document specifies pt's wishes in the event of terminal condition or irreversible coma (would not want life prolonged). 5. Initial consult note routed to primary continuity provider 6. Communicated plan of care with: Palliative IDT 
 
 
 GOALS OF CARE / TREATMENT PREFERENCES:  
 
GOALS OF CARE: 
Patient/Health Care Proxy Stated Goals: Prolong life TREATMENT PREFERENCES:  
Code Status: Full Code Advance Care Planning: 
[x] The [facility] Interdisciplinary Team has updated the ACP Navigator with [address] and Patient Capacity Primary Decision Maker (Health Care Agent): Adi March (equal authority with Feng Flynn, either may act) Relationship to patient: Dtr Phone number: [phone] [x] Named in a scanned document  
[] Legal Next of Kin 
[] Guardian 
  
Secondary Decision Maker ([address]): Jj Kaplan (equal authority with [place], either may act) Relationship to patient: Son Phone number:  [phone] [x] Named in a scanned document  
[] Legal Next of Kin 
[] Guardian 
  
ACP documents you current have include: 
[x] Advance Directive or Living Will 
[] Durable Do Not Resuscitate 
[] Physician Orders for Scope of Treatment (POST) [x] Medical Power of  
[] Other Medical Interventions: Full interventions Other Instructions: Other: As far as possible, the palliative care team has discussed with patient / health care proxy about goals of care / treatment preferences for patient. HISTORY:  
 
History obtained from: patient, chart CHIEF COMPLAINT: confusion and lethargy HPI/SUBJECTIVE: The patient is:  
[x] Verbal and participatory [] Non-participatory due to:  
 
Patient presented to ED with complaints of lethargy, confusion, and recent fall. Was admitted overnight last week on 11/6 for hypotension. ED eval revealed chest xray with no acute process, CT head with no acute intracranial abnormality, CT head/neck with no evidence of significant stenosis or aneurysm, EKG with afib with RVR and urinalysis consistent with UTI. Urine culture and blood cultures pending. Neurology consulted for altered mental status and MRI and EEG pending. Cardiology consulted for afib with RVR and recommended IV dig for heart rate, and changed diltiazem regimen to short acting given low BP. Repeat echo pending. Currently denies any complaints. Clinical Pain Assessment (nonverbal scale for severity on nonverbal patients):  
Clinical Pain Assessment Severity: 0 Activity (Movement): Lying quietly, normal position Duration: for how long has pt been experiencing pain (e.g., 2 days, 1 month, years) Frequency: how often pain is an issue (e.g., several times per day, once every few days, constant) FUNCTIONAL ASSESSMENT:  
 
Palliative Performance Scale (PPS): PPS: 70 PSYCHOSOCIAL/SPIRITUAL SCREENING:  
 
Palliative IDT has assessed this patient for cultural preferences / practices and a referral made as appropriate to needs (Cultural Services, Patient Advocacy, Ethics, etc.) Any spiritual / Congregation concerns: 
[] Yes /  [x] No 
 
Caregiver Burnout: 
[] Yes /  [] No /  [x] No Caregiver Present Anticipatory grief assessment:  
[x] Normal  / [] Maladaptive ESAS Anxiety: Anxiety: 0 
 
ESAS Depression:    
 
 
 REVIEW OF SYSTEMS:  
 
Positive and pertinent negative findings in ROS are noted above in HPI. The following systems were [x] reviewed / [] unable to be reviewed as noted in HPI Other findings are noted below. Systems: constitutional, ears/nose/mouth/throat, respiratory, gastrointestinal, genitourinary, musculoskeletal, integumentary, neurologic, psychiatric, endocrine. Positive findings noted below. Modified ESAS Completed by: provider Fatigue: 4 Drowsiness: 0 Pain: 0 Anxiety: 0 Nausea: 0 Anorexia: 3 Dyspnea: 0 PHYSICAL EXAM:  
 
From RN flowsheet: 
Wt Readings from Last 3 Encounters:  
11/13/18 165 lb (74.8 kg) 11/07/18 167 lb 1.6 oz (75.8 kg) 10/19/18 175 lb 9.6 oz (79.7 kg) Blood pressure 110/66, pulse (!) 117, temperature 98.1 °F (36.7 °C), resp. rate 20, height 5' 6\" (1.676 m), weight 165 lb (74.8 kg), SpO2 99 %. Pain Scale 1: Visual 
Pain Intensity 1: 0 Last bowel movement, if known:  
 
Constitutional: NAD Eyes: pupils equal, anicteric ENMT: no nasal discharge, moist mucous membranes Cardiovascular: regular rhythm, distal pulses intact Respiratory: breathing not labored, symmetric Gastrointestinal: soft non-tender, +bowel sounds Musculoskeletal: no deformity, no tenderness to palpation Skin: warm, dry Neurologic: following commands, moving all extremities, A&O x 3. Psychiatric: flat affect, no hallucinations Other: 
 
 
 HISTORY:  
 
Principal Problem: 
  Altered mental status (11/13/2018) Active Problems: 
  Benign hypertensive heart disease with HF (heart failure) ([facility] [address] (10/11/2011) Atrial fibrillation ([facility] [address] (9/16/2017) Dyslipidemia (11/13/2017) Acquired hypothyroidism (11/6/2018) UTI (urinary tract infection) (11/13/2018) Past Medical History:  
Diagnosis Date  Anemia  Cardiomyopathy ([facility] [address] mild LV regional/global dysfunction, likely ischemic etiology  Coronary artery calcification seen on CT scan  Dysfunction, erectile  Hyperlipidemia 5/18/2010  Hypertension  Metastatic lung cancer (metastasis from lung to other site) [facility]) brain  Paroxysmal atrial flutter ([facility] [address] History reviewed. No pertinent surgical history. Family History Problem Relation Age of Onset  Stroke Father  Diabetes Sister  Diabetes Brother  Heart Disease Sister History reviewed, no pertinent family history. Social History Tobacco Use  Smoking status: Former Smoker Packs/day: 1.00 Years: 45.00 Pack years: 45.00 Types: Cigarettes  Smokeless tobacco: Never Used Substance Use Topics  Alcohol use: Yes Alcohol/week: 1.5 oz Types: 3 Cans of beer per week Allergies Allergen Reactions  Lisinopril Angioedema  Hydrochlorothiazide Hives and Swelling  Sulfa (Sulfonamide Antibiotics) Hives Current Facility-Administered Medications Medication Dose Route Frequency  0.9% sodium chloride infusion  115 mL/hr IntraVENous CONTINUOUS  
 acetaminophen (TYLENOL) tablet 500 mg  500 mg Oral Q6H PRN  
 DULoxetine (CYMBALTA) capsule 60 mg  60 mg Oral QHS  glucose chewable tablet 16 g  4 Tab Oral PRN  
 dextrose (D50W) injection syrg 12.5-25 g  12.5-25 g IntraVENous PRN  
 glucagon (GLUCAGEN) injection 1 mg  1 mg IntraMUSCular PRN  
 dilTIAZem (CARDIZEM) IR tablet 60 mg  60 mg Oral AC&HS  sodium chloride (NS) flush 5-10 mL  5-10 mL IntraVENous Q8H  
 sodium chloride (NS) flush 5-10 mL  5-10 mL IntraVENous PRN  
 apixaban (ELIQUIS) tablet 5 mg  5 mg Oral BID  levothyroxine (SYNTHROID) tablet 75 mcg  75 mcg Oral ACB  cefepime (MAXIPIME) 2 g in 0.9% sodium chloride (MBP/ADV) 100 mL  2 g IntraVENous Q12H  
 
 
 
 LAB AND IMAGING FINDINGS:  
 
Lab Results Component Value Date/Time WBC 6.7 11/14/2018 03:20 AM  
 HGB 10.5 (L) 11/14/2018 03:20 AM  
 PLATELET 890 (H) 95/76/6094 03:20 AM  
 
Lab Results Component Value Date/Time Sodium 143 11/14/2018 03:20 AM  
 Potassium 3.8 11/14/2018 03:20 AM  
 Chloride 108 11/14/2018 03:20 AM  
 CO2 20 (L) 11/14/2018 03:20 AM  
 BUN 10 11/14/2018 03:20 AM  
 Creatinine 0.94 11/14/2018 03:20 AM  
 Calcium 9.8 11/14/2018 03:20 AM  
 Magnesium 1.5 (L) 11/13/2018 02:54 PM  
 Phosphorus 4.1 11/13/2018 10:56 PM  
  
Lab Results Component Value Date/Time AST (SGOT) 44 (H) 11/13/2018 02:54 PM  
 Alk. phosphatase 67 11/13/2018 02:54 PM  
 Protein, total 7.5 11/13/2018 02:54 PM  
 Albumin 3.0 (L) 11/13/2018 02:54 PM  
 Globulin 4.5 (H) 11/13/2018 02:54 PM  
 GGT 57 05/04/2018 10:26 AM  
 
Lab Results Component Value Date/Time INR 1.2 (H) 11/13/2018 02:54 PM  
 Prothrombin time 12.4 (H) 11/13/2018 02:54 PM  
 aPTT 40.2 (H) 11/13/2018 02:54 PM  
  
Lab Results Component Value Date/Time Iron 55 05/27/2016 12:01 PM  
 TIBC 255 05/27/2016 12:01 PM  
 Iron % saturation 22 05/27/2016 12:01 PM  
 Ferritin 780 (H) 05/27/2016 12:01 PM  
  
No results found for: PH, PCO2, PO2 No components found for: [place] No results found for: CPK, CKMB Total time: 50 min Counseling / coordination time, spent as noted above: 40 min 
> 50% counseling / coordination?: yes Prolonged service was provided for  []30 min   []75 min in face to face time in the presence of the patient, spent as noted above. Time Start:  
Time End:  
Note: this can only be billed with 83688 (initial) or 33019 (follow up). If multiple start / stop times, list each separately.

## 2018-11-14 NOTE — PROGRESS NOTES
[facility] MEDICINE RESIDENCY PROGRAM  
Daily Progress Note Date: 11/14/2018 PCP: Danette Wilkins MD  
 
Assessment/Plan:  
Angelica Torres is a 75 y.o. male who is admitted for AMS 2/2 UTI vs CVA/TIA. 
  
AMS 2/2 UTI vs CVA/TIA In the setting of recent fall 11/13/18. CT head, CT cerv spine, and CXR showed no acute processes. CTA head neck prelim report no acute thrombosis/ischemia. +UA. Ammonia 21, UDS neg, ethyl alcohol <10. Trop 0.06. PT 12.4 INR 1.2.  
- Stroke workup: Neuro checks q4hrs, NIH stroke scale assessment, Neuro consult. Pending MRI brain.  
- Pending Echo 
- Pending UCx, BCx 
- Trend Trops - IV Cefepime for UTI 
- MIVF  
- Soft mechanical diet 
- Daily CBC, CMP Tachycardia HR 120s on monitor. Tachycardic on physical exam. Cardizem scheduled for 9AM. Metoprolol held for low BP trend - Given AM cardizem dose earlier, monitor - BPs 107/64 (MAP 78). Maintain MAPs >65 Hypoglycemia Gluc 67 this AM. Pt refusing PO intake. - D50 given, re-evaluate gluc. - Hypoglycemia protocols ordered - Soft mechanical diet, encourage PO intake Anemia Hgb today 10.5 (POA 11.8); BL 10-12 (11/07/18: Hgb 10.4). POA Hgb value possibly 2/2 volume contraction. Followed by Heme/Onc - consulted Dr. Keshav Key, appreciate recs. No recent BM; no bleeding per rectum/hematemesis/gross hematuria. - Daily CBC Metastatic Stage 4 Latoya Key. On palliative chemo. - Consult Heme/Onc 
  
Hx CHF Last Echo 09/15/2017 EF 45-50% - Resume home cardizem - Trend Trops 
- Echo 
  
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 No lower leg swelling/ calf pain today.  
-Continue home Apixiban 5mg BID  
  
History of A.fib  
- Remote tele - Resume home Cardizem - Resume home Apixaban 5mg daily  
- Resume home ASA 81mg daily  
- Will monitor heart rate/rhythm 
  
Hypothyroidism TSH 4.66. 
- Resume home Levothyroxine 75mcg daily  
- Pending Free T4 
  
 Arthritic pain Patient diagnosed with arthiritis, on Tramadol and Tylenol prn for pain. - Continue Tylenol 500mg q6hrs PRN for pain  
  
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  
- Resume home Cymbalta 60mg daily  
  
FEN/GI - Soft mechanical diet. NS at 110 mL/hr. Activity - Ambulate with assistance DVT prophylaxis - None indicated at this time GI prophylaxis - Not indicated at this time Fall prophylaxis - Fall precautions ordered. Disposition - Admit to Remote Telemetry. Plan to d/c to TBD. Consulting PT/OT/CM Code Status - Full, discussed with patient's daughter Next of Kin Name and Contact Daughter: Ms. Anuj Dos Santos ([phone]) 
  
Patient Samara Noel will be discussed Dr. Maddison Kebede MD 
Family Medicine Resident CC: AMS Subjective No acute events overnight. Patient continues to mumble, difficult to comprehend speech. Denies fever, chills, chest pain, palpitations, shortness of breath, abdominal pain, nausea and vomiting, and LE edema. Inpatient Medications Current Facility-Administered Medications Medication Dose Route Frequency  0.9% sodium chloride infusion  115 mL/hr IntraVENous CONTINUOUS  
 acetaminophen (TYLENOL) tablet 500 mg  500 mg Oral Q6H PRN  
 DULoxetine (CYMBALTA) capsule 60 mg  60 mg Oral QHS  metoprolol (LOPRESSOR) injection 5 mg  5 mg IntraVENous NOW  sodium chloride (NS) flush 5-10 mL  5-10 mL IntraVENous Q8H  
 sodium chloride (NS) flush 5-10 mL  5-10 mL IntraVENous PRN  
 apixaban (ELIQUIS) tablet 5 mg  5 mg Oral BID  aspirin delayed-release tablet 81 mg  81 mg Oral DAILY  dilTIAZem CD (CARDIZEM CD) capsule 240 mg  240 mg Oral DAILY  levothyroxine (SYNTHROID) tablet 75 mcg  75 mcg Oral ACB  cefepime (MAXIPIME) 2 g in 0.9% sodium chloride (MBP/ADV) 100 mL  2 g IntraVENous Q12H Current Outpatient Medications Medication Sig  tamsulosin (FLOMAX) 0.4 mg capsule Take 0.4 mg by mouth nightly.  diclofenac (VOLTAREN) 1 % gel Apply 2 g to affected area four (4) times daily as needed.  DULoxetine (CYMBALTA) 60 mg capsule Take 60 mg by mouth nightly.  apixaban (ELIQUIS) 5 mg tablet TAKE ONE TABLET BY MOUTH TWICE DAILY  dilTIAZem CD (CARDIZEM CD) 240 mg ER capsule Take 1 Cap by mouth daily.  traMADol (ULTRAM) 50 mg tablet Take 1 Tab by mouth every six (6) hours as needed for Pain. Max Daily Amount: 200 mg.  
 lidocaine-prilocaine (EMLA) topical cream Apply  to affected area as needed for Pain (Apply 30-60 min. prior to having your port accessed).  diphenhydrAMINE (BENADRYL) 25 mg tablet Take 25 mg by mouth nightly as needed for Sleep.  levocetirizine (XYZAL) 5 mg tablet Take 5 mg by mouth daily as needed for Allergies.  magic mouthwash solution Swish and spit 15-30 mL every 2-4 hours as needed for mouth pain. May swallow for throat pain. Magic mouth wash Maalox Lidocaine 2% viscous Diphenhydramine oral solution Pharmacy to mix equal portions of ingredients to a total volume as indicated in the dispense amount.  prochlorperazine (COMPAZINE) 10 mg tablet Take 1 Tab by mouth every six (6) hours as needed for Nausea.  ondansetron hcl (ZOFRAN) 8 mg tablet Take 1 Tab by mouth every eight (8) hours as needed for Nausea.  aspirin delayed-release 81 mg tablet Take 81 mg by mouth daily.  levothyroxine (SYNTHROID) 75 mcg tablet Take 1 Tab by mouth Daily (before breakfast).  acetaminophen (TYLENOL EXTRA STRENGTH) 500 mg tablet Take  by mouth every six (6) hours as needed for Pain. Allergies Allergies Allergen Reactions  Lisinopril Angioedema  Hydrochlorothiazide Hives and Swelling  Sulfa (Sulfonamide Antibiotics) Hives Objective Vitals: 
Visit Vitals /64 Pulse (!) 121 Temp 98.8 °F (37.1 °C) Resp 22 Ht 5' 6\" (1.676 m) Wt 165 lb (74.8 kg) SpO2 96% BMI 26.63 kg/m² Temp (24hrs), Av.2 °F (36.8 °C), Min:97.6 °F (36.4 °C), Max:98.8 °F (37.1 °C) O2 Flow Rate (L/min): 2 l/min O2 Device: Room air I/O: 
 
Intake/Output Summary (Last 24 hours) at 2018 0444 Last data filed at 2018 1611 Gross per 24 hour Intake 500 ml Output  Net 500 ml Last 3 shifts: 
   0701 -  1900 In: 500 [I.V.:500] Out: - Physical Exam:General: No acute distress. Alert. Cooperative. Head: Normocephalic. Atraumatic. Throat: Dry lips, crusted, bottom lip: dark crusted appearance. Respiratory: Decreased breath sounds b/l. Normal effort. Cardiovascular: Tachycardic, regular rhytm. Normal S1,S2. GI: + bowel sounds. Nontender. Extremities: No edema. Neuro:                          Disoriented, mumbling incoherent speech. Skin:                             Warm and dry. No rashes or lesions Recent Labs 18 
0320 18 
1454 WBC 6.7 6.9 HGB 10.5* 11.8* HCT 32.7* 36.3*  
* 457* Recent Labs 18 
0320 18 
2256 18 
1454   --  141  
K 3.8  --  4.3   --  104 CO2 20*  --  22  
GLU 62*  --  72 BUN 10  --  11  
CREA 0.94  --  1.05  
CA 9.8  --  10.2* MG  --   --  1.5* PHOS  --  4.1  --   
ALB  --   --  3.0*  
SGOT  --   --  44* ALT  --   --  21 INR  --   --  1.2* Imaging/procedures: CTA head neck wo contrast 10/13/18 (prelim): No acute thrombosis or significant stenosis. Left sphenoid sinus disease. Signed by: Sia Retana MD 
Resident, Family Medicine 18

## 2018-11-14 NOTE — PROGRESS NOTES
Physical Therapy Note:  Orders received and acknowledged. Chart reviewed. OT attempted to see pt this pm for eval. Stating pt is very confused and mumbling non-stop with periods of lucide words. RN notified and aware. Advised to  follow up tomorrow with OT/PT evals as pt is able to actively participate.  
Rekha Ford, PT

## 2018-11-14 NOTE — PROGRESS NOTES
Patient lying in bed resting. No family present. Will open eyes to verbal stimuli and answers basic questions with short one word replies. Says he is comfortable. Provided ministry of presence. Spoke words of encouragement and hope. Asked patient to have nurse contact a  if desired and patient agreed. Will continue to follow up as needed and upon request. 
 
Visited by Rev. Oziel Martinez, [facility]  paging service: [phone])

## 2018-11-15 NOTE — PROGRESS NOTES
requested for compassionate extubation of patient. Family at bedside. Provided ministry of presence, emphatic listening. Shortly after, patient passed away quietly while being comforted by family. Provided presence and words of comfort, support, and hope. After family spent time giving voice to their grief and bereavement, after they had told stories of their loved one, and after they had comforted one another, they left the unit as a group. Visited by Rev. Eve Schneider, [facility]  paging service: [phone])

## 2018-11-15 NOTE — PROGRESS NOTES
[address] [facility] [id number] with [address] [facility] Resident Progress Note in Brief S: Evaluating patient for abnormal rhythm overnight, with HR in the 110-120 ranges that would then increase to the 160's for seconds to minutes at a time. On evaluation patient was in distress and not answering questions appropriately. While in room patient developed bradycardia before losing pulse and became unresponsive at which time Ritchie Gaytan was called and CPR/ACLS was immediately started. During intubation noted that patient had copious aspirate, up to 1 Liter. Patient underwent multiple rounds of CPR with 5 doses of epinephrine and bicarbonate x 1. Patient then achieved ROSC, though remained unresponsive, and was transferred to ICU. Spoke with daughter of patient who is POA, discussed case. Continuing care but patient has been made DNR at this time. Will continue to follow closely, family discussion about goals of care when arriving. Fam Franks MD 
Family Medicine Resident

## 2018-11-15 NOTE — PROGRESS NOTES
Family Medicine Death Pronouncement 1:13 PM 
 
Called to examine patient who has . No response to verbal and tactile stimuli. No respiratory effort. Absent heart sounds and pulses. Pupils fixed and dilated. Patient pronounced dead at 1:12 PM hours. Signed by: Alessandra Huitron MD 
Resident, PGY-1 November 15, 2018

## 2018-11-15 NOTE — PROGRESS NOTES
[facility] at [address], [address] [place] [address] W: [phone]  F: [phone] Reason for Visit:  
Angelica Torres is a 75 y.o. male who is seen in consultation at the request of Dr. Anthony Khoury  for evaluation of pt on palliative chemo. Hematology / Oncology Treatment History: · CXR 7/11/2017: Mediastinal lymphadenopathy with left hilar mass. · CT Chest 7/14/2017: Bulky mediastinal and left hilar lymphadenopathy. Bilateral pulmonary masses and nodules · PET-CT 7/24/2017: Right parietal mass. Hypermetabolic right supraclavicular, right paratracheal, AP window, prevascular, precarinal, subcarinal and left hilar lymphadenopathy. Hypermetabolic pulmonary nodules bilaterally. · MRI Brain 7/24/2017: Junction right posterior temporal lobe and occipital lobe 2.7 cm mass likely metastasis from lung cancer. No additional acute abnormalities · FNA supraclavicular node 7/28/2017: Metastatic squamous cell carcinoma, PD-L1 5%, BRAF negative, EGFR negative, ALK & ROS-1 negative · Stage IV Non-small cell lung cancer (Squamous Cell) · Gamma knife by Dr. Ruy Valle 8/15/2017 · Palliative chemotherapy with carboplatin and gemcitabine beginning 8/25/2017 - 11/3/2017 · Maintenance gemcitabine x4 cycles from 11/17/2017 to 1/26/2018 · Clinical trial OG [id number]: [id number] (PROCESS II) from 3/23/2018 - 5/4/2018 · Palliative immunotherapy with Pembrolizumab beginning 5/25/2018 History of Present Illness:  
 
Mr Mj Ayala was admitted on 11/13/2018 from the ED when he presented per family with c/o increased confusion and reported falls x 2 at home. ED work up revealed 's; cloudy UA, CT head, cervical spine and CXR unrevealing. Admitted for further eval and management. Recent hospitalization [facility] (11/06-11/07) for hypotension,  
Diarrhea.  
  
Mr Mj Ayala is difficult to understand 2/2 to speaking softly and appears to be mumbling; but is able to state that he is in 4 [facility]; the yr is 2018; thought the month was Oct and it was Tues but admitted that he he sometimes gets confused. Reports he fell at home; did not hit his head and landed on carpet. Aware he was confused but thinks he is getting better. 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 Denies any additional pain or SOB. No family at bedside. Pt reports son will be back later. Interval History:  
 
Transferred to ICU early this am  after developing bradycardia; losing pulse and becoming unresponsive. CPR/ACLS initiated; intubated. Pt remains intubated. Discussed with Family Practice, Intensivist and nursing. Awaiting for family to arrive. No family at bedside Past Medical History:  
Diagnosis Date  Anemia  Cardiomyopathy ([facility] [address] mild LV regional/global dysfunction, likely ischemic etiology  Coronary artery calcification seen on CT scan  Dysfunction, erectile  Hyperlipidemia 5/18/2010  Hypertension  Metastatic lung cancer (metastasis from lung to other site) [facility]) brain  Paroxysmal atrial flutter ([facility] [address] History reviewed. No pertinent surgical history. Social History Tobacco Use  Smoking status: Former Smoker Packs/day: 1.00 Years: 45.00 Pack years: 45.00 Types: Cigarettes  Smokeless tobacco: Never Used Substance Use Topics  Alcohol use: Yes Alcohol/week: 1.5 oz Types: 3 Cans of beer per week Family History Problem Relation Age of Onset  Stroke Father  Diabetes Sister  Diabetes Brother  Heart Disease Sister Current Facility-Administered Medications Medication Dose Route Frequency  NOREPINephrine (LEVOPHED) 8 mg in dextrose 5% 250 mL infusion  2-60 mcg/min IntraVENous TITRATE  hydrocortisone Sod Succ (PF) (SOLU-CORTEF) injection 100 mg  100 mg IntraVENous Q8H  
  fentaNYL (PF) 900 mcg/30 ml infusion soln  0-200 mcg/hr IntraVENous TITRATE  propofol (DIPRIVAN) infusion  0-50 mcg/kg/min IntraVENous TITRATE  LORazepam (ATIVAN) injection 4 mg  4 mg IntraVENous ONCE  potassium chloride 10 mEq in 100 ml IVPB  10 mEq IntraVENous Q1H  
 magnesium sulfate 2 g/50 ml IVPB (premix or compounded)  2 g IntraVENous ONCE  
 dextrose 5% - 0.45% NaCl with KCl 40 mEq/L infusion   IntraVENous CONTINUOUS  
 metroNIDAZOLE (FLAGYL) IVPB premix 500 mg  500 mg IntraVENous Q8H  
 PHENYLephrine (SRIDEVI-SYNEPHRINE) 30 mg in 0.9% sodium chloride 250 mL infusion   mcg/min IntraVENous TITRATE  chlorhexidine (PERIDEX) 0.12 % mouthwash 15 mL  15 mL Oral BID  famotidine (PF) (PEPCID) 20 mg in sodium chloride 0.9% 10 mL injection  20 mg IntraVENous Q24H  
 vancomycin (VANCOCIN) 1,750 mg in 0.9% sodium chloride 500 mL IVPB  1,750 mg IntraVENous ONCE  
 acetaminophen (TYLENOL) tablet 500 mg  500 mg Oral Q6H PRN  
 DULoxetine (CYMBALTA) capsule 60 mg  60 mg Oral QHS  glucose chewable tablet 16 g  4 Tab Oral PRN  
 dextrose (D50W) injection syrg 12.5-25 g  12.5-25 g IntraVENous PRN  
 glucagon (GLUCAGEN) injection 1 mg  1 mg IntraMUSCular PRN  
 magic mouthwash cpd (with sucralfate)  5 mL Oral TIDAC  sodium chloride (NS) flush 5-10 mL  5-10 mL IntraVENous Q8H  
 sodium chloride (NS) flush 5-10 mL  5-10 mL IntraVENous PRN  
 apixaban (ELIQUIS) tablet 5 mg  5 mg Oral BID  levothyroxine (SYNTHROID) tablet 75 mcg  75 mcg Oral ACB  cefepime (MAXIPIME) 2 g in 0.9% sodium chloride (MBP/ADV) 100 mL  2 g IntraVENous Q12H Allergies Allergen Reactions  Lisinopril Angioedema  Hydrochlorothiazide Hives and Swelling  Sulfa (Sulfonamide Antibiotics) Hives Review of Systems: A complete review of systems was obtained, negative except as described above. Physical Exam:  
 
Visit Vitals BP (!) 75/46 Pulse (!) 103 Temp (!) 101.7 °F (38.7 °C) Resp 25  
 Ht 5' 6\" (1.676 m) Wt 74.9 kg (165 lb 3.2 oz) SpO2 100% BMI 26.66 kg/m² ECOG PS: 4 General: Intubated;  
Eyes: closed HENT: Atraumatic with normal appearance of ears and nose; OP clear Neck: Supple; no thyromegaly Lymphatic: No cervical, supraclavicular, or axillary adenopathy Resp: intubated CV: Port noted to chest area; tachy rate, regular rhythm, no murmurs, no peripheral edema GI: Soft, nontender, nondistended, no masses, no hepatomegaly, no splenomegaly MS: Digits without clubbing or cyanosis. Skin: No rashes, ecchymoses, or petechiae. Normal temperature, turgor, and texture. Neuro/Psych: intubated Results:  
 
Lab Results Component Value Date/Time WBC 7.5 11/15/2018 08:18 AM  
 HGB 8.4 (L) 11/15/2018 08:18 AM  
 HCT 27.3 (L) 11/15/2018 08:18 AM  
 PLATELET 622 21/97/6610 08:18 AM  
 MCV 94.5 11/15/2018 08:18 AM  
 ABS. NEUTROPHILS 3.4 11/13/2018 02:54 PM  
 
Lab Results Component Value Date/Time Sodium 149 (H) 11/15/2018 08:18 AM  
 Potassium 2.5 (LL) 11/15/2018 08:18 AM  
 Chloride 115 (H) 11/15/2018 08:18 AM  
 CO2 20 (L) 11/15/2018 08:18 AM  
 Glucose 222 (H) 11/15/2018 08:18 AM  
 BUN 13 11/15/2018 08:18 AM  
 Creatinine 1.75 (H) 11/15/2018 08:18 AM  
 GFR est AA 46 (L) 11/15/2018 08:18 AM  
 GFR est non-AA 38 (L) 11/15/2018 08:18 AM  
 Calcium 9.1 11/15/2018 08:18 AM  
 Glucose (POC) 238 (H) 11/15/2018 08:15 AM  
 
Lab Results Component Value Date/Time Bilirubin, total 0.6 11/15/2018 08:18 AM  
 ALT (SGPT) 18 11/15/2018 08:18 AM  
 AST (SGOT) 45 (H) 11/15/2018 08:18 AM  
 Alk. phosphatase 45 11/15/2018 08:18 AM  
 Protein, total 4.9 (L) 11/15/2018 08:18 AM  
 Albumin 1.7 (L) 11/15/2018 08:18 AM  
 Globulin 3.2 11/15/2018 08:18 AM  
 
Lab Results Component Value Date/Time  Iron % saturation 22 05/27/2016 12:01 PM  
 TIBC 255 05/27/2016 12:01 PM  
 Ferritin 780 (H) 05/27/2016 12:01 PM  
 Vitamin B12 694 05/27/2016 12:01 PM  
 TSH 4.66 (H) 11/13/2018 02:54 PM  
 Lipase 78 11/06/2018 02:16 PM  
 Hep C Virus Ab <0.1 03/21/2012 10:00 AM  
 HIV 1/O/2 Abs <1.00 03/21/2012 10:00 AM  
 
Lab Results Component Value Date/Time INR 1.2 (H) 11/13/2018 02:54 PM  
 aPTT 40.2 (H) 11/13/2018 02:54 PM  
 Fibrinogen 592 (H) 02/14/2018 08:45 AM  
 
Lab Results Component Value Date/Time  
 Prostate Specific Ag 7.5 (H) 10/18/2016 01:36 PM  
 Prostate Specific Ag 1.7 10/13/2010 11:04 AM  
 
11/13/2018 CT HEAD WO CONT IMPRESSION:  
No acute intracranial abnormality. Stable left sphenoid sinus opacification. 11/13/2018 CT SPINE CERV WO  
IMPRESSION: 
No acute abnormality. Stable diffuse cervical degenerative changes most advanced 
at C5-6 where there is moderate spinal canal stenosis and moderate to severe 
bilateral foraminal stenosis. 11/13/2018 XR CHEST IMPRESSION: 
No acute process. Stable exam. 
 
11/14/2018 MRI BRAIN W WO CONT Pending 11/13/2018 CTA HEAD NECK W CONT IMPRESSION:  
  
CTA Head: 1. No evidence of significant stenosis or aneurysm. 
  
CTA Neck: 1. No evidence of flow-limiting stenosis. 2. Mild stenosis (less than 50%) by NASCET criteria of the bilateral proximal 
internal carotid arteries. 11/14/2018 MRI BRAIN 
IMPRESSION:  
1. No evidence of acute intracranial abnormality. 2. No evidence of intracranial metastases. Mild gliosis and hemosiderin staining 
at site of previously treated right periatrial metastasis. Assessment and Recommendations: 1. Metastatic non-small cell lung cancer (squamous cell) EGFR, ALK, ROS1, BRAF negative He has disease within his chest and brain. His cancer is not curable and management is with palliative intent. He is s/p gamma knife to CNS disease. He is now receiving second line therapy with pembrolizumab, an immunotherapy agent.  
  
He had been tolerating therapy well, and recent CT scan from 11/2 shows excellent disease control.   Received C8 on 10/19/2018 was due to next cycle on 11/09 but missed this appt. Therapy on hold now due to his acute illness. Family now decided to withdraw care at this time. Continue to offer support to family. 2. AMS Intubated Unclear etiology. Possibly from UTI, on abx. Neurology consulted: MRI brain unrevealing; was undergoing EEG at time of code 3. Brain metastasis S/P gamma knife. Followed with Dr. Betzy Whyte; MRI Brain: no evidence of intracranial metastases 4. Atrial fibrillation, CHF Cardiology following as out patient. On apixaban. 5. DVT 9/15/2017 Previously on Lovenox BID but unable to tolerate injections. Continue Apixaban 5mg BID. He should continue this long term in the setting of malignancy. 6. 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 Increase Cymbalta to 60mg daily. 7. Goals of care Palliative team following Family (son and daughter) have decided to withdraw care; stating that their dad would not want to be connected to all the machines. Awaiting additional family to arrive. Continue to offer supportive measures for family. Plan reviewed with Dr Raissa Sanchez Signed By: Heidi Keller, ERLIN

## 2018-11-15 NOTE — PROGRESS NOTES
Palliative Medicine Update: 
 
Notified by FP that family is ready for transition to comfort care. Confirmed with family, reiterated the process and the goal of comfort. Family wishes to proceed with compassionate extubation. Comfort care order set placed. Discussed with bedside nurse. Plan to up titrate fentanyl drip for symptoms, continue diprivan. Vasopressors discontinued. Will be readily available to family and nursing during transition.

## 2018-11-15 NOTE — PROGRESS NOTES
contacted by nurse, daughter and son-in-law arrived and are at bedside. Other family are on their way. Provided ministry of presence and spiritual listening. Spoke words of comfort and hope. Supported family as they discussed end of life care and discussed care with provider and palliative care team.  Family appeared comforted as a result of this visit and continue to comfort and encourage one another. Will continue to follow up as needed and upon request as able. Visited by Rev. Tristen Scott, [address]  paging service: [phone])

## 2018-11-15 NOTE — PROGRESS NOTES
Palliative Medicine Consult [place]: [phone]) Patient Name: Patricia Carroll YOB: 1945 Date of Initial Consult: 11/14/2018 Reason for Consult: Care decisions Requesting Provider: Renaldo Kirkland Primary Care Physician: Rickey Frausto MD 
 
 SUMMARY:  
Patricia Carroll is a 75 y. o. 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 Patient presented to ED with complaints of lethargy, confusion, and recent fall. Was admitted overnight last week on 11/6 for hypotension. ED eval revealed chest xray with no acute process, CT head with no acute intracranial abnormality, CT head/neck with no evidence of significant stenosis or aneurysm, EKG with afib with RVR and urinalysis consistent with UTI. Started on cefepime and urine culture and blood cultures pending. Neurology consulted for altered mental status and MRI and EEG pending. Cardiology consulted for afib with RVR and recommended IV dig for heart rate, and changed diltiazem regimen to short acting given low BP. Repeat echo pending. Hx of metastatic non-small cell lung cancer, diagnosed in 7/2017. Underwent gamma knife treatment for brain mets in 8/2017. S/P chemotherapy 8/2017-1/2018, clinical trial 3/2018-5/2018, and has been on immunotherapy [employer] [facility]) since 5/25/2018. Missed scheduled dose on 11/9 due to feeling poorly. Last CT indicative of response to treatment. Current medical issues leading to Palliative Medicine involvement include: Care decisions. SH: Single, lives alone. Has daughter and son that live locally. Interim History--> 
11/15: Overnight, remained in afib with RVR. This morning, patient more confused and in distress. Became more tachycardic and then bradycardic.  Subsequently becoming pulseless and underwent multiple rounds of CPR and received 5 dose of epinephrine. During intubation, 1 liter of aspirate removed from airway. Antibiotic therapy broadened to included vancomycin and flagyl (with concern for aspiration). Started on stress dose steroids, vasopressor support. Concern for seizures and given loading dose of keppra 1 gm PALLIATIVE DIAGNOSES:  
 
1. Physical debility 2. Goals of care 3. DNR 
4. ACP 5. Altered mental status 6. Metastatic NSCLC PLAN:  
1. Met with family at bedside (daughter, son, and their respective spouses) 2. Patient intubated, unresponsive, intermittent jerking. On propofol 10 mcg/kg/min, levophed at 20 mcg/min, and fentanyl 50 mcg/hr. 3. Goals of care--> Discussed overnight events, family very tearful and aware of patient's critical condition. . Have already elected for patient to be DNR. Both [facility] confirm that patient would not want to be prolonged if no chance for meaningful recovery. Expecting several family members to arrive to the hospital within the hour (step children and a cousin) to see the patient. After they arrive, will plan on transition to comfort care. Discussed what this process would look like: compassionate extubation, discontinuing therapies including pressors and any other artificially prolonging measures, and liberalizing medications for pain, anxiety and shortness of breath. Prognosis estimated in minutes to short hours with some degree of uncertainty once we transition. FP resident will notify me with family has arrived and I will place comfort care oddest and assist in transition at that time. 4. ACP--> Pt has AMD on file which appoints dtr Greg Patrica and/or son Karen Gonsalez as Medical POAs, either may act. Document specifies pt's wishes in the event of terminal condition or irreversible coma (would not want life prolonged). 5. Initial consult note routed to primary continuity provider 6. Communicated plan of care with: Palliative IDT GOALS OF CARE / TREATMENT PREFERENCES:  
 
GOALS OF CARE: 
Patient/Health Care Proxy Stated Goals: (await arrival of family members and then transition to comfort care) TREATMENT PREFERENCES:  
Code Status: DNR Advance Care Planning: 
[x] [facility] Interdisciplinary Team has updated the ACP Navigator with [address] and Patient Capacity Primary Decision Maker (Health Care Agent): Linda Mascorro (equal authority with Jaquelin Goldman, either may act) Relationship to patient: Dtr Phone number: [phone] [x] Named in a scanned document  
[] Legal Next of Kin 
[] Guardian 
  
Secondary Decision Maker ([address]): Hayley Tejeda (equal authority with [place], either may act) Relationship to patient: Son Phone number:  [phone] [x] Named in a scanned document  
[] Legal Next of Kin 
[] Guardian 
  
ACP documents you current have include: 
[x] Advance Directive or Living Will 
[] Durable Do Not Resuscitate 
[] Physician Orders for Scope of Treatment (POST) [x] Medical Power of  
[] Other Medical Interventions: (continue current interventions until family arrives; DNR) Other Instructions: Other: As far as possible, the palliative care team has discussed with patient / health care proxy about goals of care / treatment preferences for patient. HISTORY:  
 
History obtained from: chart CHIEF COMPLAINT: s/p code blue HPI/SUBJECTIVE: The patient is:  
[] Verbal and participatory [x] Non-participatory due to: intubated and sedated Patient intubated, unresponsive, intermittent jerking. On propofol 10 mcg/kg/min, levophed at 20 mcg/min, and fentanyl 50 mcg/hr. Clinical Pain Assessment (nonverbal scale for severity on nonverbal patients):  
Clinical Pain Assessment Severity: 0 Activity (Movement): Lying quietly, normal position Duration: for how long has pt been experiencing pain (e.g., 2 days, 1 month, years) Frequency: how often pain is an issue (e.g., several times per day, once every few days, constant) FUNCTIONAL ASSESSMENT:  
 
Palliative Performance Scale (PPS): PPS: 10 PSYCHOSOCIAL/SPIRITUAL SCREENING:  
 
Palliative IDT has assessed this patient for cultural preferences / practices and a referral made as appropriate to needs (Cultural Services, Patient Advocacy, Ethics, etc.) Any spiritual / Mormonism concerns: 
[] Yes /  [x] No 
 
Caregiver Burnout: 
[] Yes /  [x] No /  [] No Caregiver Present Anticipatory grief assessment:  
[x] Normal  / [] Maladaptive ESAS Anxiety: Anxiety: 0 
 
ESAS Depression:    
 
 
 REVIEW OF SYSTEMS:  
 
Positive and pertinent negative findings in ROS are noted above in HPI. The following systems were [x] reviewed / [] unable to be reviewed as noted in HPI Other findings are noted below. Systems: constitutional, ears/nose/mouth/throat, respiratory, gastrointestinal, genitourinary, musculoskeletal, integumentary, neurologic, psychiatric, endocrine. Positive findings noted below. Modified ESAS Completed by: provider Fatigue: 4 Drowsiness: 10(sedated) Pain: 0 Anxiety: 0 Nausea: 0 Anorexia: 3 Dyspnea: 0 PHYSICAL EXAM:  
 
From RN flowsheet: 
Wt Readings from Last 3 Encounters:  
11/15/18 165 lb 3.2 oz (74.9 kg) 11/07/18 167 lb 1.6 oz (75.8 kg) 10/19/18 175 lb 9.6 oz (79.7 kg) Blood pressure (!) 89/68, pulse 80, temperature (!) 101.7 °F (38.7 °C), resp. rate 20, height 5' 6\" (1.676 m), weight 165 lb 3.2 oz (74.9 kg), SpO2 100 %. Pain Scale 1: Adult Nonverbal Pain Scale Pain Intensity 1: 0 Last bowel movement, if known:  
 
Constitutional: intubated and sedated Eyes: pupils equal, anicteric ENMT: no nasal discharge, moist mucous membranes Cardiovascular: regular rhythm, distal pulses intact Respiratory: breathing not labored, symmetric Gastrointestinal: soft non-tender, +bowel sounds Musculoskeletal: no deformity, no tenderness to palpation Skin: warm, dry Neurologic: unresponsive, intermittent jerking Psychiatric: flat affect, no hallucinations Other: 
 
 
 HISTORY:  
 
Principal Problem: 
  Altered mental status (11/13/2018) Active Problems: 
  Benign hypertensive heart disease with HF (heart failure) ([facility] [address] (10/11/2011) Atrial fibrillation ([facility] [address] (9/16/2017) Dyslipidemia (11/13/2017) Acquired hypothyroidism (11/6/2018) UTI (urinary tract infection) (11/13/2018) Physical debility () Goals of care, counseling/discussion () 
 
  ACP (advance care planning) () Past Medical History:  
Diagnosis Date  Anemia  Cardiomyopathy ([facility] [address] mild LV regional/global dysfunction, likely ischemic etiology  Coronary artery calcification seen on CT scan  Dysfunction, erectile  Hyperlipidemia 5/18/2010  Hypertension  Metastatic lung cancer (metastasis from lung to other site) [facility]) brain  Paroxysmal atrial flutter ([facility] [address] History reviewed. No pertinent surgical history. Family History Problem Relation Age of Onset  Stroke Father  Diabetes Sister  Diabetes Brother  Heart Disease Sister History reviewed, no pertinent family history. Social History Tobacco Use  Smoking status: Former Smoker Packs/day: 1.00 Years: 45.00 Pack years: 45.00 Types: Cigarettes  Smokeless tobacco: Never Used Substance Use Topics  Alcohol use: Yes Alcohol/week: 1.5 oz Types: 3 Cans of beer per week Allergies Allergen Reactions  Lisinopril Angioedema  Hydrochlorothiazide Hives and Swelling  Sulfa (Sulfonamide Antibiotics) Hives Current Facility-Administered Medications Medication Dose Route Frequency  NOREPINephrine (LEVOPHED) 8 mg in dextrose 5% 250 mL infusion  2-60 mcg/min IntraVENous TITRATE  hydrocortisone Sod Succ (PF) (SOLU-CORTEF) injection 100 mg  100 mg IntraVENous Q8H  
 fentaNYL (PF) 900 mcg/30 ml infusion soln  0-200 mcg/hr IntraVENous TITRATE  propofol (DIPRIVAN) infusion  0-50 mcg/kg/min IntraVENous TITRATE  LORazepam (ATIVAN) injection 4 mg  4 mg IntraVENous ONCE  potassium chloride 10 mEq in 100 ml IVPB  10 mEq IntraVENous Q1H  
 dextrose 5% - 0.45% NaCl with KCl 40 mEq/L infusion   IntraVENous CONTINUOUS  
 metroNIDAZOLE (FLAGYL) IVPB premix 500 mg  500 mg IntraVENous Q8H  
 PHENYLephrine (SRIDEVI-SYNEPHRINE) 30 mg in 0.9% sodium chloride 250 mL infusion   mcg/min IntraVENous TITRATE  chlorhexidine (PERIDEX) 0.12 % mouthwash 15 mL  15 mL Oral BID  famotidine (PF) (PEPCID) 20 mg in sodium chloride 0.9% 10 mL injection  20 mg IntraVENous Q24H  
 acetaminophen (TYLENOL) tablet 500 mg  500 mg Oral Q6H PRN  
 DULoxetine (CYMBALTA) capsule 60 mg  60 mg Oral QHS  glucose chewable tablet 16 g  4 Tab Oral PRN  
 dextrose (D50W) injection syrg 12.5-25 g  12.5-25 g IntraVENous PRN  
 glucagon (GLUCAGEN) injection 1 mg  1 mg IntraMUSCular PRN  
 magic mouthwash cpd (with sucralfate)  5 mL Oral TIDAC  sodium chloride (NS) flush 5-10 mL  5-10 mL IntraVENous Q8H  
 sodium chloride (NS) flush 5-10 mL  5-10 mL IntraVENous PRN  
 apixaban (ELIQUIS) tablet 5 mg  5 mg Oral BID  levothyroxine (SYNTHROID) tablet 75 mcg  75 mcg Oral ACB  cefepime (MAXIPIME) 2 g in 0.9% sodium chloride (MBP/ADV) 100 mL  2 g IntraVENous Q12H  
 
 
 
 LAB AND IMAGING FINDINGS:  
 
Lab Results Component Value Date/Time WBC 7.5 11/15/2018 08:18 AM  
 HGB 8.4 (L) 11/15/2018 08:18 AM  
 PLATELET 812 07/53/9741 08:18 AM  
 
Lab Results Component Value Date/Time  Sodium 149 (H) 11/15/2018 08:18 AM  
 Potassium 2.5 (LL) 11/15/2018 08:18 AM  
 Chloride 115 (H) 11/15/2018 08:18 AM  
 CO2 20 (L) 11/15/2018 08:18 AM  
 BUN 13 11/15/2018 08:18 AM  
 Creatinine 1.75 (H) 11/15/2018 08:18 AM  
 Calcium 9.1 11/15/2018 08:18 AM  
 Magnesium 1.7 11/15/2018 08:18 AM  
 Phosphorus 3.2 11/15/2018 08:18 AM  
  
Lab Results Component Value Date/Time AST (SGOT) 45 (H) 11/15/2018 08:18 AM  
 Alk. phosphatase 45 11/15/2018 08:18 AM  
 Protein, total 4.9 (L) 11/15/2018 08:18 AM  
 Albumin 1.7 (L) 11/15/2018 08:18 AM  
 Globulin 3.2 11/15/2018 08:18 AM  
 GGT 57 05/04/2018 10:26 AM  
 
Lab Results Component Value Date/Time INR 1.2 (H) 11/13/2018 02:54 PM  
 Prothrombin time 12.4 (H) 11/13/2018 02:54 PM  
 aPTT 40.2 (H) 11/13/2018 02:54 PM  
  
Lab Results Component Value Date/Time Iron 55 05/27/2016 12:01 PM  
 TIBC 255 05/27/2016 12:01 PM  
 Iron % saturation 22 05/27/2016 12:01 PM  
 Ferritin 780 (H) 05/27/2016 12:01 PM  
  
Lab Results Component Value Date/Time pH 7.24 (LL) 11/15/2018 09:45 AM  
 PCO2 41 11/15/2018 09:45 AM  
 PO2 77 (L) 11/15/2018 09:45 AM  
 
No components found for: [place] Lab Results Component Value Date/Time  11/15/2018 08:18 AM  
 CK - MB 1.5 11/15/2018 08:18 AM  
  
 
 
   
 
Total time: 35 min Counseling / coordination time, spent as noted above: 30 min 
> 50% counseling / coordination?: yes Prolonged service was provided for  []30 min   []75 min in face to face time in the presence of the patient, spent as noted above. Time Start:  
Time End:  
Note: this can only be billed with 42196 (initial) or 29967 (follow up). If multiple start / stop times, list each separately.

## 2018-11-15 NOTE — PROGRESS NOTES
Speech Language Pathology Chart reviewed. Note events with patient currently intubated. Will defer and f/u if patient becomes medically appropriate. Thank you. Maria Henry M.S., CCC-SLP

## 2018-11-15 NOTE — PROGRESS NOTES
contacted to respond to a cardiac arrest in room 316, patient transferred to ICU 3015. No family present, but on their way to the hospital.  Provided ministry of presence. Will follow up as needed and upon request as family arrives. Visited by Rev. Krista Meneses, [facility]  paging service: [phone])

## 2018-11-15 NOTE — ROUTINE PROCESS
2010 
Patient had a mucus like bowel movement. 2136 Currently resting. Making grunting like noises. Clifton Osorioslade 6 Patient heart rate in 160's. EKG being done. 1333 S. Jose Johnston EKG showing A Fib with RVR and BBB. Gave scheduled metoprolol and heart rate came down to upper 90's- low 100's. Dr. Will Negro made aware of event. No additional orders at this time. 8225 Patient heart rate got into the 160's again. Patient could be heard calling out from 3 rooms away. Sounded like he was asking for [facility]. After going in to check on patient heart rate started to come down. 1078 Heart rate fluctuating from 112- 130's but non-sustaining. 
 
0503 Reviewed lab results. Noticed blood glucose is 76. Has orders for D5 but does not have orders for accu checks. Spoke with Dr. Lavell Oppenheim and advised to recheck glucose and medicate per protocol. 8948 Within a minute patient heart rate went from 91 to 111 to 150 but non-sustaining. Accu check was 60 and recheck was 57. Will give whole D5 per protocol. 
 
3139 Heart rate went into the 80's then 120's. 0897 PCT advised blood sugar is 76.  Spoke with Dr. Annalise Camacho and advised with blood sugars and heart rate I am concerned with patient needing a higher level care. 1415 Dr. Corrie Liu on floor. Advised to move patient to step down. Change in fluids ordered. Bedside and Verbal shift change report given to Kirby López (oncoming nurse) by Joe Morrison (offgoing nurse). Report included the following information SBAR, Kardex, ED Summary, Procedure Summary, Intake/Output, MAR, Recent Results and Cardiac Rhythm A Fib.  
 
8732 Patient heart rate started to go down and eyes started to roll in back of head while Dr. Corrie Liu was at bedside. Called a RRT initially and then changed to code blue. 611 [facility] TRANSFER - OUT REPORT: 
 
Verbal report given to Doris (name) on Evelina Ogden  being transferred to ICU(unit) for urgent transfer Report consisted of patients Situation, Background, Assessment and  
Recommendations(SBAR). Information from the following report(s) SBAR, Kardex, ED Summary, Procedure Summary, Intake/Output, MAR and Recent Results was reviewed with the receiving nurse. Lines:  
Venous Access Device right chest portacath  (Active) Central Line Being Utilized Yes 11/15/2018  4:09 AM  
Criteria for Appropriate Use Limited/no vessel suitable for conventional peripheral access 11/15/2018  4:09 AM  
Site Assessment Clean, dry, & intact 11/15/2018  4:09 AM  
Date of Last Dressing Change 11/13/18 11/15/2018  4:09 AM  
Dressing Status Clean, dry, & intact 11/15/2018  4:09 AM  
   
Peripheral IV 11/13/18 Right Antecubital (Active) Site Assessment Clean, dry, & intact 11/15/2018  4:09 AM  
Phlebitis Assessment 0 11/15/2018  4:09 AM  
Infiltration Assessment 0 11/15/2018  4:09 AM  
Dressing Status Clean, dry, & intact 11/15/2018  4:09 AM  
Dressing Type Tape;Transparent 11/15/2018  4:09 AM  
Hub Color/Line Status Pink 11/15/2018  4:09 AM  
Action Taken Open ports on tubing capped 11/15/2018  4:09 AM  
Alcohol Cap Used Yes 11/15/2018  4:09 AM  
  
 
Opportunity for questions and clarification was provided. Patient transported with: 
 Monitor Registered Nurse Tech Respiratory Physician Pharmacy

## 2018-11-15 NOTE — PROGRESS NOTES
Cardiology Progress Note       ICU  
NAME:  Chay Wright :   1945 MRN:   [id number]  
 
critically ill Assessment/Plan:  
1. S/P cardio/resp arrest: per intensivist. Dtr (POA) has now made pt DNR. 2. Cardiomyopathy: EF now 35%. Start  ACE-I/ARB after extubation as hemodynamics allow 3. Paroxysmal A fib: currently sinus tach. Rate 100. Would change CCB to BB when extubated given LV dysfunction. Previously anticoagulated with Eliquis. 4. Coronary calcifications on previous CT: previously on asa 5. Stage IV NSCLC with mets: followed by Dr Ariel Garcia 6. Subjective:  
Chay Wright is a 75 y.o. AA  male  with PMH significant for Stage IV non small cell lung ca with bran mets, a fib, coronary calcification on CT, cardiomyopathy, DVT, HTN, hypothryoidism who presented to the ER for evaluation of AMS in the setting of fall . The pt is poor historian with garbled speech. Per chart review he was found on the floor after a fall. Pt denies hitting head/LOC, but states he lost balance. Pt normally uses a cane for support. Pt was recently hospitalized 18 for hypovolemic shock 2/2 diarrhea; diarrhea resolved by . Pt has been refusing to seek medical care over the past 3 days and refused to follow-up with heme/on for palliative chemo (missed appointment 18).   
  
In the ER, vital signs were remarkable for . Labs were remarkable for UA: cloudy, mod LE, nitrites neg,  WBC, 5-10 RBC. CT head, CT cervical spine, and CXR showed no acute processes. Pt was treated with 1x IV cefepime.   
  
Cardiology was consulted 18 for a fib RVR: PTA meds dilt 240 mg every day/Eliquis 
  
 
Overnight events: CODE BLUE at change of shift. During [facility] evaluation pt with AMS, > became bradycardic > pulseless During intubation noted that patient had copious aspirate, up to 1 Liter. ROSC > transferred to ICU Now DNR 
 
 
 
 
 Cardiac ROS: Patient denies any exertional chest pain, dyspnea, palpitations, syncope, orthopnea, edema or paroxysmal nocturnal dyspnea. Previous Cardiac Eval 
ECHO 18 LVEF 35%, ypokinesis of the basal-mid inferoseptal, entire inferior, and basal inferolateral wall(s). ECHO 17: EF 45-50% mild HK basal-mid inferior wall(s). Mild LVH, G3DD, mild-mod LAE, mild HARSHIL, mild- mod MAC, mild MR Review of Systems: intubated/sedated Objective:  
 
Visit Vitals /56 Pulse (!) 104 Temp (!) 101.7 °F (38.7 °C) Resp 22 Ht 5' 6\" (1.676 m) Wt 165 lb 3.2 oz (74.9 kg) SpO2 97% BMI 26.66 kg/m² O2 Flow Rate (L/min): 4 l/min O2 Device: Nasal cannula Temp (24hrs), Av.5 °F (37.5 °C), Min:98 °F (36.7 °C), Max:101.7 °F (38.7 °C) No intake/output data recorded.  1901 - 11/15 0700 In: 1483.8 [I.V.:1483.8] Out: 450 [Urine:450] TELE: ST  Overnight SVT, a fib General: intubated/sedated HEENT: orally intubated Neck : Supple Lungs: rhonchi Heart: tachycardic, Regular rhythm, 2/6 systolic murmur. Abdomen: Soft, distended, + Bowel sounds. Extremities: No edema, no cyanosis Neurologic: sedated Psych: sedated/intubated Care Plan discussed with: 
  Comments Patient Family RN x Care Manager Consultant:  x Intensivist, FP Data Review: No lab exists for component: ITNL Recent Labs 18 
0260 18 
0320 TROIQ 0.05* 0.06* Recent Labs 11/15/18 
0413 18 
0956 18 
0320 18 
2256 18 
1454 *  --  143  --  141  
K 3.8  --  3.8  --  4.3 *  --  108  --  104 CO2 19*  --  20*  --  22 BUN 12  --  10  --  11  
CREA 0.95  --  0.94  --  1.05  
GLU 76  --  62*  --  72 PHOS  --   --   --  4.1  --   
MG 1.9 1.4*  --   --  1.5* ALB  --   --   --   --  3.0* WBC 6.7  --  6.7  --  6.9 HGB 10.1*  --  10.5*  --  11.8* HCT 31.7*  --  32.7*  --  36.3*  
 *  --  401*  --  457* Recent Labs 11/13/18 
1454 INR 1.2* PTP 12.4* APTT 40.2* Medications reviewed Current Facility-Administered Medications Medication Dose Route Frequency  dextrose 5% and 0.9% NaCl infusion  115 mL/hr IntraVENous CONTINUOUS  
 NOREPINephrine (LEVOPHED) 8 mg in dextrose 5% 250 mL infusion  2-60 mcg/min IntraVENous TITRATE  hydrocortisone Sod Succ (PF) (SOLU-CORTEF) injection 100 mg  100 mg IntraVENous Q8H  
 levETIRAcetam (KEPPRA) 1,000 mg in 0.9% sodium chloride 100 mL IVPB  1,000 mg IntraVENous ONCE  
 fentaNYL (PF) 900 mcg/30 ml infusion soln  0-200 mcg/hr IntraVENous TITRATE  propofol (DIPRIVAN) infusion  0-50 mcg/kg/min IntraVENous TITRATE  acetaminophen (TYLENOL) tablet 500 mg  500 mg Oral Q6H PRN  
 DULoxetine (CYMBALTA) capsule 60 mg  60 mg Oral QHS  glucose chewable tablet 16 g  4 Tab Oral PRN  
 dextrose (D50W) injection syrg 12.5-25 g  12.5-25 g IntraVENous PRN  
 glucagon (GLUCAGEN) injection 1 mg  1 mg IntraMUSCular PRN  
 dilTIAZem (CARDIZEM) IR tablet 60 mg  60 mg Oral AC&HS  magic mouthwash cpd (with sucralfate)  5 mL Oral TIDAC  metoprolol (LOPRESSOR) injection 5 mg  5 mg IntraVENous Q8H  
 sodium chloride (NS) flush 5-10 mL  5-10 mL IntraVENous Q8H  
 sodium chloride (NS) flush 5-10 mL  5-10 mL IntraVENous PRN  
 apixaban (ELIQUIS) tablet 5 mg  5 mg Oral BID  levothyroxine (SYNTHROID) tablet 75 mcg  75 mcg Oral ACB  cefepime (MAXIPIME) 2 g in 0.9% sodium chloride (MBP/ADV) 100 mL  2 g IntraVENous Q12H Ricardo Tovar NP

## 2018-11-15 NOTE — CDMP QUERY
Please clarify the cause of AMS on admission:  
 
=> Acute CVA and TIA on admission in the setting of AMS ruled out AEB MRI showing no acute process  
=> Metabolic Encephalopathy POA 2/2 UTI  in the setting of AMS,lethargy and confusion  treated with IV Cefepime 
=>Metabolic Encephalopathy s/p cardiac arrest AEB unresponsiveness and abnormal EEG  
=> AMS POA secondary to brain mets  
=> Other explanation of clinical findings  
=> Clinically Undetermined (no explanation for clinical findings) The medical record reflects the following clinical findings, treatment, and risk factors. Risk Factors: 76 yo M admitted on 11/13 with AMS and concern for UTI/CVA/TIA Clinical Indicators:  pt presents with AMS, frequent falls, confusion, lethargy MRI showed no acute intracranial abnormality, Post cardiac arrest abnormal EGG \"post anoxic\" UA: WBC, 5-10 RBC    Urine cx; mixed sina Treatment: IV Abx, Neuro consult, EEG, MRI,  now comfort care Please clarify and document your clinical opinion in the progress notes and discharge summary including the definitive and/or presumptive diagnosis, (suspected or probable), related to the above clinical findings. Please include clinical findings supporting your diagnosis. Thank you for your time [facility] RN/BSN, CCDS Desk:   [phone] Other:  [phone]

## 2018-11-15 NOTE — PROCEDURES
Name: Patricia Carroll : 1945 Age: 75 y.o. Admit Date: 2018 MRN: [id number] Date of EE/15/2018 Patient Location: [facility] CONTINUOUS ELECTROENCEPHALOGRAM REPORT 
 
PROCEDURE: 24 HR Continuous telemetry EEG monitoring with simultaneous video EEG PROCEDURE NUMBER: [id number] RECORDING START TIME: 11/15/18 @ 5323 RECORDING END TIME: 11/15/18 @ 1314 TECHNICAL NOTES: 
This recording was performed on equipment with 32 channel capability using scalp electrodes. EEG and video-audio apparatus specifications in accordance with ASCN guidelines. Additional EKG monitoring was utilized. Scalp electrodes were placed in accordance with the International 10-20 system. Additional inferior temporal electrodes were placed at F9, F10, T9, T10, P9, and P10. DESCRIPTION OF THE RECORDING: Continuous telemetry EEG monitoring with simultaneous video was requested in this 75 y.o. male  
to appropriately capture and characterize the episodes of clinical interest for accurate diagnosis. INDICATION FOR cEEG:  
subclinical seizures ANTIEPILEPTIC DRUGS (AEDS):  
Keppra SEDATIVES: 
Propofol INTUBATION: 
Yes and then extubated PUSH BUTTON EVENTS: YES Patient with burst suppression with myoclonus every 2-3 seconds. At 1304 EEG completely flat with no brain activity FINDINGS:  
 
BACKGROUND:  
 
Hemispheric Symmetric: 
No posterior dominant rhythm Hemispheric Asymmetric: NO 
 
FOCAL SLOWING: NO 
 
AMPLITUDE:  
Left: Normal 
Right: Normal 
 
VARIABILITY: Yes CONTINUITY: 
Burst suppression throughout the study and then electrocerebral silence REACTIVITY: Yes but not at the end BREACH EFFECT: No 
 
STAGE II SLEEP (K complex and spindles): No 
 
EEG IMPRESSION: 
Abnormal 
 
CLINICAL CORRELATION: 
24hr video EEG with simulatnous video monitoring was abnormal.  There was burst suppression with myoclonic activity likely secondary to post anoxic injury. Patient had care withdrawn and at [phone] the EEG was consistent with electrocerebral silence. Clinical correlation recommended. Opal Clarke MD 
11/15/2018

## 2018-11-15 NOTE — PROGRESS NOTES
[facility] Dosing Services: The following medication: famotidine was automatically dose-adjusted per [facility] P&T Committee Protocol, with respect to renal function. Consult provided for this   75 y.o. , male , for the indication of SUP prophylaxis. Pt Weight:  
Wt Readings from Last 1 Encounters:  
11/15/18 74.9 kg (165 lb 3.2 oz) Previous Regimen 20 mg q 12 h Serum Creatinine Lab Results Component Value Date/Time Creatinine 1.75 (H) 11/15/2018 08:18 AM  
   
Creatinine Clearance Estimated Creatinine Clearance: 33.4 mL/min (A) (based on SCr of 1.75 mg/dL (H)). BUN Lab Results Component Value Date/Time BUN 13 11/15/2018 08:18 AM  
   
Dosage changed to:  20 mg q 24 h Additional notes: 
 
 
Pharmacy to continue to monitor patient daily. Will make dosage adjustments based upon changing renal function. Signed Thea Estevez PHARMD. Contact information:  [phone]

## 2018-11-15 NOTE — PROGRESS NOTES
[facility] [address]  Office [phone] Fax [phone] Assessment and Plan Mendoza Borrero is a 75 y.o. male admitted for AMS 2/2 UTI vs CVA/TIA. [facility] He has spent 2 night(s) in the hospital. 
 
24 Hour Events: Overnight Mr. Arnold Hernandez' heart rate remained tachycardic and went up to the 160s. He received a dose of IV Metoprolol and the HR came down to low 110s. Patient is NPO. He received a dose of D50. AMS 2/2 UTI vs CVA/TIA: In the setting of recent fall 11/13/18. CT head, CT cerv spine, and CXR showed no acute processes. CTA head neck prelim report no acute thrombosis/ischemia. +UA. Ammonia 21, UDS neg, ethyl alcohol <10. Trop 0.06. PT 12.4 INR 1.2.  
- MRI Brain: No evidence of acute intracranial abnormality. No evidence of intracranial metastases. Mild gliosis and hemosiderin staining 
at site of previously treated right periatrial metastasis. - Echo: EF 35%  
- UCx pending - BCx NGTD  
- Trops 0.06 -> 0.05 ->trend - IV Cefepime BID for UTI 
- Neurology following, appreciate recs 
 -continue Eliquis 
 -follow up MRI  
 - EEG pending 
  
Tachycardia: HR 120s on monitor. Tachycardic on physical exam. Cardizem scheduled for 9AM. Metoprolol held for low BP trend - IV Metoprolol 5mg q8hrs - s/p 2x IV Digoxin - Maintain MAPs >65 
-Cardiology consulted, appreciate recs 
  
Hypoglycemia - D50 given, re-evaluate gluc. - Hypoglycemia protocols ordered 
- NPO  
  
Anemia: Hgb today 10.5 (POA 11.8); BL 10-12 (11/07/18: Hgb 10.4). POA Hgb value possibly 2/2 volume contraction. Followed by Heme/Onc - consulted Dr. Marcus Bolaños, appreciate recs. No recent BM; no bleeding per rectum/hematemesis/gross hematuria. - Hb stable - Hematology following, appreciate recs: 
 -follow MRI  
  
Metastatic Stage 4 Non-Small Cell Carcinoma: Sees Dr. Marcus Bolaños. On palliative chemo. - Hematology following, appreciate recs:  
  
Hx CHF: Last Echo 09/15/2017 EF 45-50% -Holding PO meds IR cardizem - Trops 0.06 -> 0.05 ->  
- Echo EF 35% 
  Hx DVT 2/2  Malignancy: Bilateral tibial vein DVTs on 2017. No lower leg swelling/ calf pain today.  
-Holding PO Apixiban 5mg BID 
-Consulted Heme/Onc for alternative anticoagulation   
  
History of A.fib: on telemetry - IV Metoprolol 5mg q8hrs - s/p 2x IV Digoxin  
- Holding PO IR Cardizem 60mg QID and Apixaban 5mg BID 
- Will monitor heart rate/rhythm 
  
Hypothyroidism 
- TSH 4.66 (H) - Free T4: 1 (wnl)  
- Holding PO Levothyroxine 75mcg daily  
  
Arthritic pain: Patient diagnosed with arthiritis, on Tramadol and Tylenol prn for pain. - Holding PO Tylenol 500mg q6hrs PRN for pain  
  
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  
-Holding PO Cymbalta 60mg daily  
  
FEN/GI - NPO including meds. NS at 115 mL/hr. Activity - Ambulate with assistance DVT prophylaxis - None indicated at this time GI prophylaxis - Not indicated at this time Fall prophylaxis - Fall precautions ordered. Disposition - Plan to d/c to TBD. Consulting PT/OT/CM Code Status - Full, discussed with patient's daughter Next of Kin Name and Contact Daughter: Ms. Amber Lee ([phone]) 
  
Patient Jocelyn Bhardwaj will be discussed Dr. Ab Roth MD 
Family Medicine Resident Subjective / Objective Subjective Patient was asleep this morning with his EEG leads on. He was arousable but incoherent. Temp (24hrs), Av °F (37.2 °C), Min:98 °F (36.7 °C), Max:100 °F (37.8 °C) Objective Respiratory: O2 Flow Rate (L/min): 4 l/min O2 Device: Nasal cannula Visit Vitals /55 (BP 1 Location: Right arm, BP Patient Position: At rest) Pulse (!) 115 Temp 99.5 °F (37.5 °C) Resp 22 Ht 5' 6\" (1.676 m) Wt 165 lb 3.2 oz (74.9 kg) SpO2 90% BMI 26.66 kg/m² General: No acute distress. Alert. Cooperative. Head: Normocephalic. Atraumatic. Neck: Supple. Normal ROM. No stiffness. Respiratory: CTAB. No w/r/r/c. Cardiovascular: Tachycardic. Normal S1,S2. No m/r/g. Pulses 2+ throughout. GI: Nontender. No rebound tenderness or guarding. Nondistended. Extremities: No LE edema. Distal pulses intact. Musculoskeletal: Full ROM in all extremities. Skin: Warm, dry. No rashes. I/O: 
Date 11/14/18 0700 - 11/15/18 5036 11/15/18 0700 - 11/16/18 7992 Shift 0700-1859 1900-0659 24 Hour Total 0700-1859 1900-0659 24 Hour Total  
INTAKE  
P.O.  0 0     
  P. O.  0 0 Shift Total(mL/kg)  0(0) 0(0) OUTPUT Urine(mL/kg/hr)  450 450 Urine Output (mL) (Condom Catheter 11/14/18)  450 450 Shift Total(mL/kg)  450(6) 450(6) NET  -450 -450 Weight (kg) 74.2 74.9 74.9 74.9 74.9 74.9  
 
 
CBC: 
Recent Labs 11/15/18 
0413 11/14/18 
0320 11/13/18 
1454 WBC 6.7 6.7 6.9 HGB 10.1* 10.5* 11.8* HCT 31.7* 32.7* 36.3*  
* 401* 457* Metabolic Panel: 
Recent Labs 11/15/18 
0413 11/14/18 
0956 11/14/18 
0320 11/13/18 
2256 11/13/18 
1454 *  --  143  --  141  
K 3.8  --  3.8  --  4.3 *  --  108  --  104 CO2 19*  --  20*  --  22 BUN 12  --  10  --  11  
CREA 0.95  --  0.94  --  1.05  
GLU 76  --  62*  --  72  
CA 9.9  --  9.8  --  10.2* MG 1.9 1.4*  --   --  1.5* PHOS  --   --   --  4.1  --   
ALB  --   --   --   --  3.0*  
SGOT  --   --   --   --  44* ALT  --   --   --   --  21 INR  --   --   --   --  1.2* For Billing Chief Complaint Patient presents with  Lethargy  Dehydration Hospital Problems  Date Reviewed: 10/19/2018 Codes Class Noted POA Physical debility ICD-10-CM: R53.81 ICD-9-CM: 799.3  Unknown Unknown Goals of care, counseling/discussion ICD-10-CM: Z71.89 ICD-9-CM: V65.49  Unknown Unknown  
   
 ACP (advance care planning) ICD-10-CM: Z71.89 ICD-9-CM: V65.49  Unknown Unknown UTI (urinary tract infection) ICD-10-CM: N39.0 ICD-9-CM: 599.0  11/13/2018 Unknown * (Principal) Altered mental status ICD-10-CM: R41.82 
ICD-9-CM: 780.97  11/13/2018 Unknown Acquired hypothyroidism ICD-10-CM: E03.9 ICD-9-CM: 244.9  11/6/2018 Yes Dyslipidemia ICD-10-CM: E78.5 ICD-9-CM: 272.4  11/13/2017 Yes [facility] ICD-10-CM: I48.91 
ICD-9-CM: 427.31  9/16/2017 Yes Benign hypertensive heart disease with HF (heart failure) (HCC) ICD-10-CM: I11.0 ICD-9-CM: 402.11, 428.9  10/11/2011 Yes

## 2018-11-15 NOTE — PROGRESS NOTES
[address] [place] [address] with [address] [facility] Resident Progress Note in Brief S: Patient seen and examined at bedside. Patient sleeping Tele: sinus tach O: 
Visit Vitals /70 (BP 1 Location: Left arm, BP Patient Position: At rest) Pulse (!) 118 Temp 100 °F (37.8 °C) Resp 20 Ht 5' 6\" (1.676 m) Wt 163 lb 8 oz (74.2 kg) SpO2 99% BMI 26.39 kg/m² Physical Examination:  
General appearance - sleeping, grunting-like noises while asleep A/P:  
Yolanda Banks is a 75 y.o. gentleman admitted for AMS 2/2 UTI vs TIA 
 
- Strict NPO w/o meds - holding cymbalta and elequis. Consulting cardiology for holding home cardizem. - Sinus Tach: pt on metoprolol 5mg injection q8hrs per cardiology Please see daily progress note for detailed plan. Delma Valencia MD 
Family Medicine Resident

## 2018-11-15 NOTE — CONSULTS
[facility] Name: Emmanuel Cruz MRN: [id number] : 1945 Hospital: [address] Date: 11/15/2018 Impression Plan 1. Cardiac arrest 
2. Acute hypoxic respiratory failure 3. Shock 4. Pneumothorax, Right sided, small 5. Aspiration 6. IVONE 7. Hypokalemia 8. Lactic acidosis 9. Suspected UTI · Full vent support, titrate FiO2 for a goal sat >90% · Minimize PEEP given pneumothorax · ABG pending · Daily CXR, would repeat if change in hemodynamics/respiratory parameters to evaluate for expansion of the pneumothorax · Pressors as needed for a goal MAP >65 · Stress dose steroids · Propofol, fentanyl for sedation · Cefepime, add flagyl and vancomycin · Repeat cultures · Trending lactate · Replete potassium, repeat BMP prior to giving additional doses in the setting of new IVONE · EEG as per neuro · Neurology, cardiology following · Famotidine · On apixaban · NPO Now DNR Pt is critically ill and at risk of decompensation in the setting of cardiac arrest, shock, respiratory failure. Critical care time spent with pt exclusive of procedures was 120 minutes Radiology ( personally reviewed) CXR with RASHMI airspace disease, ETT ok, small right sided pneumothorax ABG No results for input(s): PHI, PO2I, PCO2I in the last 72 hours. Subjective This patient has been seen and evaluated at the request of Dr. Kera Delacruz for cardiac arrest, shock. Patient is a 75 y.o. 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 He was witnessed to willian down and then become unresponsive. He underwent at least 15 minutes of chest compressions and multiple rounds of epi. He was intubated and found to have very large volume aspiration with large particulates aspirated.  He had been noted in the ED to be having difficulty swallowing/choking. He was undergoing EEG for concern for seizures. He is now in the ICU on levophed, propofol. Hooked back up to EEG. Review of Systems: 
Review of systems not obtained due to patient factors. Past Medical History:  
Diagnosis Date  Anemia  Cardiomyopathy ([facility] mild LV regional/global dysfunction, likely ischemic etiology  Coronary artery calcification seen on CT scan  Dysfunction, erectile  Hyperlipidemia 5/18/2010  Hypertension  Metastatic lung cancer (metastasis from lung to other site) [facility]) brain  Paroxysmal atrial flutter ([facility] History reviewed. No pertinent surgical history. Prior to Admission medications Medication Sig Start Date End Date Taking? Authorizing Provider  
tamsulosin (FLOMAX) 0.4 mg capsule Take 0.4 mg by mouth nightly. Yes Provider, Historical  
diclofenac (VOLTAREN) 1 % gel Apply 2 g to affected area four (4) times daily as needed. Yes Provider, Historical  
DULoxetine (CYMBALTA) 60 mg capsule Take 60 mg by mouth nightly. Yes Provider, Historical  
apixaban (ELIQUIS) 5 mg tablet TAKE ONE TABLET BY MOUTH TWICE DAILY 10/19/18  Yes Mickey MAGAÑA NP  
dilTIAZem CD (CARDIZEM CD) 240 mg ER capsule Take 1 Cap by mouth daily. 10/19/18  Yes Rick Rodriguez NP  
traMADol (ULTRAM) 50 mg tablet Take 1 Tab by mouth every six (6) hours as needed for Pain. Max Daily Amount: 200 mg. 9/28/18  Yes Rick Rodriguez NP  
lidocaine-prilocaine (EMLA) topical cream Apply  to affected area as needed for Pain (Apply 30-60 min. prior to having your port accessed). 2/23/18  Yes Kale Velazquez NP  
diphenhydrAMINE (BENADRYL) 25 mg tablet Take 25 mg by mouth nightly as needed for Sleep. Yes Provider, Historical  
levocetirizine (XYZAL) 5 mg tablet Take 5 mg by mouth daily as needed for Allergies.    Yes Provider, Historical  
magic mouthwash solution Swish and spit 15-30 mL every 2-4 hours as needed for mouth pain. May swallow for throat pain. Magic mouth wash Maalox Lidocaine 2% viscous Diphenhydramine oral solution Pharmacy to mix equal portions of ingredients to a total volume as indicated in the dispense amount. 9/12/17  Yes Kaela Ng MD  
prochlorperazine (COMPAZINE) 10 mg tablet Take 1 Tab by mouth every six (6) hours as needed for Nausea. 8/8/17  Yes Darin Musa NP  
ondansetron hcl (ZOFRAN) 8 mg tablet Take 1 Tab by mouth every eight (8) hours as needed for Nausea. 8/8/17  Yes Darin Musa NP  
aspirin delayed-release 81 mg tablet Take 81 mg by mouth daily. Yes Provider, Historical  
levothyroxine (SYNTHROID) 75 mcg tablet Take 1 Tab by mouth Daily (before breakfast). 10/19/18   Donna Hair NP  
acetaminophen (TYLENOL EXTRA STRENGTH) 500 mg tablet Take  by mouth every six (6) hours as needed for Pain. Provider, Historical  
 
Current Facility-Administered Medications Medication Dose Route Frequency  NOREPINephrine (LEVOPHED) 8 mg in dextrose 5% 250 mL infusion  2-60 mcg/min IntraVENous TITRATE  hydrocortisone Sod Succ (PF) (SOLU-CORTEF) injection 100 mg  100 mg IntraVENous Q8H  
 fentaNYL (PF) 900 mcg/30 ml infusion soln  0-200 mcg/hr IntraVENous TITRATE  propofol (DIPRIVAN) infusion  0-50 mcg/kg/min IntraVENous TITRATE  LORazepam (ATIVAN) injection 4 mg  4 mg IntraVENous ONCE  potassium chloride 10 mEq in 100 ml IVPB  10 mEq IntraVENous Q1H  
 magnesium sulfate 2 g/50 ml IVPB (premix or compounded)  2 g IntraVENous ONCE  
 dextrose 5% - 0.45% NaCl with KCl 40 mEq/L infusion   IntraVENous CONTINUOUS  
 metroNIDAZOLE (FLAGYL) IVPB premix 500 mg  500 mg IntraVENous Q8H  
 DULoxetine (CYMBALTA) capsule 60 mg  60 mg Oral QHS  dilTIAZem (CARDIZEM) IR tablet 60 mg  60 mg Oral AC&HS  magic mouthwash cpd (with sucralfate)  5 mL Oral TIDAC  metoprolol (LOPRESSOR) injection 5 mg  5 mg IntraVENous Q8H  
  sodium chloride (NS) flush 5-10 mL  5-10 mL IntraVENous Q8H  
 apixaban (ELIQUIS) tablet 5 mg  5 mg Oral BID  levothyroxine (SYNTHROID) tablet 75 mcg  75 mcg Oral ACB  cefepime (MAXIPIME) 2 g in 0.9% sodium chloride (MBP/ADV) 100 mL  2 g IntraVENous Q12H Allergies Allergen Reactions  Lisinopril Angioedema  Hydrochlorothiazide Hives and Swelling  Sulfa (Sulfonamide Antibiotics) Hives Social History Tobacco Use  Smoking status: Former Smoker Packs/day: 1.00 Years: 45.00 Pack years: 45.00 Types: Cigarettes  Smokeless tobacco: Never Used Substance Use Topics  Alcohol use: Yes Alcohol/week: 1.5 oz Types: 3 Cans of beer per week Family History Problem Relation Age of Onset  Stroke Father  Diabetes Sister  Diabetes Brother  Heart Disease Sister Laboratory: I have personally reviewed the critical care flowsheet and labs. Recent Labs 11/15/18 
0818 11/15/18 
0413 11/14/18 
0320 WBC 7.5 6.7 6.7 HGB 8.4* 10.1* 10.5* HCT 27.3* 31.7* 32.7*  
 412* 401* Recent Labs 11/15/18 
0818 11/15/18 
0413 11/14/18 
0956 11/14/18 
0320 11/13/18 
2256 11/13/18 
1454 * 148*  --  143  --  141  
K 2.5* 3.8  --  3.8  --  4.3 * 113*  --  108  --  104 CO2 20* 19*  --  20*  --  22  
* 76  --  62*  --  72 BUN 13 12  --  10  --  11  
CREA 1.75* 0.95  --  0.94  --  1.05  
CA 9.1 9.9  --  9.8  --  10.2* MG 1.7 1.9 1.4*  --   --  1.5* PHOS 3.2  --   --   --  4.1  --   
ALB 1.7*  --   --   --   --  3.0*  
SGOT 45*  --   --   --   --  44* ALT 18  --   --   --   --  21 INR  --   --   --   --   --  1.2* Objective: Mode Rate Tidal Volume Pressure FiO2 PEEP Assist control   450 ml    100 % 8 cm H20 Vital Signs:   
Ventilator Pressures PIP Observed (cm H2O): 16 cm H2O Plateau Pressure (cm H2O): 17 cm H2O 
MAP (cm H2O): 10 PEEP/VENT (cm H2O): 8 cm [id number](24)Ventilator Pressures PIP Observed (cm H2O): 16 cm H2O Plateau Pressure (cm H2O): 17 cm H2O 
MAP (cm H2O): 10 PEEP/VENT (cm H2O): 8 cm H20 Safety & Alarms Backup Mode Checked/Apnea: Yes 
Pressure Max: 40 cm H2O Ve Min: 3 Ve Max: 20 Vt Min: 200 ml Vt Max: 800 ml 
RR Max: 40 Intake/Output:  
Last shift:      Ventilator Volumes Vt Set (ml): 450 ml Vt Exhaled (Machine Breath) (ml): 409 ml Ve Observed (l/min): 10 l/min Last 3 shifts: No intake/output data recorded. Jacque Cedeño Intake/Output Summary (Last 24 hours) at 11/15/2018 8416 Last data filed at 11/15/2018 6442 Gross per 24 hour Intake 1483.83 ml Output 450 ml Net 1033.83 ml EXAM:  
GENERAL: well developed and in intubated, HEENT:  PERRL, EOMI, no alar flaring or epistaxis, oral mucosa moist without cyanosis, NECK:  no jugular vein distention, no retractions, no thyromegaly or masses, LUNGS: decreased breath sounds bilaterally and with rhonchi , HEART:  Regular rate and rhythm with no MGR; no edema is present, ABDOMEN:  soft with no tenderness, bowel sounds present, EXTREMITIES:  warm with no cyanosis, SKIN:  no jaundice or ecchymosis and NEUROLOGIC:  intubated, spontaneously moving all extremities but with concern for seizure vs myoclonus at times Beatris Slaughter MD 
[facility]

## 2018-11-15 NOTE — PROGRESS NOTES
Called Mr. Raghu Baez daughter Cathleen Lacey at [phone] and updated her on her father's current condition and that we are in the middle of CPR and resuscitation. She stated that she will come to the hospital right away. Her brother is also on his way at this time.

## 2018-11-15 NOTE — PROGRESS NOTES
Neurology Progress Note Interval Hx: Follow up: dysarthria Reviewed chart and overnight events Pt was admitted for evaluation of dysarthria. MRI Brain didn't show any evidence of stroke or intracranial mets. Prolonged EEG was started yesterday for confusion but did not reveal any subclinical seizures. Pt coded this AM and was intubated/ sedated. Since then he has had continuous myoclonic jerking, despite being loaded on Keppra and on propofol drip. In light of patient's overall condition (metastatic lung CA), poor prognosis from cardiac arrest, family has elected to withdraw care. Will sign off Signed By: Oleg White MD   
 November 15, 2018

## 2018-11-15 NOTE — DISCHARGE SUMMARY
[address] [facility]  
Death Summary Patient: Kathleen Membreno MRN: [id number] YOB: 1945 Age: 75 y.o. Date of admission:  11/13/2018 Date of death:  11/15/2018 Primary care provider:  Maci Bridges MD  
 
Admitting provider:  Simon Corral DO Discharging provider:  Kiki Schneider MD  
 
Consultations IP CONSULT TO HEMATOLOGY 
IP CONSULT TO NEUROLOGY 
IP CONSULT TO PALLIATIVE CARE - PROVIDER 
IP CONSULT TO INTENSIVIST 
IP CONSULT TO CARDIOLOGY Admission diagnoses Altered mental status UTI (urinary tract infection) Hospital Problems Hospital Problems  Date Reviewed: 10/19/2018 Codes Class Noted POA  
 DNR (do not resuscitate) ICD-10-CM: A20 ICD-9-CM: V49.86  Unknown Unknown Physical debility ICD-10-CM: R53.81 ICD-9-CM: 799.3  Unknown Unknown Goals of care, counseling/discussion ICD-10-CM: Z71.89 ICD-9-CM: V65.49  Unknown Unknown  
   
 ACP (advance care planning) ICD-10-CM: Z71.89 ICD-9-CM: V65.49  Unknown Unknown UTI (urinary tract infection) ICD-10-CM: N39.0 ICD-9-CM: 599.0  11/13/2018 Unknown * (Principal) Altered mental status ICD-10-CM: R41.82 
ICD-9-CM: 780.97  11/13/2018 Unknown Acquired hypothyroidism ICD-10-CM: E03.9 ICD-9-CM: 244.9  11/6/2018 Yes Dyslipidemia ICD-10-CM: E78.5 ICD-9-CM: 272.4  11/13/2017 Yes Atrial fibrillation ([address] ICD-10-CM: I48.91 
ICD-9-CM: 427.31  9/16/2017 Yes Benign hypertensive heart disease with HF (heart failure) (HCC) ICD-10-CM: I11.0 ICD-9-CM: 402.11, 428.9  10/11/2011 Yes History of Presenting Illness Kathleen Membreno is a 75 y.o. male with known metastatic Stage IV non-small cell carcinoma, A.fib, CHF, DVT, HTN, hypothryoidism who presents to the ER for evaluation of AMS in the setting of fall today.  The pt is difficult to comprehend as he is mumbling, and so most hx is obtained from the pt's daughter Ms. Marti Umanzor (via telephone). Per pt's daughter: he has been having a 3 day hx of mumbling/slurred speech, lethargy, and generalized weakness. Today, he was found on the floor after a fall. Pt denies hitting head/LOC, but states he lost balance. Pt normally uses a cane for support. Pt also endorses falling last night - he lives alone and no one had witnessed the 2 recent falls. Pt was recently hospitalized 11/06/18 for hypovolemic shock 2/2 diarrhea; diarrhea resolved by Sunday. No constipation/blood in stool/ nausea/vomiting. Pt's daughter describes subjective fevers but no chills. No recent travel. Pt has been refusing to seek medical care over the past 3 days and refused to follow-up with heme/on for palliative chemo (missed appointment 11/09/18).   
In the ER, vital signs were remarkable for . Labs were remarkable for UA: cloudy, mod LE, nitrites neg,  WBC, 5-10 RBC. CT head, CT cervical spine, and CXR showed no acute processes. Pt was treated with 1x IV cefepime.   
[facility] Course AMS 2/2 UTI vs CVA/TIA: In the setting of recent fall 11/13/18. CT head, CT cerv spine, and CXR showed no acute processes.  CTA head neck showed no acute thrombosis/ischemia. A UDS and Ammonia level were unremarkable. MRI Brain showed no evidence of acute intracranial abnormality and no evidence of intracranial metastases. An Echo showed an EF 35%. Trops were trended down. The patient was continued on IV Cefepime BID for UTI. An EEG was pending to rule out any seizure-like activity. Metastatic Stage 4 Non-Small Cell Carcinoma: Patient follows with Dr. Luca Sanchez. He missed his last palliative chemo appointment on 11/09. Heme/Onc was consulted and recommended MRI brain. Tachycardia: On admission, the patient's HR was 120s on monitor. He was tachycardic on physical exam. He received two doses of IV Digoxin and scheduled IV Metoprolol for rate control.  He was unable to take PO medications as he needed further speech work up for safe swallow. Overnight he remained tachycardic. On the morning of 11/15, patient was being evaluated for an abnormal rhythm overnight and HR in the 110-120s ranges that would then increase to the 160's for seconds to minutes at a time. On evaluation patient was in distress and not answering questions appropriately. While in room patient developed bradycardia before losing pulse and became unresponsive at which time Ritchie aGytan was called and CPR/ACLS was immediately started. During intubation it was noted that patient had copious aspirate, up to 1 Liter. Patient underwent multiple rounds of CPR with 5 doses of epinephrine and bicarbonate x 1. Patient then achieved ROSC, though remained unresponsive, and was transferred to ICU. There the patient was started on pressors and solucortef. He was intubated and received a dose of Keppra per Neurology for seizure like activity. Family Practice team spoke with daughter of patient who is POA and discussed the patient's course. On family's arrival to patient's bedside, the family decided to withdraw care and start comfort measures. Following withdrawal of care, the patient passed shortly after, at 1.12pm on 11/15. Last vital signs recorded Visit Vitals BP (!) 85/42 Pulse 99 Temp (!) 101.7 °F (38.7 °C) Resp (!) 45 Ht 5' 6\" (1.676 m) Wt 165 lb 3.2 oz (74.9 kg) SpO2 100% BMI 26.66 kg/m² Labs Recent Labs 11/15/18 
0818 11/15/18 
0413 11/14/18 
0320 WBC 7.5 6.7 6.7 HGB 8.4* 10.1* 10.5* HCT 27.3* 31.7* 32.7*  
 412* 401* Recent Labs 11/15/18 
0818 11/15/18 
0413 11/14/18 
2350 11/14/18 
0320 11/13/18 
2256 * 148*  --  143  --   
K 2.5* 3.8  --  3.8  --   
* 113*  --  108  --   
CO2 20* 19*  --  20*  --   
BUN 13 12  --  10  --   
CREA 1.75* 0.95  --  0.94  --   
* 76  --  62*  --   
CA 9.1 9.9  --  9.8  --   
MG 1.7 1.9 1.4*  --   --   
 PHOS 3.2  --   --   --  4.1 Recent Labs 11/15/18 
0818 11/13/18 
1454 SGOT 45* 44* AP 45 67  
TP 4.9* 7.5 ALB 1.7* 3.0*  
GLOB 3.2 4.5* Recent Labs 11/13/18 
1454 INR 1.2* PTP 12.4* APTT 40.2* Recent Labs 11/15/18 
0945 PH 7.24* PCO2 41 PO2 77* Recent Labs 11/15/18 
0818  CKMB 1.5 Pertinent imaging studies XR Results (most recent): 
Results from Hospital Encounter encounter on 11/13/18 XR ABD PORT  1 V Narrative INDICATION: Orogastric tube placement FINDINGS: Single supine view of the abdomen demonstrates a nonspecific 
intestinal gas pattern. There is some retained oral contrast material in the 
colon. Orogastric tube tip overlies the gastric fundus. No soft tissue mass or 
pathological soft tissue calcification is seen. Small right-sided pneumothorax 
is again noted. Impression IMPRESSION: Orogastric tube appears to be in satisfactory position. CT Results (most recent): 
Results from Hospital Encounter encounter on 11/13/18 CTA HEAD NECK W CONT Narrative PRELIMINARY REPORT No acute thrombosis or significant stenosis. Left sphenoid sinus disease. Preliminary report was provided by Dr. Alisha Kramer, the on-call radiologist, at 10:45 PM. Final report to follow. END PRELIMINARY REPORT 
 
EXAM:  CTA HEAD NECK W CONT INDICATION:   AMS 
 
COMPARISON:  CT head 11/13/2018. CONTRAST:  100 mL of Isovue-370. TECHNIQUE:  Unenhanced  images were obtained to localize the volume for 
acquisition. Multislice helical axial CT angiography was performed from the 
aortic arch to the top of the head during uneventful rapid bolus intravenous 
contrast administration. Coronal and sagittal reformations and 3D post 
processing was performed. CT dose reduction was achieved through use of a 
standardized protocol tailored for this examination and automatic exposure 
control for dose modulation. FINDINGS: 
 
CTA Head: There is no evidence of large vessel occlusion or flow-limiting stenosis of the 
intracranial internal carotid, anterior cerebral, and middle cerebral arteries. The anterior communicating artery is not well seen. There is no evidence of large vessel occlusion or flow-limiting stenosis of the 
intracranial vertebral arteries, basilar artery, or posterior cerebral arteries. The posterior communicating arteries are not well seen. There is no evidence of aneurysm or vascular malformation. The dural venous 
sinuses and deep cerebral venous system are patent. No evidence of abnormal 
enhancement on delayed phase images. CTA NECK: 
NASCET method was utilized for calculating stenosis. The aortic arch is unremarkable. The common carotid arteries are normal in 
course and caliber bilaterally. Eccentric calcified plaque in the proximal right 
internal carotid artery, resulting in mild (less than 50%) stenosis. Eccentric 
calcified plaques in the proximal left internal carotid artery, resulting in 
mild (less than 50%) stenosis. There is a codominant vertebrobasilar arterial system. The vertebral arteries 
and imaged portion of the basilar artery are normal in course, size and contour 
without significant stenosis. Visualized soft tissues of the neck are unremarkable. Prominent, but not 
pathologically enlarged, bilateral cervical lymph nodes, largest measuring 6 mm 
at right level IIb. A right chest port is noted. Upper lobe predominant 
centrilobular emphysema. No acute fracture or aggressive osseous lesion. Reversal of cervical lordosis with multilevel degenerative disc disease of the 
cervical spine, most advanced at C5-C6 and C6-C7. Advanced degenerative changes 
at the atlantodental joint. Impression IMPRESSION:  
 
CTA Head: 1. No evidence of significant stenosis or aneurysm. CTA Neck: 1. No evidence of flow-limiting stenosis. 2. Mild stenosis (less than 50%) by NASCET criteria of the bilateral proximal 
internal carotid arteries. MRI Results (most recent): 
Results from Hospital Encounter encounter on 11/13/18 MRI BRAIN W WO CONT Narrative EXAM:  MRI BRAIN W WO CONT INDICATION:    TIA. Altered mental status. COMPARISON:  MR brain 7/24/2017, CT head 11/13/2018. CONTRAST: 15 ml Dotarem. TECHNIQUE:   
Multiplanar multisequence acquisition without and with contrast of the brain. FINDINGS: 
Mild generalized parenchymal volume loss with commensurate dilation of the sulci 
and ventricular system. There is mild T2/FLAIR hyperintensity and hemosiderin 
staining in the right periatrial white matter at site of previously treated 
metastasis. No abnormal enhancement. There is no acute infarct, hemorrhage, 
extra-axial fluid collection, or mass effect. There is no cerebellar tonsillar 
herniation. Expected arterial flow-voids are present. Stable chronic opacification of the left sphenoid sinus. The remaining paranasal 
sinuses, mastoid air cells and middle ears are clear. The orbital contents are 
within normal limits. No significant osseous or scalp lesions. Impression IMPRESSION:  
1. No evidence of acute intracranial abnormality. 2. No evidence of intracranial metastases. Mild gliosis and hemosiderin staining 
at site of previously treated right periatrial metastasis.  
 
--------------------------------- Chronic Diagnoses Problem List as of 11/15/2018 Date Reviewed: 10/19/2018 Codes Class Noted - Resolved DNR (do not resuscitate) ICD-10-CM: L56 ICD-9-CM: V49.86  Unknown - Present Physical debility ICD-10-CM: R53.81 ICD-9-CM: 799.3  Unknown - Present Goals of care, counseling/discussion ICD-10-CM: Z71.89 ICD-9-CM: V65.49  Unknown - Present ACP (advance care planning) ICD-10-CM: Z71.89 ICD-9-CM: V65.49  Unknown - Present UTI (urinary tract infection) ICD-10-CM: N39.0 ICD-9-CM: 599.0  11/13/2018 - Present * (Principal) Altered mental status ICD-10-CM: R41.82 
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 
ICD-9-CM: 357.6, E933.1  12/29/2017 - Present Dyslipidemia ICD-10-CM: E78.5 ICD-9-CM: 272.4  11/13/2017 - Present Anemia ICD-10-CM: D64.9 ICD-9-CM: 879. 9  Unknown - Present Atrial fibrillation [facility]) ICD-10-CM: I48.91 
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 
ICD-9-CM: 162.9  7/28/2017 - Present Metastasis to brain [facility]) ICD-10-CM: C79.31 
ICD-9-CM: 198.3  7/28/2017 - Present Non-compliance ICD-10-CM: Z91.19 ICD-9-CM: V15.81  5/27/2016 - Present Groin fullness ICD-10-CM: R19.00 ICD-9-CM: 789.30  3/5/2014 - Present Benign hypertensive heart disease with HF (heart failure) (HCC) ICD-10-CM: I11.0 ICD-9-CM: 402.11, 428.9  10/11/2011 - Present History of tobacco abuse ICD-10-CM: Z87.891 ICD-9-CM: V15.82  10/11/2011 - Present Alcohol abuse ICD-10-CM: F10.10 ICD-9-CM: 305.00  10/11/2011 - Present Cough ICD-10-CM: R05 ICD-9-CM: 786.2  4/4/2011 - Present ED (erectile dysfunction) ICD-10-CM: N52.9 ICD-9-CM: 607.84  5/18/2010 - Present Hyperlipidemia ICD-10-CM: E78.5 ICD-9-CM: 272.4  5/18/2010 - Present RESOLVED: Paroxysmal atrial flutter ([facility] ICD-10-CM: I48.92 
ICD-9-CM: 427.32  Unknown - 12/29/2017 RESOLVED: Atrial flutter with rapid ventricular response ([address] ICD-10-CM: I48.92 
ICD-9-CM: 427.32  9/27/2017 - 12/29/2017 RESOLVED: Pulmonary mass ICD-10-CM: R91.8 ICD-9-CM: 786.6  7/14/2017 - 7/28/2017 RESOLVED: Hypertension ICD-10-CM: I10 
ICD-9-CM: 401.9  Unknown - 10/11/2011 Signed:   Bernardo Burnham MD 
               Family Medicine Resident 11/15/2018                5:18 PM 
 
Cc: Danette Wilkins MD

## 2018-11-15 NOTE — PROGRESS NOTES
Occupational therapy note: 
Chart reviewed. Noted patient transferred to higher level of care and undergoing CPR. Will hold OT services until patient is medically stable and appropriate for activity. Carmen James MS OTR/L

## 2018-11-15 NOTE — PROGRESS NOTES
I was informed by attending that family would like to compassionately withdraw care and transition pt into comfort care. Palliative Medicine NP is here with attending now meeting with family. Jordan Goodrich 
[phone]

## 2018-11-15 NOTE — PROGRESS NOTES
Physical Therapy: 
 
Chart reviewed. Patient transferred to a higher level care and undergoing CPR. Will hold therapy until medically stable. Thank you Arnie Adhikari, PT, DPT

## 2018-11-15 NOTE — PROCEDURES
Name: Milana Castro : 1945 Age: 75 y.o. Admit Date: 2018 MRN: [id number] Date of EE/15/2018 Patient Location: [facility] CONTINUOUS ELECTROENCEPHALOGRAM REPORT 
 
PROCEDURE: 24 HR Continuous telemetry EEG monitoring with simultaneous video EEG PROCEDURE NUMBER: [id number] RECORDING START TIME: 18 @ 1414 RECORDING END TIME: 11/15/18 @ 0110 TECHNICAL NOTES: 
This recording was performed on equipment with 32 channel capability using scalp electrodes. EEG and video-audio apparatus specifications in accordance with ASCN guidelines. Additional EKG monitoring was utilized. Scalp electrodes were placed in accordance with the International 10-20 system. Additional inferior temporal electrodes were placed at F9, F10, T9, T10, P9, and P10. DESCRIPTION OF THE RECORDING: Continuous telemetry EEG monitoring with simultaneous video was requested in this 75 y.o. male  
to appropriately capture and characterize the episodes of clinical interest for accurate diagnosis. INDICATION FOR cEEG:  
subclinical seizures ANTIEPILEPTIC DRUGS (AEDS): None SEDATIVES: 
Propofol (in last hour of study) INTUBATION: 
Yes PUSH BUTTON EVENTS: YES Code Blue at 0745 then burst suppression with myoclonus after this point. FINDINGS:  
 
BACKGROUND:  
 
Hemispheric Symmetric: 
Alpha, not posterior dominant rhythm Posterior dominant alpha at 8 Hz until 0745 after this the study is in burst suppression with myoclonus every 2 seconds Hemispheric Asymmetric: NO 
 
FOCAL SLOWING: NO 
 
AMPLITUDE:  
Left: Normal 
Right: Normal 
 
VARIABILITY: Yes CONTINUITY: 
Burst suppression (in last hour of study) REACTIVITY: Yes BREACH EFFECT: No 
 
STAGE II SLEEP (K complex and spindles):  Present but abnormal 
 
EEG IMPRESSION: 
Abnormal 
 
CLINICAL CORRELATION: 
24hr video EEG with simulatnous video monitoring was abnormal. Initially study showed normal alpha rhythm but at 0742 a code was called and after this when the study resumed at 0848 there was burst suppression with myoclonus. 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 Clinical correlation recommended.   
 
Fiona Hicks MD 
11/15/2018 
9:31 AM

## 2018-11-19 LAB
BACTERIA SPEC CULT: NORMAL
BACTERIA SPEC CULT: NORMAL
SERVICE CMNT-IMP: NORMAL
SERVICE CMNT-IMP: NORMAL

## 2018-11-20 LAB
BACTERIA SPEC CULT: NORMAL
SERVICE CMNT-IMP: NORMAL

## 2018-11-27 ENCOUNTER — TELEPHONE (OUTPATIENT)
Dept: FAMILY MEDICINE CLINIC | Age: 73
End: 2018-11-27

## 2018-11-27 NOTE — TELEPHONE ENCOUNTER
Mrs. Leno Latham from [facility] called regarding the Death Certificate. Want to know can she pick it up today.   [phone]

## 2018-11-30 ENCOUNTER — APPOINTMENT (OUTPATIENT)
Dept: INFUSION THERAPY | Age: 73
End: 2018-11-30

## 2018-12-07 ENCOUNTER — APPOINTMENT (OUTPATIENT)
Dept: INFUSION THERAPY | Age: 73
End: 2018-12-07

## 2018-12-21 ENCOUNTER — APPOINTMENT (OUTPATIENT)
Dept: INFUSION THERAPY | Age: 73
End: 2018-12-21

## 2018-12-28 ENCOUNTER — APPOINTMENT (OUTPATIENT)
Dept: INFUSION THERAPY | Age: 73
End: 2018-12-28
